# Patient Record
Sex: FEMALE | Race: WHITE | Employment: OTHER | URBAN - METROPOLITAN AREA
[De-identification: names, ages, dates, MRNs, and addresses within clinical notes are randomized per-mention and may not be internally consistent; named-entity substitution may affect disease eponyms.]

---

## 2017-02-24 ENCOUNTER — TRANSCRIBE ORDERS (OUTPATIENT)
Dept: ADMINISTRATIVE | Facility: HOSPITAL | Age: 72
End: 2017-02-24

## 2017-02-24 DIAGNOSIS — M25.562 LEFT KNEE PAIN, UNSPECIFIED CHRONICITY: Primary | ICD-10-CM

## 2017-03-02 ENCOUNTER — HOSPITAL ENCOUNTER (OUTPATIENT)
Dept: RADIOLOGY | Facility: HOSPITAL | Age: 72
Discharge: HOME | End: 2017-03-02
Attending: ORTHOPAEDIC SURGERY
Payer: MEDICARE

## 2017-03-02 DIAGNOSIS — M25.562 LEFT KNEE PAIN, UNSPECIFIED CHRONICITY: ICD-10-CM

## 2017-03-02 PROCEDURE — 78315 BONE IMAGING 3 PHASE: CPT

## 2017-03-02 PROCEDURE — A9503 TC99M MEDRONATE: HCPCS

## 2017-03-24 ENCOUNTER — APPOINTMENT (OUTPATIENT)
Dept: LAB | Facility: CLINIC | Age: 72
End: 2017-03-24
Payer: MEDICARE

## 2017-03-24 ENCOUNTER — GENERIC CONVERSION - ENCOUNTER (OUTPATIENT)
Dept: OTHER | Facility: OTHER | Age: 72
End: 2017-03-24

## 2017-03-24 DIAGNOSIS — R73.01 IMPAIRED FASTING GLUCOSE: ICD-10-CM

## 2017-03-24 DIAGNOSIS — Z79.899 OTHER LONG TERM (CURRENT) DRUG THERAPY: ICD-10-CM

## 2017-03-24 LAB
ALBUMIN SERPL BCP-MCNC: 4.1 G/DL (ref 3.5–5)
ALP SERPL-CCNC: 74 U/L (ref 46–116)
ALT SERPL W P-5'-P-CCNC: 25 U/L (ref 12–78)
ANION GAP SERPL CALCULATED.3IONS-SCNC: 4 MMOL/L (ref 4–13)
AST SERPL W P-5'-P-CCNC: 16 U/L (ref 5–45)
BILIRUB SERPL-MCNC: 0.98 MG/DL (ref 0.2–1)
BUN SERPL-MCNC: 18 MG/DL (ref 5–25)
CALCIUM SERPL-MCNC: 9.3 MG/DL (ref 8.3–10.1)
CHLORIDE SERPL-SCNC: 104 MMOL/L (ref 100–108)
CHOLEST SERPL-MCNC: 231 MG/DL (ref 50–200)
CO2 SERPL-SCNC: 34 MMOL/L (ref 21–32)
CREAT SERPL-MCNC: 0.89 MG/DL (ref 0.6–1.3)
EST. AVERAGE GLUCOSE BLD GHB EST-MCNC: 105 MG/DL
GFR SERPL CREATININE-BSD FRML MDRD: >60 ML/MIN/1.73SQ M
GLUCOSE P FAST SERPL-MCNC: 76 MG/DL (ref 65–99)
HBA1C MFR BLD: 5.3 % (ref 4.2–6.3)
HDLC SERPL-MCNC: 47 MG/DL (ref 40–60)
LDLC SERPL CALC-MCNC: 152 MG/DL (ref 0–100)
POTASSIUM SERPL-SCNC: 3.7 MMOL/L (ref 3.5–5.3)
PROT SERPL-MCNC: 7.4 G/DL (ref 6.4–8.2)
SODIUM SERPL-SCNC: 142 MMOL/L (ref 136–145)
TRIGL SERPL-MCNC: 161 MG/DL
TSH SERPL DL<=0.05 MIU/L-ACNC: 3.5 UIU/ML (ref 0.36–3.74)

## 2017-03-24 PROCEDURE — 83036 HEMOGLOBIN GLYCOSYLATED A1C: CPT

## 2017-03-24 PROCEDURE — 80061 LIPID PANEL: CPT

## 2017-03-24 PROCEDURE — 84443 ASSAY THYROID STIM HORMONE: CPT

## 2017-03-24 PROCEDURE — 80053 COMPREHEN METABOLIC PANEL: CPT

## 2017-03-24 PROCEDURE — 36415 COLL VENOUS BLD VENIPUNCTURE: CPT

## 2017-03-30 ENCOUNTER — ALLSCRIPTS OFFICE VISIT (OUTPATIENT)
Dept: OTHER | Facility: OTHER | Age: 72
End: 2017-03-30

## 2017-04-10 ENCOUNTER — GENERIC CONVERSION - ENCOUNTER (OUTPATIENT)
Dept: OTHER | Facility: OTHER | Age: 72
End: 2017-04-10

## 2017-07-20 ENCOUNTER — APPOINTMENT (OUTPATIENT)
Dept: AUDIOLOGY | Facility: CLINIC | Age: 72
End: 2017-07-20
Payer: COMMERCIAL

## 2017-07-25 ENCOUNTER — ALLSCRIPTS OFFICE VISIT (OUTPATIENT)
Dept: OTHER | Facility: OTHER | Age: 72
End: 2017-07-25

## 2017-09-11 ENCOUNTER — GENERIC CONVERSION - ENCOUNTER (OUTPATIENT)
Dept: OTHER | Facility: OTHER | Age: 72
End: 2017-09-11

## 2017-09-11 ENCOUNTER — APPOINTMENT (OUTPATIENT)
Dept: LAB | Facility: CLINIC | Age: 72
End: 2017-09-11
Payer: MEDICARE

## 2017-09-11 ENCOUNTER — TRANSCRIBE ORDERS (OUTPATIENT)
Dept: ADMINISTRATIVE | Facility: HOSPITAL | Age: 72
End: 2017-09-11

## 2017-09-11 DIAGNOSIS — Z12.31 ENCOUNTER FOR SCREENING MAMMOGRAM FOR MALIGNANT NEOPLASM OF BREAST: ICD-10-CM

## 2017-09-11 DIAGNOSIS — E78.5 HYPERLIPIDEMIA: ICD-10-CM

## 2017-09-11 DIAGNOSIS — Z12.31 VISIT FOR SCREENING MAMMOGRAM: Primary | ICD-10-CM

## 2017-09-11 LAB
ALBUMIN SERPL BCP-MCNC: 4.1 G/DL (ref 3.5–5)
ALP SERPL-CCNC: 88 U/L (ref 46–116)
ALT SERPL W P-5'-P-CCNC: 26 U/L (ref 12–78)
ANION GAP SERPL CALCULATED.3IONS-SCNC: 8 MMOL/L (ref 4–13)
AST SERPL W P-5'-P-CCNC: 20 U/L (ref 5–45)
BILIRUB SERPL-MCNC: 0.85 MG/DL (ref 0.2–1)
BUN SERPL-MCNC: 19 MG/DL (ref 5–25)
CALCIUM SERPL-MCNC: 9.2 MG/DL (ref 8.3–10.1)
CHLORIDE SERPL-SCNC: 102 MMOL/L (ref 100–108)
CHOLEST SERPL-MCNC: 214 MG/DL (ref 50–200)
CO2 SERPL-SCNC: 29 MMOL/L (ref 21–32)
CREAT SERPL-MCNC: 0.85 MG/DL (ref 0.6–1.3)
GFR SERPL CREATININE-BSD FRML MDRD: 69 ML/MIN/1.73SQ M
GLUCOSE P FAST SERPL-MCNC: 87 MG/DL (ref 65–99)
HDLC SERPL-MCNC: 44 MG/DL (ref 40–60)
LDLC SERPL CALC-MCNC: 148 MG/DL (ref 0–100)
POTASSIUM SERPL-SCNC: 3.8 MMOL/L (ref 3.5–5.3)
PROT SERPL-MCNC: 7.5 G/DL (ref 6.4–8.2)
SODIUM SERPL-SCNC: 139 MMOL/L (ref 136–145)
TRIGL SERPL-MCNC: 112 MG/DL

## 2017-09-11 PROCEDURE — 36415 COLL VENOUS BLD VENIPUNCTURE: CPT

## 2017-09-11 PROCEDURE — 80053 COMPREHEN METABOLIC PANEL: CPT

## 2017-09-11 PROCEDURE — 80061 LIPID PANEL: CPT

## 2017-10-12 ENCOUNTER — HOSPITAL ENCOUNTER (OUTPATIENT)
Dept: RADIOLOGY | Facility: HOSPITAL | Age: 72
Discharge: HOME | End: 2017-10-12
Payer: MEDICARE

## 2017-10-12 DIAGNOSIS — Z12.31 VISIT FOR SCREENING MAMMOGRAM: ICD-10-CM

## 2017-10-16 ENCOUNTER — ALLSCRIPTS OFFICE VISIT (OUTPATIENT)
Dept: OTHER | Facility: OTHER | Age: 72
End: 2017-10-16

## 2017-10-16 DIAGNOSIS — N64.4 MASTODYNIA: ICD-10-CM

## 2017-10-30 ENCOUNTER — GENERIC CONVERSION - ENCOUNTER (OUTPATIENT)
Dept: OTHER | Facility: OTHER | Age: 72
End: 2017-10-30

## 2017-10-30 ENCOUNTER — HOSPITAL ENCOUNTER (OUTPATIENT)
Dept: RADIOLOGY | Facility: HOSPITAL | Age: 72
Discharge: HOME | End: 2017-10-30
Payer: MEDICARE

## 2017-10-30 DIAGNOSIS — N64.4 MASTODYNIA: ICD-10-CM

## 2017-10-30 PROCEDURE — G0204 DX MAMMO INCL CAD BI: HCPCS

## 2017-10-30 PROCEDURE — G0279 TOMOSYNTHESIS, MAMMO: HCPCS

## 2018-01-09 NOTE — RESULT NOTES
Message   Appointment 3/30/17   Please print and put in my folder  Dr Moore       Verified Results  (1) COMPREHENSIVE METABOLIC PANEL 86XRJ3530 51:25WZ "[facility] Order Number: [id number]     Test Name Result Flag Reference   SODIUM 142 mmol/L  136-145   POTASSIUM 3 7 mmol/L  3 5-5 3   CHLORIDE 104 mmol/L  100-108   CARBON DIOXIDE 34 mmol/L H 21-32   ANION GAP (CALC) 4 mmol/L  4-13   BLOOD UREA NITROGEN 18 mg/dL  5-25   CREATININE 0 89 mg/dL  0 60-1 30   Standardized to IDMS reference method   CALCIUM 9 3 mg/dL  8 3-10 1   BILI, TOTAL 0 98 mg/dL  0 20-1 00   ALK PHOSPHATAS 74 U/L     ALT (SGPT) 25 U/L  12-78   AST(SGOT) 16 U/L  5-45   ALBUMIN 4 1 g/dL  3 5-5 0   TOTAL PROTEIN 7 4 g/dL  6 4-8 2   eGFR Non-African American      >60 0 ml/min/1 73sq m   - Patient Instructions: This is a fasting blood test  Water, black tea or black coffee only after 9:00pm the night before test Drink 2 glasses of water the morning of test - Patient Instructions: This bloodwork is non-fasting  Please drink two glasses of   water morning of bloodwork  National Kidney Disease Education Program recommendations are as follows:  GFR calculation is accurate only with a steady state creatinine  Chronic Kidney disease less than 60 ml/min/1 73 sq  meters  Kidney failure less than 15 ml/min/1 73 sq  meters  GLUCOSE FASTING 76 mg/dL  65-99     (1) HEMOGLOBIN A1C 36VZH6554 09:10AM "[facility] Order Number: [id number]     Test Name Result Flag Reference   HEMOGLOBIN A1C 5 3 %  4 2-6 3   EST  AVG  GLUCOSE 105 mg/dl       (1) LIPID PANEL FASTING W DIRECT LDL REFLEX [id number] 09:10AM "[facility] Order Number: [id number]     Test Name Result Flag Reference   CHOLESTEROL 231 mg/dL H    LDL CHOLESTEROL CALCULATED 152 mg/dL H 0-100   - Patient Instructions:  This is a fasting blood test  Water, black tea or black coffee only after 9:00pm the night before test   Drink 2 glasses of water the morning of test     - Patient Instructions: This is a fasting blood test  Water, black tea or black coffee only after 9:00pm the night before test Drink 2 glasses of water the morning of test - Patient Instructions: This bloodwork is non-fasting  Please drink two glasses of   water morning of bloodwork  Triglyceride:         Normal              <150 mg/dl       Borderline High    150-199 mg/dl       High               200-499 mg/dl       Very High          >499 mg/dl  Cholesterol:         Desirable        <200 mg/dl      Borderline High  200-239 mg/dl      High             >239 mg/dl  HDL Cholesterol:        High    >59 mg/dL      Low     <41 mg/dL  LDL Cholesterol:        Optimal          <100 mg/dl        Near Optimal     100-129 mg/dl        Above Optimal          Borderline High   130-159 mg/dl          High              160-189 mg/dl          Very High        >189 mg/dl  LDL CALCULATED:    This screening LDL is a calculated result  It does not have the accuracy of the Direct Measured LDL in the monitoring of patients with hyperlipidemia and/or statin therapy  Direct Measure LDL (NAQ479) must be ordered separately in these patients  TRIGLYCERIDES 161 mg/dL H <=150   Specimen collection should occur prior to N-Acetylcysteine or Metamizole administration due to the potential for falsely depressed results  HDL,DIRECT 47 mg/dL  40-60   Specimen collection should occur prior to Metamizole administration due to the potential for falsely depressed results  (1) TSH 48TVH8815 09:10AM Manoj Johnson Order Number: [id number]     Test Name Result Flag Reference   TSH 3 500 uIU/mL  0 358-3 740   - Patient Instructions: This bloodwork is non-fasting  Please drink two glasses of water morning of bloodwork  - Patient Instructions:  This is a fasting blood test  Water, black tea or black coffee only after 9:00pm the night before test Drink 2 glasses of water the morning of test - Patient Instructions: This bloodwork is non-fasting  Please drink two glasses of   water morning of bloodwork  Patients undergoing fluorescein dye angiography may retain small amounts of fluorescein in the body for 48-72 hours post procedure  Samples containing fluorescein can produce falsely depressed TSH values  If the patient had this procedure,a specimen should be resubmitted post fluorescein clearance            The recommended reference ranges for TSH during pregnancy are as follows:  First trimester 0 1 to 2 5 uIU/mL  Second trimester  0 2 to 3 0 uIU/mL  Third trimester 0 3 to 3 0 uIU/m

## 2018-01-09 NOTE — MISCELLANEOUS
To Whom It May Concern:    Kathleen Parker has Sjogren's syndrome and shortness of breath that is not controlled  She is currently unable to travel safely by air  Please allow her to cancel her upcoming flight on 6/10/16         Thank you,              Electronically signed by:Ally Reddy DO  May 20 2016 11:38AM EST Author

## 2018-01-10 NOTE — PROGRESS NOTES
Assessment    1  Sjogren's syndrome (710 2) (M35 00)   2  Primary generalized (osteo)arthritis (715 09) (M15 0)   3  NSAID long-term use (V58 64) (Z79 1)   4  GERD without esophagitis (530 81) (K21 9)   5  Asthma, mild persistent (493 90) (J45 30)    Plan    1  Hydroxychloroquine Sulfate 200 MG Oral Tablet; take 1 tablet twice daily after meals   2  (1) CBC/PLT/DIFF; Status:Active; Requested for:98Wjn1433;    3  (1) COMPREHENSIVE METABOLIC PANEL; Status:Active; Requested for:46Blb2119;    4  (1) C-REACTIVE PROTEIN; Status:Active; Requested for:62Cdc0444;    5  (1) SED RATE; Status:Active; Requested for:17Kjc0704;    6  Follow-up visit in 2 months Evaluation and Treatment  Follow-up  Status: Hold For -   Scheduling  Requested for: 20OVE9417    7  Call [phone] if: The pain seems worse ; Status:Complete;   Done: 23UQP0891   8  Call [phone] if: The symptoms seem worse ; Status:Complete;   Done:   78PLJ6629   9  Call [phone] if: You have questions or concerns about your problem ;   Status:Complete;   Done: 73PKY7686    Discussion/Summary    Ms Partha Flynn did undergo a left total knee arthroplasty since her last evaluation, and ago she has been progressing well in physical therapy, she still continues to have significant pain in her left knee which is worse than prior to surgery  She had the surgery proximally 2 months ago  She does continue to have some difficulty walking because of her pain, as well as worsening swelling in her legs and worsening acid reflux  She does feel that inclement weather seems to worsen her pain  She has been taking aspirin as well as Tylenol for her pain with some good relief  She also complains of pain in her shoulders, feet, right hip, and lower back  She states that her pain is constant, but the intensity varies  She has noted swelling in her hands  She also reports significant shortness of breath especially with exertion   She does report morning stiffness which typically worsens throughout the day  She also reports difficulty sleeping because of the pain and fatigue, but she denies any nonrestorative sleep  On exam, there is synovitis of the MCPs and PIPs of the hands, as well as the bilateral ankles  There is significant tenderness to palpation of mildly increased warmth of the left knee which is status post TKA  She also has crepitus of the right knee  No new labs were available for review today  At this time, her Sjogren's syndrome does appear mildly active and uncontrolled off of any DMARD therapy  We did discuss several treatment options, and we have opted to restart Plaquenil 200 mg twice a day  She is aware of the need for an annual eye exam while taking this medication  Hopefully this will limit her need to take aspirin for her discomfort, as she is having significant reflux symptoms  I will reevaluate her in 2 months  She will call in the interim if there are any questions or concerns  Counseling  Rheumatology Counseling Documentation: The patient was counseled regarding instructions for management and impressions  Chief Complaint  F/U Sjogren's and OA   Patient is here today for follow up of chronic conditions described in HPI  History of Present Illness  Pt  returns for F/U for Sjogren's and OA  Had L TKA since last visit  Progressing well in PT  Still with pain in L, but worse than before surgery  Just had surgery 2 months ago  Still with some difficulty walking due to pain  c/o swelling in legs and worsening GERD  Inclement weather worsens pain  Taking ASA and Tylenol for pain with good relief  Also with pain in shoulders, feet, R hip, and lower back  Pain is constant but intensity varies  + swelling in hands as well  Also with SOB especially with exertion  + AM stiffness which worsens throughout day  + difficulty sleeping due to pain  No non-restorative sleep  + fatigue       RAPID3: 7 0/30      Review of Systems    Constitutional: fatigue and recent 13 lb weight loss, but no fever, no recent weight gain, no chills and no anorexia  HEENT: dryness mouth, but no blurred vision, no double vision, no amaurosis fugax, no erythema eye(s), no mouth sores, not feeling congested and no sore throat    The patient presents with complaints of dryness of the eyes  Symptoms are improving  The patient presents with complaints of bilateral eye pain, described as burning  Cardiovascular: dyspnea on exertion and swelling in the arms or legs    The patient presents with complaints of chest pain or pressure (a/w GERD)  Respiratory: shortness of breath, but no pleurisy    The patient presents with complaints of unusual or persistent cough, described as dry  Gastrointestinal: heartburn and diarrhea, but no vomiting, no constipation, no melena and no BRBPR    The patient presents with complaints of mild epigastric abdominal pain  The patient presents with complaints of occasional episodes of nausea  Genitourinary: no foamy urine, but no dysuria and no hematuria  Musculoskeletal: as noted in HPI  Integumentary alopecia and nail changes, but no rash, no Raynaud's and no photosensitivity  Endocrine no polyuria and no polydipsia  Hematologic/Lymphatic: no unusual bleeding and no tendency for easy bruising  Neurological: tingling and weakness, but no headache    The patient presents with complaints of occasional episodes of vertigo or dizziness, described as loss of balance  Symptoms are made worse by turning head  Psychiatric: insomnia, but no non-restorative sleep  Active Problems    1  Asthma, mild persistent (493 90) (J45 30)   2  Body mass index (BMI) of 33 0-33 9 in adult (V85 33) (N81 17)   3  Edema (782 3) (R60 9)   4  Exertional shortness of breath (786 05) (R06 02)   5  Fatigue (780 79) (R53 83)   6  GERD without esophagitis (530 81) (K21 9)   7  Impaired fasting glucose (790 21) (R73 01)   8  Long term use of drug (V58 69) (Z79 899)   9  NSAID long-term use (V58 64) (Z79 1)   10  Palpitations (785 1) (R00 2)   11  Plantar fasciitis, right (728 71) (M72 2)   12  Pre-operative cardiovascular examination (V72 81) (Z01 810)   13  Primary generalized (osteo)arthritis (715 09) (M15 0)   14  Sjogren's syndrome (710 2) (M35 00)    Past Medical History    1  History of Acute UTI (599 0) (N39 0)   2  History of Encounter for screening mammogram for malignant neoplasm of breast   (V76 12) (Z12 31)   3  History of Generalized osteoarthritis (715 00) (M15 9)   4  History of Hearing problem, unspecified laterality (V41 2) (H91 90)   5  History of cataract (V12 49) (Z86 69)   6  History of gastroesophageal reflux (GERD) (V12 79) (Z87 19)   7  History of high cholesterol (V12 29) (Z86 39)   8  History of polyarthritis (V13 4) (Z87 39)   9  History of Long-term use of hydroxychloroquine (V58 69) (Z79 899)   10  Need for pneumococcal vaccination (V03 82) (Z23)   11  Preop examination (V72 84) (Z01 818)   12  History of Screening for thyroid disorder (V77 0) (Z13 29)   13  Sjogren's syndrome (710 2) (M35 00)   14  History of SS-A antibody positive (795 79) (R76 8)    The active problems and past medical history were reviewed and updated today  Surgical History    1  History of Anterior Colporrhaphy, Repair Of Cystocele   2  History of Appendectomy   3  History of Bladder Surgery   4  History of Breast Surgery Enlargement Procedure Elective Bilateral   5  History of Colonoscopy (Fiberoptic) Screening   6  History of Hysterectomy   7  History of Knee Arthroplasty   8  History of Knee Surgery Left   9  History of Oophorectomy - Bilateral (Removal Of Both Ovaries)   10  History of Tonsillectomy With Adenoidectomy   11  History of Urethra Surgery    The surgical history was reviewed and updated today  Family History  Mother    1  Family history of CAD (coronary artery disease)   2  Family history of chronic obstructive pulmonary disease (V17 6) (Z82 5)   3  Family history of hypertension (V17 49) (Z82 49)   4  Family history of High cholesterol  Father    5  Family history of CAD (coronary artery disease)   6  Family history of arthritis (V17 7) (Z82 61)   7  Family history of chronic obstructive pulmonary disease (V17 6) (Z82 5)   8  Family history of hypertension (V17 49) (Z82 49)   9  Family history of High cholesterol  Brother    10  Family history of CAD (coronary artery disease)   6  Family history of hypertension (V17 49) (Z82 49)   12  Family history of lung cancer (V16 1) (Z80 1)   13  Family history of High cholesterol  Maternal Grandmother    14  Family history of diabetes mellitus (V18 0) (Z83 3)   15  Family history of malignant neoplasm of breast (V16 3) (Z80 3)  Paternal Grandmother    12  Family history of malignant neoplasm of breast (V16 3) (Z80 3)  Family History    17  Family history of arthritis (V17 7) (Z82 61)   18  Family history of asthma (V17 5) (Z82 5)   19  Denied: Family history of Crohn's disease   21  Denied: Family history of psoriasis   21  Denied: Family history of rheumatoid arthritis   22  Denied: Family history of systemic lupus erythematosus   23  Denied: Family history of ulcerative colitis    The family history was reviewed and updated today  Social History    · Lack of adequate sleep (V69 4) (Z72 820)   · Lack of exercise (V69 0) (Z72 3)   ·    · Never smoker   · No drug use   · Occasional alcohol use   · Retired  The social history was reviewed and updated today  The social history was reviewed and is unchanged  Current Meds   1  Aspirin  MG Oral Tablet Delayed Release; take 2 tablet twice daily; Therapy: (Recorded:33Xic8012) to Recorded   2  B Complete TABS; TAKE 1 TABLET DAILY; Therapy: ([phone]) to Recorded   3  Colace 100 MG Oral Capsule; TAKE 1 CAPSULE DAILY; Therapy: ([phone]) to Recorded   4  Cranberry CAPS; take 1 capsule daily;    Therapy: ([phone]) to Recorded   5  Flovent  MCG/ACT Inhalation Aerosol; INHALE 2 PUFFS TWICE DAILY  RINSE   MOUTH AFTER USE  Requested for: 48QDT5867; Last Rx:94Mls0120 Ordered   6  Furosemide 40 MG Oral Tablet; TAKE 1 TABLET DAILY; Therapy: 03Tmq7878 to (Evaluate:17Jan2016); Last Rx:19Oct2015 Ordered   7  Hydrocodone-Acetaminophen 5-325 MG Oral Tablet; TAKE 1 TABLET 3 times daily PRN   pain; Therapy: (Recorded:17May2016) to Recorded   8  Klor-Con 10 10 MEQ Oral Tablet Extended Release; One a day  Requested for:   28RNE5403; Last Rx:19Oct2015 Ordered   9  Magnesium CAPS; take 1 capsule daily; Therapy: (Cary Seed) to Recorded   10  Multivitamins TABS; TAKE 1 TABLET DAILY; Therapy: (Cary Seed) to Recorded   11  ProAir  (90 Base) MCG/ACT Inhalation Aerosol Solution; 2 puffs Q4 hours prn    SOB/wheeze; Therapy: 77NNA8576 to (Last Rx:29May2015)  Requested for: 05TNW1801 Ordered   12  Probiotic Oral Capsule; take 1 capsule daily; Therapy: (Cary Seed) to Recorded   13  Restasis 0 05 % Ophthalmic Emulsion; INSTILL 1 DROP IN EACH EYE TWICE DAILY    Recorded   14  Tylenol Extra Strength 500 MG TABS; TAKE 2 TABLET Twice daily PRN pain; Therapy: (Recorded:17May2016) to Recorded   15  Vitamin D3 CAPS; take 1 capsule daily; Therapy: (Cary Seed) to Recorded    The medication list was reviewed and updated today  Immunizations  Influenza --- Kisha Shirley: 77ROH4942   Pneumococcal --- Kisha Shirley: 47QVP0232     Allergies    1  CeleBREX CAPS    Vitals  Signs [Data Includes: Current Encounter]   Recorded: 77URY5714 01:24PM   Heart Rate: 60  Systolic: 359  Diastolic: 82  Weight: 658 lb   BMI Calculated: 31 8  BSA Calculated: 1 99    Physical Exam    Constitutional   General appearance: Abnormal   obese  Eyes   Conjunctiva and lids: No swelling, erythema or discharge  Pupils and irises: Equal, round and reactive to light      Ears, Nose, Mouth, and Throat   External inspection of ears and nose: Normal     Oropharynx: Abnormal   Oral mucosa was dry  Pulmonary   Respiratory effort: No increased work of breathing or signs of respiratory distress  Auscultation of lungs: Clear to auscultation  Cardiovascular   Auscultation of heart: Normal rate and rhythm, normal S1 and S2, without murmurs  Examination of extremities for edema and/or varicosities: Normal     Lymphatic   Palpation of lymph nodes in neck: No lymphadenopathy  Psychiatric   Orientation to person, place, and time: Normal     Mood and affect: Normal         Right glenohumeral joint tenderness  Left glenohumeral joint tenderness  Right great toe MTP tenderness  Left knee tenderness and restricted ROM  Right Upper Extremity: Right Hand: Right Hand Appearance: + OA changes 1st MTP  Right Wrist: Right Elbow: Right Shoulder:   Left Upper Extremity: Left Hand: Appearance: + OA changes 1st MTP  Left Wrist: Left Elbow: Left Shoulder:   Musculoskeletal - Joints, bones, and muscles: Abnormal  Palpation - right knee crepitus and L TKA  Skin - Skin and subcutaneous tissue: Normal    Neurologic - Sensation: Normal      The patient has tenderness in all PIP and DIP joints of the right hand  The patient has tenderness in all PIP and DIP joints of the left hand        Future Appointments    Date/Time Provider Specialty Site   05/19/2016 09:30 AM Tracee Julien [address] [place] [address]   06/27/2016 09:15 AM Tracee Julien [address] [place] [address]   07/18/2016 02:40 PM Renu Barrios DO Rheumatology [facility] [address] [facility]     Signatures   Electronically signed by : Kvng Escobar DO; May 17 2016  6:57PM EST                       (Author)

## 2018-01-10 NOTE — RESULT NOTES
Verified Results  * MAMMO SCREENING BILATERAL W CAD 40UFM0677 10:38AM [facility] Order Number: [id number]    - Patient Instructions: To schedule this appointment, please contact Central Scheduling at [phone]  Do not wear any perfume, powder, lotion or deodorant on breast or underarm area  Please bring your doctors order, referral (if needed) and insurance information with you on the day of the test  Failure to bring this information may result in this test being rescheduled  Arrive 15 minutes prior to your appointment time to register  On the day of your test, please bring any prior mammogram or breast studies with you that were not performed at a [facility]  Failure to bring prior exams may result in your test needing to be rescheduled  TW Order Number: [id number]    - Patient Instructions: To schedule this appointment, please contact Central Scheduling at [phone]  Do not wear any perfume, powder, lotion or deodorant on breast or underarm area  Please bring your doctors order, referral (if needed) and insurance information with you on the day of the test  Failure to bring this information may result in this test being rescheduled  Arrive 15 minutes prior to your appointment time to register  On the day of your test, please bring any prior mammogram or breast studies with you that were not performed at a [facility]  Failure to bring prior exams may result in your test needing to be rescheduled  TW Order Number: [id number]    - Patient Instructions: To schedule this appointment, please contact Central Scheduling at [phone]  Do not wear any perfume, powder, lotion or deodorant on breast or underarm area  Please bring your doctors order, referral (if needed) and insurance information with you on the day of the test  Failure to bring this information may result in this test being rescheduled   Arrive 15 minutes prior to your appointment time to register  On the day of your test, please bring any prior mammogram or breast studies with you that were not performed at a [facility]  Failure to bring prior exams may result in your test needing to be rescheduled  Test Name Result Flag Reference   MAMMO SCREENING BILATERAL W CAD (Report)     Patient History:   Patient is postmenopausal    Family history of unknown cancer in paternal grandmother at age    48 or over and breast cancer in maternal grandmother at age 48 or   over  Reductions of both breasts, May 5, 1998  Taking unspecified hormones for 18 years  Patient has never smoked  Patient's BMI is 30 3  Reason for exam: screening (asymptomatic)  Mammo Screening Bilateral W CAD: September 9, 2016 - Check In #:    [de-identified]   Bilateral CC and MLO view(s) were taken  Technologist: ILIANA Bergman   Prior study comparison: June 24, 2015, bilateral screening    mammogram  June 16, 2014, bilateral screening mammogram      The breast tissue is almost entirely fat  No new dominant soft    tissue mass, architectural distortion or suspicious    calcifications are noted  The skin and nipple structures are    within normal limits  Benign appearing calcifications are noted  No mammographic evidence of malignancy  No    significant changes when compared with prior studies  ASSESSMENT: BiRad:2 - Benign     Recommendation:   Routine screening mammogram of both breasts in 1 year  Analyzed by CAD     8-10% of cancers will be missed on mammography  Management of a    palpable abnormality must be based on clinical grounds  Patients   will be notified of their results via letter from our facility  Accredited by [employer] of [employer] and FDA       Transcription Location: L[facility] Doll [facility]: [id number]     Risk Value(s):   Tyrer-Cuzick 10 Year: 5 832%, Tyrer-Cuzick Lifetime: 8 465%,    Myriad Table: 1 5%, KATHLEEN 5 Year: 1 4%, NCI Lifetime: 4 0%   Signed by: Kaela Reyes MD   9/9/16       Discussion/Summary   Key Vides,   Your mammogram is normal    Dr Tay Sigala

## 2018-01-12 VITALS
HEART RATE: 72 BPM | WEIGHT: 199 LBS | DIASTOLIC BLOOD PRESSURE: 78 MMHG | BODY MASS INDEX: 31.98 KG/M2 | RESPIRATION RATE: 16 BRPM | SYSTOLIC BLOOD PRESSURE: 120 MMHG | TEMPERATURE: 97.4 F | HEIGHT: 66 IN

## 2018-01-12 VITALS
SYSTOLIC BLOOD PRESSURE: 110 MMHG | DIASTOLIC BLOOD PRESSURE: 80 MMHG | BODY MASS INDEX: 31.85 KG/M2 | HEART RATE: 70 BPM | OXYGEN SATURATION: 97 % | WEIGHT: 198.19 LBS | HEIGHT: 66 IN

## 2018-01-12 NOTE — PROGRESS NOTES
Assessment    1  Sjogren's syndrome (710 2) (M35 00)   2  Primary generalized (osteo)arthritis (715 09) (M15 0)   3  NSAID long-term use (V58 64) (Z79 1)   4  GERD without esophagitis (530 81) (K21 9)   5  Asthma, mild persistent (493 90) (J45 30)    Plan    1  Call [phone] if: The pain seems worse ; Status:Complete;   Done: 16CBK6949   2  Call [phone] if: The symptoms seem worse ; Status:Complete;   Done:   54WUS5538   3  Call [phone] if: You have questions or concerns about your problem ;   Status:Complete;   Done: 92PHB7339   4  Follow-up visit in 3 months Evaluation and Treatment  Follow-up  Status: Complete    Done: 87DXM0504    Discussion/Summary    Ms Shin Henriquez has been feeling generally about the same since her last evaluation  She has continue to have significant amounts of bilateral knee pain, left more so than right  She was seen by Orthopedics, and she is scheduled to undergo a left total knee arthroplasty in March  She also continues to have some discomfort in her bilateral hands and feet  She has been taking aspirin 650 mg twice a day with some good relief  She does report some occasional swelling in her hands and her feet, as well as her knees  On exam, there is no active synovitis  She does have crepitus of the bilateral knees, as well as osteoarthritic changes in the hands  No new labs were available for review today  At this time, her Sjogren's syndrome does appear mildly active but relatively stable  I believe the majority of her symptoms are due to her primary generalized osteoarthritis at this time  She did express some interest in possibly retrying the Plaquenil at some point when she is healed from her surgery  We will continue the aspirin as needed for now, but this will need to be stopped perioperatively  I will plan to reevaluate her in 2 months  She will call in the interim if there are any questions or concerns        Counseling  Rheumatology Counseling Documentation: The patient was counseled regarding instructions for management, impressions and risks and benefits of treatment options  Chief Complaint  F/U Sjogren's and OA   Patient is here today for follow up of chronic conditions described in HPI  History of Present Illness  Pt  returns for F/U for Sjogren's and OA  Feeling better since last visit as long as she takes ASA BID  Still with pain in hands, knees, and feet  Also with mild R hip pain  Had worsening L knee pain over the holidays, which caused increased R knee pain  To have L TKA in March  + swelling in knees and hands  + AM stiffness x 30-60 minutes  + difficulty sleeping due to pain  + non-restorative sleep  + occasional fatigue  Had flu vaccine  Had pneumonia and shingles vaccines  RAPID3: not completed      Review of Systems    Constitutional: recent 3-5 lb weight gain and chills, but no fever, no recent weight loss and no anorexia    The patient presents with complaints of occasional episodes of fatigue  HEENT: dryness mouth, but no blurred vision, no double vision, no amaurosis fugax, no eye pain, no erythema eye(s), no mouth sores, not feeling congested and no sore throat    The patient presents with complaints of dryness of the eyes  Symptoms are improved by prescription eye drops  Cardiovascular: swelling in the arms or legs, but no dyspnea on exertion    The patient presents with complaints of epigastric chest pain or pressure (a/w too much ASA)  Respiratory: no shortness of breath and no pleurisy    The patient presents with complaints of constant episodes of unusual or persistent cough, described as dry  Gastrointestinal: heartburn and BRBPR, but no abdominal pain, no vomiting, no diarrhea, no constipation and no melena    The patient presents with complaints of occasional episodes of nausea (2/2 meds)  Genitourinary: no foamy urine, but no dysuria and no hematuria  Musculoskeletal: as noted in HPI     Integumentary no rash, no Raynaud's, no alopecia, no nail changes and no photosensitivity  Endocrine no polyuria and no polydipsia  Hematologic/Lymphatic: no unusual bleeding and no tendency for easy bruising  Neurological: headache and weakness, but no vertigo or dizziness and no tingling  Psychiatric: insomnia and non-restorative sleep  Active Problems    1  Asthma, mild persistent (493 90) (J45 30)   2  Edema (782 3) (R60 9)   3  Exertional shortness of breath (786 05) (R06 02)   4  Fatigue (780 79) (R53 83)   5  GERD without esophagitis (530 81) (K21 9)   6  Impaired fasting glucose (790 21) (R73 01)   7  Long term use of drug (V58 69) (Z79 899)   8  NSAID long-term use (V58 64) (Z79 1)   9  Palpitations (785 1) (R00 2)   10  Plantar fasciitis, right (728 71) (M72 2)   11  Pre-operative cardiovascular examination (V72 81) (Z01 810)   12  Primary generalized (osteo)arthritis (715 09) (M15 0)   13  Sjogren's syndrome (710 2) (M35 00)    Past Medical History    1  History of Acute UTI (599 0) (N39 0)   2  History of Encounter for screening mammogram for malignant neoplasm of breast   (V76 12) (Z12 31)   3  History of Generalized osteoarthritis (715 00) (M15 9)   4  History of Hearing problem, unspecified laterality (V41 2) (H91 90)   5  History of cataract (V12 49) (Z86 69)   6  History of gastroesophageal reflux (GERD) (V12 79) (Z87 19)   7  History of high cholesterol (V12 29) (Z86 39)   8  History of polyarthritis (V13 4) (Z87 39)   9  History of Long-term use of hydroxychloroquine (V58 69) (Z79 899)   10  Need for pneumococcal vaccination (V03 82) (Z23)   11  History of Screening for thyroid disorder (V77 0) (Z13 29)   12  Sjogren's syndrome (710 2) (M35 00)   13  History of SS-A antibody positive (795 79) (R76 0)    The active problems and past medical history were reviewed and updated today  Surgical History    1  History of Anterior Colporrhaphy, Repair Of Cystocele   2  History of Appendectomy   3   History of Bladder Surgery   4  History of Breast Surgery Enlargement Procedure Elective Bilateral   5  History of Colonoscopy (Fiberoptic) Screening   6  History of Hysterectomy   7  History of Knee Surgery Left   8  History of Oophorectomy - Bilateral (Removal Of Both Ovaries)   9  History of Tonsillectomy With Adenoidectomy   10  History of Urethra Surgery    The surgical history was reviewed and updated today  Family History    1  Family history of CAD (coronary artery disease)   2  Family history of chronic obstructive pulmonary disease (V17 6) (Z82 5)   3  Family history of hypertension (V17 49) (Z82 49)   4  Family history of High cholesterol    5  Family history of CAD (coronary artery disease)   6  Family history of arthritis (V17 7) (Z82 61)   7  Family history of chronic obstructive pulmonary disease (V17 6) (Z82 5)   8  Family history of hypertension (V17 49) (Z82 49)   9  Family history of High cholesterol    10  Family history of CAD (coronary artery disease)   6  Family history of hypertension (V17 49) (Z82 49)   12  Family history of lung cancer (V16 1) (Z80 1)   13  Family history of High cholesterol    14  Family history of diabetes mellitus (V18 0) (Z83 3)   15  Family history of malignant neoplasm of breast (V16 3) (Z80 3)    16  Family history of malignant neoplasm of breast (V16 3) (Z80 3)    17  Family history of arthritis (V17 7) (Z82 61)   18  Family history of asthma (V17 5) (Z82 5)   19  Denied: Family history of Crohn's disease   21  Denied: Family history of psoriasis   21  Denied: Family history of rheumatoid arthritis   22  Denied: Family history of systemic lupus erythematosus   23  Denied: Family history of ulcerative colitis    The family history was reviewed and updated today         Social History    · Lack of adequate sleep (V69 4) (Z72 820)   · Lack of exercise (V69 0) (Z72 3)   ·    · Never smoker   · No drug use   · Occasional alcohol use   · Retired  The social history was reviewed and updated today  The social history was reviewed and is unchanged  Current Meds   1  Acetaminophen-Codeine #3 300-30 MG Oral Tablet; as directed; Last Rx:69San3832   Ordered   2  Aspirin  MG Oral Tablet Delayed Release; take 2 tablet twice daily; Therapy: (Recorded:72Rdt7244) to Recorded   3  B Complete TABS; TAKE 1 TABLET DAILY; Therapy: (Adrienne Morehouse) to Recorded   4  Colace 100 MG Oral Capsule; TAKE 1 CAPSULE DAILY; Therapy: (Adrienne Morehouse) to Recorded   5  Cranberry CAPS; take 1 capsule daily; Therapy: (Adrienne Morehouse) to Recorded   6  Flovent  MCG/ACT Inhalation Aerosol; INHALE 2 PUFFS TWICE DAILY  RINSE   MOUTH AFTER USE Recorded   7  Furosemide 40 MG Oral Tablet; TAKE 1 TABLET DAILY; Therapy: 38Arj4917 to (Evaluate:17Jan2016); Last Rx:19Oct2015 Ordered   8  Klor-Con 10 10 MEQ Oral Tablet Extended Release; One a day  Requested for:   31NGS4409; Last Rx:19Oct2015 Ordered   9  Magnesium CAPS; take 1 capsule daily; Therapy: (Adrienne Morehouse) to Recorded   10  Multivitamins TABS; TAKE 1 TABLET DAILY; Therapy: (Adrienne Morehouse) to Recorded   11  Omeprazole 20 MG Oral Capsule Delayed Release; take 1 capsule daily; Therapy: 14EUZ7328 to (Evaluate:18May2016)  Requested for: 92HPC3423; Last    Rx:20Nov2015 Ordered   12  ProAir  (90 Base) MCG/ACT Inhalation Aerosol Solution; 2 puffs Q4 hours prn    SOB/wheeze; Therapy: 51XIR9767 to (Last Rx:53Djs8919)  Requested for: 68SVE0887 Ordered   13  Probiotic Oral Capsule; take 1 capsule daily; Therapy: (Adrienne Morehouse) to Recorded   14  Restasis 0 05 % Ophthalmic Emulsion; INSTILL 1 DROP IN EACH EYE TWICE DAILY    Recorded   15  Vitamin D3 CAPS; take 1 capsule daily; Therapy: (Adrienne Morehouse) to Recorded    The medication list was reviewed and updated today  Immunizations  Influenza --- Power Burrell: 39OYH7390   Pneumococcal --- [place] Burrell: 39JPN1994     Allergies    1   CeleBREX CAPS    Vitals  Signs [Data Includes: Current Encounter]   Recorded: 47YWF9414 04:29PM   Heart Rate: 80  Systolic: 672  Diastolic: 74  Weight: 943 lb   BMI Calculated: 33 9  BSA Calculated: 2 04    Physical Exam    Constitutional   General appearance: Abnormal   obese  Eyes   Conjunctiva and lids: No swelling, erythema or discharge  Pupils and irises: Equal, round and reactive to light  Ears, Nose, Mouth, and Throat   External inspection of ears and nose: Normal     Oropharynx: Abnormal   Oral mucosa was dry  Pulmonary   Respiratory effort: No increased work of breathing or signs of respiratory distress  Auscultation of lungs: Clear to auscultation  Cardiovascular   Auscultation of heart: Normal rate and rhythm, normal S1 and S2, without murmurs  Examination of extremities for edema and/or varicosities: Normal     Lymphatic   Palpation of lymph nodes in neck: No lymphadenopathy  Psychiatric   Orientation to person, place, and time: Normal     Mood and affect: Normal         Right glenohumeral joint tenderness  Left glenohumeral joint tenderness  Right great toe MTP tenderness  Right Upper Extremity: Right Hand: Right Hand Appearance: + OA changes 1st MTP  Right Wrist: Right Elbow: Right Shoulder:   Left Upper Extremity: Left Hand: Appearance: + OA changes 1st MTP  Left Wrist: Left Elbow: Left Shoulder:   Musculoskeletal - Joints, bones, and muscles: Abnormal  Palpation - bilateral knee crepitus  Skin - Skin and subcutaneous tissue: Normal    Neurologic - Sensation: Normal      The patient has tenderness in all PIP and DIP joints of the right hand  The patient has tenderness in all PIP and DIP joints of the left hand        Future Appointments    Date/Time Provider Specialty Site   03/02/2016 10:00 AM Gray Dangelo DO Cardiology [facility]   03/01/2016 11:00 AM Janee Ludwig [address] [place] [address]   06/27/2016 09:15 AM Janee Ludwig DO Family [address]   05/17/2016 01:20 PM Nereyda Azar DO Rheumatology [address] [facility]     Signatures   Electronically signed by : Tonya Key DO; Feb 8 2016  6:34PM EST                       (Author)

## 2018-01-13 VITALS
SYSTOLIC BLOOD PRESSURE: 110 MMHG | WEIGHT: 197 LBS | TEMPERATURE: 97.3 F | HEART RATE: 76 BPM | DIASTOLIC BLOOD PRESSURE: 70 MMHG | BODY MASS INDEX: 31.66 KG/M2 | RESPIRATION RATE: 16 BRPM | HEIGHT: 66 IN

## 2018-01-13 NOTE — RESULT NOTES
Message   Appointment 6/27/16   Please print and put in my folder   Dr Primo Allen     Verified Results  (1) HEMOGLOBIN A1C 42NVA7170 10:25AM [employer]     Test Name Result Flag Reference   HEMOGLOBIN A1C 5 1 %  4 2-6 3   EST  AVG  GLUCOSE 100 mg/dl       (1) COMPREHENSIVE METABOLIC PANEL 64TOC1291 19:66[employer]G [employer]     Test Name Result Flag Reference   GLUCOSE,RANDM 88 mg/dL     If the patient is fasting, the ADA then defines impaired fasting glucose as > 100 mg/dL and diabetes as > or equal to 123 mg/dL  SODIUM 139 mmol/L  136-145   POTASSIUM 3 9 mmol/L  3 5-5 3   CHLORIDE 103 mmol/L  100-108   CARBON DIOXIDE 32 mmol/L  21-32   ANION GAP (CALC) 4 mmol/L  4-13   BLOOD UREA NITROGEN 17 mg/dL  5-25   CREATININE 0 84 mg/dL  0 60-1 30   Standardized to IDMS reference method   CALCIUM 9 5 mg/dL  8 3-10 1   BILI, TOTAL 0 79 mg/dL  0 20-1 00   ALK PHOSPHATAS 71 U/L     ALT (SGPT) 24 U/L  12-78   AST(SGOT) 21 U/L  5-45   ALBUMIN 4 3 g/dL  3 5-5 0   TOTAL PROTEIN 7 5 g/dL  6 4-8 2   eGFR Non-African American      >60 0 ml/min/1 73sq m   [facility] Disease Education Program recommendations are as follows:  GFR calculation is accurate only with a steady state creatinine  Chronic Kidney disease less than 60 ml/min/1 73 sq  meters  Kidney failure less than 15 ml/min/1 73 sq  meters  (1) LIPID PANEL, FASTING 67OFW9976 10:25AM [employer]     Test Name Result Flag Reference   CHOLESTEROL 186 mg/dL     HDL,DIRECT 42 mg/dL  40-60   Specimen collection should occur prior to Metamizole administration due to the potential for falsely depressed results     LDL CHOLESTEROL CALCULATED 117 mg/dL H 0-100   Triglyceride:         Normal              <150 mg/dl       Borderline High    150-199 mg/dl       High               200-499 mg/dl       Very High          >499 mg/dl  Cholesterol:         Desirable        <200 mg/dl      Borderline High  200-239 mg/dl      High             >239 mg/dl  HDL Cholesterol: High    >59 mg/dL      Low     <41 mg/dL  LDL CALCULATED:    This screening LDL is a calculated result  It does not have the accuracy of the Direct Measured LDL in the monitoring of patients with hyperlipidemia and/or statin therapy  Direct Measure LDL (SDP830) must be ordered separately in these patients  TRIGLYCERIDES 133 mg/dL  <=150   Specimen collection should occur prior to N-Acetylcysteine or Metamizole administration due to the potential for falsely depressed results

## 2018-01-15 NOTE — RESULT NOTES
Verified Results  MAMMO DIAGNOSTIC BILATERAL W 3D & CAD 75WCM0372 09:25AM [place] Order Number: [id number]    - Patient Instructions: To schedule this appointment, please contact Central Scheduling at [phone]  Do not wear any perfume, powder, lotion or deodorant on breast or underarm area  Please bring your doctors order, referral (if needed) and insurance information with you on the day of the test  Failure to bring this information may result in this test being rescheduled  Arrive 15 minutes prior to your appointment time to register  On the day of your test, please bring any prior mammogram or breast studies with you that were not performed at a [facility]  Failure to bring prior exams may result in your test needing to be rescheduled  Test Name Result Flag Reference   MAMMO DIAGNOSTIC BILATERAL W 3D & CAD (Report)     Patient History:   Patient is postmenopausal    Family history of breast cancer at age 48 or over in maternal    grandmother, unknown cancer at age 48 or over in paternal    grandmother  Reductions of both breasts, May 5, 1998  Taking unspecified hormones for 18 years  Patient has never smoked  Patient's BMI is 31 6  Reason for exam: clinical finding  Mammo Diagnostic Bilateral W DBT and CAD: October 30, 2017 -    Check In #: [de-identified]   2D/3D Procedure   3D views: Bilateral MLO and CC view(s) were taken  2D views: Bilateral MLO and CC view(s) were taken  Technologist: Lauren Cruz   Prior study comparison: September 9, 2016, mammo screening    bilateral W CAD performed at [address]  June 24, 2015, bilateral screening mammogram  June 16, 2014,    bilateral screening mammogram  June 12, 2013, bilateral    screening mammogram  March 5, 2012, bilateral screening    mammogram      The breast tissue is almost entirely fat   No new dominant soft    tissue mass, architectural distortion or suspicious calcifications are noted  The skin and nipple contours are    within normal limits  Scattered benign calcifications are seen  No evidence of malignancy  No significant changes   when compared with prior studies  ACR BI-RADSï¾® Assessments: BiRad:1 - Negative     Recommendation:   Routine screening mammogram of both breasts in 1 year  The patient is scheduled in a reminder system for screening    mammography  8-10% of cancers will be missed on mammography  Management of a    palpable abnormality must be based on clinical grounds  Patients   will be notified of their results via letter from our facility  Accredited by [employer] of Radiology and FDA       Transcription Location: [facility] 98: [id number]     Risk Value(s):   Tyrer-Cuzick 10 Year: 6 000%, Tyrer-Cuzick Lifetime: 8 000%,    Myriad Table: 1 5%, KATHLEEN 5 Year: 1 4%, NCI Lifetime: 3 8%   Signed by:   Asuncion Rosas MD   10/30/17

## 2018-01-15 NOTE — PROGRESS NOTES
Assessment    1  Sjogren's syndrome (710 2) (M35 00)   2  Primary generalized (osteo)arthritis (715 09) (M15 0)   3  NSAID long-term use (V58 64) (Z79 1)   4  Long-term use of hydroxychloroquine (V58 69) (Z79 899)   5  GERD without esophagitis (530 81) (K21 9)   6  Asthma, mild persistent (493 90) (J45 30)    Plan    1  Follow-up visit in 3 months Evaluation and Treatment  Follow-up  Status: Complete    Done: 66AOT8006    2  Call [phone] if: The pain seems worse ; Status:Complete;   Done: 59UBF2163   3  Call [phone] if: The symptoms seem worse ; Status:Complete;   Done:   62HOM8856   4  Call [phone] if: You have questions or concerns about your problem ;   Status:Complete;   Done: 09XYX5832    5  Acetaminophen 500 MG Oral Capsule; TAKE 2 CAPSULE Daily PRN pain    Discussion/Summary    This is a 70-year-old female presenting today for follow-up for her Sjogren's syndrome  The patient states that she was doing well and feeling great with after restarting hydroxychloroquine however she states that she developed changes in her hearing  She states that she had trialed this medication in the past and also noticed hearing changes  She did decide after seeing her audiologist to discontinue hydroxychloroquine 3 weeks ago  The patient states that she's been utilizing aspirin and Tylenol for pain with relief  She also has noticed less discomfort over the last few weeks  She does notice pain in both feet, hands, elbows, shoulders, and hips  She also has been having lower extremity swelling which she is seeing her primary care physician for and has been utilizing an extra Lasix throughout the week  She does have a planned appointment with a cardiologist for further evaluation  The patient states that she has not noticed any obvious joint swelling however continues to have morning stiffness lasting about an hour   She does have difficulty with sleep due to left knee pain and often describes fatigue throughout the day  She has however been riding the incumbent bike once a day and walking up to 2 miles several times per week  On physical exam, patient does have mild synovitis of both ankles as well as the right elbow  She does have tenderness of bilateral ankles  Patient did not have any lab work since last being seen  At this time patient's history and physical is most consistent with Sjogren's syndrome which appears to be stable off hydroxychloroquine  Patient has opted to continue with aspirin and Tylenol for her discomfort and would not like to try any further medication for her Sjogren's at this time  We will plan to see patient back in the office in 3 months time however she will call me interim if she has any further questions or concerns or decides she would like to try further medication  The patient was counseled regarding instructions for management, prognosis, patient and family education, risks and benefits of treatment options, importance of compliance with treatment  Chief Complaint  F/U Sjogren's   Patient is here today for follow up of chronic conditions described in HPI  History of Present Illness  Patient's in the office today for follow up for Sjogren's  Feeling great at this time however having hearing issues again  Did see her audiologist  Stopped HCQ 3 weeks ago  Using ASA and APAP for pain with some relief  Bilateral feet and hands, elbows, shoulders, and hips  BLE swelling and saw Dr Cassandra Bird, taking extra Lasix a couple times a week  Saw cardiologist prior to knee surgery and scheduled another appt with them  No obvious joint swelling  +Am stiffness x30 min to 1 hours  +Difficulty sleeping due to left knee pain  +Occasional non restorative sleep  +Fatigue  Using recumbent bike everyday and walking up to two miles a few times a week      RAPID3:5 5 /30      Review of Systems    Constitutional: fatigue, but no fever, no recent weight gain, no chills and no anorexia    The patient presents with complaints of recent 4 lb weight loss (intentional)  HEENT: blurred vision, dryness of the eyes and dryness mouth, but no double vision, no amaurosis fugax, no eye pain, no erythema eye(s), no mouth sores, not feeling congested and no sore throat  Cardiovascular: dyspnea on exertion and swelling in the arms or legs, but no chest pain or pressure  Respiratory: no shortness of breath and no pleurisy    The patient presents with complaints of unusual or persistent cough, described as dry  Gastrointestinal: heartburn, but no abdominal pain, no nausea, no vomiting, no diarrhea, no constipation, no melena and no BRBPR  Genitourinary: No foamy urine, but no dysuria and no hematuria  Musculoskeletal: as noted in HPI  Integumentary alopecia, nail changes and photosensitivity, but no rash and no Raynaud's  Endocrine no polyuria and no polydipsia  Hematologic/Lymphatic: no unusual bleeding and no tendency for easy bruising  Neurological: tingling and weakness, but no headache and no vertigo or dizziness  Psychiatric: insomnia and non-restorative sleep  Active Problems    1  Asthma, mild persistent (493 90) (J45 30)   2  Body mass index (BMI) of 33 0-33 9 in adult (V85 33) (N47 34)   3  Edema (782 3) (R60 9)   4  Exertional shortness of breath (786 05) (R06 02)   5  Fatigue (780 79) (R53 83)   6  GERD without esophagitis (530 81) (K21 9)   7  Impaired fasting glucose (790 21) (R73 01)   8  Long term use of drug (V58 69) (Z79 899)   9  NSAID long-term use (V58 64) (Z79 1)   10  Palpitations (785 1) (R00 2)   11  Plantar fasciitis, right (728 71) (M72 2)   12  Primary generalized (osteo)arthritis (715 09) (M15 0)   13  Sjogren's syndrome (710 2) (M35 00)    Past Medical History    1  History of Acute UTI (599 0) (N39 0)   2  History of Encounter for screening mammogram for malignant neoplasm of breast   (V76 12) (Z12 31)   3  History of Generalized osteoarthritis (715 00) (M15 9)   4  History of Hearing problem, unspecified laterality (V41 2) (H91 90)   5  History of cataract (V12 49) (Z86 69)   6  History of gastroesophageal reflux (GERD) (V12 79) (Z87 19)   7  History of high cholesterol (V12 29) (Z86 39)   8  History of polyarthritis (V13 4) (Z87 39)   9  Need for pneumococcal vaccination (V03 82) (Z23)   10  Preop examination (V72 84) (Z01 818)   11  History of Pre-operative cardiovascular examination (V72 81) (Z01 810)   12  History of Screening for osteoporosis (V82 81) (Z13 820)   13  History of Screening for thyroid disorder (V77 0) (Z13 29)   14  Sjogren's syndrome (710 2) (M35 00)   15  History of SS-A antibody positive (795 79) (R76 8)    The active problems and past medical history were reviewed and updated today  Surgical History    1  History of Anterior Colporrhaphy, Repair Of Cystocele   2  History of Appendectomy   3  History of Bladder Surgery   4  History of Breast Surgery Enlargement Procedure Elective Bilateral   5  History of Colonoscopy (Fiberoptic) Screening   6  History of Hysterectomy   7  History of Knee Arthroplasty   8  History of Knee Surgery Left   9  History of Oophorectomy - Bilateral (Removal Of Both Ovaries)   10  History of Tonsillectomy With Adenoidectomy   11  History of Urethra Surgery    The surgical history was reviewed and updated today  Family History  Mother    1  Family history of CAD (coronary artery disease)   2  Family history of chronic obstructive pulmonary disease (V17 6) (Z82 5)   3  Family history of hypertension (V17 49) (Z82 49)   4  Family history of High cholesterol  Father    5  Family history of CAD (coronary artery disease)   6  Family history of arthritis (V17 7) (Z82 61)   7  Family history of chronic obstructive pulmonary disease (V17 6) (Z82 5)   8  Family history of hypertension (V17 49) (Z82 49)   9  Family history of High cholesterol  Sister    8  Family history of pericarditis (V17 49) (Z82 49)  Brother    6   Family history of CAD (coronary artery disease)   12  Family history of hypertension (V17 49) (Z82 49)   13  Family history of lung cancer (V16 1) (Z80 1)   14  Family history of High cholesterol  Maternal Grandmother    15  Family history of diabetes mellitus (V18 0) (Z83 3)   16  Family history of malignant neoplasm of breast (V16 3) (Z80 3)  Paternal Grandmother    16  Family history of malignant neoplasm of breast (V16 3) (Z80 3)  Family History    18  Family history of arthritis (V17 7) (Z82 61)   19  Family history of asthma (V17 5) (Z82 5)   20  Denied: Family history of Crohn's disease   21  Denied: Family history of psoriasis   22  Denied: Family history of rheumatoid arthritis   23  Denied: Family history of systemic lupus erythematosus   24  Denied: Family history of ulcerative colitis    The family history was reviewed and updated today  Social History    · Lack of adequate sleep (V69 4) (Z72 820)   · Lack of exercise (V69 0) (Z72 3)   ·    · Never smoker   · No drug use   · Occasional alcohol use   · Retired  The social history was reviewed and updated today  The social history was reviewed and is unchanged  Current Meds   1  Acetaminophen 500 MG Oral Capsule; TAKE 2 CAPSULE Daily PRN pain; Therapy: (Recorded:99Qbk4256) to Recorded   2  Aspirin  MG Oral Tablet Delayed Release; TAKE 2 TABLET Daily PRN pain; Therapy: (Recorded:34Phz5808) to Recorded   3  B Complete TABS; TAKE 1 TABLET DAILY; Therapy: (Magdalena Art) to Recorded   4  Colace 100 MG Oral Capsule; TAKE 1 CAPSULE DAILY; Therapy: (Magdalena Art) to Recorded   5  Cranberry CAPS; take 1 capsule daily; Therapy: (Magdalena Art) to Recorded   6  Flovent  MCG/ACT Inhalation Aerosol; INHALE 2 PUFFS TWICE DAILY  RINSE   MOUTH AFTER USE  Requested for: 19ZPY3782; Last Rx:40Ynj1340 Ordered   7  Furosemide 40 MG Oral Tablet; TAKE 1 TABLET DAILY;    Therapy: 56Fay0112 to (Evaluate:69Rmw9911)  Requested for: 28UJD9402; Last   MQ:51ADY2219 Ordered   8  Klor-Con 10 10 MEQ Oral Tablet Extended Release; One a day  Requested for:   27Jun2016; Last Rx:20Eav4347 Ordered   9  Magnesium CAPS; take 1 capsule daily; Therapy: (Svitlana Bidding) to Recorded   10  ProAir  (90 Base) MCG/ACT Inhalation Aerosol Solution; 2 puffs Q4 hours prn    SOB/wheeze; Therapy: 98RYM1937 to (Last Rx:56Jwz2555)  Requested for: 98KTF6354 Ordered   11  Probiotic Oral Capsule; take 1 capsule daily; Therapy: (Svitlana Bidding) to Recorded   12  Restasis 0 05 % Ophthalmic Emulsion; INSTILL 1 DROP IN EACH EYE TWICE DAILY    Recorded   13  Vitamin D3 CAPS; take 1 capsule daily; Therapy: (Svitlana Bidding) to Recorded    The medication list was reviewed and updated today  Immunizations  Influenza --- Belvia Duarte: 01-Nov-2015   PCV --- Series1: 29-May-2015     Allergies    1  CeleBREX CAPS    Vitals  Signs   Recorded: 74FIP1253 75:84ZK   Systolic: 637  Diastolic: 76  Heart Rate: 80  Weight: 194 lb   BMI Calculated: 31 31  BSA Calculated: 1 97    Physical Exam    Constitutional   General appearance: Abnormal   overweight  Eyes   Conjunctiva and lids: No swelling, erythema or discharge  Pupils and irises: Equal, round and reactive to light  Ears, Nose, Mouth, and Throat   External inspection of ears and nose: Normal     Oropharynx: Normal with no erythema, edema, exudate lesions, or ulcers  Pulmonary   Respiratory effort: No increased work of breathing or signs of respiratory distress  Auscultation of lungs: Clear to auscultation  Cardiovascular   Auscultation of heart: Normal rate and rhythm, normal S1 and S2, without murmurs  Examination of extremities for edema and/or varicosities: Normal     Carotid pulses: Normal     Lymphatic   Palpation of lymph nodes in neck: No lymphadenopathy      Psychiatric   Orientation to person, place, and time: Normal     Mood and affect: Normal         Right elbow tenderness and swelling  Right ankle tenderness and swelling  Left ankle tenderness and swelling  Right Upper Extremity: Normal ROM  Left Upper Extremity: Normal ROM  Right Lower Extremity: Normal ROM  Left Lower Extremity: Normal ROM  Musculoskeletal - Joints, bones, and muscles: Abnormal  Palpation - bilateral knee crepitus  Right hand: All MCP, PIP and DIP joints are normal  Left Hand: All MCP, PIP and DIP joints are normal    The patient has tenderness in all MTP joints of the right foot  The patient has tenderness in all MTP joints of the left foot  Attending Note  Collaborating Physician: I did not interview and examine the patient, I discussed the case with the Advanced Practitioner and reviewed the note and I agree with the Advanced Practitioner note        Future Appointments    Date/Time Provider Specialty Site   08/15/2016 10:00 AM Stacey Ramos DO Cardiology [facility] Daina Ruffin   09/27/2016 09:30 AM Nicolasa Barnard DO Family Medicine [facility]   10/11/2016 10:20 AM DMITRIY Skelton Rheumatology [facility]     Signatures   Electronically signed by : Milton Sanchez; Jul 18 2016  3:06PM EST                       (Author)    Electronically signed by : Charlie Harmon DO; Jul 18 2016  3:09PM EST                       (Author)

## 2018-01-16 NOTE — RESULT NOTES
Verified Results  * XR HIP/PELV 2-3 VWS RIGHT W PELVIS IF PERFORMED [id number] 03:21PM Deshler Raw Order Number: [id number]     Test Name Result Flag Reference   * XR HIP/PELV 2-3 VWS RIGHT (Report)     RIGHT HIP     INDICATION: Right hip pain  COMPARISON: None     VIEWS: AP pelvis and 2 coned down views of the hip; 3 images     FINDINGS:     There is no acute fracture or dislocation  There is no evidence of right hip joint space narrowing  Mild lateral acetabular spurring present     Degenerative changes of the lower visualized lumbar spine noted     Soft tissues are unremarkable  IMPRESSION:       1  No acute osseous abnormality or significant right hip joint space narrowing  Mild lateral acetabular spurring suggesting minimal osteoarthritis  2  Degenerative changes of the visualized lumbar spine       Workstation performed: [id number]     Signed by:   Grant Menendez MD   11/4/16       Discussion/Summary   Diane,   Your hip x-ray show mild arthritis  If you are not improving please follow up in the office     Dr Eldon Siddiqi

## 2018-01-16 NOTE — RESULT NOTES
Discussion/Summary   Diane,   Your cholesterol has improved  Your sugar, kidney function and liver enzymes are normal    Dr Beto Whitehead      Verified Results  (1) COMPREHENSIVE METABOLIC PANEL [id number] 18:31SG Rachel Fisher Order Number: [id number]     Test Name Result Flag Reference   SODIUM 139 mmol/L  136-145   POTASSIUM 3 8 mmol/L  3 5-5 3   CHLORIDE 102 mmol/L  100-108   CARBON DIOXIDE 29 mmol/L  21-32   ANION GAP (CALC) 8 mmol/L  4-13   BLOOD UREA NITROGEN 19 mg/dL  5-25   CREATININE 0 85 mg/dL  0 60-1 30   Standardized to IDMS reference method   CALCIUM 9 2 mg/dL  8 3-10 1   BILI, TOTAL 0 85 mg/dL  0 20-1 00   ALK PHOSPHATAS 88 U/L     ALT (SGPT) 26 U/L  12-78   Specimen collection should occur prior to Sulfasalazine and/or Sulfapyridine administration due to the potential for falsely depressed results  AST(SGOT) 20 U/L  5-45   Specimen collection should occur prior to Sulfasalazine administration due to the potential for falsely depressed results  ALBUMIN 4 1 g/dL  3 5-5 0   TOTAL PROTEIN 7 5 g/dL  6 4-8 2   eGFR 69 ml/min/1 73sq m     National Kidney Disease Education Program recommendations are as follows:  GFR calculation is accurate only with a steady state creatinine  Chronic Kidney disease less than 60 ml/min/1 73 sq  meters  Kidney failure less than 15 ml/min/1 73 sq  meters  GLUCOSE FASTING 87 mg/dL  65-99   Specimen collection should occur prior to Sulfasalazine administration due to the potential for falsely depressed results  Specimen collection should occur prior to Sulfapyridine administration due to the potential for falsely elevated results  (1) LIPID PANEL FASTING W DIRECT LDL REFLEX 64Ahq8506 10:48AM Rachel Fisher Order Number: [id number]     Test Name Result Flag Reference   CHOLESTEROL 214 mg/dL H    LDL CHOLESTEROL CALCULATED 148 mg/dL H 0-100   - Patient Instructions:  This is a fasting blood test  Water, black tea or black coffee only after 9:00pm the night before test   Drink 2 glasses of water the morning of test       Triglyceride:        Normal ??? ??? ??? ??? ??? ??? ??? <150 mg/dl   ??? ??? ???Borderline High ??? ??? 150-199 mg/dl   ??? ??? ? ?? High ??? ??? ??? ??? ??? ??? ??? 200-499 mg/dl   ??? ??? ? ??Very High ??? ??? ??? ??? ??? >499 mg/dl      Cholesterol:       Desirable ??? ??? ??? ??? <200 mg/dl   ??? ??? Borderline High ??? 200-239 mg/dl   ??? ??? High ??? ??? ??? ??? ??? ??? >239 mg/dl      HDL Cholesterol:       High ??? ???>59 mg/dL   ??? ??? Low ??? ??? <41 mg/dL      HDL Cholesterol:       High ??? ???>59 mg/dL   ??? ??? Low ??? ??? <41 mg/dL      This screening LDL is a calculated result  It does not have the accuracy of the Direct Measured LDL in the monitoring of patients with hyperlipidemia and/or statin therapy  Direct Measure LDL (FCZ290) must be ordered separately in these patients  TRIGLYCERIDES 112 mg/dL  <=150   Specimen collection should occur prior to N-Acetylcysteine or Metamizole administration due to the potential for falsely depressed results  HDL,DIRECT 44 mg/dL  40-60   Specimen collection should occur prior to Metamizole administration due to the potential for falsley depressed results

## 2018-01-18 ENCOUNTER — ALLSCRIPTS OFFICE VISIT (OUTPATIENT)
Dept: OTHER | Facility: OTHER | Age: 73
End: 2018-01-18

## 2018-01-18 DIAGNOSIS — R10.31 RIGHT LOWER QUADRANT PAIN: ICD-10-CM

## 2018-01-18 DIAGNOSIS — Z78.0 ASYMPTOMATIC MENOPAUSAL STATE: ICD-10-CM

## 2018-01-18 DIAGNOSIS — E78.5 HYPERLIPIDEMIA: ICD-10-CM

## 2018-01-18 DIAGNOSIS — R73.01 IMPAIRED FASTING GLUCOSE: ICD-10-CM

## 2018-01-19 NOTE — PROGRESS NOTES
Assessment   1  Right hip pain (719 45) (M25 551)   2  Postmenopausal (V49 81) (Z78 0)   3  Right lower quadrant abdominal pain (789 03) (R10 31)   4  Medicare annual wellness visit, subsequent (V70 0) (Z00 00)    Plan   Hyperlipidemia    · (1) COMPREHENSIVE METABOLIC PANEL; Status:Active; Requested CHATO:95ZBV5709;    · (1) LIPID PANEL FASTING W DIRECT LDL REFLEX; Status:Active; Requested    GKW:57GQY2480; Impaired fasting glucose    · (1) HEMOGLOBIN A1C; Status:Active; Requested NEE:66KWT9479; Postmenopausal    · * DXA BONE DENSITY SPINE HIP AND PELVIS; Status:Active; Requested QPJ:23TIZ7174;   Primary generalized (osteo)arthritis    · Acetaminophen-Codeine #3 300-30 MG Oral Tablet; TAKE 1 TABLET Every 4    hours PRN pain  Right hip pain    · Follow-up visit in 6 months Evaluation and Treatment  Follow-up  Status: Complete -    Scheduling  Done: 43MDV2763 08:41AM  Right lower quadrant abdominal pain    · (1) CBC/PLT/DIFF; Status:Active; Requested FQM:66FAL6850;    · * CT ABDOMEN PELVIS W CONTRAST; Status:Need Information - Financial Authorization; Requested GYC:[id number];   Screening for genitourinary condition    · *VB - Urinary Incontinence Screen (Dx Z13 89 Screen for UI); Status:Complete;   Done:    36HKQ1043 08:59AM    Discussion/Summary      Right hip pain - pain flares, will continue as needed Tylenol with codeine lower quadrant pain - new, asked for copy of last colonoscopy report, CT scan ordered  of opiate medications discussed  They are controlled dangerous substance  Opioids are highly addictive, even when taken as prescribed and there is a significant risk of developing a physical or psychological dependence  If you mix opiate medications with sedatives, benzodiazepines or alcohol fatal respiratory depression can occur        Chief Complaint   f/u medication refill chronic pain refill rmklpn      History of Present Illness   She has an appointment with Dr Malik Frias next month regarding right lower quadrant pain  also has chronic right hip pain  She uses the Tylenol with codeine to help control the pain and allow her to sleep  She uses aspirin, but then it bothers her stomach  She also gets arthritis pain in her hands  Active Problems   1  Adult BMI 32 0-32 9 kg/sq m (V85 32) (Z68 32)   2  Asthma, mild persistent (493 90) (J45 30)   3  Edema (782 3) (R60 9)   4  Encounter for screening mammogram for breast cancer (V76 12) (Z12 31)   5  Encounter for special screening examination for genitourinary disorder (V81 6) (Z13 89)   6  Exertional shortness of breath (786 05) (R06 02)   7  Fatigue (780 79) (R53 83)   8  GERD without esophagitis (530 81) (K21 9)   9  Hyperlipidemia (272 4) (E78 5)   10  Impaired fasting glucose (790 21) (R73 01)   11  Left knee pain (719 46) (M25 562)   12  Long term use of drug (V58 69) (Z79 899)   13  Long-term use of hydroxychloroquine (V58 69) (Z79 899)   14  NSAID long-term use (V58 64) (Z79 1)   15  Pain of left breast (611 71) (N64 4)   16  Palpitations (785 1) (R00 2)   17  Plantar fasciitis, right (728 71) (M72 2)   18  Primary generalized (osteo)arthritis (715 09) (M15 0)   19  Right hip pain (719 45) (M25 551)   20  Sjogren's syndrome (710 2) (M35 00)    Past Medical History   1  History of Acute UTI (599 0) (N39 0)   2  History of Encounter for screening mammogram for malignant neoplasm of breast     (V76 12) (Z12 31)   3  History of Generalized osteoarthritis (715 00) (M15 9)   4  History of Hearing problem, unspecified laterality (V41 2) (H91 90)   5  History of cataract (V12 49) (Z86 69)   6  History of gastroesophageal reflux (GERD) (V12 79) (Z87 19)   7  History of high cholesterol (V12 29) (Z86 39)   8  History of polyarthritis (V13 4) (Z87 39)   9  Influenza vaccine needed (V04 81) (Z23)   10  Initial Medicare annual wellness visit (V70 0) (Z00 00)   11  Need for pneumococcal vaccination (V03 82) (Z23)   12  Preop examination (V72 84) (Z01 818)   13  History of Pre-operative cardiovascular examination (V72 81) (Z01 810)   14  History of Screening for osteoporosis (V82 81) (Z13 820)   15  History of Screening for thyroid disorder (V77 0) (Z13 29)   16  Sjogren's syndrome (710 2) (M35 00)   17  History of SS-A antibody positive (795 79) (R76 8)    Surgical History   1  History of Anterior Colporrhaphy, Repair Of Cystocele   2  History of Appendectomy   3  History of Bladder Surgery   4  History of Breast Surgery Enlargement Procedure Elective Bilateral   5  History of Colonoscopy (Fiberoptic) Screening   6  History of Hysterectomy   7  History of Knee Arthroplasty   8  History of Knee Surgery Left   9  History of Oophorectomy - Bilateral (Removal Of Both Ovaries)   10  History of Tonsillectomy With Adenoidectomy   11  History of Urethra Surgery    Family History   Mother    1  Family history of CAD (coronary artery disease)   2  Family history of chronic obstructive pulmonary disease (V17 6) (Z82 5)   3  Family history of hypertension (V17 49) (Z82 49)   4  Family history of High cholesterol  Father    5  Family history of CAD (coronary artery disease)   6  Family history of arthritis (V17 7) (Z82 61)   7  Family history of chronic obstructive pulmonary disease (V17 6) (Z82 5)   8  Family history of hypertension (V17 49) (Z82 49)   9  Family history of High cholesterol  Sister    8  Family history of pericarditis (V17 49) (Z82 49)  Brother    6  Family history of CAD (coronary artery disease)   15  Family history of hypertension (V17 49) (Z82 49)   13  Family history of lung cancer (V16 1) (Z80 1)   14  Family history of High cholesterol  Maternal Grandmother    15  Family history of diabetes mellitus (V18 0) (Z83 3)   16  Family history of malignant neoplasm of breast (V16 3) (Z80 3)  Paternal Grandmother    16  Family history of malignant neoplasm of breast (V16 3) (Z80 3)  Family History    18  Family history of arthritis (V17 7) (Z82 61)   19   Family history of asthma (V17 5) (Z82 5)   20  Denied: Family history of Crohn's disease   21  Denied: Family history of psoriasis   22  Denied: Family history of rheumatoid arthritis   23  Denied: Family history of systemic lupus erythematosus   24  Denied: Family history of ulcerative colitis    Social History    · Lack of adequate sleep (V69 4) (Z72 820)   · Lack of exercise (V69 0) (Z72 3)   ·    · Never smoker   · No drug use   · Occasional alcohol use   · Retired    Current Meds    1  Acetaminophen 500 MG CAPS; TAKE 2 CAPSULE Daily PRN pain; Therapy: (Recorded:54Qwt6968) to Recorded   2  Acetaminophen-Codeine #3 300-30 MG Oral Tablet; TAKE 1 TABLET Every 4 hours PRN     pain; Last Rx:75Aww7425; Status: ACTIVE - Renewal Voided Ordered   3  Aspirin  MG Oral Tablet Delayed Release; TAKE 3 TABLET Twice daily; Therapy: (Recorded:09Amw5780) to Recorded   4  B Complete TABS; TAKE 1 TABLET DAILY; Therapy: (Perla Herman) to Recorded   5  Baclofen 10 MG Oral Tablet; TAKE 1 TABLET Twice daily prn muscle pain; Therapy: 68DZO3286 to (Evaluate:62Hzx4762)  Requested for: 95PAE9616; Last     Rx:48Phm7474 Ordered   6  Colace 100 MG Oral Capsule; TAKE 1 CAPSULE DAILY; Therapy: (Perla Herman) to Recorded   7  Flovent  MCG/ACT Inhalation Aerosol; INHALE 2 PUFFS TWICE DAILY  RINSE     MOUTH AFTER USE  Requested for: 06WEN4891; Last Rx:21Xhl4838 Ordered   8  Furosemide 40 MG Oral Tablet; Take 1 tablet daily; Therapy: 01Eyc0630 to (Last Rx:16Dyq4599)  Requested for: 16Oct2017 Ordered   9  Klor-Con 10 10 MEQ Oral Tablet Extended Release; One a day  Requested for:     10EIZ6250; Last Rx:27Qpa4154 Ordered   10  Magnesium CAPS; take 1 capsule daily; Therapy: (Perla Herman) to Recorded   11  ProAir  (90 Base) MCG/ACT Inhalation Aerosol Solution; 2 puffs Q4 hours prn      SOB/wheeze; Therapy: 78SQS3954 to (Last Rx:58Tdi8808)  Requested for: 16Oct2017 Ordered   12   Probiotic Oral Capsule; take 1 capsule daily; Therapy: (Katrin Severino) to Recorded   13  Restasis 0 05 % Ophthalmic Emulsion; INSTILL 1 DROP IN EACH EYE TWICE DAILY      Recorded   14  Vitamin D3 CAPS; take 1 capsule daily; Therapy: (Recorded:93Jmf2787) to Recorded    Allergies   1  CeleBREX CAPS    Vitals   Vital Signs    Recorded: 35XVD1016 08:18AM   Temperature 97 2 F   Heart Rate 74   Respiration 18   Systolic 090   Diastolic 76   Height 5 ft 6 in   Weight 202 lb    BMI Calculated 32 6   BSA Calculated 2 01     Physical Exam        Constitutional      General appearance: No acute distress, well appearing and well nourished  Ears, Nose, Mouth, and Throat      External inspection of ears and nose: Normal        Otoscopic examination: Tympanic membranes translucent with normal light reflex  Canals patent without erythema  Oropharynx: Normal with no erythema, edema, exudate or lesions  Pulmonary      Auscultation of lungs: Clear to auscultation  Cardiovascular      Auscultation of heart: Normal rate and rhythm, normal S1 and S2, without murmurs  Abdomen      Abdomen: Abnormal  -- vague right lower quadrant tenderness, no rebound or guarding  Musculoskeletal      Gait and station: Normal           Results/Data   *VB - Urinary Incontinence Screen (Dx Z13 89 Screen for UI) 36JNY4091 08:59AM Yoandy Ripple      Test Name Result Flag Reference   Urinary Incontinence Assessment 27UNO6642        Falls Risk Assessment (Dx Z13 89 Screen for Neurologic Disorder) 59MRN8830 08:58AM User, Ahs      Test Name Result Flag Reference   Falls Risk      No falls in the past year      PHQ-2 Adult Depression Screening 26RID6306 08:58AM User, Ahs      Test Name Result Flag Reference   PHQ-2 Adult Depression Score 0     Over the last two weeks, how often have you been bothered by any of the following problems?      Little interest or pleasure in doing things: Not at all - 0     Feeling down, depressed, or hopeless: Not at all - 0   PHQ-2 Adult Depression Screening Negative          Provider Comments      [place] Prescription Monitoring Program report reviewed and is appropriate, [place] and [place] included in search criteria      Health Management   Encounter for screening mammogram for breast cancer   MAMMO DIAGNOSTIC BILATERAL W 3D & CAD; every 1 year; Last 63FQS2982; Next Due: 82WNX7223;     Active    Future Appointments      Date/Time Provider Specialty Site   02/09/2018 08:00 AM Damion Tim DO [facility] [address]     Signatures    Electronically signed by : Donavan Erwin DO; Jan 18 2018  9:28AM EST                       (Author)

## 2018-01-22 ENCOUNTER — APPOINTMENT (OUTPATIENT)
Dept: LAB | Facility: CLINIC | Age: 73
End: 2018-01-22
Payer: MEDICARE

## 2018-01-22 ENCOUNTER — GENERIC CONVERSION - ENCOUNTER (OUTPATIENT)
Dept: OTHER | Facility: OTHER | Age: 73
End: 2018-01-22

## 2018-01-22 DIAGNOSIS — E78.5 HYPERLIPIDEMIA: ICD-10-CM

## 2018-01-22 DIAGNOSIS — R73.01 IMPAIRED FASTING GLUCOSE: ICD-10-CM

## 2018-01-22 DIAGNOSIS — R10.31 RIGHT LOWER QUADRANT PAIN: ICD-10-CM

## 2018-01-22 LAB
ALBUMIN SERPL BCP-MCNC: 4.1 G/DL (ref 3.5–5)
ALP SERPL-CCNC: 76 U/L (ref 46–116)
ALT SERPL W P-5'-P-CCNC: 24 U/L (ref 12–78)
ANION GAP SERPL CALCULATED.3IONS-SCNC: 6 MMOL/L (ref 4–13)
AST SERPL W P-5'-P-CCNC: 18 U/L (ref 5–45)
BASOPHILS # BLD AUTO: 0.01 THOUSANDS/ΜL (ref 0–0.1)
BASOPHILS NFR BLD AUTO: 0 % (ref 0–1)
BILIRUB SERPL-MCNC: 0.93 MG/DL (ref 0.2–1)
BUN SERPL-MCNC: 20 MG/DL (ref 5–25)
CALCIUM SERPL-MCNC: 8.9 MG/DL (ref 8.3–10.1)
CHLORIDE SERPL-SCNC: 107 MMOL/L (ref 100–108)
CHOLEST SERPL-MCNC: 214 MG/DL (ref 50–200)
CO2 SERPL-SCNC: 28 MMOL/L (ref 21–32)
CREAT SERPL-MCNC: 0.84 MG/DL (ref 0.6–1.3)
EOSINOPHIL # BLD AUTO: 0.09 THOUSAND/ΜL (ref 0–0.61)
EOSINOPHIL NFR BLD AUTO: 2 % (ref 0–6)
ERYTHROCYTE [DISTWIDTH] IN BLOOD BY AUTOMATED COUNT: 14.2 % (ref 11.6–15.1)
EST. AVERAGE GLUCOSE BLD GHB EST-MCNC: 103 MG/DL
GFR SERPL CREATININE-BSD FRML MDRD: 70 ML/MIN/1.73SQ M
GLUCOSE P FAST SERPL-MCNC: 90 MG/DL (ref 65–99)
HBA1C MFR BLD: 5.2 % (ref 4.2–6.3)
HCT VFR BLD AUTO: 42.1 % (ref 34.8–46.1)
HDLC SERPL-MCNC: 50 MG/DL (ref 40–60)
HGB BLD-MCNC: 14 G/DL (ref 11.5–15.4)
LDLC SERPL CALC-MCNC: 137 MG/DL (ref 0–100)
LYMPHOCYTES # BLD AUTO: 1.75 THOUSANDS/ΜL (ref 0.6–4.47)
LYMPHOCYTES NFR BLD AUTO: 42 % (ref 14–44)
MCH RBC QN AUTO: 29.2 PG (ref 26.8–34.3)
MCHC RBC AUTO-ENTMCNC: 33.3 G/DL (ref 31.4–37.4)
MCV RBC AUTO: 88 FL (ref 82–98)
MONOCYTES # BLD AUTO: 0.45 THOUSAND/ΜL (ref 0.17–1.22)
MONOCYTES NFR BLD AUTO: 11 % (ref 4–12)
NEUTROPHILS # BLD AUTO: 1.9 THOUSANDS/ΜL (ref 1.85–7.62)
NEUTS SEG NFR BLD AUTO: 45 % (ref 43–75)
NRBC BLD AUTO-RTO: 0 /100 WBCS
PLATELET # BLD AUTO: 214 THOUSANDS/UL (ref 149–390)
PMV BLD AUTO: 10.9 FL (ref 8.9–12.7)
POTASSIUM SERPL-SCNC: 3.9 MMOL/L (ref 3.5–5.3)
PROT SERPL-MCNC: 7.6 G/DL (ref 6.4–8.2)
RBC # BLD AUTO: 4.8 MILLION/UL (ref 3.81–5.12)
SODIUM SERPL-SCNC: 141 MMOL/L (ref 136–145)
TRIGL SERPL-MCNC: 136 MG/DL
WBC # BLD AUTO: 4.21 THOUSAND/UL (ref 4.31–10.16)

## 2018-01-22 PROCEDURE — 83036 HEMOGLOBIN GLYCOSYLATED A1C: CPT

## 2018-01-22 PROCEDURE — 80061 LIPID PANEL: CPT

## 2018-01-22 PROCEDURE — 36415 COLL VENOUS BLD VENIPUNCTURE: CPT

## 2018-01-22 PROCEDURE — 80053 COMPREHEN METABOLIC PANEL: CPT

## 2018-01-22 PROCEDURE — 85025 COMPLETE CBC W/AUTO DIFF WBC: CPT

## 2018-01-23 VITALS
RESPIRATION RATE: 18 BRPM | DIASTOLIC BLOOD PRESSURE: 76 MMHG | SYSTOLIC BLOOD PRESSURE: 130 MMHG | WEIGHT: 202 LBS | TEMPERATURE: 97.2 F | HEART RATE: 74 BPM | HEIGHT: 66 IN | BODY MASS INDEX: 32.47 KG/M2

## 2018-01-23 NOTE — PROGRESS NOTES
Assessment    1  Right hip pain (719 45) (M25 551)   2  Postmenopausal (V49 81) (Z78 0)   3  Right lower quadrant abdominal pain (789 03) (R10 31)   4  Medicare annual wellness visit, subsequent (V70 0) (Z00 00)    Plan  Hyperlipidemia    · (1) COMPREHENSIVE METABOLIC PANEL; Status:Active; Requested WDZ:41CEM1673;    · (1) LIPID PANEL FASTING W DIRECT LDL REFLEX; Status:Active; Requested  HTF:73QAR6266; Impaired fasting glucose    · (1) HEMOGLOBIN A1C; Status:Active; Requested BJH:78OKN1380; Postmenopausal    · * DXA BONE DENSITY SPINE HIP AND PELVIS; Status:Active; Requested DJQ:12EJM5734;   Primary generalized (osteo)arthritis    · Acetaminophen-Codeine #3 300-30 MG Oral Tablet; TAKE 1 TABLET Every 4  hours PRN pain  Right hip pain    · Follow-up visit in 6 months Evaluation and Treatment  Follow-up  Status: Complete -  Scheduling  Done: 67KVE2378 08:41AM  Right lower quadrant abdominal pain    · (1) CBC/PLT/DIFF; Status:Active; Requested IIJ:19JHF0143;    · * CT ABDOMEN PELVIS W CONTRAST; Status:Need Information - Financial Authorization; Requested [facility]:[id number];   Screening for genitourinary condition    · *VB - Urinary Incontinence Screen (Dx Z13 89 Screen for UI); Status:Complete;   Done:  21VUN7503 08:59AM    Discussion/Summary    Care plan given  Impression: Subsequent Annual Wellness Visit, with preventive exam as well as age and risk appropriate counseling completed  Cardiovascular screening and counseling: screening is current  Diabetes screening and counseling: screening is current  Colorectal cancer screening and counseling: she reports having a colonoscopy in 2016, she has a copy of the result at home and will bring it in  Breast cancer screening and counseling: screening is current  Cervical cancer screening and counseling: screening not indicated  Osteoporosis screening and counseling: the risks and benefits of screening were discussed and due for DXA appendicular skeleton  Abdominal aortic aneurysm screening and counseling: screening not indicated  Glaucoma screening and counseling: screening is current  HIV screening and counseling: screening not indicated  Immunizations: influenza vaccine is up to date this year, the lifetime pneumococcal vaccine has been completed, hepatitis B vaccination series is not indicated at this time due to the patient's low risk of margarita the disease and Had Zostavax, shingrix recommended  Advance Directive Planning: complete and up to date, she was encouraged to follow-up with me to discuss her questions and/or decisions  Patient Discussion: plan discussed with the patient, follow-up visit needed in 6 months  Chief Complaint  AWV rmklpn      History of Present Illness  The patient is being seen for the subsequent annual wellness visit  Medicare Screening and Risk Factors   Hospitalizations: no previous hospitalizations  Once per lifetime medicare screening tests: ECG (in chart)  Medicare Screening Tests Risk Questions   Abdominal aortic aneurysm risk assessment: none indicated  Osteoporosis risk assessment: , female gender and over 48years of age  HIV risk assessment: none indicated  Drug and Alcohol Use: The patient has never smoked cigarettes  The patient reports never drinking alcohol  She has never used illicit drugs  Diet and Physical Activity: Current diet includes low salt food choices, limited junk food, 1-2 servings of fruit per day, 1 servings of vegetables per day, 1-2 servings of meat per day, 2 servings of whole grains per day, 1-2 servings of simple carbohydrates per day, 2 servings of dairy products per day, 2 cups of coffee per day, 1 cups of tea per day and multiple glasses of water  She exercises 3 times per week  Exercise: walking, stretching 3 hours per week  Mood Disorder and Cognitive Impairment Screening: Anxiety screening was done using ENID and score was 5   She denies feeling down, depressed, or hopeless over the past two weeks  She denies feeling little interest or pleasure in doing things over the past two weeks  Cognitive impairment screening: denies difficulty learning/retaining new information, denies difficulty handling complex tasks, denies difficulty with reasoning, denies difficulty with spatial ability and orientation, denies difficulty with language and denies difficulty with behavior  Functional Ability/Level of Safety: Hearing is significantly decreased in the right ear and slightly decreased in the left ear  She uses a hearing aid  The patient is currently able to do activities of daily living without limitations, able to do instrumental activities of daily living without limitations, able to participate in social activities without limitations and able to drive without limitations  Activities of daily living details: transportation help needed, but does not need help using the phone, does not need help shopping, no meal preparation help needed, does not need help doing housework, does not need help doing laundry, does not need help managing medications and does not need help managing money  Fall risk factors: The patient fell 0 times in the past 12 months  Home safety risk factors:  loose rugs and No stairs, but no unfamiliar surroundings, no poor household lighting, no uneven floors, no household clutter and grab bars in the bathroom  Advance Directives: Advance directives: living will, durable power of  for health care directives and advance directives  end of life decisions were reviewed with the patient and I agree with the patient's decisions  Co-Managers and Medical Equipment/Suppliers: See Patient Care Team     Last Medicare Wellness Visit Information was reviewed, patient interviewed and updates made to the record today  Falls Risk: The patient fell 0 times in the past 12 months   Urinary Incontinence Symptoms includes: incomplete bladder emptying, nocturia, weak stream, intermittent stream and vaginal dryness, but no urinary incontinence    Date of last glaucoma screen was 12/2016      Patient Care Team    Care Team Member Role Specialty Office Number   [facility] DO Specialist Rheumatology [phone]   Malka Shankar MD Specialist Orthopedic Surgery [phone]   Alexia Ramos MD Specialist Orthopedic Surgery [phone]   Kathi Levi DPM Specialist Podiatry [phone]   [facility] [phone], 1 Hoganmaria del carmen Yeung DO Specialist Cardiology [phone]     Active Problems    1  Adult BMI 32 0-32 9 kg/sq m (V85 32) (Z68 32)   2  Asthma, mild persistent (493 90) (J45 30)   3  Edema (782 3) (R60 9)   4  Encounter for screening mammogram for breast cancer (V76 12) (Z12 31)   5  Encounter for special screening examination for genitourinary disorder (V81 6) (Z13 89)   6  Exertional shortness of breath (786 05) (R06 02)   7  Fatigue (780 79) (R53 83)   8  GERD without esophagitis (530 81) (K21 9)   9  Hyperlipidemia (272 4) (E78 5)   10  Impaired fasting glucose (790 21) (R73 01)   11  Left knee pain (719 46) (M25 562)   12  Long term use of drug (V58 69) (Z79 899)   13  Long-term use of hydroxychloroquine (V58 69) (Z79 899)   14  NSAID long-term use (V58 64) (Z79 1)   15  Pain of left breast (611 71) (N64 4)   16  Palpitations (785 1) (R00 2)   17  Plantar fasciitis, right (728 71) (M72 2)   18  Postmenopausal (V49 81) (Z78 0)   19  Primary generalized (osteo)arthritis (715 09) (M15 0)   20  Right hip pain (719 45) (M25 551)   21  Right lower quadrant abdominal pain (789 03) (R10 31)   22  Sjogren's syndrome (710 2) (M35 00)    Past Medical History    1  History of Acute UTI (599 0) (N39 0)   2  History of Encounter for screening mammogram for malignant neoplasm of breast   (V76 12) (Z12 31)   3  History of Generalized osteoarthritis (715 00) (M15 9)   4   History of Hearing problem, unspecified laterality (V41 2) (H91 90)   5  History of cataract (V12 49) (Z86 69)   6  History of gastroesophageal reflux (GERD) (V12 79) (Z87 19)   7  History of high cholesterol (V12 29) (Z86 39)   8  History of polyarthritis (V13 4) (Z87 39)   9  Influenza vaccine needed (V04 81) (Z23)   10  Initial Medicare annual wellness visit (V70 0) (Z00 00)   11  Need for pneumococcal vaccination (V03 82) (Z23)   12  Preop examination (V72 84) (Z01 818)   13  History of Pre-operative cardiovascular examination (V72 81) (Z01 810)   14  History of Screening for osteoporosis (V82 81) (Z13 820)   15  History of Screening for thyroid disorder (V77 0) (Z13 29)   16  Sjogren's syndrome (710 2) (M35 00)   17  History of SS-A antibody positive (795 79) (R76 8)    Surgical History    1  History of Anterior Colporrhaphy, Repair Of Cystocele   2  History of Appendectomy   3  History of Bladder Surgery   4  History of Breast Surgery Enlargement Procedure Elective Bilateral   5  History of Colonoscopy (Fiberoptic) Screening   6  History of Hysterectomy   7  History of Knee Arthroplasty   8  History of Knee Surgery Left   9  History of Oophorectomy - Bilateral (Removal Of Both Ovaries)   10  History of Tonsillectomy With Adenoidectomy   11  History of Urethra Surgery    Family History  Mother    1  Family history of CAD (coronary artery disease)   2  Family history of chronic obstructive pulmonary disease (V17 6) (Z82 5)   3  Family history of hypertension (V17 49) (Z82 49)   4  Family history of High cholesterol  Father    5  Family history of CAD (coronary artery disease)   6  Family history of arthritis (V17 7) (Z82 61)   7  Family history of chronic obstructive pulmonary disease (V17 6) (Z82 5)   8  Family history of hypertension (V17 49) (Z82 49)   9  Family history of High cholesterol  Sister    8  Family history of pericarditis (V17 49) (Z82 49)  Brother    6  Family history of CAD (coronary artery disease)   15   Family history of hypertension (V17 49) (Z82 49)   13  Family history of lung cancer (V16 1) (Z80 1)   14  Family history of High cholesterol  Maternal Grandmother    15  Family history of diabetes mellitus (V18 0) (Z83 3)   16  Family history of malignant neoplasm of breast (V16 3) (Z80 3)  Paternal Grandmother    16  Family history of malignant neoplasm of breast (V16 3) (Z80 3)  Family History    18  Family history of arthritis (V17 7) (Z82 61)   19  Family history of asthma (V17 5) (Z82 5)   20  Denied: Family history of Crohn's disease   21  Denied: Family history of psoriasis   22  Denied: Family history of rheumatoid arthritis   23  Denied: Family history of systemic lupus erythematosus   24  Denied: Family history of ulcerative colitis    Social History    · Lack of adequate sleep (V69 4) (Z72 820)   · Lack of exercise (V69 0) (Z72 3)   ·    · Never smoker   · No drug use   · Occasional alcohol use   · Retired    Current Meds   1  Acetaminophen 500 MG CAPS; TAKE 2 CAPSULE Daily PRN pain; Therapy: (Recorded:48Liv4770) to Recorded   2  Acetaminophen-Codeine #3 300-30 MG Oral Tablet; TAKE 1 TABLET Every 4 hours PRN   pain; Last Rx:18Tgq5752; Status: ACTIVE - Renewal Voided Ordered   3  Aspirin  MG Oral Tablet Delayed Release; TAKE 3 TABLET Twice daily; Therapy: (Recorded:16Oct2017) to Recorded   4  B Complete TABS; TAKE 1 TABLET DAILY; Therapy: (Lyndal Monster) to Recorded   5  Baclofen 10 MG Oral Tablet; TAKE 1 TABLET Twice daily prn muscle pain; Therapy: 65PVO1880 to (Evaluate:03Ktx8012)  Requested for: 22RCS5537; Last   Rx:16Oct2017 Ordered   6  Colace 100 MG Oral Capsule; TAKE 1 CAPSULE DAILY; Therapy: (Lyndal Monster) to Recorded   7  Flovent  MCG/ACT Inhalation Aerosol; INHALE 2 PUFFS TWICE DAILY  RINSE   MOUTH AFTER USE  Requested for: 16MNW6502; Last Rx:16Oct2017 Ordered   8  Furosemide 40 MG Oral Tablet; Take 1 tablet daily;    Therapy: 77Cwu0081 to (Last Rx:04Thw9398)  Requested for: 16Oct2017 Ordered 9  Klor-Con 10 10 MEQ Oral Tablet Extended Release; One a day  Requested for:   93LYE5141; Last Rx:56Ona7246 Ordered   10  Magnesium CAPS; take 1 capsule daily; Therapy: (Lalo Ashleigh) to Recorded   11  ProAir  (90 Base) MCG/ACT Inhalation Aerosol Solution; 2 puffs Q4 hours prn    SOB/wheeze; Therapy: 19VQF7531 to (Last Rx:63Vhx3158)  Requested for: 16Oct2017 Ordered   12  Probiotic Oral Capsule; take 1 capsule daily; Therapy: (Lalo Ashleigh) to Recorded   13  Restasis 0 05 % Ophthalmic Emulsion; INSTILL 1 DROP IN EACH EYE TWICE DAILY    Recorded   14  Vitamin D3 CAPS; take 1 capsule daily; Therapy: (Recorded:25Jun2015) to Recorded    Allergies    1  CeleBREX CAPS    Immunizations  Influenza --- Nelly Ana: 01-Nov-2015; Pari Leroyter: 27-Sep-2016; Series3: Sep 2017   PCV --- Series1: 29-May-2015   PPSV --- Nelly Ana: 01-Nov-2010   Td/DT --- Series1: 01-Apr-2013   Zoster --- Series1: 01-Apr-2013     Vitals  Signs   Recorded: 09HPG0851 08:18AM   Temperature: 97 2 F  Heart Rate: 74  Respiration: 18  Systolic: 819  Diastolic: 76  Height: 5 ft 6 in  Weight: 202 lb   BMI Calculated: 32 6  BSA Calculated: 2 01    Results/Data  *VB - Urinary Incontinence Screen (Dx Z13 89 Screen for UI) 97DLT2336 08:59AM [employer]     Test Name Result Flag Reference   Urinary Incontinence Assessment 27YXW9833       Falls Risk Assessment (Dx Z13 89 Screen for Neurologic Disorder) 56LBD1522 08:58AM User, Ahs     Test Name Result Flag Reference   Falls Risk      No falls in the past year     PHQ-2 Adult Depression Screening 38LCP0466 08:58AM User, Ahs     Test Name Result Flag Reference   PHQ-2 Adult Depression Score 0     Over the last two weeks, how often have you been bothered by any of the following problems?   Little interest or pleasure in doing things: Not at all - 0  Feeling down, depressed, or hopeless: Not at all - 0   PHQ-2 Adult Depression Screening Negative         Health Management  Encounter for screening mammogram for breast cancer   MAMMO DIAGNOSTIC BILATERAL W 3D & CAD; every 1 year; Last 49HMJ9026; Next Due: 90WED1790;   Active    Future Appointments    Date/Time Provider Specialty Site   02/09/2018 08:00 AM Mariely Tim DO Gastroenterology Adult [facility] [address]     Signatures   Electronically signed by : Clary Orozco DO; Jan 18 2018 12:27PM EST                       (Author)

## 2018-01-24 ENCOUNTER — HOSPITAL ENCOUNTER (OUTPATIENT)
Dept: RADIOLOGY | Facility: HOSPITAL | Age: 73
Discharge: HOME | End: 2018-01-24
Payer: MEDICARE

## 2018-01-24 ENCOUNTER — TELEPHONE (OUTPATIENT)
Dept: FAMILY MEDICINE CLINIC | Facility: CLINIC | Age: 73
End: 2018-01-24

## 2018-01-24 DIAGNOSIS — R10.31 RIGHT LOWER QUADRANT PAIN: ICD-10-CM

## 2018-01-24 PROCEDURE — 74177 CT ABD & PELVIS W/CONTRAST: CPT

## 2018-01-24 RX ADMIN — IOHEXOL 100 ML: 350 INJECTION, SOLUTION INTRAVENOUS at 08:19

## 2018-01-24 NOTE — RESULT NOTES
Discussion/Summary   Diane,   Your blood count, blood sugar, kidney function, and liver enzymes are all normal    Your cholesterol is stable  Dr Lorin Schwab     Verified Results  (1) COMPREHENSIVE METABOLIC PANEL 86SGQ7845 36:54NS [place] Order Number: [id number]     Test Name Result Flag Reference   SODIUM 141 mmol/L  136-145   POTASSIUM 3 9 mmol/L  3 5-5 3   CHLORIDE 107 mmol/L  100-108   CARBON DIOXIDE 28 mmol/L  21-32   ANION GAP (CALC) 6 mmol/L  4-13   BLOOD UREA NITROGEN 20 mg/dL  5-25   CREATININE 0 84 mg/dL  0 60-1 30   Standardized to IDMS reference method   CALCIUM 8 9 mg/dL  8 3-10 1   BILI, TOTAL 0 93 mg/dL  0 20-1 00   ALK PHOSPHATAS 76 U/L     ALT (SGPT) 24 U/L  12-78   Specimen collection should occur prior to Sulfasalazine and/or Sulfapyridine administration due to the potential for falsely depressed results  AST(SGOT) 18 U/L  5-45   Specimen collection should occur prior to Sulfasalazine administration due to the potential for falsely depressed results  ALBUMIN 4 1 g/dL  3 5-5 0   TOTAL PROTEIN 7 6 g/dL  6 4-8 2   eGFR 70 ml/min/1 73sq m     [facility] Disease Education Program recommendations are as follows:  GFR calculation is accurate only with a steady state creatinine  Chronic Kidney disease less than 60 ml/min/1 73 sq  meters  Kidney failure less than 15 ml/min/1 73 sq  meters  GLUCOSE FASTING 90 mg/dL  65-99   Specimen collection should occur prior to Sulfasalazine administration due to the potential for falsely depressed results  Specimen collection should occur prior to Sulfapyridine administration due to the potential for falsely elevated results       (1) LIPID PANEL FASTING W DIRECT LDL REFLEX 22Jan2018 09:16AM [place] Order Number: [id number]     Test Name Result Flag Reference   CHOLESTEROL 214 mg/dL H    Cholesterol:    Desirable <200 mg/dl    Borderline 200-239 mg/dl    High>239   LDL CHOLESTEROL CALCULATED 137 mg/dL H 0-100 This screening LDL is a calculated result  It does not have the accuracy of the Direct Measured LDL in the monitoring of patients with hyperlipidemia and/or statin therapy  Direct Measure LDL (DUX424) must be ordered separately in these patients  Triglyceride:        Normal <150 mg/dl   Borderline High 150-199 mg/dl   High 200-499 mg/dl   Very High >499 mg/dl   TRIGLYCERIDES 136 mg/dL  <=150   Specimen collection should occur prior to N-Acetylcysteine or Metamizole administration due to the potential for falsely depressed results  HDL,DIRECT 50 mg/dL  40-60   HDL Cholesterol:    High>59 mg/dL    Low <41 mg/dL  HDL Cholesterol:    High>59 mg/dL    Low <41 mg/dL     (1) CBC/PLT/DIFF 22Jan2018 09:16AM Marc Grief Order Number: [id number]     Test Name Result Flag Reference   WBC COUNT 4 21 Thousand/uL L 4 31-10 16   RBC COUNT 4 80 Million/uL  3 81-5 12   HEMOGLOBIN 14 0 g/dL  11 5-15 4   HEMATOCRIT 42 1 %  34 8-46  1   MCV 88 fL  82-98   MCH 29 2 pg  26 8-34 3   MCHC 33 3 g/dL  31 4-37 4   RDW 14 2 %  11 6-15 1   MPV 10 9 fL  8 9-12 7   PLATELET COUNT 904 Thousands/uL  149-390   nRBC AUTOMATED 0 /100 WBCs     NEUTROPHILS RELATIVE PERCENT 45 %  43-75   LYMPHOCYTES RELATIVE PERCENT 42 %  14-44   MONOCYTES RELATIVE PERCENT 11 %  4-12   EOSINOPHILS RELATIVE PERCENT 2 %  0-6   BASOPHILS RELATIVE PERCENT 0 %  0-1   NEUTROPHILS ABSOLUTE COUNT 1 90 Thousands/? ??L  1 85-7 62   LYMPHOCYTES ABSOLUTE COUNT 1 75 Thousands/? ??L  0 60-4 47   MONOCYTES ABSOLUTE COUNT 0 45 Thousand/? ??L  0 17-1 22   EOSINOPHILS ABSOLUTE COUNT 0 09 Thousand/? ??L  0 00-0 61   BASOPHILS ABSOLUTE COUNT 0 01 Thousands/? ??L  0 00-0 10     (1) HEMOGLOBIN A1C 22Jan2018 09:16AM Marc Grief Order Number: [id number]     Test Name Result Flag Reference   HEMOGLOBIN A1C 5 2 %  4 2-6 3   EST  AVG   GLUCOSE 103 mg/dl

## 2018-01-26 ENCOUNTER — TELEPHONE (OUTPATIENT)
Dept: FAMILY MEDICINE CLINIC | Facility: CLINIC | Age: 73
End: 2018-01-26

## 2018-01-26 NOTE — TELEPHONE ENCOUNTER
Spoke with patient regarding her CT scan results  She is feeling much better and thinks the pain is from her spastic colon    Alysha Yap, DO

## 2018-02-09 ENCOUNTER — OFFICE VISIT (OUTPATIENT)
Dept: GASTROENTEROLOGY | Facility: CLINIC | Age: 73
End: 2018-02-09
Payer: MEDICARE

## 2018-02-09 VITALS
SYSTOLIC BLOOD PRESSURE: 128 MMHG | HEIGHT: 66 IN | DIASTOLIC BLOOD PRESSURE: 84 MMHG | BODY MASS INDEX: 32.53 KG/M2 | TEMPERATURE: 97.6 F | HEART RATE: 74 BPM | WEIGHT: 202.4 LBS

## 2018-02-09 DIAGNOSIS — K58.0 IRRITABLE BOWEL SYNDROME WITH DIARRHEA: ICD-10-CM

## 2018-02-09 DIAGNOSIS — K52.9 CHRONIC DIARRHEA: Primary | ICD-10-CM

## 2018-02-09 PROCEDURE — 99204 OFFICE O/P NEW MOD 45 MIN: CPT | Performed by: INTERNAL MEDICINE

## 2018-02-09 RX ORDER — ACETAMINOPHEN 160 MG
1 TABLET,DISINTEGRATING ORAL EVERY MORNING
COMMUNITY
End: 2022-06-30

## 2018-02-09 RX ORDER — ALBUTEROL SULFATE 90 UG/1
2 AEROSOL, METERED RESPIRATORY (INHALATION) EVERY 4 HOURS PRN
COMMUNITY
Start: 2015-05-29 | End: 2018-04-15 | Stop reason: ALTCHOICE

## 2018-02-09 RX ORDER — FLUTICASONE PROPIONATE 220 UG/1
2 AEROSOL, METERED RESPIRATORY (INHALATION) EVERY MORNING
COMMUNITY
End: 2018-12-22 | Stop reason: SDUPTHER

## 2018-02-09 RX ORDER — DOCUSATE SODIUM 100 MG/1
1 CAPSULE, LIQUID FILLED ORAL DAILY
COMMUNITY
End: 2018-04-15 | Stop reason: ALTCHOICE

## 2018-02-09 RX ORDER — ASPIRIN 325 MG
3 TABLET ORAL 2 TIMES DAILY
COMMUNITY
End: 2018-04-15 | Stop reason: ALTCHOICE

## 2018-02-09 RX ORDER — AMOXICILLIN 500 MG/1
CAPSULE ORAL
COMMUNITY
Start: 2018-01-13 | End: 2018-04-15 | Stop reason: ALTCHOICE

## 2018-02-09 RX ORDER — DICYCLOMINE HCL 20 MG
20 TABLET ORAL EVERY 6 HOURS
Qty: 120 TABLET | Refills: 5 | Status: SHIPPED | OUTPATIENT
Start: 2018-02-09 | End: 2018-04-15 | Stop reason: ALTCHOICE

## 2018-02-09 RX ORDER — CYCLOSPORINE 0.5 MG/ML
1 EMULSION OPHTHALMIC EVERY MORNING
COMMUNITY
End: 2018-11-26 | Stop reason: ALTCHOICE

## 2018-02-09 RX ORDER — PYRIDOXINE HCL (VITAMIN B6) 100 MG
1 TABLET ORAL EVERY MORNING
COMMUNITY
End: 2019-07-09 | Stop reason: ALTCHOICE

## 2018-02-09 RX ORDER — FUROSEMIDE 40 MG/1
TABLET ORAL
COMMUNITY
Start: 2017-12-16 | End: 2018-03-23 | Stop reason: SDUPTHER

## 2018-02-09 RX ORDER — POTASSIUM CHLORIDE 750 MG/1
TABLET, FILM COATED, EXTENDED RELEASE ORAL DAILY
COMMUNITY
End: 2018-03-23 | Stop reason: SDUPTHER

## 2018-02-09 RX ORDER — BACLOFEN 10 MG/1
TABLET ORAL 2 TIMES DAILY
COMMUNITY
Start: 2016-11-04 | End: 2018-04-15 | Stop reason: ALTCHOICE

## 2018-02-09 NOTE — PROGRESS NOTES
[facility] - Outpatient Consultation  Kyle Aguilar 67 y o  female MRN: [id number]  Encounter: [id number]      ASSESSMENT AND PLAN:    67year old female with     1  Chronic diarrhea  - will check for C diff and giardia- if these are negative will try a trial of imodium as symptoms seem related to IBS given longstanding symptoms  -other differential is microcytic colitis     2  IBS- D with strong pain component  - no alarm symptoms and she had recent colonoscopy in 2016 by another gastroenterologist   - trial of bentyl      Follow up in 3 months time for consideration of repeat colonoscopy if not improved       ______________________________________________________________________    HPI:  67year old female referred by PCD Dr Clements Minus per pt preference as she wanted to get a [facility] gastroenterologist due to a few months of RLQ pain that is intermittent in nautre  Was associated with fevers in November which went away  Last summer she reported some loose bowel movements after taking colace and magnesium  After stopping these her loose stools resolved but then she would develop constipation  She thinks she has IBS  She reports a history of H pylori in 2011  Now back to loose stools  She reports 3 or 4 bowel movements daily which are loose  She has known hemorrhoids and states she occasionally sees a small amount of blood on the toilet paper  Her weight is stable  Good appetite  She denies any recent antibiotic use  She brought her last colonoscopy was in 2016 which showed non bleeding internal hemorrhoids  5 mm polyp in the sigmoid  4 mm polyp in the rectum  Multiple diverticula in the colon  REVIEW OF SYSTEMS:    CONSTITUTIONAL: Denies any fever, chills, rigors, and weight loss  HEENT: No earache or tinnitus  Denies hearing loss or visual disturbances  CARDIOVASCULAR: No chest pain or palpitations     RESPIRATORY: Denies any cough, hemoptysis, shortness of breath or dyspnea on exertion  GASTROINTESTINAL: As noted in the History of Present Illness  GENITOURINARY: No problems with urination  Denies any hematuria or dysuria  NEUROLOGIC: No dizziness or vertigo, denies headaches  MUSCULOSKELETAL: Denies any muscle or joint pain  SKIN: Denies skin rashes or itching  ENDOCRINE: Denies excessive thirst  Denies intolerance to heat or cold  PSYCHOSOCIAL: Denies depression or anxiety  Denies any recent memory loss  Historical Information   Past Medical History:   Diagnosis Date    Arthritis     Colon polyps 2016     Past Surgical History:   Procedure Laterality Date    APPENDECTOMY      HYSTERECTOMY       Social History   History   Alcohol Use No     History   Drug Use No     History   Smoking Status    Former Smoker   Smokeless Tobacco    Never Used     Family History   Problem Relation Age of Onset    No Known Problems Mother     No Known Problems Father     Lung cancer Brother        Meds/Allergies       Current Outpatient Prescriptions:     albuterol (PROAIR HFA) 90 mcg/act inhaler    amoxicillin (AMOXIL) 500 mg capsule    aspirin 325 mg tablet    B Complex-Biotin-FA (B COMPLETE) TABS    baclofen 10 mg tablet    Cholecalciferol (VITAMIN D3) 2000 units capsule    Cranberry 1000 MG CAPS    cycloSPORINE (RESTASIS) 0 05 % ophthalmic emulsion    docusate sodium (COLACE) 100 mg capsule    fluticasone (FLOVENT HFA) 220 mcg/act inhaler    furosemide (LASIX) 40 mg tablet    Magnesium Oxide -Mg Supplement 400 MG CAPS    potassium chloride (KLOR-CON 10) 10 mEq tablet    Probiotic Product (PROBIOTIC-10 PO)    Allergies   Allergen Reactions    Celecoxib      Other reaction(s): Palpitations  Category: Adverse Reaction;        Objective     Blood pressure 128/84, pulse 74, temperature 97 6 °F (36 4 °C), temperature source Tympanic, height 5' 6" (1 676 m), weight 91 8 kg (202 lb 6 4 oz)        PHYSICAL EXAM:      General Appearance:   Alert, cooperative, no distress   HEENT:   Normocephalic, atraumatic, anicteric      Neck:  Supple, symmetrical, trachea midline   Lungs:   Clear to auscultation bilaterally; no rales, rhonchi or wheezing; respirations unlabored    Heart[de-identified]   Regular rate and rhythm; no murmur, rub, or gallop  Abdomen:   Soft, non-tender, non-distended; normal bowel sounds; no masses, no organomegaly, TTP in the RLQ   Genitalia:   Deferred    Rectal:   Deferred    Extremities:  No cyanosis, clubbing or edema    Pulses:  2+ and symmetric    Skin:  No jaundice, rashes, or lesions    Lymph nodes:  No palpable cervical lymphadenopathy        Lab Results:   No visits with results within 1 Day(s) from this visit     Latest known visit with results is:   Appointment on 01/22/2018   Component Date Value    Sodium 01/22/2018 141     Potassium 01/22/2018 3 9     Chloride 01/22/2018 107     CO2 01/22/2018 28     Anion Gap 01/22/2018 6     BUN 01/22/2018 20     Creatinine 01/22/2018 0 84     Glucose, Fasting 01/22/2018 90     Calcium 01/22/2018 8 9     AST 01/22/2018 18     ALT 01/22/2018 24     Alkaline Phosphatase 01/22/2018 76     Total Protein 01/22/2018 7 6     Albumin 01/22/2018 4 1     Total Bilirubin 01/22/2018 0 93     eGFR 01/22/2018 70     Cholesterol 01/22/2018 214*    Triglycerides 01/22/2018 136     HDL, Direct 01/22/2018 50     LDL Calculated 01/22/2018 137*    WBC 01/22/2018 4 21*    RBC 01/22/2018 4 80     Hemoglobin 01/22/2018 14 0     Hematocrit 01/22/2018 42 1     MCV 01/22/2018 88     MCH 01/22/2018 29 2     MCHC 01/22/2018 33 3     RDW 01/22/2018 14 2     MPV 01/22/2018 10 9     Platelets 91/11/2116 214     nRBC 01/22/2018 0     Neutrophils Relative 01/22/2018 45     Lymphocytes Relative 01/22/2018 42     Monocytes Relative 01/22/2018 11     Eosinophils Relative 01/22/2018 2     Basophils Relative 01/22/2018 0     Neutrophils Absolute 01/22/2018 1 90     Lymphocytes Absolute 01/22/2018 1 75     Monocytes Absolute 01/22/2018 0 45     Eosinophils Absolute 01/22/2018 0 09     Basophils Absolute 01/22/2018 0 01     Hemoglobin A1C 01/22/2018 5 2     EAG 01/22/2018 103        CT:  Diverticulosis without evidence of acute diverticulitis

## 2018-02-09 NOTE — LETTER
February 9, 2018     Goldie Torres DO  [address] [facility] [place] [address]    Patient: Carmelita Wasserman   YOB: 1945   Date of Visit: 2/9/2018       Dear Dr Wiliam Xiao: Thank you for referring Carmelita Wasserman to me for evaluation  Below are my notes for this consultation  If you have questions, please do not hesitate to call me  I look forward to following your patient along with you           Sincerely,        Jacque Parry DO        CC: No Recipients

## 2018-03-23 DIAGNOSIS — R60.9 EDEMA, UNSPECIFIED TYPE: Primary | ICD-10-CM

## 2018-03-23 RX ORDER — POTASSIUM CHLORIDE 750 MG/1
10 TABLET, FILM COATED, EXTENDED RELEASE ORAL DAILY
Qty: 90 TABLET | Refills: 3 | Status: SHIPPED | OUTPATIENT
Start: 2018-03-23 | End: 2019-08-06 | Stop reason: SDUPTHER

## 2018-03-23 RX ORDER — FUROSEMIDE 40 MG/1
40 TABLET ORAL DAILY
Qty: 90 TABLET | Refills: 3 | Status: SHIPPED | OUTPATIENT
Start: 2018-03-23 | End: 2018-11-26 | Stop reason: ALTCHOICE

## 2018-04-15 ENCOUNTER — APPOINTMENT (EMERGENCY)
Dept: RADIOLOGY | Facility: HOSPITAL | Age: 73
End: 2018-04-15
Payer: MEDICARE

## 2018-04-15 ENCOUNTER — HOSPITAL ENCOUNTER (EMERGENCY)
Facility: HOSPITAL | Age: 73
Discharge: HOME | End: 2018-04-15
Attending: EMERGENCY MEDICINE
Payer: MEDICARE

## 2018-04-15 VITALS
BODY MASS INDEX: 32.62 KG/M2 | DIASTOLIC BLOOD PRESSURE: 84 MMHG | RESPIRATION RATE: 18 BRPM | TEMPERATURE: 100.7 F | HEIGHT: 66 IN | OXYGEN SATURATION: 95 % | SYSTOLIC BLOOD PRESSURE: 149 MMHG | HEART RATE: 82 BPM | WEIGHT: 203 LBS

## 2018-04-15 DIAGNOSIS — K57.92 DIVERTICULITIS: Primary | ICD-10-CM

## 2018-04-15 LAB
ALBUMIN SERPL BCP-MCNC: 3.8 G/DL (ref 3.5–5)
ALP SERPL-CCNC: 96 U/L (ref 46–116)
ALT SERPL W P-5'-P-CCNC: 27 U/L (ref 12–78)
ANION GAP SERPL CALCULATED.3IONS-SCNC: 6 MMOL/L (ref 4–13)
APTT PPP: 29 SECONDS (ref 24–33)
AST SERPL W P-5'-P-CCNC: 20 U/L (ref 5–45)
BASOPHILS # BLD AUTO: 0 THOUSANDS/ΜL (ref 0–0.1)
BASOPHILS NFR BLD AUTO: 0 % (ref 0–1)
BILIRUB SERPL-MCNC: 1.3 MG/DL (ref 0.2–1)
BILIRUB UR QL STRIP: NEGATIVE
BUN SERPL-MCNC: 16 MG/DL (ref 5–25)
CALCIUM SERPL-MCNC: 9 MG/DL
CHLORIDE SERPL-SCNC: 103 MMOL/L (ref 100–108)
CLARITY UR: CLEAR
CO2 SERPL-SCNC: 31 MMOL/L (ref 21–32)
COLOR UR: YELLOW
CREAT SERPL-MCNC: 0.93 MG/DL (ref 0.6–1.3)
EOSINOPHIL # BLD AUTO: 0 THOUSAND/ΜL (ref 0–0.61)
EOSINOPHIL NFR BLD AUTO: 1 % (ref 0–6)
ERYTHROCYTE [DISTWIDTH] IN BLOOD BY AUTOMATED COUNT: 14.1 % (ref 11.6–15.1)
GFR SERPL CREATININE-BSD FRML MDRD: 62 ML/MIN/1.73SQ M
GLUCOSE SERPL-MCNC: 96 MG/DL (ref 65–140)
GLUCOSE UR STRIP-MCNC: NEGATIVE MG/DL
HCT VFR BLD AUTO: 41.2 % (ref 37–47)
HGB BLD-MCNC: 13.8 G/DL (ref 12–16)
HGB UR QL STRIP.AUTO: NEGATIVE
INR PPP: 1.02 (ref 0.86–1.16)
KETONES UR STRIP-MCNC: NEGATIVE MG/DL
LACTATE SERPL-SCNC: 0.8 MMOL/L (ref 0.5–2)
LEUKOCYTE ESTERASE UR QL STRIP: NEGATIVE
LIPASE SERPL-CCNC: 63 U/L (ref 73–393)
LYMPHOCYTES # BLD AUTO: 1.2 THOUSANDS/ΜL (ref 0.6–4.47)
LYMPHOCYTES NFR BLD AUTO: 14 % (ref 14–44)
MAGNESIUM SERPL-MCNC: 2.2 MG/DL (ref 1.6–2.6)
MCH RBC QN AUTO: 29.8 PG (ref 27–31)
MCHC RBC AUTO-ENTMCNC: 33.6 G/DL (ref 31.4–37.4)
MCV RBC AUTO: 89 FL (ref 82–98)
MONOCYTES # BLD AUTO: 0.9 THOUSAND/ΜL (ref 0.17–1.22)
MONOCYTES NFR BLD AUTO: 10 % (ref 4–12)
NEUTROPHILS # BLD AUTO: 6.7 THOUSANDS/ΜL (ref 1.85–7.62)
NEUTS SEG NFR BLD AUTO: 76 % (ref 43–75)
NITRITE UR QL STRIP: NEGATIVE
NRBC BLD AUTO-RTO: 0 /100 WBCS
PH UR STRIP.AUTO: 6.5 [PH] (ref 5–9)
PHOSPHATE SERPL-MCNC: 2.7 MG/DL (ref 2.3–4.1)
PLATELET # BLD AUTO: 219 THOUSANDS/UL (ref 130–400)
PMV BLD AUTO: 8.3 FL (ref 8.9–12.7)
POTASSIUM SERPL-SCNC: 3.5 MMOL/L (ref 3.5–5.3)
PROT SERPL-MCNC: 7.4 G/DL (ref 6.4–8.2)
PROT UR STRIP-MCNC: NEGATIVE MG/DL
PROTHROMBIN TIME: 10.7 SECONDS (ref 9.4–11.7)
RBC # BLD AUTO: 4.65 MILLION/UL (ref 4.2–5.4)
SODIUM SERPL-SCNC: 140 MMOL/L (ref 136–145)
SP GR UR STRIP.AUTO: 1.01 (ref 1–1.03)
TROPONIN I SERPL-MCNC: <0.02 NG/ML
UROBILINOGEN UR QL STRIP.AUTO: 0.2 E.U./DL
WBC # BLD AUTO: 8.9 THOUSAND/UL (ref 4.8–10.8)

## 2018-04-15 PROCEDURE — 85025 COMPLETE CBC W/AUTO DIFF WBC: CPT | Performed by: EMERGENCY MEDICINE

## 2018-04-15 PROCEDURE — 71046 X-RAY EXAM CHEST 2 VIEWS: CPT

## 2018-04-15 PROCEDURE — 85730 THROMBOPLASTIN TIME PARTIAL: CPT | Performed by: EMERGENCY MEDICINE

## 2018-04-15 PROCEDURE — 83605 ASSAY OF LACTIC ACID: CPT | Performed by: EMERGENCY MEDICINE

## 2018-04-15 PROCEDURE — 80053 COMPREHEN METABOLIC PANEL: CPT | Performed by: EMERGENCY MEDICINE

## 2018-04-15 PROCEDURE — 85610 PROTHROMBIN TIME: CPT | Performed by: EMERGENCY MEDICINE

## 2018-04-15 PROCEDURE — 36415 COLL VENOUS BLD VENIPUNCTURE: CPT | Performed by: EMERGENCY MEDICINE

## 2018-04-15 PROCEDURE — 93005 ELECTROCARDIOGRAM TRACING: CPT | Performed by: EMERGENCY MEDICINE

## 2018-04-15 PROCEDURE — 84484 ASSAY OF TROPONIN QUANT: CPT | Performed by: EMERGENCY MEDICINE

## 2018-04-15 PROCEDURE — 81003 URINALYSIS AUTO W/O SCOPE: CPT | Performed by: EMERGENCY MEDICINE

## 2018-04-15 PROCEDURE — 96375 TX/PRO/DX INJ NEW DRUG ADDON: CPT

## 2018-04-15 PROCEDURE — 96374 THER/PROPH/DIAG INJ IV PUSH: CPT

## 2018-04-15 PROCEDURE — 96361 HYDRATE IV INFUSION ADD-ON: CPT

## 2018-04-15 PROCEDURE — 83735 ASSAY OF MAGNESIUM: CPT | Performed by: EMERGENCY MEDICINE

## 2018-04-15 PROCEDURE — 99285 EMERGENCY DEPT VISIT HI MDM: CPT

## 2018-04-15 PROCEDURE — 84100 ASSAY OF PHOSPHORUS: CPT | Performed by: EMERGENCY MEDICINE

## 2018-04-15 PROCEDURE — 83690 ASSAY OF LIPASE: CPT | Performed by: EMERGENCY MEDICINE

## 2018-04-15 PROCEDURE — 74177 CT ABD & PELVIS W/CONTRAST: CPT

## 2018-04-15 RX ORDER — CIPROFLOXACIN 500 MG/1
500 TABLET, FILM COATED ORAL ONCE
Status: COMPLETED | OUTPATIENT
Start: 2018-04-15 | End: 2018-04-15

## 2018-04-15 RX ORDER — METRONIDAZOLE 500 MG/1
500 TABLET ORAL EVERY 8 HOURS SCHEDULED
Qty: 30 TABLET | Refills: 0 | Status: SHIPPED | OUTPATIENT
Start: 2018-04-15 | End: 2018-04-25

## 2018-04-15 RX ORDER — GINSENG 100 MG
1 CAPSULE ORAL ONCE
Status: DISCONTINUED | OUTPATIENT
Start: 2018-04-15 | End: 2018-04-15

## 2018-04-15 RX ORDER — ONDANSETRON 2 MG/ML
4 INJECTION INTRAMUSCULAR; INTRAVENOUS ONCE
Status: COMPLETED | OUTPATIENT
Start: 2018-04-15 | End: 2018-04-15

## 2018-04-15 RX ORDER — ONDANSETRON 4 MG/1
4 TABLET, FILM COATED ORAL EVERY 6 HOURS
Qty: 10 TABLET | Refills: 0 | Status: SHIPPED | OUTPATIENT
Start: 2018-04-15 | End: 2018-05-30

## 2018-04-15 RX ORDER — SENNA PLUS 8.6 MG/1
1 TABLET ORAL AS NEEDED
COMMUNITY

## 2018-04-15 RX ORDER — DOCUSATE SODIUM 100 MG/1
100 CAPSULE, LIQUID FILLED ORAL 2 TIMES DAILY
COMMUNITY

## 2018-04-15 RX ORDER — METRONIDAZOLE 500 MG/1
500 TABLET ORAL ONCE
Status: COMPLETED | OUTPATIENT
Start: 2018-04-15 | End: 2018-04-15

## 2018-04-15 RX ORDER — CIPROFLOXACIN 500 MG/1
500 TABLET, FILM COATED ORAL 2 TIMES DAILY
Qty: 20 TABLET | Refills: 0 | Status: SHIPPED | OUTPATIENT
Start: 2018-04-15 | End: 2018-04-25

## 2018-04-15 RX ORDER — MORPHINE SULFATE 4 MG/ML
4 INJECTION, SOLUTION INTRAMUSCULAR; INTRAVENOUS ONCE
Status: COMPLETED | OUTPATIENT
Start: 2018-04-15 | End: 2018-04-15

## 2018-04-15 RX ADMIN — METRONIDAZOLE 500 MG: 500 TABLET ORAL at 12:12

## 2018-04-15 RX ADMIN — IOHEXOL 100 ML: 350 INJECTION, SOLUTION INTRAVENOUS at 11:29

## 2018-04-15 RX ADMIN — ONDANSETRON 4 MG: 2 INJECTION INTRAMUSCULAR; INTRAVENOUS at 10:48

## 2018-04-15 RX ADMIN — MORPHINE SULFATE 4 MG: 4 INJECTION INTRAVENOUS at 10:51

## 2018-04-15 RX ADMIN — SODIUM CHLORIDE 1000 ML: 0.9 INJECTION, SOLUTION INTRAVENOUS at 10:30

## 2018-04-15 RX ADMIN — CIPROFLOXACIN 500 MG: 500 TABLET, FILM COATED ORAL at 12:12

## 2018-04-15 NOTE — ED PROVIDER NOTES
History  Chief Complaint   Patient presents with    Abdominal Pain     Pt sts started with left lower abd pain 5 days ago  Initially had diarrhea but now unable to move bowels  Has had vomiting off and on since  80-year-old female with past medical history of arthritis, asthma, peripheral edema, colon polyps, presents to the ER with complaint of left-sided abdominal pain over the past 5 days  Patient states that initially she had multiple episodes of nausea, vomiting, diarrhea  The symptoms resolved in 24 hours however abdominal pain persisted and has become more severe in nature in the past 24 hours  Patient came to the ER for further evaluation  Patient denies any fevers or chills at home  Patient does have some associated nausea currently  Patient describes her abdominal pain to be sharp and moderate in intensity  Patient notes no stool output over the past 4 days        History provided by:  Patient  Abdominal Pain   Associated symptoms: nausea    Associated symptoms: no chest pain, no chills, no constipation, no cough, no diarrhea, no dysuria, no fever and no shortness of breath        Prior to Admission Medications   Prescriptions Last Dose Informant Patient Reported? Taking?    B Complex-Biotin-FA (B COMPLETE) TABS  Self Yes Yes   Sig: Take 1 tablet by mouth daily   Cholecalciferol (VITAMIN D3) 2000 units capsule  Self Yes Yes   Sig: Take 1 capsule by mouth daily   Magnesium Oxide -Mg Supplement 400 MG CAPS  Self Yes Yes   Sig: Take 1 capsule by mouth daily   cycloSPORINE (RESTASIS) 0 05 % ophthalmic emulsion  Self Yes Yes   Sig: Apply 1 drop to eye 2 (two) times a day   docusate sodium (COLACE) 100 mg capsule   Yes Yes   Sig: Take 100 mg by mouth 2 (two) times a day   fluticasone (FLOVENT HFA) 220 mcg/act inhaler  Self Yes Yes   Sig: Inhale 2 puffs 2 (two) times a day   furosemide (LASIX) 40 mg tablet   No Yes   Sig: Take 1 tablet (40 mg total) by mouth daily   potassium chloride (KLOR-CON 10) 10 mEq tablet   No Yes   Sig: Take 1 tablet (10 mEq total) by mouth daily   senna (SENOKOT) 8 6 MG tablet   Yes Yes   Sig: Take 1 tablet by mouth as needed for constipation      Facility-Administered Medications: None       Past Medical History:   Diagnosis Date    Arthritis     Asthma     Colon polyps 2016       Past Surgical History:   Procedure Laterality Date    APPENDECTOMY      BLADDER SURGERY      HYSTERECTOMY      REDUCTION MAMMAPLASTY         Family History   Problem Relation Age of Onset    No Known Problems Mother     No Known Problems Father     Lung cancer Brother      I have reviewed and agree with the history as documented  Social History   Substance Use Topics    Smoking status: Former Smoker    Smokeless tobacco: Never Used    Alcohol use No        Review of Systems   Constitutional: Negative for activity change, appetite change, chills and fever  HENT: Negative for congestion and ear pain  Eyes: Negative for pain and discharge  Respiratory: Negative for cough, chest tightness, shortness of breath, wheezing and stridor  Cardiovascular: Negative for chest pain and palpitations  Gastrointestinal: Positive for abdominal pain and nausea  Negative for abdominal distention, constipation and diarrhea  Endocrine: Negative for cold intolerance  Genitourinary: Negative for dysuria, frequency and urgency  Musculoskeletal: Negative for arthralgias and back pain  Skin: Negative for color change and rash  Allergic/Immunologic: Negative for environmental allergies and food allergies  Neurological: Negative for dizziness, weakness, numbness and headaches  Hematological: Negative for adenopathy  Psychiatric/Behavioral: Negative for agitation, behavioral problems and confusion  The patient is not nervous/anxious  All other systems reviewed and are negative        Physical Exam  ED Triage Vitals [04/15/18 0954]   Temperature Pulse Respirations Blood Pressure SpO2   (!) 100 7 °F (38 2 °C) 105 18 165/89 96 %      Temp src Heart Rate Source Patient Position - Orthostatic VS BP Location FiO2 (%)   -- -- -- -- --      Pain Score       6           Orthostatic Vital Signs  Vitals:    04/15/18 1115 04/15/18 1200 04/15/18 1215 04/15/18 1230   BP: 163/91  153/75 149/84   Pulse: 84 86 88 82       Physical Exam   Constitutional: She is oriented to person, place, and time  She appears well-developed and well-nourished  HENT:   Head: Normocephalic and atraumatic  Mouth/Throat: Oropharynx is clear and moist    Eyes: Conjunctivae and EOM are normal    Neck: Normal range of motion  Neck supple  Cardiovascular: Normal rate, regular rhythm, normal heart sounds and intact distal pulses  Pulmonary/Chest: Effort normal and breath sounds normal    Abdominal: Soft  Bowel sounds are normal  She exhibits no distension  There is tenderness  Abdomen is soft  Bowel sounds present in all 4 quadrant  Significant tenderness to palpation noted to the left lower quadrant  Musculoskeletal: Normal range of motion  Neurological: She is alert and oriented to person, place, and time  Skin: Skin is warm and dry  Psychiatric: She has a normal mood and affect  Her behavior is normal  Judgment and thought content normal    Nursing note and vitals reviewed        ED Medications  Medications   sodium chloride 0 9 % bolus 1,000 mL (0 mL Intravenous Stopped 4/15/18 1143)   ondansetron (ZOFRAN) injection 4 mg (4 mg Intravenous Given 4/15/18 1048)   ondansetron (ZOFRAN) injection 4 mg (4 mg Intravenous Given 4/15/18 1048)   morphine (PF) 4 mg/mL injection 4 mg (4 mg Intravenous Given 4/15/18 1051)   iohexol (OMNIPAQUE) 350 MG/ML injection (MULTI-DOSE) 100 mL (100 mL Intravenous Given 4/15/18 1129)   ciprofloxacin (CIPRO) tablet 500 mg (500 mg Oral Given 4/15/18 1212)   metroNIDAZOLE (FLAGYL) tablet 500 mg (500 mg Oral Given 4/15/18 1212)       Diagnostic Studies  Results Reviewed     Procedure Component Value Units Date/Time    Protime-INR [[id number]]  (Normal) Collected:  04/15/18 1030    Lab Status:  Final result Specimen:  Blood from Arm, Right Updated:  04/15/18 1124     Protime 10 7 seconds      INR 1 02    APTT [[id number]]  (Normal) Collected:  04/15/18 1030    Lab Status:  Final result Specimen:  Blood from Arm, Right Updated:  04/15/18 1124     PTT 29 seconds     Troponin I [[id number]]  (Normal) Collected:  04/15/18 1030    Lab Status:  Final result Specimen:  Blood from Arm, Right Updated:  04/15/18 1118     Troponin I <0 02 ng/mL     Narrative:         [employer] Chemistry analyzer 99% cutoff is > 0 04 ng/mL in network labs    o cTnI 99% cutoff is useful only when applied to patients in the clinical setting of myocardial ischemia  o cTnI 99% cutoff should be interpreted in the context of clinical history, ECG findings and possibly cardiac imaging to establish correct diagnosis  o cTnI 99% cutoff may be suggestive but clearly not indicative of a coronary event without the clinical setting of myocardial ischemia  Lactic acid, plasma [[id number]]  (Normal) Collected:  04/15/18 1030    Lab Status:  Final result Specimen:  Blood from Arm, Right Updated:  04/15/18 1118     LACTIC ACID 0 8 mmol/L     Narrative:         Result may be elevated if tourniquet was used during collection      Comprehensive metabolic panel [[id number]]  (Abnormal) Collected:  04/15/18 1030    Lab Status:  Final result Specimen:  Blood from Arm, Right Updated:  04/15/18 1115     Sodium 140 mmol/L      Potassium 3 5 mmol/L      Chloride 103 mmol/L      CO2 31 mmol/L      Anion Gap 6 mmol/L      BUN 16 mg/dL      Creatinine 0 93 mg/dL      Glucose 96 mg/dL      Calcium 9 0 mg/dL      AST 20 U/L      ALT 27 U/L      Alkaline Phosphatase 96 U/L      Total Protein 7 4 g/dL      Albumin 3 8 g/dL      Total Bilirubin 1 30 (H) mg/dL      eGFR 62 ml/min/1 73sq m     Narrative:         National Kidney Disease Education Program recommendations are as follows:  GFR calculation is accurate only with a steady state creatinine  Chronic Kidney disease less than 60 ml/min/1 73 sq  meters  Kidney failure less than 15 ml/min/1 73 sq  meters  Magnesium [[id number]]  (Normal) Collected:  04/15/18 1030    Lab Status:  Final result Specimen:  Blood from Arm, Right Updated:  04/15/18 1115     Magnesium 2 2 mg/dL     Phosphorus [[id number]]  (Normal) Collected:  04/15/18 1030    Lab Status:  Final result Specimen:  Blood from Arm, Right Updated:  04/15/18 1115     Phosphorus 2 7 mg/dL     Lipase [[id number]]  (Abnormal) Collected:  04/15/18 1030    Lab Status:  Final result Specimen:  Blood from Arm, Right Updated:  04/15/18 1115     Lipase 63 (L) u/L     CBC and differential [[id number]]  (Abnormal) Collected:  04/15/18 1030    Lab Status:  Final result Specimen:  Blood from Arm, Right Updated:  04/15/18 1057     WBC 8 90 Thousand/uL      RBC 4 65 Million/uL      Hemoglobin 13 8 g/dL      Hematocrit 41 2 %      MCV 89 fL      MCH 29 8 pg      MCHC 33 6 g/dL      RDW 14 1 %      MPV 8 3 (L) fL      Platelets 437 Thousands/uL      nRBC 0 /100 WBCs      Neutrophils Relative 76 (H) %      Lymphocytes Relative 14 %      Monocytes Relative 10 %      Eosinophils Relative 1 %      Basophils Relative 0 %      Neutrophils Absolute 6 70 Thousands/µL      Lymphocytes Absolute 1 20 Thousands/µL      Monocytes Absolute 0 90 Thousand/µL      Eosinophils Absolute 0 00 Thousand/µL      Basophils Absolute 0 00 Thousands/µL     UA w Reflex to Microscopic w Reflex to Culture [[id number]] Collected:  04/15/18 1048    Lab Status:  No result Specimen:  Urine from Urine, Clean Catch                  XR chest 2 views   Final Result by Derek Quinn MD (04/15 1200)      No acute cardiopulmonary disease              Workstation performed: [id number]         CT abdomen pelvis with contrast   Final Result by Sydney Saini MD (04/15 1159)      Moderate thickening of a short segment of sigmoid colon with surrounding stranding  This likely represents acute diverticulitis, particularly given a rounded hyperdense focus along the wall suggesting an inflamed diverticula  Followup colonoscopy after    the acute illness has resolved is recommended to rule out colon cancer mimicking diverticulitis  Workstation performed: [id number]                    Procedures  ECG 12 Lead Documentation  Date/Time: 4/15/2018 10:45 AM  Performed by: Christa Adler  Authorized by: Christa Adler     Indications / Diagnosis:  Pain  ECG reviewed by me, the ED Provider: yes    Patient location:  ED  Previous ECG:     Previous ECG:  Unavailable  Interpretation:     Interpretation: normal    Comments:      Sinus rhythm, rate 87, normal axis, normal intervals, T-wave inversions noted to lead 3, no acute ST elevations noted, nonspecific T-wave abnormality, otherwise unremarkable EKG, no previous EKG available for comparison  Phone Contacts  ED Phone Contact    ED Course  ED Course as of Apr 15 1250   Sun Apr 15, 2018   1220   Patient re-examined at bedside  Patient's pain is decreased to 2/10  MDM  Number of Diagnoses or Management Options  Diverticulitis: new and requires workup  Diagnosis management comments: Obtain blood work, EKG, CT abdomen pelvis to rule out any acute abnormalities  Give IV fluids, antiemetics, pain medication and reassess symptoms  Amount and/or Complexity of Data Reviewed  Clinical lab tests: ordered and reviewed  Tests in the radiology section of CPT®: ordered and reviewed  Tests in the medicine section of CPT®: ordered and reviewed  Review and summarize past medical records: yes  Independent visualization of images, tracings, or specimens: yes    Risk of Complications, Morbidity, and/or Mortality  General comments: No significant abnormalities noted in patient's blood work  CT abdomen/pelvis shows concern for diverticulitis    Patient's symptoms have improved in the ER with IV fluids, pain medication, antiemetics  At this point patient is started on empiric antibiotics and discharged home on antibiotics and antiemetics p r n  Renard Romero Patient to follow up with PCP and GI  Close return instructions given to return to the ER for any worsening symptoms  Patient agrees with discharge plan  Patient well appearing at time of discharge  Patient Progress  Patient progress: stable    CritCare Time    Disposition  Final diagnoses:   Diverticulitis     Time reflects when diagnosis was documented in both MDM as applicable and the Disposition within this note     Time User Action Codes Description Comment    4/15/2018 11:23 AM Jan Mail Add Ha Ankush  XXXA] Insect bite, initial encounter     4/15/2018 11:57 AM Megan Bethea Remove [D83  XXXA] Insect bite, initial encounter     4/15/2018 12:31 PM Jan Mail Add [K57 [id number]] Diverticulitis       ED Disposition     ED Disposition Condition Comment    Discharge          Follow-up Information     Follow up With Specialties Details Why Contact Info    Cesar Molina DO Family Medicine In 1 week  [address] [place] [address]  [phone]      Ana Chu MD Gastroenterology In 2 weeks Please follow up with her own GI doctor or call the number below if you do not have a GI doctor   [facility]  [phone]          Discharge Medication List as of 4/15/2018 12:43 PM      START taking these medications    Details   ciprofloxacin (CIPRO) 500 mg tablet Take 1 tablet (500 mg total) by mouth 2 (two) times a day for 10 days, Starting Sun 4/15/2018, Until Wed 4/25/2018, Normal      metroNIDAZOLE (FLAGYL) 500 mg tablet Take 1 tablet (500 mg total) by mouth every 8 (eight) hours for 10 days, Starting Sun 4/15/2018, Until Wed 4/25/2018, Normal      ondansetron (ZOFRAN) 4 mg tablet Take 1 tablet (4 mg total) by mouth every 6 (six) hours, Starting Sun 4/15/2018, Normal         CONTINUE these medications which have NOT CHANGED    Details   B Complex-Biotin-FA (B COMPLETE) TABS Take 1 tablet by mouth daily, Historical Med      Cholecalciferol (VITAMIN D3) 2000 units capsule Take 1 capsule by mouth daily, Historical Med      cycloSPORINE (RESTASIS) 0 05 % ophthalmic emulsion Apply 1 drop to eye 2 (two) times a day, Historical Med      docusate sodium (COLACE) 100 mg capsule Take 100 mg by mouth 2 (two) times a day, Historical Med      fluticasone (FLOVENT HFA) 220 mcg/act inhaler Inhale 2 puffs 2 (two) times a day, Historical Med      furosemide (LASIX) 40 mg tablet Take 1 tablet (40 mg total) by mouth daily, Starting Fri 3/23/2018, Normal      Magnesium Oxide -Mg Supplement 400 MG CAPS Take 1 capsule by mouth daily, Historical Med      potassium chloride (KLOR-CON 10) 10 mEq tablet Take 1 tablet (10 mEq total) by mouth daily, Starting Fri 3/23/2018, Normal      senna (SENOKOT) 8 6 MG tablet Take 1 tablet by mouth as needed for constipation, Historical Med           No discharge procedures on file      ED Provider  Electronically Signed by           Charisma Phan DO  04/15/18 0873

## 2018-04-15 NOTE — DISCHARGE INSTRUCTIONS
Please take a list of all of your medications and discharge paperwork with you to all of your follow-up medical visits  Please take all of your medications as directed  Please call your family doctor or return to the ER if you have increased shortness of breath, chest pain, fevers, chills, nausea, vomiting, diarrhea, or any other worsening symptoms  Diverticulitis   WHAT YOU NEED TO KNOW:   What is diverticulitis? Diverticulitis is a condition that causes small pockets along your intestine called diverticula to become inflamed or infected  This is caused by hard bowel movement, food, or bacteria that get stuck in the pockets  What are the signs and symptoms of diverticulitis? · Pain in the lower left side of your abdomen    · Fever and chills    · Nausea or vomiting     · Constipation or diarrhea     · An urge to urinate or have a bowel movement more often than usual     · Bloody bowel movements    · Bloating and gas  How is diverticulitis diagnosed? Your healthcare provider will examine you  He or she will ask questions about your symptoms and health history  You may need any of the following:  · Blood and urine tests  may show signs of infection or inflammation  · CT scan or ultrasound  pictures may be used to find problems in your intestines  You may be given contrast liquid to help your intestines show up better in the pictures  Tell the healthcare provider if you have ever had an allergic reaction to contrast liquid  · A colonoscopy  is used to look at your intestines with a scope  A scope (long bendable tube with a light on the end) is used to take pictures  This test may show swollen diverticula or bleeding  Samples may be taken from your digestive tract and sent to a lab for tests  Bleeding may be controlled with tools that are inserted through the scope  How is diverticulitis treated? Mild diverticulitis can be treated at home   You may need to rest and follow a clear liquid diet until your diverticulitis gets better  You will be admitted to the hospital if you have severe diverticulitis  You may need any of the following:  · Antibiotics  help treat or prevent a bacterial infection  · Prescription pain medicine  may be given  Ask your healthcare provider how to take this medicine safely  Some prescription pain medicines contain acetaminophen  Do not take other medicines that contain acetaminophen without talking to your healthcare provider  Too much acetaminophen may cause liver damage  Prescription pain medicine may cause constipation  Ask your healthcare provider how to prevent or treat constipation  · An IV  may be used to give you liquids and nutrition  You may not be able to eat or drink anything until your healthcare provider says it is okay  · Drainage  may be done to reduce inflammation or treat infection  Your healthcare provider may insert a small tube through an incision in your abdomen to drain pus from infected diverticula  · Surgery  may be needed if there is a hole in your bowel or a large amount of swelling  A healthcare provider will remove the infected or inflamed areas of your colon  How can I manage my symptoms? A clear liquid diet will allow your intestines to heal  A clear liquid diet includes any liquids that you can see through  Examples include water, ginger-oumar, cranberry or apple juice, frozen fruit ice, or broth  Ask your healthcare provider for more information on a clear liquid diet  When should I seek immediate care? · You have bowel movement or foul-smelling discharge leaking from your vagina or in your urine  · You have severe diarrhea  · You urinate less than usual or not at all  · You are not able to have a bowel movement  · You cannot stop vomiting  · You have severe abdominal pain, a fever, and your abdomen is larger than usual      · You have new or increased blood in your bowel movements    When should I contact my healthcare provider? · You have pain when you urinate  · Your symptoms get worse or do not go away  · You have questions or concerns about your condition or care  CARE AGREEMENT:   You have the right to help plan your care  Learn about your health condition and how it may be treated  Discuss treatment options with your caregivers to decide what care you want to receive  You always have the right to refuse treatment  The above information is an  only  It is not intended as medical advice for individual conditions or treatments  Talk to your doctor, nurse or pharmacist before following any medical regimen to see if it is safe and effective for you  © 2017 [employer]  Information is for End User's use only and may not be sold, redistributed or otherwise used for commercial purposes  All illustrations and images included in CareNotes® are the copyrighted property of A D [employer] M [employer]  or [employer]

## 2018-04-16 ENCOUNTER — OFFICE VISIT (OUTPATIENT)
Dept: FAMILY MEDICINE CLINIC | Facility: CLINIC | Age: 73
End: 2018-04-16
Payer: MEDICARE

## 2018-04-16 ENCOUNTER — VBI (OUTPATIENT)
Dept: ADMINISTRATIVE | Facility: OTHER | Age: 73
End: 2018-04-16

## 2018-04-16 VITALS
TEMPERATURE: 99.3 F | RESPIRATION RATE: 20 BRPM | SYSTOLIC BLOOD PRESSURE: 136 MMHG | DIASTOLIC BLOOD PRESSURE: 80 MMHG | HEIGHT: 66 IN | BODY MASS INDEX: 33.91 KG/M2 | HEART RATE: 100 BPM | WEIGHT: 211 LBS

## 2018-04-16 DIAGNOSIS — K57.92 ACUTE DIVERTICULITIS: Primary | ICD-10-CM

## 2018-04-16 DIAGNOSIS — K21.9 GERD WITHOUT ESOPHAGITIS: ICD-10-CM

## 2018-04-16 PROBLEM — N64.4 PAIN OF LEFT BREAST: Status: ACTIVE | Noted: 2017-10-16

## 2018-04-16 PROBLEM — E78.5 HYPERLIPIDEMIA: Status: ACTIVE | Noted: 2017-03-30

## 2018-04-16 LAB
ATRIAL RATE: 87 BPM
P AXIS: 43 DEGREES
PR INTERVAL: 164 MS
QRS AXIS: 1 DEGREES
QRSD INTERVAL: 78 MS
QT INTERVAL: 366 MS
QTC INTERVAL: 440 MS
T WAVE AXIS: 18 DEGREES
VENTRICULAR RATE: 87 BPM

## 2018-04-16 PROCEDURE — 99214 OFFICE O/P EST MOD 30 MIN: CPT | Performed by: NURSE PRACTITIONER

## 2018-04-16 PROCEDURE — 93010 ELECTROCARDIOGRAM REPORT: CPT | Performed by: INTERNAL MEDICINE

## 2018-04-16 RX ORDER — OXYCODONE HYDROCHLORIDE AND ACETAMINOPHEN 5; 325 MG/1; MG/1
1 TABLET ORAL EVERY 4 HOURS PRN
Qty: 10 TABLET | Refills: 0 | Status: SHIPPED | OUTPATIENT
Start: 2018-04-16 | End: 2018-05-30

## 2018-04-16 NOTE — PROGRESS NOTES
Assessment/Plan:    Discussed clear liquid diet and using Zofran as needed for nausea  A prescription for Percocet was given for severe pain  Monitor for fever, worsening abdominal pain, recurrent diarrhea, or decrease urine output  She will schedule an appt with GI for f/u  Will need repeat colonoscopy  Problem List Items Addressed This Visit     GERD without esophagitis     stable         Acute diverticulitis - Primary     Continue antibiotics until finished  Advised on supportive care including clear liquid diet until pain resolves  Schedule f/u with GI         Relevant Medications    oxyCODONE-acetaminophen (PERCOCET) 5-325 mg per tablet    Other Relevant Orders    Ambulatory referral to Gastroenterology          Patient Instructions     Diverticulitis   AMBULATORY CARE:   Diverticulitis  is a condition that causes small pockets along your intestine called diverticula to become inflamed or infected  This is caused by hard bowel movement, food, or bacteria that get stuck in the pockets  Signs and symptoms include the following:   · Pain in the lower left side of your abdomen    · Fever and chills    · Nausea or vomiting     · Constipation or diarrhea     · An urge to urinate or have a bowel movement more often than usual     · Bloody bowel movements    · Bloating and gas  Seek care immediately if:   · You have bowel movement or foul-smelling discharge leaking from your vagina or in your urine  · You have severe diarrhea  · You urinate less than usual or not at all  · You are not able to have a bowel movement  · You cannot stop vomiting  · You have cramps or severe abdominal pain and a fever  · You have new or increased blood in your bowel movements  Contact your healthcare provider if:   · You have pain when you urinate  · Your symptoms get worse or do not go away  · You have questions or concerns about your condition or care    Treatment:  Mild diverticulitis can be treated at home  You may need to rest and follow a clear liquid diet until your diverticulitis gets better  You will be admitted to the hospital if you have severe diverticulitis  You may need any of the following:  · Antibiotics  help treat or prevent a bacterial infection  · Prescription pain medicine  may be given  Ask your healthcare provider how to take this medicine safely  Some prescription pain medicines contain acetaminophen  Do not take other medicines that contain acetaminophen without talking to your healthcare provider  Too much acetaminophen may cause liver damage  Prescription pain medicine may cause constipation  Ask your healthcare provider how to prevent or treat constipation  · An IV  may be used to give you liquids and nutrition  You may not be able to eat or drink anything until your healthcare provider says it is okay  · Drainage  may be done to reduce inflammation or treat infection  Your healthcare provider may insert a small tube through an incision in your abdomen to drain pus from infected diverticula  · Surgery  may be needed if there is a hole in your bowel or a large amount of swelling  A healthcare provider will remove the infected or inflamed areas of your colon  Clear liquid diet:  A clear liquid diet includes any liquids that you can see through  Examples include water, ginger-oumar, cranberry or apple juice, frozen fruit ice, or broth  Stay on a clear liquid diet until your symptoms are gone, or as directed  Follow up with your healthcare provider as directed: You may need to return for a colonoscopy  When your symptoms are gone, you may need a low-fat, high-fiber diet to prevent diverticulitis from developing again  Your healthcare provider or dietitian can help you create meal plans  Write down your questions so you remember to ask them during your visits     © 2017 [employer] Information is for End User's use only and may not be sold, redistributed or otherwise used for commercial purposes  All illustrations and images included in CareNotes® are the copyrighted property of [employer] M [employer] Inc  or [employer]  The above information is an  only  It is not intended as medical advice for individual conditions or treatments  Talk to your doctor, nurse or pharmacist before following any medical regimen to see if it is safe and effective for you  Return if symptoms worsen or fail to improve  Subjective:      Patient ID: Judy Leonardo is a 67 y o  female  Chief Complaint   Patient presents with    Follow-up     ER for stomach pain       Here today for ER f/u  Was evaluated yesterday for acute diverticulitis  Symptoms started about 5 days ago with abdominal pain, n/v/d  She has had a low grade temp since symptom onset  Diarrhea resolved after the first 24-48 hours  She passed a small formed stool this morning and is passing more gas  She is trying to hydrate and sticking to clear liquids  Abdominal pain somewhat improved  States pain is no longer stabbing but still moderate  Pain is LLQ and radiates to right side of abdomen  Colonoscopy 3 years ago  Most recently saw GI in [place], looking for someone closer  The following portions of the patient's history were reviewed and updated as appropriate: allergies, current medications, past family history, past medical history, past social history, past surgical history and problem list     Review of Systems   Constitutional: Positive for fever  Gastrointestinal: Positive for abdominal pain  See HPI   Genitourinary: Negative for decreased urine volume and difficulty urinating  Neurological: Negative for dizziness and weakness  All other systems reviewed and are negative          Current Outpatient Prescriptions   Medication Sig Dispense Refill    B Complex-Biotin-FA (B COMPLETE) TABS Take 1 tablet by mouth daily      Cholecalciferol (VITAMIN D3) 2000 units capsule Take 1 capsule by mouth daily      ciprofloxacin (CIPRO) 500 mg tablet Take 1 tablet (500 mg total) by mouth 2 (two) times a day for 10 days 20 tablet 0    cycloSPORINE (RESTASIS) 0 05 % ophthalmic emulsion Apply 1 drop to eye 2 (two) times a day      docusate sodium (COLACE) 100 mg capsule Take 100 mg by mouth 2 (two) times a day      fluticasone (FLOVENT HFA) 220 mcg/act inhaler Inhale 2 puffs 2 (two) times a day      furosemide (LASIX) 40 mg tablet Take 1 tablet (40 mg total) by mouth daily 90 tablet 3    Magnesium Oxide -Mg Supplement 400 MG CAPS Take 1 capsule by mouth daily      metroNIDAZOLE (FLAGYL) 500 mg tablet Take 1 tablet (500 mg total) by mouth every 8 (eight) hours for 10 days 30 tablet 0    ondansetron (ZOFRAN) 4 mg tablet Take 1 tablet (4 mg total) by mouth every 6 (six) hours 10 tablet 0    potassium chloride (KLOR-CON 10) 10 mEq tablet Take 1 tablet (10 mEq total) by mouth daily 90 tablet 3    senna (SENOKOT) 8 6 MG tablet Take 1 tablet by mouth as needed for constipation      oxyCODONE-acetaminophen (PERCOCET) 5-325 mg per tablet Take 1 tablet by mouth every 4 (four) hours as needed for moderate pain Max Daily Amount: 6 tablets 10 tablet 0     No current facility-administered medications for this visit  Objective:    /80   Pulse 100   Temp 99 3 °F (37 4 °C)   Resp 20   Ht 5' 6" (1 676 m)   Wt 95 7 kg (211 lb)   BMI 34 06 kg/m²        Physical Exam   Constitutional: She appears well-developed and well-nourished  HENT:   Right Ear: Tympanic membrane, external ear and ear canal normal    Left Ear: Tympanic membrane, external ear and ear canal normal    Nose: No mucosal edema  Mouth/Throat: Oropharynx is clear and moist and mucous membranes are normal    Eyes: Conjunctivae are normal    Cardiovascular: Normal rate, regular rhythm and normal heart sounds      Pulmonary/Chest: Effort normal and breath sounds normal    Abdominal: Bowel sounds are normal  She exhibits no distension  There is no splenomegaly or hepatomegaly  There is tenderness in the left lower quadrant  Lymphadenopathy:        Right cervical: No superficial cervical adenopathy present  Left cervical: No superficial cervical adenopathy present  Skin: No rash noted  Psychiatric: She has a normal mood and affect  Nursing note and vitals reviewed               Jacek Brown

## 2018-04-16 NOTE — ASSESSMENT & PLAN NOTE
Continue antibiotics until finished  Advised on supportive care including clear liquid diet until pain resolves    Schedule f/u with GI

## 2018-04-16 NOTE — PATIENT INSTRUCTIONS
Diverticulitis   AMBULATORY CARE:   Diverticulitis  is a condition that causes small pockets along your intestine called diverticula to become inflamed or infected  This is caused by hard bowel movement, food, or bacteria that get stuck in the pockets  Signs and symptoms include the following:   · Pain in the lower left side of your abdomen    · Fever and chills    · Nausea or vomiting     · Constipation or diarrhea     · An urge to urinate or have a bowel movement more often than usual     · Bloody bowel movements    · Bloating and gas  Seek care immediately if:   · You have bowel movement or foul-smelling discharge leaking from your vagina or in your urine  · You have severe diarrhea  · You urinate less than usual or not at all  · You are not able to have a bowel movement  · You cannot stop vomiting  · You have cramps or severe abdominal pain and a fever  · You have new or increased blood in your bowel movements  Contact your healthcare provider if:   · You have pain when you urinate  · Your symptoms get worse or do not go away  · You have questions or concerns about your condition or care  Treatment:  Mild diverticulitis can be treated at home  You may need to rest and follow a clear liquid diet until your diverticulitis gets better  You will be admitted to the hospital if you have severe diverticulitis  You may need any of the following:  · Antibiotics  help treat or prevent a bacterial infection  · Prescription pain medicine  may be given  Ask your healthcare provider how to take this medicine safely  Some prescription pain medicines contain acetaminophen  Do not take other medicines that contain acetaminophen without talking to your healthcare provider  Too much acetaminophen may cause liver damage  Prescription pain medicine may cause constipation  Ask your healthcare provider how to prevent or treat constipation       · An IV  may be used to give you liquids and nutrition  You may not be able to eat or drink anything until your healthcare provider says it is okay  · Drainage  may be done to reduce inflammation or treat infection  Your healthcare provider may insert a small tube through an incision in your abdomen to drain pus from infected diverticula  · Surgery  may be needed if there is a hole in your bowel or a large amount of swelling  A healthcare provider will remove the infected or inflamed areas of your colon  Clear liquid diet:  A clear liquid diet includes any liquids that you can see through  Examples include water, ginger-oumar, cranberry or apple juice, frozen fruit ice, or broth  Stay on a clear liquid diet until your symptoms are gone, or as directed  Follow up with your healthcare provider as directed: You may need to return for a colonoscopy  When your symptoms are gone, you may need a low-fat, high-fiber diet to prevent diverticulitis from developing again  Your healthcare provider or dietitian can help you create meal plans  Write down your questions so you remember to ask them during your visits  © 2017 [facility] Information is for End User's use only and may not be sold, redistributed or otherwise used for commercial purposes  All illustrations and images included in CareNotes® are the copyrighted property of [employer]  or [employer]  The above information is an  only  It is not intended as medical advice for individual conditions or treatments  Talk to your doctor, nurse or pharmacist before following any medical regimen to see if it is safe and effective for you

## 2018-04-17 ENCOUNTER — TELEPHONE (OUTPATIENT)
Dept: GASTROENTEROLOGY | Facility: CLINIC | Age: 73
End: 2018-04-17

## 2018-04-17 ENCOUNTER — TELEPHONE (OUTPATIENT)
Dept: FAMILY MEDICINE CLINIC | Facility: CLINIC | Age: 73
End: 2018-04-17

## 2018-04-17 NOTE — TELEPHONE ENCOUNTER
Spoke with pt regarding her ER follow up for diverticulitis  Pt was seen in our office for an ER follow up  Pt states her pain is better, still a little bit of discomfort but better  She called today to let us know she has not had a bowel movement in 6 days  States she has tried multiple suppositories, senokot, dulcolax stimulant, she also tried an enema  She states she didn't completely finish the enema because she felt some resistance, but no stool  Pt states she has bowel sounds in all 4 quadrants  She also stated that she has no taken any pain medication because she is worried to become more constipated  I spoke with Cora Ramires she stated pt should try mirilax today and no more enemas and she will need to call our office tomorrow to updated us on if she went or not  If not shraon stated she will need an x-ray to r/o any obstruction  Pt was agreeable and will call our office tomorrow with an updated   Abrahan Furl, [place]

## 2018-04-17 NOTE — TELEPHONE ENCOUNTER
Dr Asael Montalvo pt    Pt has been seeing dr Papo Watkins, she is requesting to switch her care to dr ferris due to the [place] location being too far, she is closer to the [place] office   [facility]

## 2018-05-01 ENCOUNTER — OFFICE VISIT (OUTPATIENT)
Dept: GASTROENTEROLOGY | Facility: CLINIC | Age: 73
End: 2018-05-01
Payer: MEDICARE

## 2018-05-01 VITALS
WEIGHT: 201 LBS | DIASTOLIC BLOOD PRESSURE: 70 MMHG | SYSTOLIC BLOOD PRESSURE: 116 MMHG | HEIGHT: 66 IN | HEART RATE: 72 BPM | BODY MASS INDEX: 32.3 KG/M2

## 2018-05-01 DIAGNOSIS — R10.31 RIGHT LOWER QUADRANT PAIN: ICD-10-CM

## 2018-05-01 DIAGNOSIS — R17 SERUM TOTAL BILIRUBIN ELEVATED: ICD-10-CM

## 2018-05-01 DIAGNOSIS — K57.33 DIVERTICULITIS OF LARGE INTESTINE WITHOUT PERFORATION OR ABSCESS WITH BLEEDING: Primary | ICD-10-CM

## 2018-05-01 PROCEDURE — 99214 OFFICE O/P EST MOD 30 MIN: CPT | Performed by: INTERNAL MEDICINE

## 2018-05-01 NOTE — LETTER
May 1, 2018     Paulina Deal DO  [address] [place] [address]    Patient: Thomas Fields   YOB: 1945   Date of Visit: 5/1/2018       Dear Dr Guy Benjamin: Thank you for referring Thomas Fields to me for evaluation  Below are my notes for this consultation  If you have questions, please do not hesitate to call me  I look forward to following your patient along with you           Sincerely,        Marco A Funes MD        CC: No Recipients

## 2018-05-01 NOTE — PROGRESS NOTES
[facility]  Progress Note - Maryuri Clark 67 y o  female MRN: [id number]    Unit/Bed#:  Encounter: [id number]    Assessment/Plan:  1  Recent history of diverticulitis status post treatment with Cipro and Flagyl for a days-currently with minimal discomfort, nontender exam   CT of abdomen and pelvis showed moderate thickening of the sigmoid colon     -discussed to continue low residue diet for 2 more weeks followed by high-fiber diet  Should she develop any constipation while on low residue diet, start taking Metamucil on a daily basis along with as needed stool softener   - Patient was explained about  the risks and benefits of the procedure  Risks including but not limited to bleeding, infection, perforation were explained in detail  Also explained about less than 100% sensitivity with the exam and other alternatives  -increase water intake   -last colonoscopy was in 2016 by another gastroenterologist which revealed nonbleeding internal hemorrhoids, 5 mm polyp in the sigmoid and a 4 mm polyp in the rectum  There was also evidence of multiple diverticuli   -will schedule repeat colonoscopy in a 4-5 weeks to assess for any polyps and inflammatory bowel disease given initial episode of diverticulitis  2   Isolated elevation of total bilirubin on the most recent labs  No right upper quadrant tenderness, no evidence of gallstones on the CT scan  Will plan for repeat hepatic function  3   Intermittent episodes of right lower quadrant pain and bloating suspect could be IBS, start on probiotics daily  Patient was previously given prescription for Bentyl, can be taken on as-needed basis  Subjective: This is a 57-year-old female who comes for follow-up  Patient was previously seen by Dr  tap it for right lower quadrant pain that has been ongoing for past 6 months the and is intermittent in nature    She underwent CT scan recently in the ER which showed diverticulitis in the sigmoid and was appropriately treated with Cipro and Flagyl  She states that the right lower quadrant pain had proceeded the episode of diverticulitis  She denies any unintentional weight loss, nausea, vomiting, hematemesis or melena  No unintentional weight loss  Her last colonoscopy was in 2016  Patient attributes current symptoms to IB S     Objective:     Vitals: Blood pressure 116/70, pulse 72, height 5' 6" (1 676 m), weight 91 2 kg (201 lb)  ,Body mass index is 32 44 kg/m²  [unfilled]    Physical Exam:    GEN: wn/wd, NAD  HEENT: MMM, no cervical or supraclavicular LAD, anciteric  CV: RRR, no m/r/g  CHEST: CTA b/l, no w/r/r  ABD: +BS, soft, NT/ND, no hepatosplenomegaly  EXT: no c/c/e  SKIN: no rashes  NEURO: aaox3      Invasive Devices     Peripheral Intravenous Line            Peripheral IV 04/15/18 Right Antecubital 16 days                        Lab, Imaging and other studies:     No visits with results within 1 Day(s) from this visit     Latest known visit with results is:   Admission on 04/15/2018, Discharged on 04/15/2018   Component Date Value    WBC 04/15/2018 8 90     RBC 04/15/2018 4 65     Hemoglobin 04/15/2018 13 8     Hematocrit 04/15/2018 41 2     MCV 04/15/2018 89     MCH 04/15/2018 29 8     MCHC 04/15/2018 33 6     RDW 04/15/2018 14 1     MPV 04/15/2018 8 3*    Platelets 70/23/1469 219     nRBC 04/15/2018 0     Neutrophils Relative 04/15/2018 76*    Lymphocytes Relative 04/15/2018 14     Monocytes Relative 04/15/2018 10     Eosinophils Relative 04/15/2018 1     Basophils Relative 04/15/2018 0     Neutrophils Absolute 04/15/2018 6 70     Lymphocytes Absolute 04/15/2018 1 20     Monocytes Absolute 04/15/2018 0 90     Eosinophils Absolute 04/15/2018 0 00     Basophils Absolute 04/15/2018 0 00     Protime 04/15/2018 10 7     INR 04/15/2018 1 02     PTT 04/15/2018 29     Sodium 04/15/2018 140     Potassium 04/15/2018 3 5     Chloride 04/15/2018 103     CO2 04/15/2018 31     Anion Gap 04/15/2018 6     BUN 04/15/2018 16     Creatinine 04/15/2018 0 93     Glucose 04/15/2018 96     Calcium 04/15/2018 9 0     AST 04/15/2018 20     ALT 04/15/2018 27     Alkaline Phosphatase 04/15/2018 96     Total Protein 04/15/2018 7 4     Albumin 04/15/2018 3 8     Total Bilirubin 04/15/2018 1 30*    eGFR 04/15/2018 62     Magnesium 04/15/2018 2 2     Phosphorus 04/15/2018 2 7     Lipase 04/15/2018 63*    Color, UA 04/15/2018 Yellow     Clarity, UA 04/15/2018 Clear     Specific Gravity, UA 04/15/2018 1 010     pH, UA 04/15/2018 6 5     Leukocytes, UA 04/15/2018 Negative     Nitrite, UA 04/15/2018 Negative     Protein, UA 04/15/2018 Negative     Glucose, UA 04/15/2018 Negative     Ketones, UA 04/15/2018 Negative     Urobilinogen, UA 04/15/2018 0 2     Bilirubin, UA 04/15/2018 Negative     Blood, UA 04/15/2018 Negative     Troponin I 04/15/2018 <0 02     LACTIC ACID 04/15/2018 0 8     Ventricular Rate 04/15/2018 87     Atrial Rate 04/15/2018 87     CA Interval 04/15/2018 164     QRSD Interval 04/15/2018 78     QT Interval 04/15/2018 366     QTC Interval 04/15/2018 440     P Axis 04/15/2018 43     QRS Axis 04/15/2018 1     T Wave Axis 04/15/2018 18          I have personally reviewed pertinent films in PACS    No current facility-administered medications for this visit

## 2018-05-04 ENCOUNTER — TRANSCRIBE ORDERS (OUTPATIENT)
Dept: LAB | Facility: CLINIC | Age: 73
End: 2018-05-04

## 2018-05-04 ENCOUNTER — APPOINTMENT (OUTPATIENT)
Dept: LAB | Facility: CLINIC | Age: 73
End: 2018-05-04
Payer: MEDICARE

## 2018-05-04 DIAGNOSIS — K57.33 DIVERTICULITIS OF LARGE INTESTINE WITHOUT PERFORATION OR ABSCESS WITH BLEEDING: ICD-10-CM

## 2018-05-04 LAB
ALBUMIN SERPL BCP-MCNC: 4.1 G/DL (ref 3.5–5)
ALP SERPL-CCNC: 75 U/L (ref 46–116)
ALT SERPL W P-5'-P-CCNC: 25 U/L (ref 12–78)
AST SERPL W P-5'-P-CCNC: 17 U/L (ref 5–45)
BILIRUB DIRECT SERPL-MCNC: 0.17 MG/DL (ref 0–0.2)
BILIRUB SERPL-MCNC: 0.88 MG/DL (ref 0.2–1)
PROT SERPL-MCNC: 7.7 G/DL (ref 6.4–8.2)

## 2018-05-04 PROCEDURE — 80076 HEPATIC FUNCTION PANEL: CPT

## 2018-05-04 PROCEDURE — 36415 COLL VENOUS BLD VENIPUNCTURE: CPT

## 2018-05-09 ENCOUNTER — TELEPHONE (OUTPATIENT)
Dept: GASTROENTEROLOGY | Facility: AMBULARY SURGERY CENTER | Age: 73
End: 2018-05-09

## 2018-05-09 NOTE — TELEPHONE ENCOUNTER
----- Message from Geraldine Koch MD sent at 5/8/2018  1:13 PM EDT -----  Please inform the patient that the liver functions are normal

## 2018-05-30 ENCOUNTER — ANESTHESIA EVENT (OUTPATIENT)
Dept: GASTROENTEROLOGY | Facility: AMBULARY SURGERY CENTER | Age: 73
End: 2018-05-30
Payer: MEDICARE

## 2018-05-30 RX ORDER — ASPIRIN 325 MG
325 TABLET ORAL
COMMUNITY
End: 2022-01-13

## 2018-05-30 NOTE — PRE-PROCEDURE INSTRUCTIONS
Pre-Surgery Instructions:   Medication Instructions    Acetaminophen-Codeine (TYLENOL WITH CODEINE #3 PO) Patient was instructed by Physician and understands   aspirin 325 mg tablet Patient was instructed by Physician and understands   B Complex-Biotin-FA (B COMPLETE) TABS Patient was instructed by Physician and understands   Cholecalciferol (VITAMIN D3) 2000 units capsule Patient was instructed by Physician and understands   cycloSPORINE (RESTASIS) 0 05 % ophthalmic emulsion Patient was instructed by Physician and understands   docusate sodium (COLACE) 100 mg capsule Patient was instructed by Physician and understands   fluticasone (FLOVENT HFA) 220 mcg/act inhaler Patient was instructed by Physician and understands   furosemide (LASIX) 40 mg tablet Patient was instructed by Physician and understands   Magnesium Oxide -Mg Supplement 400 MG CAPS Patient was instructed by Physician and understands   Na Sulfate-K Sulfate-Mg Sulf (SUPREP BOWEL PREP KIT PO) Patient was instructed by Physician and understands   potassium chloride (KLOR-CON 10) 10 mEq tablet Patient was instructed by Physician and understands   senna (SENOKOT) 8 6 MG tablet Patient was instructed by Physician and understands  Pt to follow Dr Jordan Barth instructions    Sam

## 2018-05-30 NOTE — ANESTHESIA PREPROCEDURE EVALUATION
Review of Systems/Medical History  Patient summary reviewed  Chart reviewed  History of anesthetic complications (has gotten shivers after anesthesia)     Cardiovascular   Pulmonary  Asthma , well controlled/ stable ,        GI/Hepatic    GERD ,             Endo/Other    Comment: sjogren's syndrome    GYN    Hysterectomy,        Hematology   Musculoskeletal    Arthritis     Neurology      Comment: Blind left eye Psychology           Physical Exam    Airway    Mallampati score: II  TM Distance: >3 FB  Neck ROM: full     Dental       Cardiovascular  Rhythm: regular, Rate: normal,     Pulmonary  Breath sounds clear to auscultation,     Other Findings        Anesthesia Plan  ASA Score- 2     Anesthesia Type- IV sedation with anesthesia with ASA Monitors  Additional Monitors:   Airway Plan:         Plan Factors-    Induction- intravenous  Postoperative Plan-     Informed Consent- Anesthetic plan and risks discussed with patient

## 2018-05-31 ENCOUNTER — HOSPITAL ENCOUNTER (OUTPATIENT)
Facility: AMBULARY SURGERY CENTER | Age: 73
Setting detail: OUTPATIENT SURGERY
Discharge: HOME | End: 2018-05-31
Attending: INTERNAL MEDICINE | Admitting: INTERNAL MEDICINE
Payer: MEDICARE

## 2018-05-31 ENCOUNTER — ANESTHESIA (OUTPATIENT)
Dept: GASTROENTEROLOGY | Facility: AMBULARY SURGERY CENTER | Age: 73
End: 2018-05-31
Payer: MEDICARE

## 2018-05-31 VITALS
SYSTOLIC BLOOD PRESSURE: 133 MMHG | DIASTOLIC BLOOD PRESSURE: 78 MMHG | HEART RATE: 59 BPM | WEIGHT: 201 LBS | TEMPERATURE: 97.5 F | OXYGEN SATURATION: 98 % | RESPIRATION RATE: 18 BRPM | BODY MASS INDEX: 32.44 KG/M2

## 2018-05-31 DIAGNOSIS — K57.33 DIVERTICULITIS OF LARGE INTESTINE WITHOUT PERFORATION OR ABSCESS WITH BLEEDING: ICD-10-CM

## 2018-05-31 PROCEDURE — 88305 TISSUE EXAM BY PATHOLOGIST: CPT | Performed by: PATHOLOGY

## 2018-05-31 PROCEDURE — 45380 COLONOSCOPY AND BIOPSY: CPT | Performed by: INTERNAL MEDICINE

## 2018-05-31 PROCEDURE — 45385 COLONOSCOPY W/LESION REMOVAL: CPT | Performed by: INTERNAL MEDICINE

## 2018-05-31 RX ORDER — PROPOFOL 10 MG/ML
INJECTION, EMULSION INTRAVENOUS AS NEEDED
Status: DISCONTINUED | OUTPATIENT
Start: 2018-05-31 | End: 2018-05-31 | Stop reason: SURG

## 2018-05-31 RX ORDER — SODIUM CHLORIDE 9 MG/ML
75 INJECTION, SOLUTION INTRAVENOUS CONTINUOUS
Status: DISCONTINUED | OUTPATIENT
Start: 2018-05-31 | End: 2018-05-31 | Stop reason: HOSPADM

## 2018-05-31 RX ADMIN — PROPOFOL 30 MG: 10 INJECTION, EMULSION INTRAVENOUS at 12:09

## 2018-05-31 RX ADMIN — SODIUM CHLORIDE: 0.9 INJECTION, SOLUTION INTRAVENOUS at 12:02

## 2018-05-31 RX ADMIN — PROPOFOL 60 MG: 10 INJECTION, EMULSION INTRAVENOUS at 12:33

## 2018-05-31 RX ADMIN — PROPOFOL 40 MG: 10 INJECTION, EMULSION INTRAVENOUS at 12:27

## 2018-05-31 RX ADMIN — PROPOFOL 60 MG: 10 INJECTION, EMULSION INTRAVENOUS at 12:23

## 2018-05-31 RX ADMIN — PROPOFOL 60 MG: 10 INJECTION, EMULSION INTRAVENOUS at 12:19

## 2018-05-31 RX ADMIN — PROPOFOL 40 MG: 10 INJECTION, EMULSION INTRAVENOUS at 12:30

## 2018-05-31 RX ADMIN — PROPOFOL 110 MG: 10 INJECTION, EMULSION INTRAVENOUS at 12:06

## 2018-05-31 RX ADMIN — PROPOFOL 60 MG: 10 INJECTION, EMULSION INTRAVENOUS at 12:14

## 2018-05-31 RX ADMIN — PROPOFOL 40 MG: 10 INJECTION, EMULSION INTRAVENOUS at 12:15

## 2018-05-31 NOTE — H&P
History and Physical -  [facility]  Jaja Arshad 67 y o  female MRN: [id number]    HPI: Jaja Arshad is a 67y o  year old female who presents for evaluation of recent episode of diverticulitis  Last colonoscopy was in 2016, had several polyps        Review of Systems    Historical Information   Past Medical History:   Diagnosis Date    Anesthesia complication     "shivers"    Arthritis     Asthma     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 see colors, with over-use goes darker- from untreated amblyopia    Breathing difficulty     with extubation pt developed laryngeal spasm    Cataract     both eyes- right eye needs surgery    Colon polyps 2016    Diverticulitis     had episode in April 2018    Edema     lower legs    Generalized osteoarthritis     GERD (gastroesophageal reflux disease)     Hearing problem     unspecified laterality-hearing aid right ear    High cholesterol     Polyarthritis     last assessed: 6/26/2015    Sjogren's syndrome ([address] )     last assessed: 7/18/2016    SS-A antibody positive     last assessed: 8/20/2015     Past Surgical History:   Procedure Laterality Date    APPENDECTOMY      AUGMENTATION BREAST Bilateral     breast surgery enlargement procedure elective bilateral    BILATERAL OOPHORECTOMY Bilateral 2003    BLADDER SURGERY      bladder lift,cystocele    COLONOSCOPY  04/18/2011    COLPORRHAPHY  2014    anterior, repair of cystocele    HYSTERECTOMY  1673    with cystocele     JOINT REPLACEMENT Left 2016    knee    KNEE ARTHROPLASTY Left     KNEE ARTHROSCOPY  2009    KNEE SURGERY Left 2009    torn meniscus    REDUCTION MAMMAPLASTY      TONSILLECTOMY AND ADENOIDECTOMY      URETHRA SURGERY       Social History   History   Alcohol Use    Yes     Comment: rarely     History   Drug Use No     History   Smoking Status    Never Smoker   Smokeless Tobacco    Never Used     Family History   Problem Relation Age of Onset    Coronary artery disease Mother     COPD Mother     Hypertension Mother     Hyperlipidemia Mother      high cholesterol    Atrial fibrillation Mother     Coronary artery disease Father     Arthritis Father     COPD Father      Black lung    Hypertension Father     Hyperlipidemia Father      high cholesterol    Lung cancer Brother     Coronary artery disease Brother     Hypertension Brother     Hyperlipidemia Brother      high cholesterol    Atrial fibrillation Brother      fib/flutter    Other Sister      pericarditis    Arthritis Sister     Diabetes Maternal Grandmother      mellitus    Breast cancer Maternal Grandmother     Breast cancer Paternal Grandmother     Arthritis Family     Asthma Family     Cancer Brother      lung    Heart disease Brother      CAD-stents-smoker       Meds/Allergies     Prescriptions Prior to Admission   Medication    Acetaminophen-Codeine (TYLENOL WITH CODEINE #3 PO)    aspirin 325 mg tablet    B Complex-Biotin-FA (B COMPLETE) TABS    Cholecalciferol (VITAMIN D3) 2000 units capsule    cycloSPORINE (RESTASIS) 0 05 % ophthalmic emulsion    docusate sodium (COLACE) 100 mg capsule    fluticasone (FLOVENT HFA) 220 mcg/act inhaler    furosemide (LASIX) 40 mg tablet    Magnesium Oxide -Mg Supplement 400 MG CAPS    Na Sulfate-K Sulfate-Mg Sulf (SUPREP BOWEL PREP KIT PO)    potassium chloride (KLOR-CON 10) 10 mEq tablet    senna (SENOKOT) 8 6 MG tablet       Allergies   Allergen Reactions    Celecoxib      Other reaction(s): Palpitations  Category: Adverse Reaction;        Objective     Pulse 60, temperature 97 5 °F (36 4 °C), temperature source Tympanic, resp  rate 18, weight 91 2 kg (201 lb), SpO2 99 %  PHYSICAL EXAM    Gen: NAD  CV: RRR  CHEST: Clear  ABD: soft, NT/ND  EXT: no edema  Neuro: AAO      ASSESSMENT/PLAN:  This is a 67y o  year old female here for recent episode of diverticulitis, had colonoscopy in 2016 which showed polyps  PLAN:   Procedure:  Colonoscopy

## 2018-05-31 NOTE — OP NOTE
Colonoscopy Procedure Note    Procedure: Colonoscopy    Sedation: Monitored anesthesia care, check anesthesia records      ASA Class: 2    INDICATIONS: History of Colon Polyps, History of Diverticulitis    POST-OP DIAGNOSIS: See the impression below    Procedure Details     Prior colonoscopy: 3 years ago  Informed consent was obtained for the procedure, including sedation  Risks of perforation, hemorrhage, adverse drug reaction and aspiration were discussed  The patient was placed in the left lateral decubitus position  Based on the pre-procedure assessment, including review of the patient's medical history, medications, allergies, and review of systems, she had been deemed to be an appropriate candidate for conscious sedation; she was therefore sedated with the medications listed below  The patient was monitored continuously with telemetry, pulse oximetry, blood pressure monitoring, and direct observations  A rectal examination was performed  The variable-stiffness pediatric colonoscope was inserted into the rectum and advanced under direct vision to the cecum, which was identified by the ileocecal valve and appendiceal orifice  The quality of the colonic preparation was good  A careful inspection was made as the colonoscope was withdrawn, including a retroflexed view of the rectum; findings and interventions are described below  Findings:  1   3 mm sessile polyp noted in the descending colon which was removed using cold biopsy forceps  2   2 mm sessile polyp noted in the sigmoid colon, removed using biopsy forceps  3   7-8 mm pedunculated polyp noted in the rectosigmoid area, removed using hot snare polypectomy in its entirety  4   Severe diverticulosis noted in the rectosigmoid and descending colon  There was 1 area of slight erythema around diverticuli in the rectosigmoid area    5   Retroflexed view revealed small internal hemorrhoids           Complications:  None; patient tolerated the procedure well  Impression:    1  Three colon polyps  2   Severe left-sided diverticulosis  3   Small internal hemorrhoids  4   Slight erythema around 1 of the diverticuli in the sigmoid colon suggestive of healing diverticulitis  Recommendations:  Repeat colonoscopy in 3 years if polyp is an adenoma  High-fiber diet    Follow up biopsy results in 2-3 weeks

## 2018-06-12 ENCOUNTER — TELEPHONE (OUTPATIENT)
Dept: GASTROENTEROLOGY | Facility: AMBULARY SURGERY CENTER | Age: 73
End: 2018-06-12

## 2018-06-25 RX ORDER — BACLOFEN 10 MG/1
TABLET ORAL
Qty: 60 TABLET | Refills: 0 | OUTPATIENT
Start: 2018-06-25

## 2018-07-31 ENCOUNTER — OFFICE VISIT (OUTPATIENT)
Dept: FAMILY MEDICINE CLINIC | Facility: CLINIC | Age: 73
End: 2018-07-31
Payer: MEDICARE

## 2018-07-31 VITALS
HEIGHT: 66 IN | RESPIRATION RATE: 18 BRPM | BODY MASS INDEX: 30.7 KG/M2 | DIASTOLIC BLOOD PRESSURE: 74 MMHG | SYSTOLIC BLOOD PRESSURE: 120 MMHG | TEMPERATURE: 97.6 F | HEART RATE: 72 BPM | WEIGHT: 191 LBS

## 2018-07-31 DIAGNOSIS — G89.29 CHRONIC BILATERAL THORACIC BACK PAIN: ICD-10-CM

## 2018-07-31 DIAGNOSIS — R73.01 IMPAIRED FASTING GLUCOSE: Primary | ICD-10-CM

## 2018-07-31 DIAGNOSIS — M15.0 PRIMARY GENERALIZED (OSTEO)ARTHRITIS: ICD-10-CM

## 2018-07-31 DIAGNOSIS — J45.30 MILD PERSISTENT ASTHMA WITHOUT COMPLICATION: ICD-10-CM

## 2018-07-31 DIAGNOSIS — M54.6 CHRONIC BILATERAL THORACIC BACK PAIN: ICD-10-CM

## 2018-07-31 PROBLEM — K57.92 ACUTE DIVERTICULITIS: Status: RESOLVED | Noted: 2018-04-16 | Resolved: 2018-07-31

## 2018-07-31 PROCEDURE — 99214 OFFICE O/P EST MOD 30 MIN: CPT | Performed by: FAMILY MEDICINE

## 2018-07-31 RX ORDER — ACETAMINOPHEN AND CODEINE PHOSPHATE 300; 30 MG/1; MG/1
1 TABLET ORAL EVERY 6 HOURS PRN
Qty: 30 TABLET | Refills: 1 | Status: SHIPPED | OUTPATIENT
Start: 2018-07-31 | End: 2019-01-21 | Stop reason: SDUPTHER

## 2018-07-31 RX ORDER — BACLOFEN 10 MG/1
10 TABLET ORAL 3 TIMES DAILY PRN
Qty: 90 TABLET | Refills: 2 | Status: SHIPPED | OUTPATIENT
Start: 2018-07-31 | End: 2019-12-10

## 2018-07-31 NOTE — PATIENT INSTRUCTIONS
Opiates are a controlled dangerous substance   Opioids are highly addictive, even when taken as prescribed and there is a significant risk of developing a physical or psychological dependence   If you mix opiate medications with sedatives, benzodiazepines or alcohol fatal respiratory depression can occur  Core Strengthening Exercises   AMBULATORY CARE:   What you need to know about core strengthening exercises: Your core includes the muscles of your lower back, hip, pelvis, and abdomen  Core strengthening exercises help heal and strengthen these muscles  This helps prevent another injury, and keeps your pelvis, spine, and hips in the correct position  Contact your healthcare provider if:   · You have sharp or worsening pain during exercise or at rest     · You have questions or concerns about your shoulder exercises  Safety tips:  Talk to your healthcare provider before you start an exercise program  A physical therapist can teach you how to do core strengthening exercises safely  · Do the exercises on a mat or firm surface  A firm surface will support your spine and avoid low back pain  Do not do these exercises on a bed  · Move slowly and smoothly  Avoid fast or jerky motions  · Stop if you feel pain  Core exercises should not feel painful  Stop if you feel pain  · Breathe normally during core exercises  Do not hold your breath  This may cause an increase in blood pressure and prevent muscle strengthening  Your healthcare provider will tell you when to inhale and exhale during the exercise  · Begin all of your exercises with abdominal bracing  Abdominal bracing helps warm up your core muscles  You can also practice abdominal bracing throughout the day while you are sitting or standing  Lie on your back with your knees bent and feet flat on the floor  Place your arms in a relaxed position beside your body  Tighten your abdominal muscles   Pull your belly button in and up toward your spine  Hold for 5 seconds  Relax your muscles  Repeat 10 times  Core strengthening exercises: Your healthcare provider will tell you how often to do these exercises  The provider will also tell you how many repetitions of each exercise you should do  Hold each exercise for 5 seconds or as directed  As you get stronger, increase your hold to 10 to 15 seconds  You can do some of these exercises on a stability ball, or with a weight  Ask your healthcare provider how to use a stability ball or weight for these exercises:  · Bent leg side bridge:  Lie on one side with your legs, hips, and shoulders in a straight line  Prop yourself up onto your forearm so your elbow is directly below your shoulder  Bend your knees to 90°  Lift your hips off the floor  Balance yourself on your forearm and the side of your knee  Hold this position  Repeat on the other side  · Straight leg side bridge:  Lie on one side with your legs, hips, and shoulders in a straight line  Prop yourself up onto your forearm so your elbow is directly below your shoulder  Your top leg can rest in front of your bottom leg for support  Lift your hips off the floor  Balance yourself on your forearm and the outside of your flexed foot  Do not let your ankle bend sideways  Hold this position  Repeat on the other side  · Superman:  Lie on your stomach  Extend your arms forward on the floor  Tighten your abdominal muscles and lift your right hand and left leg off the floor  Hold this position  Slowly return to the starting position  Tighten your abdominal muscles and lift your left hand and right leg off the floor  Hold this position  Slowly return to the starting position  · Curl up:  Lie on your back with your knees bent and feet flat on the floor  Place your hands, palms down, underneath your lower back  Next, with your elbows on the floor, lift your shoulders and chest 2 to 3 inches off the floor   Keep your head in line with your shoulders  Hold this position  Slowly return to the starting position  · Straight leg raises:  Lie on your back with one leg straight  Bend the other knee and place your foot flat on the floor  Tighten your abdominal muscles  Keep your leg straight and slowly lift it straight up 6 to 12 inches off the floor  Hold this position  Lower your leg slowly  Do as many repetitions as directed on this side  Repeat with the other leg  · Plank:  Lie on your stomach  Bend your elbows and place your forearms flat on the floor  Lift your chest, stomach, and knees off the floor  Make sure your elbows are below your shoulders  Your body should be in a straight line  Do not let your hips or lower back sink to the ground  Squeeze your abdominal muscles together and hold for 15 seconds  To make this exercise harder, hold for 30 seconds or lift 1 leg at a time  · Bicycles:  Lie on your back  Bend both knees and bring them toward your chest  Your calves should be parallel to the floor  Place the palms of your hands on the back of your head  Straighten your right leg and keep it lifted 2 inches off the floor  Raise your head and shoulders off the floor and twist towards your left  Keep your head and shoulders lifted  Bend your right knee while you straighten your left leg  Keep your left leg 2 inches off the floor  Twist your head and chest towards the left leg  Continue to straighten 1 leg at a time and twist        Follow up with your healthcare provider as directed:  Write down your questions so you remember to ask them during your visits  © 2017 [employer] Information is for End User's use only and may not be sold, redistributed or otherwise used for commercial purposes  All illustrations and images included in CareNotes® are the copyrighted property of [employer]  or [employer]  The above information is an  only   It is not intended as medical advice for individual conditions or treatments  Talk to your doctor, nurse or pharmacist before following any medical regimen to see if it is safe and effective for you

## 2018-07-31 NOTE — PROGRESS NOTES
Assessment/Plan:    Problem List Items Addressed This Visit     Asthma, mild persistent     Well controlled         Impaired fasting glucose - Primary     Last fasting sugar was normal at 96  Primary generalized (osteo)arthritis    Relevant Medications    acetaminophen-codeine (TYLENOL/CODEINE #3) 300-30 MG per tablet    Chronic bilateral thoracic back pain     From caring for her disabled   Risks and benefits of medication discussed  Relevant Medications    baclofen 10 mg tablet        [place] prescription monitoring program report reviewed and is appropriate  [place] and [place] included in search criteria  Patient Instructions     Opiates are a controlled dangerous substance   Opioids are highly addictive, even when taken as prescribed and there is a significant risk of developing a physical or psychological dependence   If you mix opiate medications with sedatives, benzodiazepines or alcohol fatal respiratory depression can occur  Core Strengthening Exercises   AMBULATORY CARE:   What you need to know about core strengthening exercises: Your core includes the muscles of your lower back, hip, pelvis, and abdomen  Core strengthening exercises help heal and strengthen these muscles  This helps prevent another injury, and keeps your pelvis, spine, and hips in the correct position  Contact your healthcare provider if:   · You have sharp or worsening pain during exercise or at rest     · You have questions or concerns about your shoulder exercises  Safety tips:  Talk to your healthcare provider before you start an exercise program  A physical therapist can teach you how to do core strengthening exercises safely  · Do the exercises on a mat or firm surface  A firm surface will support your spine and avoid low back pain  Do not do these exercises on a bed  · Move slowly and smoothly  Avoid fast or jerky motions  · Stop if you feel pain  Core exercises should not feel painful  Stop if you feel pain  · Breathe normally during core exercises  Do not hold your breath  This may cause an increase in blood pressure and prevent muscle strengthening  Your healthcare provider will tell you when to inhale and exhale during the exercise  · Begin all of your exercises with abdominal bracing  Abdominal bracing helps warm up your core muscles  You can also practice abdominal bracing throughout the day while you are sitting or standing  Lie on your back with your knees bent and feet flat on the floor  Place your arms in a relaxed position beside your body  Tighten your abdominal muscles  Pull your belly button in and up toward your spine  Hold for 5 seconds  Relax your muscles  Repeat 10 times  Core strengthening exercises: Your healthcare provider will tell you how often to do these exercises  The provider will also tell you how many repetitions of each exercise you should do  Hold each exercise for 5 seconds or as directed  As you get stronger, increase your hold to 10 to 15 seconds  You can do some of these exercises on a stability ball, or with a weight  Ask your healthcare provider how to use a stability ball or weight for these exercises:  · Bent leg side bridge:  Lie on one side with your legs, hips, and shoulders in a straight line  Prop yourself up onto your forearm so your elbow is directly below your shoulder  Bend your knees to 90°  Lift your hips off the floor  Balance yourself on your forearm and the side of your knee  Hold this position  Repeat on the other side  · Straight leg side bridge:  Lie on one side with your legs, hips, and shoulders in a straight line  Prop yourself up onto your forearm so your elbow is directly below your shoulder  Your top leg can rest in front of your bottom leg for support  Lift your hips off the floor  Balance yourself on your forearm and the outside of your flexed foot  Do not let your ankle bend sideways  Hold this position  Repeat on the other side  · Superman:  Lie on your stomach  Extend your arms forward on the floor  Tighten your abdominal muscles and lift your right hand and left leg off the floor  Hold this position  Slowly return to the starting position  Tighten your abdominal muscles and lift your left hand and right leg off the floor  Hold this position  Slowly return to the starting position  · Curl up:  Lie on your back with your knees bent and feet flat on the floor  Place your hands, palms down, underneath your lower back  Next, with your elbows on the floor, lift your shoulders and chest 2 to 3 inches off the floor  Keep your head in line with your shoulders  Hold this position  Slowly return to the starting position  · Straight leg raises:  Lie on your back with one leg straight  Bend the other knee and place your foot flat on the floor  Tighten your abdominal muscles  Keep your leg straight and slowly lift it straight up 6 to 12 inches off the floor  Hold this position  Lower your leg slowly  Do as many repetitions as directed on this side  Repeat with the other leg  · Plank:  Lie on your stomach  Bend your elbows and place your forearms flat on the floor  Lift your chest, stomach, and knees off the floor  Make sure your elbows are below your shoulders  Your body should be in a straight line  Do not let your hips or lower back sink to the ground  Squeeze your abdominal muscles together and hold for 15 seconds  To make this exercise harder, hold for 30 seconds or lift 1 leg at a time  · Bicycles:  Lie on your back  Bend both knees and bring them toward your chest  Your calves should be parallel to the floor  Place the palms of your hands on the back of your head  Straighten your right leg and keep it lifted 2 inches off the floor  Raise your head and shoulders off the floor and twist towards your left  Keep your head and shoulders lifted   Bend your right knee while you straighten your left leg  Keep your left leg 2 inches off the floor  Twist your head and chest towards the left leg  Continue to straighten 1 leg at a time and twist        Follow up with your healthcare provider as directed:  Write down your questions so you remember to ask them during your visits  © 2017 [employer] Information is for End User's use only and may not be sold, redistributed or otherwise used for commercial purposes  All illustrations and images included in CareNotes® are the copyrighted property of [employer]  or [employer]  The above information is an  only  It is not intended as medical advice for individual conditions or treatments  Talk to your doctor, nurse or pharmacist before following any medical regimen to see if it is safe and effective for you  Return in about 6 months (around 1/31/2019) for Annual Wellness Visit  Subjective:      Patient ID: Thomas Fields is a 68 y o  female  Chief Complaint   Patient presents with    Follow-up     blood pressure check and medication review [id number]       She has been taking care of her  who had a stroke this summer  She has to lift him and help him move around the house  The extra lifting is causing her increased back pain and arthritis pain  The hot weather is bothering her  She needs the inhaler regularly  The following portions of the patient's history were reviewed and updated as appropriate:  past social history    Review of Systems   Respiratory: Negative  Musculoskeletal: Positive for arthralgias and back pain           Current Outpatient Prescriptions   Medication Sig Dispense Refill    acetaminophen-codeine (TYLENOL/CODEINE #3) 300-30 MG per tablet Take 1 tablet by mouth every 6 (six) hours as needed for moderate pain 30 tablet 1    aspirin 325 mg tablet Take 975 mg by mouth as needed for mild pain May take every other day prn      B Complex-Biotin-FA (B COMPLETE) TABS Take 1 tablet by mouth every morning        Cholecalciferol (VITAMIN D3) 2000 units capsule Take 1 capsule by mouth every morning        cycloSPORINE (RESTASIS) 0 05 % ophthalmic emulsion Administer 1 drop to both eyes every morning        docusate sodium (COLACE) 100 mg capsule Take 100 mg by mouth 2 (two) times a day      fluticasone (FLOVENT HFA) 220 mcg/act inhaler Inhale 2 puffs every morning        furosemide (LASIX) 40 mg tablet Take 1 tablet (40 mg total) by mouth daily (Patient taking differently: Take 40 mg by mouth every morning  ) 90 tablet 3    Magnesium Oxide -Mg Supplement 400 MG CAPS Take 1 capsule by mouth every morning        potassium chloride (KLOR-CON 10) 10 mEq tablet Take 1 tablet (10 mEq total) by mouth daily (Patient taking differently: Take 10 mEq by mouth every morning  ) 90 tablet 3    senna (SENOKOT) 8 6 MG tablet Take 1 tablet by mouth as needed for constipation      baclofen 10 mg tablet Take 1 tablet (10 mg total) by mouth 3 (three) times a day as needed for muscle spasms 90 tablet 2     No current facility-administered medications for this visit  Objective:    /74   Pulse 72   Temp 97 6 °F (36 4 °C)   Resp 18   Ht 5' 6" (1 676 m)   Wt 86 6 kg (191 lb)   BMI 30 83 kg/m²        Physical Exam   Constitutional: She appears well-developed and well-nourished  HENT:   Head: Normocephalic and atraumatic  Right Ear: External ear normal    Left Ear: External ear normal    Mouth/Throat: Oropharynx is clear and moist    Cardiovascular: Normal rate, regular rhythm and normal heart sounds  Exam reveals no friction rub  No murmur heard  Pulmonary/Chest: Effort normal and breath sounds normal  No respiratory distress  She has no wheezes  She has no rales  Musculoskeletal: She exhibits no edema or deformity  Nursing note and vitals reviewed               Samara Mcgraw DO

## 2018-08-06 ENCOUNTER — OFFICE VISIT (OUTPATIENT)
Dept: CARDIOLOGY CLINIC | Facility: CLINIC | Age: 73
End: 2018-08-06
Payer: MEDICARE

## 2018-08-06 VITALS
OXYGEN SATURATION: 97 % | SYSTOLIC BLOOD PRESSURE: 119 MMHG | DIASTOLIC BLOOD PRESSURE: 74 MMHG | HEART RATE: 71 BPM | WEIGHT: 189 LBS | BODY MASS INDEX: 30.37 KG/M2 | HEIGHT: 66 IN

## 2018-08-06 DIAGNOSIS — R73.01 IMPAIRED FASTING GLUCOSE: Primary | ICD-10-CM

## 2018-08-06 DIAGNOSIS — E78.2 MIXED HYPERLIPIDEMIA: ICD-10-CM

## 2018-08-06 DIAGNOSIS — R60.9 EDEMA, UNSPECIFIED TYPE: ICD-10-CM

## 2018-08-06 PROCEDURE — 93000 ELECTROCARDIOGRAM COMPLETE: CPT | Performed by: INTERNAL MEDICINE

## 2018-08-06 PROCEDURE — 99214 OFFICE O/P EST MOD 30 MIN: CPT | Performed by: INTERNAL MEDICINE

## 2018-08-30 ENCOUNTER — OFFICE VISIT (OUTPATIENT)
Dept: FAMILY MEDICINE CLINIC | Facility: CLINIC | Age: 73
End: 2018-08-30
Payer: MEDICARE

## 2018-08-30 VITALS
DIASTOLIC BLOOD PRESSURE: 70 MMHG | TEMPERATURE: 97.9 F | RESPIRATION RATE: 16 BRPM | HEART RATE: 76 BPM | WEIGHT: 189 LBS | HEIGHT: 66 IN | BODY MASS INDEX: 30.37 KG/M2 | SYSTOLIC BLOOD PRESSURE: 112 MMHG

## 2018-08-30 DIAGNOSIS — J45.30 MILD PERSISTENT ASTHMA WITHOUT COMPLICATION: ICD-10-CM

## 2018-08-30 DIAGNOSIS — E78.2 MIXED HYPERLIPIDEMIA: ICD-10-CM

## 2018-08-30 DIAGNOSIS — Z01.818 PRE-OP EXAM: Primary | ICD-10-CM

## 2018-08-30 DIAGNOSIS — R60.9 EDEMA, UNSPECIFIED TYPE: ICD-10-CM

## 2018-08-30 DIAGNOSIS — H26.9 CATARACT OF RIGHT EYE, UNSPECIFIED CATARACT TYPE: ICD-10-CM

## 2018-08-30 PROCEDURE — 99214 OFFICE O/P EST MOD 30 MIN: CPT | Performed by: NURSE PRACTITIONER

## 2018-08-30 NOTE — PROGRESS NOTES
FAMILY PRACTICE PRE-OPERATIVE EVALUATION  [facility]    NAME: Keiko Gibbs  AGE: 68 y o  SEX: female  : 1945     DATE: 2018    Family Practice Pre-Operative Evaluation      Chief Complaint: Pre-operative Evaluation     Surgery: Right cataract surgery   Anticipated Date of Surgery: 2018  Surgeon: Dr Spike Hampton        History of Present Illness:     Keiko Gibbs is a 68 y o  female who presents to the office today for a preoperative consultation at the request of surgeon, Dr Spike Hampton, who plans on performing right cataract  on 2018  Planned anesthesia is IV sedation  Patient has a bleeding risk of: no recent abnormal bleeding, no remote history of abnormal bleeding and no use of Ca-channel blockers  Patient does not have objections to receiving blood products if needed  Current anti-platelet/anti-coagulation medications that the patient is prescribed includes: Aspirin  Assessment of Chronic Conditions:   - Asthma: Stable with flovent BID   Had cardiac follow up done on 2018 with Dr Angel Good for edema and continue with lasix and KCL  Not on any other cardiac medications      Assessment of Cardiac Risk:  · Denies unstable or severe angina or MI in the last 6 weeks or history of stent placement in the last year   · Denies decompensated heart failure (e g  New onset heart failure, NYHA functional class IV heart failure, or worsening existing heart failure)  · Denies significant arrhythmias such as high grade AV block, symptomatic ventricular arrhythmia, newly recognized ventricular tachycardia, supraventricular tachycardia with resting heart rate >100, or symptomatic bradycardia  · Denies severe heart valve disease including aortic stenosis or symptomatic mitral stenosis     Exercise Capacity:  · Able to walk 4 blocks without symptoms?: Yes  · Able to walk 2 flights without symptoms?: Yes    Prior Anesthesia Reactions: stated that had the general anaesthesia in the past and on extubation had the laryngeal spasm  Personal history of venous thromboembolic disease? No    History of steroid use for >2 weeks within last year? No         Review of Systems:     Review of Systems   Constitutional: Negative  HENT: Negative  Eyes: Negative  Respiratory: Negative  Cardiovascular: Negative  Gastrointestinal: Negative  Endocrine: Negative  Genitourinary: Negative  Musculoskeletal: Negative  Skin: Negative  Allergic/Immunologic: Negative  Neurological: Negative  Hematological: Negative  Psychiatric/Behavioral: Negative  Current Problem List:     Patient Active Problem List   Diagnosis    Edema    Asthma, mild persistent    Exertional shortness of breath    Fatigue    GERD without esophagitis    Hyperlipidemia    Impaired fasting glucose    Left knee pain    Pain of left breast    Plantar fasciitis, right    Palpitations    Primary generalized (osteo)arthritis    Right hip pain    Sjogren's syndrome ([address] )    Diverticulitis of large intestine without perforation or abscess with bleeding    Chronic bilateral thoracic back pain       Allergies: Allergies   Allergen Reactions    Celecoxib      Other reaction(s): Palpitations  Category:  Adverse Reaction;        Current Medications:       Current Outpatient Prescriptions:     acetaminophen-codeine (TYLENOL/CODEINE #3) 300-30 MG per tablet, Take 1 tablet by mouth every 6 (six) hours as needed for moderate pain, Disp: 30 tablet, Rfl: 1    aspirin 325 mg tablet, Take 975 mg by mouth as needed for mild pain May take every other day prn, Disp: , Rfl:     B Complex-Biotin-FA (B COMPLETE) TABS, Take 1 tablet by mouth every morning  , Disp: , Rfl:     baclofen 10 mg tablet, Take 1 tablet (10 mg total) by mouth 3 (three) times a day as needed for muscle spasms, Disp: 90 tablet, Rfl: 2    Cholecalciferol (VITAMIN D3) 2000 units capsule, Take 1 capsule by mouth every morning  , Disp: , Rfl:     cycloSPORINE (RESTASIS) 0 05 % ophthalmic emulsion, Administer 1 drop to both eyes every morning  , Disp: , Rfl:     docusate sodium (COLACE) 100 mg capsule, Take 100 mg by mouth 2 (two) times a day, Disp: , Rfl:     fluticasone (FLOVENT HFA) 220 mcg/act inhaler, Inhale 2 puffs every morning  , Disp: , Rfl:     furosemide (LASIX) 40 mg tablet, Take 1 tablet (40 mg total) by mouth daily (Patient taking differently: Take 40 mg by mouth every morning  ), Disp: 90 tablet, Rfl: 3    Magnesium Oxide -Mg Supplement 400 MG CAPS, Take 1 capsule by mouth every morning  , Disp: , Rfl:     potassium chloride (KLOR-CON 10) 10 mEq tablet, Take 1 tablet (10 mEq total) by mouth daily (Patient taking differently: Take 10 mEq by mouth every morning  ), Disp: 90 tablet, Rfl: 3    senna (SENOKOT) 8 6 MG tablet, Take 1 tablet by mouth as needed for constipation, Disp: , Rfl:     Past Medical History:       Past Medical History:   Diagnosis Date    Anesthesia complication     "shivers"    Arthritis     Asthma     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 see colors, with over-use goes darker- from untreated amblyopia    Breathing difficulty     with extubation pt developed laryngeal spasm    Cataract     both eyes- right eye needs surgery    Colon polyps 2016    Diverticulitis     had episode in April 2018    Edema     lower legs    Generalized osteoarthritis     GERD (gastroesophageal reflux disease)     Hearing problem     unspecified laterality-hearing aid right ear    High cholesterol     Polyarthritis     last assessed: 6/26/2015    Sjogren's syndrome [address] [address]     last assessed: 7/18/2016    SS-A antibody positive     last assessed: 8/20/2015        Past Surgical History:   Procedure Laterality Date    APPENDECTOMY      AUGMENTATION BREAST Bilateral     breast surgery enlargement procedure elective bilateral    BILATERAL OOPHORECTOMY Bilateral 2003    BLADDER SURGERY      bladder lift,cystocele    COLONOSCOPY 04/18/2011    COLPORRHAPHY  2014    anterior, repair of cystocele    HYSTERECTOMY  1673    with cystocele     JOINT REPLACEMENT Left 2016    knee    KNEE ARTHROPLASTY Left     KNEE ARTHROSCOPY  2009    KNEE SURGERY Left 2009    torn meniscus    AR COLONOSCOPY FLX DX W/COLLJ SPEC WHEN PFRMD N/A 5/31/2018    Procedure: COLONOSCOPY;  Surgeon: Vishal Chavez MD;  Location: [facility];   Service: Gastroenterology    REDUCTION MAMMAPLASTY      TONSILLECTOMY AND ADENOIDECTOMY      URETHRA SURGERY          Family History   Problem Relation Age of Onset    Coronary artery disease Mother     COPD Mother    Learta January Hypertension Mother     Hyperlipidemia Mother         high cholesterol    Atrial fibrillation Mother     Coronary artery disease Father     Arthritis Father     COPD Father         Black lung    Hypertension Father     Hyperlipidemia Father         high cholesterol    Lung cancer Brother     Coronary artery disease Brother     Hypertension Brother     Hyperlipidemia Brother         high cholesterol    Atrial fibrillation Brother         fib/flutter    Other Sister         pericarditis    Arthritis Sister     Diabetes Maternal Grandmother         mellitus    Breast cancer Maternal Grandmother     Breast cancer Paternal Grandmother     Arthritis Family     Asthma Family     Cancer Brother         lung    Heart disease Brother         CAD-stents-smoker        Social History     Social History    Marital status: /Civil Union     Spouse name: N/A    Number of children: N/A    Years of education: N/A     Occupational History    retired      Social History Main Topics    Smoking status: Never Smoker    Smokeless tobacco: Never Used    Alcohol use Yes      Comment: rarely    Drug use: No    Sexual activity: Not on file     Other Topics Concern    Not on file     Social History Narrative    Lack of adequate sleep-4-6 hrs/night    Lack of exercise            Physical Exam: /70   Pulse 76   Temp 97 9 °F (36 6 °C)   Resp 16   Ht 5' 6" (1 676 m)   Wt 85 7 kg (189 lb)   BMI 30 51 kg/m²     Physical Exam   Constitutional: She is oriented to person, place, and time  She appears well-developed and well-nourished  HENT:   Head: Normocephalic  Right Ear: Tympanic membrane, external ear and ear canal normal    Left Ear: Tympanic membrane, external ear and ear canal normal    Nose: Nose normal  Right sinus exhibits no maxillary sinus tenderness and no frontal sinus tenderness  Left sinus exhibits no maxillary sinus tenderness and no frontal sinus tenderness  Mouth/Throat: Oropharynx is clear and moist and mucous membranes are normal    Eyes: Conjunctivae and lids are normal  Pupils are equal, round, and reactive to light  Neck: Normal range of motion  Neck supple  Cardiovascular: Normal rate, regular rhythm and normal heart sounds  Pulmonary/Chest: Effort normal and breath sounds normal    Abdominal: Soft  Normal appearance and bowel sounds are normal  There is no hepatosplenomegaly  There is no tenderness  There is no rebound and no CVA tenderness  Hernia confirmed negative in the right inguinal area and confirmed negative in the left inguinal area  Musculoskeletal: Normal range of motion  She exhibits no edema, tenderness or deformity  Lymphadenopathy:        Right cervical: No superficial cervical and no posterior cervical adenopathy present  Left cervical: No superficial cervical and no posterior cervical adenopathy present  Right: No inguinal and no supraclavicular adenopathy present  Left: No inguinal and no supraclavicular adenopathy present  Neurological: She is alert and oriented to person, place, and time  She has normal strength and normal reflexes  No cranial nerve deficit or sensory deficit  She displays a negative Romberg sign  Coordination normal  GCS eye subscore is 4  GCS verbal subscore is 5  GCS motor subscore is 6  Skin: Skin is warm, dry and intact  Psychiatric: She has a normal mood and affect  Her speech is normal and behavior is normal  Judgment and thought content normal  Cognition and memory are normal    Vitals reviewed  Data:     Pre-operative work-up    Laboratory Results: I have personally reviewed the pertinent laboratory results/reports  from April and amy 2018  EKG: I have personally reviewed pertinent reports  Done on 8/6 by cardiologist and evaluated with NSR without any ST abnormalities        Assessment & Recommendations:     1  Pre-op exam     2  Cataract of right eye, unspecified cataract type     3  Edema, unspecified type      Stable with lasix and KCL   4  Mild persistent asthma without complication      Stable with flovent BID   5  Mixed hyperlipidemia      Patient refused statin as advised by cardilogist and aware of possible complications of hyperlipidemia and wants to manage with diet and excercise  6  BMI 30 0-30 9,adult         Pre-Op Evaluation Assessment  68 y o  female with planned surgery: right cataract surgery on 9/7/2018    Known risk factors for perioperative complications: None  Current medications which may produce withdrawal symptoms if withheld perioperatively: None    Pre-Op Evaluation Plan  1  Further preoperative workup as follows:   - None; no further preoperative work-up is required    2  Medication Management/Recommendations:   - Patient has been instructed to avoid herbs or non-directed vitamins the week prior to surgery to ensure no drug interactions with perioperative surgical and anesthetic medications  - Patient should hold diuretic medication the day of surgery  - Patient has been instructed to avoid aspirin containing medications or non-steroidal anti-inflammatory drugs for the week preceding surgery  3  Prophylaxis for cardiac events with perioperative beta-blockers: should be considered, specific regimen per anesthesia      4  Patient requires further consultation with: None    Clearance  Patient is CLEARED for surgery without any additional cardiac testing  Patient at low to moderate risk from cardiovascular standpoint at this time without any additional cardiac testing  Reevaluation needed, if patient should present with symptoms prior to surgery/procedure  Has h/o asthma and with last surgery on extubation had larygospasm and was cleared with nebulizer as per patient and please keep strong observation of airway      Wynelle Gosselin, [facility]  [address] [address]  Phone#  [phone]  Fax#  [phone]

## 2018-08-30 NOTE — PATIENT INSTRUCTIONS
Patient has been instructed to avoid herbs or non-directed vitamins the week prior to surgery to ensure no drug interactions with perioperative surgical and anesthetic medications  - Patient should hold diuretic medication the day of surgery  - Patient has been instructed to avoid aspirin containing medications or non-steroidal anti-inflammatory drugs for the week preceding surgery  Supportive care discussed and advised  Follow up for no improvement and worsening of conditions  Patient advised and educated when to see immediate medical care

## 2018-10-01 ENCOUNTER — OFFICE VISIT (OUTPATIENT)
Dept: FAMILY MEDICINE CLINIC | Facility: CLINIC | Age: 73
End: 2018-10-01
Payer: MEDICARE

## 2018-10-01 VITALS
BODY MASS INDEX: 29.96 KG/M2 | WEIGHT: 186.4 LBS | RESPIRATION RATE: 16 BRPM | TEMPERATURE: 97.4 F | DIASTOLIC BLOOD PRESSURE: 78 MMHG | SYSTOLIC BLOOD PRESSURE: 110 MMHG | HEIGHT: 66 IN | HEART RATE: 72 BPM

## 2018-10-01 DIAGNOSIS — R60.9 EDEMA, UNSPECIFIED TYPE: ICD-10-CM

## 2018-10-01 DIAGNOSIS — H26.9 CATARACT OF LEFT EYE, UNSPECIFIED CATARACT TYPE: ICD-10-CM

## 2018-10-01 DIAGNOSIS — J45.30 MILD PERSISTENT ASTHMA WITHOUT COMPLICATION: ICD-10-CM

## 2018-10-01 DIAGNOSIS — Z01.818 PREOP EXAMINATION: Primary | ICD-10-CM

## 2018-10-01 DIAGNOSIS — E78.2 MIXED HYPERLIPIDEMIA: ICD-10-CM

## 2018-10-01 PROCEDURE — 93000 ELECTROCARDIOGRAM COMPLETE: CPT | Performed by: NURSE PRACTITIONER

## 2018-10-01 PROCEDURE — 99214 OFFICE O/P EST MOD 30 MIN: CPT | Performed by: NURSE PRACTITIONER

## 2018-10-01 NOTE — PROGRESS NOTES
FAMILY PRACTICE PRE-OPERATIVE EVALUATION  [facility]    NAME: Darshan Alvares  AGE: 68 y o  SEX: female  : 1945     DATE: 10/1/2018    Family Practice Pre-Operative Evaluation      Chief Complaint: Pre-operative Evaluation     Surgery: left cataract surgery  Anticipated Date of Surgery: 10/5/2018  Surgeon: Dr Margret Burkitt     History of Present Illness:     Darshan Alvares is a 68 y o  female who presents to the office today for a preoperative consultation at the request of surgeon, Dr Margret Burkitt, who plans on performing left cataract repair on 10/5/2018  Planned anesthesia is IV sedation  Patient has a bleeding risk of: no recent abnormal bleeding, no remote history of abnormal bleeding and no use of Ca-channel blockers  Patient does not have objections to receiving blood products if needed  Current anti-platelet/anti-coagulation medications that the patient is prescribed includes: Aspirin  Assessment of Chronic Conditions:   - Asthma: On flovent and stable     Assessment of Cardiac Risk:  · Denies unstable or severe angina or MI in the last 6 weeks or history of stent placement in the last year   · Denies decompensated heart failure (e g  New onset heart failure, NYHA functional class IV heart failure, or worsening existing heart failure)  · Denies significant arrhythmias such as high grade AV block, symptomatic ventricular arrhythmia, newly recognized ventricular tachycardia, supraventricular tachycardia with resting heart rate >100, or symptomatic bradycardia  · Denies severe heart valve disease including aortic stenosis or symptomatic mitral stenosis     Exercise Capacity:  · Able to walk 4 blocks without symptoms?: Yes  · Able to walk 2 flights without symptoms?: Yes    Prior Anesthesia Reactions: Stated that had the laryngeal spasm in the past on the post extubation after general anaesthesia  Personal history of venous thromboembolic disease?  No    History of steroid use for >2 weeks within last year? No         Review of Systems:     Review of Systems   Constitutional: Negative  HENT: Negative  Eyes: Negative  Left cataract   Respiratory: Negative  Cardiovascular: Negative  Gastrointestinal: Negative  Endocrine: Negative  Genitourinary: Negative  Musculoskeletal: Negative  Skin: Negative  Allergic/Immunologic: Negative  Neurological: Negative  Hematological: Negative  Psychiatric/Behavioral: Negative  Current Problem List:     Patient Active Problem List   Diagnosis    Edema    Asthma, mild persistent    Exertional shortness of breath    Fatigue    GERD without esophagitis    Hyperlipidemia    Impaired fasting glucose    Left knee pain    Pain of left breast    Plantar fasciitis, right    Palpitations    Primary generalized (osteo)arthritis    Right hip pain    Sjogren's syndrome ([facility] [address] )    Diverticulitis of large intestine without perforation or abscess with bleeding    Chronic bilateral thoracic back pain       Allergies: Allergies   Allergen Reactions    Celecoxib      Other reaction(s): Palpitations  Category:  Adverse Reaction;        Current Medications:       Current Outpatient Prescriptions:     acetaminophen-codeine (TYLENOL/CODEINE #3) 300-30 MG per tablet, Take 1 tablet by mouth every 6 (six) hours as needed for moderate pain, Disp: 30 tablet, Rfl: 1    aspirin 325 mg tablet, Take 975 mg by mouth as needed for mild pain May take every other day prn, Disp: , Rfl:     B Complex-Biotin-FA (B COMPLETE) TABS, Take 1 tablet by mouth every morning  , Disp: , Rfl:     baclofen 10 mg tablet, Take 1 tablet (10 mg total) by mouth 3 (three) times a day as needed for muscle spasms, Disp: 90 tablet, Rfl: 2    Besifloxacin HCl (BESIVANCE OP), Apply to eye, Disp: , Rfl:     Cholecalciferol (VITAMIN D3) 2000 units capsule, Take 1 capsule by mouth every morning  , Disp: , Rfl:     cycloSPORINE (RESTASIS) 0 05 % ophthalmic emulsion, Administer 1 drop to both eyes every morning  , Disp: , Rfl:     Difluprednate (DUREZOL OP), Apply to eye, Disp: , Rfl:     docusate sodium (COLACE) 100 mg capsule, Take 100 mg by mouth 2 (two) times a day, Disp: , Rfl:     fluticasone (FLOVENT HFA) 220 mcg/act inhaler, Inhale 2 puffs every morning  , Disp: , Rfl:     furosemide (LASIX) 40 mg tablet, Take 1 tablet (40 mg total) by mouth daily (Patient taking differently: Take 40 mg by mouth every morning  ), Disp: 90 tablet, Rfl: 3    Magnesium Oxide -Mg Supplement 400 MG CAPS, Take 1 capsule by mouth every morning  , Disp: , Rfl:     Nepafenac (ILEVRO OP), Apply to eye, Disp: , Rfl:     potassium chloride (KLOR-CON 10) 10 mEq tablet, Take 1 tablet (10 mEq total) by mouth daily (Patient taking differently: Take 10 mEq by mouth every morning  ), Disp: 90 tablet, Rfl: 3    senna (SENOKOT) 8 6 MG tablet, Take 1 tablet by mouth as needed for constipation, Disp: , Rfl:     Past Medical History:       Past Medical History:   Diagnosis Date    Anesthesia complication     "shivers"    Arthritis     Asthma     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 see colors, with over-use goes darker- from untreated amblyopia    Breathing difficulty     with extubation pt developed laryngeal spasm    Cataract     both eyes- right eye needs surgery    Colon polyps 2016    Diverticulitis     had episode in April 2018    Edema     lower legs    Generalized osteoarthritis     GERD (gastroesophageal reflux disease)     Hearing problem     unspecified laterality-hearing aid right ear    High cholesterol     Polyarthritis     last assessed: 6/26/2015    Sjogren's syndrome ([facility] [address] )     last assessed: 7/18/2016    SS-A antibody positive     last assessed: 8/20/2015        Past Surgical History:   Procedure Laterality Date    APPENDECTOMY      AUGMENTATION BREAST Bilateral     breast surgery enlargement procedure elective bilateral    BILATERAL OOPHORECTOMY Bilateral 2003    BLADDER SURGERY      bladder lift,cystocele    COLONOSCOPY  04/18/2011    COLPORRHAPHY  2014    anterior, repair of cystocele    HYSTERECTOMY  1673    with cystocele     JOINT REPLACEMENT Left 2016    knee    KNEE ARTHROPLASTY Left     KNEE ARTHROSCOPY  2009    KNEE SURGERY Left 2009    torn meniscus    SC COLONOSCOPY FLX DX W/COLLJ SPEC WHEN PFRMD N/A 5/31/2018    Procedure: COLONOSCOPY;  Surgeon: Satish Esteban MD;  Location: [facility];   Service: Gastroenterology    REDUCTION MAMMAPLASTY      TONSILLECTOMY AND ADENOIDECTOMY      URETHRA SURGERY          Family History   Problem Relation Age of Onset    Coronary artery disease Mother     COPD Mother    [employer] Hypertension Mother     Hyperlipidemia Mother         high cholesterol    Atrial fibrillation Mother     Coronary artery disease Father     Arthritis Father     COPD Father         Black lung    Hypertension Father     Hyperlipidemia Father         high cholesterol    Lung cancer Brother     Coronary artery disease Brother     Hypertension Brother     Hyperlipidemia Brother         high cholesterol    Atrial fibrillation Brother         fib/flutter    Other Sister         pericarditis    Arthritis Sister     Diabetes Maternal Grandmother         mellitus    Breast cancer Maternal Grandmother     Breast cancer Paternal Grandmother     Arthritis Family     Asthma Family     Cancer Brother         lung    Heart disease Brother         CAD-stents-smoker        Social History     Social History    Marital status: /Civil Union     Spouse name: N/A    Number of children: N/A    Years of education: N/A     Occupational History    retired      Social History Main Topics    Smoking status: Never Smoker    Smokeless tobacco: Never Used    Alcohol use Yes      Comment: rarely    Drug use: No    Sexual activity: Not on file     Other Topics Concern    Not on file     Social History Narrative    Lack of adequate sleep-4-6 hrs/night    Lack of exercise            Physical Exam:     /78   Pulse 72   Temp (!) 97 4 °F (36 3 °C)   Resp 16   Ht 5' 6" (1 676 m)   Wt 84 6 kg (186 lb 6 4 oz)   BMI 30 09 kg/m²     Physical Exam   Constitutional: She is oriented to person, place, and time  She appears well-developed and well-nourished  HENT:   Head: Normocephalic  Right Ear: Tympanic membrane, external ear and ear canal normal    Left Ear: Tympanic membrane, external ear and ear canal normal    Nose: Nose normal  Right sinus exhibits no maxillary sinus tenderness and no frontal sinus tenderness  Left sinus exhibits no maxillary sinus tenderness and no frontal sinus tenderness  Mouth/Throat: Oropharynx is clear and moist and mucous membranes are normal    Eyes: Conjunctivae and lids are normal    Neck: Normal range of motion  Neck supple  Cardiovascular: Normal rate, regular rhythm and normal heart sounds  Pulmonary/Chest: Effort normal and breath sounds normal    Abdominal: Soft  Bowel sounds are normal    Musculoskeletal: Normal range of motion  She exhibits no edema, tenderness or deformity  Lymphadenopathy:        Right cervical: No superficial cervical and no posterior cervical adenopathy present  Left cervical: No superficial cervical and no posterior cervical adenopathy present  Right: No inguinal and no supraclavicular adenopathy present  Left: No inguinal and no supraclavicular adenopathy present  Neurological: She is alert and oriented to person, place, and time  She has normal strength and normal reflexes  No cranial nerve deficit or sensory deficit  She displays a negative Romberg sign  Coordination and gait normal  GCS eye subscore is 4  GCS verbal subscore is 5  GCS motor subscore is 6  Skin: Skin is warm, dry and intact  Psychiatric: She has a normal mood and affect   Her speech is normal and behavior is normal  Judgment and thought content normal  Cognition and memory are normal    Vitals reviewed  Data:     Pre-operative work-up    Laboratory Results: I have personally reviewed the pertinent laboratory results/reports  from April and may, 2018     EKG: I have personally reviewed pertinent reports  NSR without any ischemic changes and confirmed by Dr Ruma Chang & Recommendations:     1  Preop examination  POCT ECG   2  Cataract of left eye, unspecified cataract type     3  Mild persistent asthma without complication      Stable with flovent   4  Edema, unspecified type      On lasix and KCL   5  Mixed hyperlipidemia      Diet controlled and aware about possible complications  Refused statin   6  BMI 30 0-30 9,adult         Pre-Op Evaluation Assessment  68 y o  female with planned surgery: Left cataract surgery on 10/5/2018    Known risk factors for perioperative complications: None  Current medications which may produce withdrawal symptoms if withheld perioperatively: None  Pre-Op Evaluation Plan  1  Further preoperative workup as follows:   - None; no further preoperative work-up is required    2  Medication Management/Recommendations:   - Patient has been instructed to avoid herbs or non-directed vitamins the week prior to surgery to ensure no drug interactions with perioperative surgical and anesthetic medications  - Patient should hold diuretic medication the day of surgery  - Patient has been instructed to avoid aspirin containing medications or non-steroidal anti-inflammatory drugs for the week preceding surgery  3  Prophylaxis for cardiac events with perioperative beta-blockers: should be considered, specific regimen per anesthesia  4  Patient requires further consultation with: None    Clearance  Patient is CLEARED for surgery without any additional cardiac testing       Mis Schultz, [address] [place] [address]  [id number]  Phone#  [phone]  Fax#  [phone]

## 2018-10-01 NOTE — PATIENT INSTRUCTIONS
- Patient has been instructed to avoid herbs or non-directed vitamins the week prior to surgery to ensure no drug interactions with perioperative surgical and anesthetic medications  - Patient should hold diuretic medication the day of surgery  - Patient has been instructed to avoid aspirin containing medications or non-steroidal anti-inflammatory drugs for the week preceding surgery

## 2018-11-07 ENCOUNTER — OFFICE VISIT (OUTPATIENT)
Dept: AUDIOLOGY | Facility: CLINIC | Age: 73
End: 2018-11-07

## 2018-11-07 DIAGNOSIS — H90.3 SENSORINEURAL HEARING LOSS, BILATERAL: Primary | ICD-10-CM

## 2018-11-19 RX ORDER — BESIFLOXACIN 6 MG/ML
SUSPENSION OPHTHALMIC
COMMUNITY
Start: 2018-10-01 | End: 2018-11-26 | Stop reason: ALTCHOICE

## 2018-11-19 RX ORDER — NEPAFENAC 0.3 %
SUSPENSION, DROPS(FINAL DOSAGE FORM)(ML) OPHTHALMIC (EYE)
COMMUNITY
Start: 2018-10-27 | End: 2018-11-26 | Stop reason: ALTCHOICE

## 2018-11-26 ENCOUNTER — OFFICE VISIT (OUTPATIENT)
Dept: FAMILY MEDICINE CLINIC | Facility: CLINIC | Age: 73
End: 2018-11-26
Payer: MEDICARE

## 2018-11-26 VITALS
RESPIRATION RATE: 18 BRPM | HEART RATE: 84 BPM | DIASTOLIC BLOOD PRESSURE: 80 MMHG | BODY MASS INDEX: 30.37 KG/M2 | WEIGHT: 189 LBS | TEMPERATURE: 98.6 F | HEIGHT: 66 IN | SYSTOLIC BLOOD PRESSURE: 128 MMHG

## 2018-11-26 DIAGNOSIS — Z11.59 NEED FOR HEPATITIS C SCREENING TEST: ICD-10-CM

## 2018-11-26 DIAGNOSIS — Z13.6 SCREENING FOR CARDIOVASCULAR CONDITION: ICD-10-CM

## 2018-11-26 DIAGNOSIS — L65.9 HAIR LOSS: ICD-10-CM

## 2018-11-26 DIAGNOSIS — M35.00 SJOGREN'S SYNDROME, WITH UNSPECIFIED ORGAN INVOLVEMENT (HCC): Primary | ICD-10-CM

## 2018-11-26 DIAGNOSIS — R73.01 IMPAIRED FASTING GLUCOSE: ICD-10-CM

## 2018-11-26 DIAGNOSIS — R53.83 FATIGUE, UNSPECIFIED TYPE: ICD-10-CM

## 2018-11-26 DIAGNOSIS — Z12.31 SCREENING MAMMOGRAM, ENCOUNTER FOR: ICD-10-CM

## 2018-11-26 DIAGNOSIS — Z79.899 ENCOUNTER FOR LONG-TERM CURRENT USE OF MEDICATION: ICD-10-CM

## 2018-11-26 PROCEDURE — 99214 OFFICE O/P EST MOD 30 MIN: CPT | Performed by: FAMILY MEDICINE

## 2018-11-26 RX ORDER — FUROSEMIDE 40 MG/1
40 TABLET ORAL DAILY
COMMUNITY
End: 2019-03-19 | Stop reason: SDUPTHER

## 2018-11-26 NOTE — PROGRESS NOTES
Assessment/Plan:    Problem List Items Addressed This Visit     Fatigue     new         Relevant Orders    CBC    Iron    TSH, 3rd generation    Impaired fasting glucose     Last sugar was normal at 96         Relevant Orders    Comprehensive metabolic panel    Hemoglobin A1C    Sjogren's syndrome ([facility] [address] ) - Primary     Worsening symptoms  Will consider short term course of steroids pending inflammation level  Relevant Orders    C-reactive protein      Other Visit Diagnoses     Encounter for long-term current use of medication        Relevant Orders    CBC    Iron    TSH, 3rd generation    Need for hepatitis C screening test        Relevant Orders    Hepatitis C antibody    Screening for cardiovascular condition        Relevant Orders    Lipid Panel with Direct LDL reflex    Hair loss         Relevant Orders    Iron    Screening mammogram, encounter for        Relevant Orders    Mammo screening bilateral w cad          There are no Patient Instructions on file for this visit  Return for January as scheduled  Subjective:      Patient ID: Yan Howard is a 68 y o  female  Chief Complaint   Patient presents with    Fatigue     For 3 weeks prcma       She has been aching all over and is worried it could be her Sjogren's  She is also helping her  a lot since he had CABG  She has diffuse joint pain especially in her hands  Her eyes are glassy and tired  She is falling asleep  She is also losing more hair  She stopped going to the rheumatologist because she is not comfortable with the risk of the side of effects of the medication  Fatigue   This is a new problem  The current episode started 1 to 4 weeks ago  The problem occurs daily  Associated symptoms include fatigue  The following portions of the patient's history were reviewed and updated as appropriate:  past social history    Review of Systems   Constitutional: Positive for fatigue  Cardiovascular: Negative  Current Outpatient Prescriptions   Medication Sig Dispense Refill    acetaminophen-codeine (TYLENOL/CODEINE #3) 300-30 MG per tablet Take 1 tablet by mouth every 6 (six) hours as needed for moderate pain 30 tablet 1    aspirin 325 mg tablet Take 975 mg by mouth as needed for mild pain May take every other day prn      B Complex-Biotin-FA (B COMPLETE) TABS Take 1 tablet by mouth every morning        baclofen 10 mg tablet Take 1 tablet (10 mg total) by mouth 3 (three) times a day as needed for muscle spasms 90 tablet 2    Cholecalciferol (VITAMIN D3) 2000 units capsule Take 1 capsule by mouth every morning        docusate sodium (COLACE) 100 mg capsule Take 100 mg by mouth 2 (two) times a day      fluticasone (FLOVENT HFA) 220 mcg/act inhaler Inhale 2 puffs every morning        furosemide (LASIX) 40 mg tablet Take 40 mg by mouth daily Every morning 40mg      Magnesium Oxide -Mg Supplement 400 MG CAPS Take 1 capsule by mouth every morning        potassium chloride (KLOR-CON 10) 10 mEq tablet Take 1 tablet (10 mEq total) by mouth daily (Patient taking differently: Take 10 mEq by mouth every morning  ) 90 tablet 3    senna (SENOKOT) 8 6 MG tablet Take 1 tablet by mouth as needed for constipation       No current facility-administered medications for this visit  Objective:    /80   Pulse 84   Temp 98 6 °F (37 °C)   Resp 18   Ht 5' 6" (1 676 m)   Wt 85 7 kg (189 lb)   BMI 30 51 kg/m²        Physical Exam   Constitutional: She appears well-developed and well-nourished  HENT:   Head: Normocephalic and atraumatic  Right Ear: External ear normal    Left Ear: External ear normal    Mouth/Throat: Oropharynx is clear and moist    Cardiovascular: Normal rate, regular rhythm and normal heart sounds  Exam reveals no friction rub  No murmur heard  Pulmonary/Chest: Effort normal and breath sounds normal  No respiratory distress  She has no wheezes  She has no rales     Musculoskeletal: She exhibits tenderness (diffusely in hands bilaterally and left hip)  She exhibits no edema or deformity  Psychiatric: She has a normal mood and affect  Her behavior is normal  Judgment and thought content normal    Nursing note and vitals reviewed               Jacquelyn Shen DO

## 2018-11-27 ENCOUNTER — APPOINTMENT (OUTPATIENT)
Dept: LAB | Facility: CLINIC | Age: 73
End: 2018-11-27
Payer: MEDICARE

## 2018-11-27 ENCOUNTER — TELEPHONE (OUTPATIENT)
Dept: FAMILY MEDICINE CLINIC | Facility: CLINIC | Age: 73
End: 2018-11-27

## 2018-11-27 DIAGNOSIS — Z13.6 SCREENING FOR CARDIOVASCULAR CONDITION: ICD-10-CM

## 2018-11-27 DIAGNOSIS — L65.9 HAIR LOSS: ICD-10-CM

## 2018-11-27 DIAGNOSIS — M35.00 SJOGREN'S SYNDROME, WITH UNSPECIFIED ORGAN INVOLVEMENT (HCC): Primary | ICD-10-CM

## 2018-11-27 DIAGNOSIS — Z79.899 ENCOUNTER FOR LONG-TERM CURRENT USE OF MEDICATION: ICD-10-CM

## 2018-11-27 DIAGNOSIS — R73.01 IMPAIRED FASTING GLUCOSE: ICD-10-CM

## 2018-11-27 DIAGNOSIS — R53.83 FATIGUE, UNSPECIFIED TYPE: ICD-10-CM

## 2018-11-27 DIAGNOSIS — Z11.59 NEED FOR HEPATITIS C SCREENING TEST: ICD-10-CM

## 2018-11-27 DIAGNOSIS — M35.00 SJOGREN'S SYNDROME, WITH UNSPECIFIED ORGAN INVOLVEMENT (HCC): ICD-10-CM

## 2018-11-27 LAB
ALBUMIN SERPL BCP-MCNC: 3.9 G/DL (ref 3.5–5)
ALP SERPL-CCNC: 81 U/L (ref 46–116)
ALT SERPL W P-5'-P-CCNC: 31 U/L (ref 12–78)
ANION GAP SERPL CALCULATED.3IONS-SCNC: 3 MMOL/L (ref 4–13)
AST SERPL W P-5'-P-CCNC: 20 U/L (ref 5–45)
BILIRUB SERPL-MCNC: 0.88 MG/DL (ref 0.2–1)
BUN SERPL-MCNC: 19 MG/DL (ref 5–25)
CALCIUM SERPL-MCNC: 9.6 MG/DL (ref 8.3–10.1)
CHLORIDE SERPL-SCNC: 104 MMOL/L (ref 100–108)
CHOLEST SERPL-MCNC: 211 MG/DL (ref 50–200)
CO2 SERPL-SCNC: 30 MMOL/L (ref 21–32)
CREAT SERPL-MCNC: 0.86 MG/DL (ref 0.6–1.3)
CRP SERPL QL: <3 MG/L
ERYTHROCYTE [DISTWIDTH] IN BLOOD BY AUTOMATED COUNT: 13.7 % (ref 11.6–15.1)
EST. AVERAGE GLUCOSE BLD GHB EST-MCNC: 97 MG/DL
GFR SERPL CREATININE-BSD FRML MDRD: 67 ML/MIN/1.73SQ M
GLUCOSE P FAST SERPL-MCNC: 94 MG/DL (ref 65–99)
HBA1C MFR BLD: 5 % (ref 4.2–6.3)
HCT VFR BLD AUTO: 42.9 % (ref 34.8–46.1)
HCV AB SER QL: NORMAL
HDLC SERPL-MCNC: 43 MG/DL (ref 40–60)
HGB BLD-MCNC: 14.3 G/DL (ref 11.5–15.4)
IRON SERPL-MCNC: 58 UG/DL (ref 50–170)
LDLC SERPL CALC-MCNC: 136 MG/DL (ref 0–100)
MCH RBC QN AUTO: 29.9 PG (ref 26.8–34.3)
MCHC RBC AUTO-ENTMCNC: 33.3 G/DL (ref 31.4–37.4)
MCV RBC AUTO: 90 FL (ref 82–98)
PLATELET # BLD AUTO: 213 THOUSANDS/UL (ref 149–390)
PMV BLD AUTO: 10.1 FL (ref 8.9–12.7)
POTASSIUM SERPL-SCNC: 4 MMOL/L (ref 3.5–5.3)
PROT SERPL-MCNC: 7.5 G/DL (ref 6.4–8.2)
RBC # BLD AUTO: 4.79 MILLION/UL (ref 3.81–5.12)
SODIUM SERPL-SCNC: 137 MMOL/L (ref 136–145)
TRIGL SERPL-MCNC: 160 MG/DL
TSH SERPL DL<=0.05 MIU/L-ACNC: 2.26 UIU/ML (ref 0.36–3.74)
WBC # BLD AUTO: 5.44 THOUSAND/UL (ref 4.31–10.16)

## 2018-11-27 PROCEDURE — 80061 LIPID PANEL: CPT

## 2018-11-27 PROCEDURE — 84443 ASSAY THYROID STIM HORMONE: CPT

## 2018-11-27 PROCEDURE — 36415 COLL VENOUS BLD VENIPUNCTURE: CPT

## 2018-11-27 PROCEDURE — 80053 COMPREHEN METABOLIC PANEL: CPT

## 2018-11-27 PROCEDURE — 86140 C-REACTIVE PROTEIN: CPT

## 2018-11-27 PROCEDURE — 86803 HEPATITIS C AB TEST: CPT

## 2018-11-27 PROCEDURE — 83540 ASSAY OF IRON: CPT

## 2018-11-27 PROCEDURE — 83036 HEMOGLOBIN GLYCOSYLATED A1C: CPT

## 2018-11-27 PROCEDURE — 85027 COMPLETE CBC AUTOMATED: CPT

## 2018-11-27 RX ORDER — METHYLPREDNISOLONE 4 MG/1
TABLET ORAL
Qty: 21 TABLET | Refills: 0 | Status: SHIPPED | OUTPATIENT
Start: 2018-11-27 | End: 2019-01-21 | Stop reason: ALTCHOICE

## 2018-11-27 NOTE — TELEPHONE ENCOUNTER
11/27/2018 12:27 PM Called patient regarding her lab results  Her inflammation level is normal    Hepatitis C screening is negative  Triglycerides have gone up  Discussed results with patient  We discussed all lab results that she just had  Since she is still having pain will do a short course of steroids to see if that helps her feel better    Risks and benefits of medication discussed    Message complete  Rhett Kitchen, DO

## 2018-12-18 ENCOUNTER — HOSPITAL ENCOUNTER (OUTPATIENT)
Dept: RADIOLOGY | Facility: HOSPITAL | Age: 73
Discharge: HOME | End: 2018-12-18
Payer: MEDICARE

## 2018-12-18 ENCOUNTER — TRANSCRIBE ORDERS (OUTPATIENT)
Dept: ADMINISTRATIVE | Facility: HOSPITAL | Age: 73
End: 2018-12-18

## 2018-12-18 VITALS — WEIGHT: 186 LBS | HEIGHT: 66 IN | BODY MASS INDEX: 29.89 KG/M2

## 2018-12-18 DIAGNOSIS — Z12.31 SCREENING MAMMOGRAM, ENCOUNTER FOR: ICD-10-CM

## 2018-12-18 PROCEDURE — 77067 SCR MAMMO BI INCL CAD: CPT

## 2018-12-18 PROCEDURE — 77063 BREAST TOMOSYNTHESIS BI: CPT

## 2018-12-22 DIAGNOSIS — J45.30 MILD PERSISTENT ASTHMA WITHOUT COMPLICATION: Primary | ICD-10-CM

## 2018-12-24 RX ORDER — FLUTICASONE PROPIONATE 220 UG/1
AEROSOL, METERED RESPIRATORY (INHALATION)
Qty: 36 G | Refills: 3 | Status: SHIPPED | OUTPATIENT
Start: 2018-12-24 | End: 2021-08-03 | Stop reason: SDUPTHER

## 2019-01-14 ENCOUNTER — TELEPHONE (OUTPATIENT)
Dept: FAMILY MEDICINE CLINIC | Facility: CLINIC | Age: 74
End: 2019-01-14

## 2019-01-14 DIAGNOSIS — H91.90 HEARING LOSS, UNSPECIFIED HEARING LOSS TYPE, UNSPECIFIED LATERALITY: Primary | ICD-10-CM

## 2019-01-16 RX ORDER — BESIFLOXACIN 6 MG/ML
SUSPENSION OPHTHALMIC
COMMUNITY
Start: 2018-12-11 | End: 2019-06-10

## 2019-01-16 NOTE — PROGRESS NOTES
Hearing Aid Visit:    Name:  Paulo Richter  :  1945  Age:  68 y o  Date of Evaluation: 19     Paulo Richter is being seen for a hearing aid visit  Paulo Richter   is fit in the right ear only with [employer] [id number] JONATAN hearing aid(s) serial number [id number]  The hearing aid was donated by her brother and warranty  2018  Patient reports she is using VC to turn the hearing aid up, but it seems as though the volume will then decrease on its own  The patient also reports that she lost the [email or web address] hearing aid she used periodically  The warranty on this device  18, and one-time replacement had already been used  Action:  Adjusted gain settings to reflect patient's manual VC adjustments  Cleaned and checked hearing aid -- possible that VC button is malfunctioning and causing the random decreases in volume  The patient is considering purchasing new hearing aids, and so chose to hold off on having the aid sent in for service  Patient's last hearing evaluation was in 2015  Patient has a 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

## 2019-01-21 ENCOUNTER — OFFICE VISIT (OUTPATIENT)
Dept: FAMILY MEDICINE CLINIC | Facility: CLINIC | Age: 74
End: 2019-01-21
Payer: MEDICARE

## 2019-01-21 VITALS
HEART RATE: 88 BPM | RESPIRATION RATE: 16 BRPM | SYSTOLIC BLOOD PRESSURE: 130 MMHG | HEIGHT: 66 IN | WEIGHT: 188 LBS | DIASTOLIC BLOOD PRESSURE: 80 MMHG | TEMPERATURE: 95.9 F | BODY MASS INDEX: 30.22 KG/M2

## 2019-01-21 DIAGNOSIS — M15.0 PRIMARY GENERALIZED (OSTEO)ARTHRITIS: ICD-10-CM

## 2019-01-21 DIAGNOSIS — E78.2 MIXED HYPERLIPIDEMIA: ICD-10-CM

## 2019-01-21 DIAGNOSIS — Z00.00 MEDICARE ANNUAL WELLNESS VISIT, SUBSEQUENT: Primary | ICD-10-CM

## 2019-01-21 DIAGNOSIS — M35.00 SJOGREN'S SYNDROME, WITH UNSPECIFIED ORGAN INVOLVEMENT (HCC): ICD-10-CM

## 2019-01-21 DIAGNOSIS — Z79.899 ENCOUNTER FOR LONG-TERM CURRENT USE OF MEDICATION: ICD-10-CM

## 2019-01-21 DIAGNOSIS — R73.01 IMPAIRED FASTING GLUCOSE: ICD-10-CM

## 2019-01-21 PROCEDURE — G0439 PPPS, SUBSEQ VISIT: HCPCS | Performed by: FAMILY MEDICINE

## 2019-01-21 RX ORDER — CYCLOSPORINE 0.5 MG/ML
EMULSION OPHTHALMIC
COMMUNITY
Start: 2019-01-16 | End: 2019-12-10

## 2019-01-21 RX ORDER — ACETAMINOPHEN AND CODEINE PHOSPHATE 300; 30 MG/1; MG/1
1 TABLET ORAL EVERY 6 HOURS PRN
Qty: 30 TABLET | Refills: 1 | Status: SHIPPED | OUTPATIENT
Start: 2019-01-21 | End: 2019-07-09 | Stop reason: SDUPTHER

## 2019-01-21 NOTE — ASSESSMENT & PLAN NOTE
Has chronic pain in multiple joints  Using occasional tylenol #3  Pain agreement reviewed and signed  Risks of medication discussed:  Opiates are a controlled dangerous substance   Opioids are highly addictive, even when taken as prescribed and there is a significant risk of developing a physical or psychological dependence   If you mix opiate medications with sedatives, benzodiazepines or alcohol fatal respiratory depression can occur

## 2019-01-21 NOTE — PROGRESS NOTES
Assessment and Plan:    Problem List Items Addressed This Visit     Hyperlipidemia    Relevant Orders    Comprehensive metabolic panel    Lipid Panel with Direct LDL reflex    Impaired fasting glucose    Relevant Orders    Hemoglobin A1C    Primary generalized (osteo)arthritis     Has chronic pain in multiple joints  Using occasional tylenol #3  Pain agreement reviewed and signed  Risks of medication discussed:  Opiates are a controlled dangerous substance   Opioids are highly addictive, even when taken as prescribed and there is a significant risk of developing a physical or psychological dependence   If you mix opiate medications with sedatives, benzodiazepines or alcohol fatal respiratory depression can occur  Relevant Medications    acetaminophen-codeine (TYLENOL/CODEINE #3) 300-30 MG per tablet    Sjogren's syndrome (HCC)    Relevant Medications    BESIVANCE 0 6 % SUSP    RESTASIS 0 05 % ophthalmic emulsion    Other Relevant Orders    Ambulatory referral to Rheumatology      Other Visit Diagnoses     Medicare annual wellness visit, subsequent    -  Primary    Encounter for long-term current use of medication        Relevant Orders    CBC    TSH, 3rd generation        Health Maintenance Due   Topic Date Due    Medicare Annual Wellness Visit (AWV)  1945    Urinary Incontinence Screening  07/25/2010    DXA SCAN  03/19/2011    DTaP,Tdap,and Td Vaccines (1 - Tdap) 04/02/2013     BMI Counseling: Body mass index is 30 34 kg/m²  Discussed with patient's BMI with her  The BMI is above average  BMI counseling and education was provided to the patient  Exercise recommendations include exercising 3-5 times per week  HPI:  Ricco Garnica is a 68 y o  female here for her Subsequent Wellness Visit  She has been achy        Patient Active Problem List   Diagnosis    Edema    Asthma, mild persistent    Exertional shortness of breath    Fatigue    GERD without esophagitis    Hyperlipidemia    Impaired fasting glucose    Left knee pain    Pain of left breast    Plantar fasciitis, right    Palpitations    Primary generalized (osteo)arthritis    Right hip pain    Sjogren's syndrome ([address] )    Diverticulitis of large intestine without perforation or abscess with bleeding    Chronic bilateral thoracic back pain     Past Medical History:   Diagnosis Date    Anesthesia complication     "shivers"    Arthritis     Asthma     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 see colors, with over-use goes darker- from untreated amblyopia    Breathing difficulty     with extubation pt developed laryngeal spasm    Cataract     both eyes- right eye needs surgery    Colon polyps 2016    Diverticulitis     had episode in April 2018    Edema     lower legs    Generalized osteoarthritis     GERD (gastroesophageal reflux disease)     Hearing problem     unspecified laterality-hearing aid right ear    High cholesterol     Polyarthritis     last assessed: 6/26/2015    Sjogren's syndrome ([address] )     last assessed: 7/18/2016    SS-A antibody positive     last assessed: 8/20/2015     Past Surgical History:   Procedure Laterality Date    APPENDECTOMY      AUGMENTATION BREAST Bilateral     breast surgery enlargement procedure elective bilateral    BILATERAL OOPHORECTOMY Bilateral 2003    BLADDER SURGERY      bladder lift,cystocele    COLONOSCOPY  04/18/2011    COLPORRHAPHY  2014    anterior, repair of cystocele    HYSTERECTOMY  1673    with cystocele     JOINT REPLACEMENT Left 2016    knee    KNEE ARTHROPLASTY Left     KNEE ARTHROSCOPY  2009    KNEE SURGERY Left 2009    torn meniscus    OOPHORECTOMY      NE COLONOSCOPY FLX DX W/COLLJ SPEC WHEN PFRMD N/A 5/31/2018    Procedure: COLONOSCOPY;  Surgeon: Diane Dominguez MD;  Location: [facility];   Service: Gastroenterology    REDUCTION MAMMAPLASTY      TONSILLECTOMY AND ADENOIDECTOMY      URETHRA SURGERY       Family History   Problem Relation Age of Onset    Coronary artery disease Mother     COPD Mother     Hypertension Mother     Hyperlipidemia Mother         high cholesterol    Atrial fibrillation Mother     Coronary artery disease Father     Arthritis Father     COPD Father         Black lung    Hypertension Father     Hyperlipidemia Father         high cholesterol    Lung cancer Brother     Coronary artery disease Brother     Hypertension Brother     Hyperlipidemia Brother         high cholesterol    Atrial fibrillation Brother         fib/flutter    Other Sister         pericarditis    Arthritis Sister     Diabetes Maternal Grandmother         mellitus    Breast cancer Maternal Grandmother     Breast cancer Paternal Grandmother     Arthritis Family     Asthma Family     Cancer Brother         lung    Heart disease Brother         CAD-stents-smoker     History   Smoking Status    Never Smoker   Smokeless Tobacco    Never Used     History   Alcohol Use    Yes     Comment: rarely      History   Drug Use No       Current Outpatient Prescriptions   Medication Sig Dispense Refill    acetaminophen-codeine (TYLENOL/CODEINE #3) 300-30 MG per tablet Take 1 tablet by mouth every 6 (six) hours as needed for moderate pain 30 tablet 1    aspirin 325 mg tablet Take 975 mg by mouth as needed for mild pain May take every other day prn      B Complex-Biotin-FA (B COMPLETE) TABS Take 1 tablet by mouth every morning        baclofen 10 mg tablet Take 1 tablet (10 mg total) by mouth 3 (three) times a day as needed for muscle spasms 90 tablet 2    Cholecalciferol (VITAMIN D3) 2000 units capsule Take 1 capsule by mouth every morning        docusate sodium (COLACE) 100 mg capsule Take 100 mg by mouth 2 (two) times a day      FLOVENT  MCG/ACT inhaler USE 2 INHALATIONS TWICE A DAY (RINSE MOUTH AFTER USE) 36 g 3    furosemide (LASIX) 40 mg tablet Take 40 mg by mouth daily Every morning 40mg      Magnesium Oxide -Mg Supplement 400 MG CAPS Take 1 capsule by mouth every morning        potassium chloride (KLOR-CON 10) 10 mEq tablet Take 1 tablet (10 mEq total) by mouth daily (Patient taking differently: Take 10 mEq by mouth every morning  ) 90 tablet 3    RESTASIS 0 05 % ophthalmic emulsion       senna (SENOKOT) 8 6 MG tablet Take 1 tablet by mouth as needed for constipation      BESIVANCE 0 6 % SUSP        No current facility-administered medications for this visit  Allergies   Allergen Reactions    Celecoxib      Other reaction(s): Palpitations  Category: Adverse Reaction;      Immunization History   Administered Date(s) Administered    DT (pediatric) 04/01/2013    Influenza 10/19/2018    Influenza Split High Dose Preservative Free IM 09/27/2016, 09/01/2017    Influenza TIV (IM) 11/01/2015    Pneumococcal Conjugate 13-Valent 05/29/2015    Pneumococcal Polysaccharide PPV23 11/01/2010    Zoster 04/01/2013       Patient Care Team:  Jacquelyn Shen DO as PCP - General  Cynthia Sharp, DO Jacquelyn Shen, DO Lito Delaney,     Medicare Screening Tests and Risk Assessments:  Abundio Narayan is here for her Subsequent Wellness visit  Health Risk Assessment:  Patient rates overall health as very good  Patient feels that their physical health rating is Same  Eyesight was rated as Slightly worse  Hearing was rated as Same  Patient feels that their emotional and mental health rating is Same  Pain experienced by patient in the last 7 days has been A lot  Patient's pain rating has been 5/10  Patient states that she has experienced no weight loss or gain in last 6 months  Emotional/Mental Health:  Patient has been feeling nervous/anxious  PHQ-9 Depression Screening:    Frequency of the following problems over the past two weeks:      1  Little interest or pleasure in doing things: 0 - not at all      2  Feeling down, depressed, or hopeless: 0 - not at all  PHQ-2 Score: 0          Broken Bones/Falls:     Fall Risk Assessment:    In the past year, patient has experienced: No history of falling in past year          Bladder/Bowel:  Patient has not leaked urine accidently in the last six months  Patient reports no loss of bowel control  Immunizations:  Patient has had a flu vaccination within the last year  Patient has received a pneumonia shot  Patient has received a shingles shot  Patient has received tetanus/diphtheria shot  Date of tetanus/diphtheria shot: 1/21/2009    Home Safety:  Patient does not have trouble with stairs inside or outside of their home  Patient currently reports that there are no safety hazards present in home, working smoke alarms, working carbon monoxide detectors  Preventative Screenings:   Breast cancer screening performed, 12/21/2018  colon cancer screen completed, 5/21/2018  cholesterol screen completed, 9/21/2018  glaucoma eye exam completed, 12/21/2018      Nutrition:  Current diet: Regular and Limited junk food with servings of the following:    Medications:  Patient is currently taking over-the-counter supplements  List of OTC medications includes: B vitamin, [place]am red fish oil  Patient is able to manage medications  Lifestyle Choices:  Patient reports no tobacco use  Patient has smoked or used tobacco in the past   Patient has not stopped tobacco use  Patient reports alcohol use  Alcohol use per week: socially  Patient drives a vehicle  Patient wears seat belt  Current level of exercise of physical activity described by patient as: Walking, Light excercise  Activities of Daily Living:  Can get out of bed by his or her self, able to dress self, able to make own meals, able to do own shopping, able to bathe self, can do own laundry/housekeeping, can manage own money, pay bills and track expenses    Previous Hospitalizations:  No hospitalization or ED visit in past 12 months        Advanced Directives:  Patient has decided on a power of   Patient has spoken to designated power of   Patient has completed advanced directive  Preventative Screening/Counseling:      Cardiovascular:      General: Screening Current      Counseling: Healthy Weight          Diabetes:      General: Risks and Benefits Discussed          Colorectal Cancer:      General: Screening Current          Breast Cancer:      General: Screening Current          Cervical Cancer:      General: Screening Not Indicated          Osteoporosis:      General: Risks and Benefits Discussed and Patient Declines          AAA:      General: Screening Not Indicated          Glaucoma:      General: Screening Current          HIV:      General: Screening Not Indicated          Hepatitis C:      General: Screening Current        Advanced Directives:   Patient has living will for healthcare, Information on ACP and/or AD provided  End of life assessment reviewed with patient  Provider agrees with end of life decisions   Immunizations:      Influenza: Influenza UTD This Year      Pneumococcal: Lifetime Vaccine Completed      Shingrix: Risks & Benefits Discussed          Physical Exam   Constitutional: She is oriented to person, place, and time  She appears well-developed and well-nourished  HENT:   Head: Normocephalic and atraumatic  Right Ear: External ear normal    Left Ear: External ear normal    Nose: Nose normal    Mouth/Throat: Oropharynx is clear and moist    Cardiovascular: Normal rate, regular rhythm and normal heart sounds  Exam reveals no friction rub  No murmur heard  Pulmonary/Chest: Breath sounds normal  No respiratory distress  She has no wheezes  She has no rales  Musculoskeletal: She exhibits no edema  Neurological: She is oriented to person, place, and time  No cranial nerve deficit  Nursing note and vitals reviewed

## 2019-03-19 ENCOUNTER — TRANSCRIBE ORDERS (OUTPATIENT)
Dept: LAB | Facility: CLINIC | Age: 74
End: 2019-03-19

## 2019-03-19 ENCOUNTER — LAB (OUTPATIENT)
Dept: LAB | Facility: CLINIC | Age: 74
End: 2019-03-19
Payer: MEDICARE

## 2019-03-19 DIAGNOSIS — M35.00 SJOGREN'S SYNDROME, WITH UNSPECIFIED ORGAN INVOLVEMENT (HCC): ICD-10-CM

## 2019-03-19 DIAGNOSIS — M25.50 PAIN IN JOINT, MULTIPLE SITES: Primary | ICD-10-CM

## 2019-03-19 DIAGNOSIS — R60.9 EDEMA, UNSPECIFIED TYPE: Primary | ICD-10-CM

## 2019-03-19 DIAGNOSIS — R53.83 FATIGUE, UNSPECIFIED TYPE: ICD-10-CM

## 2019-03-19 LAB
25(OH)D3 SERPL-MCNC: 47.6 NG/ML (ref 30–100)
VIT B12 SERPL-MCNC: 2420 PG/ML (ref 100–900)

## 2019-03-19 PROCEDURE — 84165 PROTEIN E-PHORESIS SERUM: CPT | Performed by: PATHOLOGY

## 2019-03-19 PROCEDURE — 84165 PROTEIN E-PHORESIS SERUM: CPT

## 2019-03-19 PROCEDURE — 86235 NUCLEAR ANTIGEN ANTIBODY: CPT

## 2019-03-19 PROCEDURE — 86430 RHEUMATOID FACTOR TEST QUAL: CPT

## 2019-03-19 PROCEDURE — 82306 VITAMIN D 25 HYDROXY: CPT

## 2019-03-19 PROCEDURE — 86200 CCP ANTIBODY: CPT

## 2019-03-19 PROCEDURE — 86225 DNA ANTIBODY NATIVE: CPT

## 2019-03-19 PROCEDURE — 36415 COLL VENOUS BLD VENIPUNCTURE: CPT

## 2019-03-19 PROCEDURE — 87340 HEPATITIS B SURFACE AG IA: CPT

## 2019-03-19 PROCEDURE — 86038 ANTINUCLEAR ANTIBODIES: CPT

## 2019-03-19 PROCEDURE — 82607 VITAMIN B-12: CPT

## 2019-03-19 RX ORDER — FUROSEMIDE 40 MG/1
40 TABLET ORAL DAILY
Qty: 90 TABLET | Refills: 1 | Status: SHIPPED | OUTPATIENT
Start: 2019-03-19 | End: 2019-07-09 | Stop reason: SDUPTHER

## 2019-03-20 LAB
HBV SURFACE AG SER QL: NORMAL
RHEUMATOID FACT SER QL LA: NEGATIVE

## 2019-03-21 LAB
ALBUMIN SERPL ELPH-MCNC: 4.39 G/DL (ref 3.5–5)
ALBUMIN SERPL ELPH-MCNC: 64.6 % (ref 52–65)
ALPHA1 GLOB SERPL ELPH-MCNC: 0.27 G/DL (ref 0.1–0.4)
ALPHA1 GLOB SERPL ELPH-MCNC: 4 % (ref 2.5–5)
ALPHA2 GLOB SERPL ELPH-MCNC: 0.53 G/DL (ref 0.4–1.2)
ALPHA2 GLOB SERPL ELPH-MCNC: 7.8 % (ref 7–13)
BETA GLOB ABNORMAL SERPL ELPH-MCNC: 0.41 G/DL (ref 0.4–0.8)
BETA1 GLOB SERPL ELPH-MCNC: 6 % (ref 5–13)
BETA2 GLOB SERPL ELPH-MCNC: 4.3 % (ref 2–8)
BETA2+GAMMA GLOB SERPL ELPH-MCNC: 0.29 G/DL (ref 0.2–0.5)
CENTROMERE B AB SER-ACNC: <0.2 AI (ref 0–0.9)
DSDNA AB SER-ACNC: 3 IU/ML (ref 0–9)
ENA RNP AB SER-ACNC: <0.2 AI (ref 0–0.9)
ENA SCL70 AB SER-ACNC: <0.2 AI (ref 0–0.9)
ENA SM AB SER-ACNC: <0.2 AI (ref 0–0.9)
ENA SS-A AB SER-ACNC: 0.8 AI (ref 0–0.9)
ENA SS-B AB SER-ACNC: <0.2 AI (ref 0–0.9)
GAMMA GLOB ABNORMAL SERPL ELPH-MCNC: 0.9 G/DL (ref 0.5–1.6)
GAMMA GLOB SERPL ELPH-MCNC: 13.3 % (ref 12–22)
IGG/ALB SER: 1.82 {RATIO} (ref 1.1–1.8)
PROT PATTERN SERPL ELPH-IMP: ABNORMAL
PROT SERPL-MCNC: 6.8 G/DL (ref 6.4–8.2)

## 2019-03-22 LAB
CCP IGA+IGG SERPL IA-ACNC: 3 UNITS (ref 0–19)
RYE IGE QN: NEGATIVE

## 2019-05-16 ENCOUNTER — TELEPHONE (OUTPATIENT)
Dept: FAMILY MEDICINE CLINIC | Facility: CLINIC | Age: 74
End: 2019-05-16

## 2019-05-16 DIAGNOSIS — H91.90 HEARING LOSS, UNSPECIFIED HEARING LOSS TYPE, UNSPECIFIED LATERALITY: Primary | ICD-10-CM

## 2019-05-20 ENCOUNTER — APPOINTMENT (OUTPATIENT)
Dept: AUDIOLOGY | Facility: CLINIC | Age: 74
End: 2019-05-20
Payer: MEDICARE

## 2019-05-20 PROCEDURE — 92557 COMPREHENSIVE HEARING TEST: CPT | Performed by: AUDIOLOGIST

## 2019-05-20 PROCEDURE — 92567 TYMPANOMETRY: CPT | Performed by: AUDIOLOGIST

## 2019-05-24 ENCOUNTER — OFFICE VISIT (OUTPATIENT)
Dept: FAMILY MEDICINE CLINIC | Facility: CLINIC | Age: 74
End: 2019-05-24
Payer: MEDICARE

## 2019-05-24 ENCOUNTER — APPOINTMENT (OUTPATIENT)
Dept: RADIOLOGY | Facility: CLINIC | Age: 74
End: 2019-05-24
Payer: MEDICARE

## 2019-05-24 VITALS
RESPIRATION RATE: 16 BRPM | WEIGHT: 196 LBS | HEART RATE: 80 BPM | TEMPERATURE: 97.7 F | SYSTOLIC BLOOD PRESSURE: 120 MMHG | BODY MASS INDEX: 31.5 KG/M2 | HEIGHT: 66 IN | DIASTOLIC BLOOD PRESSURE: 78 MMHG

## 2019-05-24 DIAGNOSIS — R05.9 COUGH: ICD-10-CM

## 2019-05-24 DIAGNOSIS — R06.01 ORTHOPNEA: Primary | ICD-10-CM

## 2019-05-24 DIAGNOSIS — R06.01 ORTHOPNEA: ICD-10-CM

## 2019-05-24 PROCEDURE — 99213 OFFICE O/P EST LOW 20 MIN: CPT | Performed by: FAMILY MEDICINE

## 2019-05-24 PROCEDURE — 71046 X-RAY EXAM CHEST 2 VIEWS: CPT

## 2019-05-24 RX ORDER — HYDROXYCHLOROQUINE SULFATE 200 MG/1
200 TABLET, FILM COATED ORAL 2 TIMES DAILY
Refills: 3 | COMMUNITY
Start: 2019-05-02 | End: 2020-01-22 | Stop reason: ALTCHOICE

## 2019-05-28 DIAGNOSIS — J45.30 MILD PERSISTENT ASTHMA WITHOUT COMPLICATION: ICD-10-CM

## 2019-05-28 RX ORDER — ALBUTEROL SULFATE 90 UG/1
2 AEROSOL, METERED RESPIRATORY (INHALATION) EVERY 6 HOURS PRN
Qty: 1 INHALER | Refills: 5 | Status: SHIPPED | OUTPATIENT
Start: 2019-05-28 | End: 2019-07-09 | Stop reason: SDUPTHER

## 2019-05-30 ENCOUNTER — TELEPHONE (OUTPATIENT)
Dept: FAMILY MEDICINE CLINIC | Facility: CLINIC | Age: 74
End: 2019-05-30

## 2019-06-10 ENCOUNTER — OFFICE VISIT (OUTPATIENT)
Dept: CARDIOLOGY CLINIC | Facility: CLINIC | Age: 74
End: 2019-06-10
Payer: MEDICARE

## 2019-06-10 VITALS
OXYGEN SATURATION: 95 % | WEIGHT: 194 LBS | HEART RATE: 72 BPM | BODY MASS INDEX: 31.18 KG/M2 | DIASTOLIC BLOOD PRESSURE: 70 MMHG | SYSTOLIC BLOOD PRESSURE: 114 MMHG | HEIGHT: 66 IN

## 2019-06-10 DIAGNOSIS — E78.2 MIXED HYPERLIPIDEMIA: ICD-10-CM

## 2019-06-10 DIAGNOSIS — R06.02 EXERTIONAL SHORTNESS OF BREATH: Primary | ICD-10-CM

## 2019-06-10 DIAGNOSIS — R60.9 EDEMA, UNSPECIFIED TYPE: ICD-10-CM

## 2019-06-10 DIAGNOSIS — R05.9 COUGH: ICD-10-CM

## 2019-06-10 PROCEDURE — 93000 ELECTROCARDIOGRAM COMPLETE: CPT | Performed by: INTERNAL MEDICINE

## 2019-06-10 PROCEDURE — 99214 OFFICE O/P EST MOD 30 MIN: CPT | Performed by: INTERNAL MEDICINE

## 2019-06-10 RX ORDER — FAMOTIDINE 40 MG/1
40 TABLET, FILM COATED ORAL DAILY
Qty: 30 TABLET | Refills: 0 | Status: SHIPPED | OUTPATIENT
Start: 2019-06-10 | End: 2019-06-11 | Stop reason: CLARIF

## 2019-06-11 ENCOUNTER — TELEPHONE (OUTPATIENT)
Dept: CARDIOLOGY CLINIC | Facility: CLINIC | Age: 74
End: 2019-06-11

## 2019-06-11 DIAGNOSIS — K21.9 GERD WITHOUT ESOPHAGITIS: Primary | ICD-10-CM

## 2019-06-11 RX ORDER — RANITIDINE 150 MG/1
150 TABLET ORAL 2 TIMES DAILY PRN
Qty: 180 TABLET | Refills: 1 | Status: SHIPPED | OUTPATIENT
Start: 2019-06-11 | End: 2019-12-10

## 2019-07-05 ENCOUNTER — LAB (OUTPATIENT)
Dept: LAB | Facility: CLINIC | Age: 74
End: 2019-07-05
Payer: MEDICARE

## 2019-07-05 DIAGNOSIS — Z51.81 ENCOUNTER FOR THERAPEUTIC DRUG MONITORING: ICD-10-CM

## 2019-07-05 DIAGNOSIS — M25.551 RIGHT HIP PAIN: ICD-10-CM

## 2019-07-05 DIAGNOSIS — R53.83 FATIGUE, UNSPECIFIED TYPE: Primary | ICD-10-CM

## 2019-07-05 DIAGNOSIS — Z79.899 ENCOUNTER FOR LONG-TERM CURRENT USE OF MEDICATION: ICD-10-CM

## 2019-07-05 DIAGNOSIS — E78.2 MIXED HYPERLIPIDEMIA: ICD-10-CM

## 2019-07-05 DIAGNOSIS — M25.552 LEFT HIP PAIN: ICD-10-CM

## 2019-07-05 DIAGNOSIS — R73.01 IMPAIRED FASTING GLUCOSE: ICD-10-CM

## 2019-07-05 DIAGNOSIS — M25.50 PAIN IN JOINT, MULTIPLE SITES: ICD-10-CM

## 2019-07-05 DIAGNOSIS — M35.00 SICCA SYNDROME (HCC): ICD-10-CM

## 2019-07-05 LAB
ALBUMIN SERPL BCP-MCNC: 4 G/DL (ref 3.5–5)
ALP SERPL-CCNC: 73 U/L (ref 46–116)
ALT SERPL W P-5'-P-CCNC: 31 U/L (ref 12–78)
ANION GAP SERPL CALCULATED.3IONS-SCNC: 4 MMOL/L (ref 4–13)
AST SERPL W P-5'-P-CCNC: 20 U/L (ref 5–45)
BILIRUB SERPL-MCNC: 0.99 MG/DL (ref 0.2–1)
BUN SERPL-MCNC: 17 MG/DL (ref 5–25)
CALCIUM SERPL-MCNC: 9.1 MG/DL (ref 8.3–10.1)
CHLORIDE SERPL-SCNC: 106 MMOL/L (ref 100–108)
CHOLEST SERPL-MCNC: 210 MG/DL (ref 50–200)
CO2 SERPL-SCNC: 30 MMOL/L (ref 21–32)
CREAT SERPL-MCNC: 0.88 MG/DL (ref 0.6–1.3)
ERYTHROCYTE [DISTWIDTH] IN BLOOD BY AUTOMATED COUNT: 13.5 % (ref 11.6–15.1)
EST. AVERAGE GLUCOSE BLD GHB EST-MCNC: 105 MG/DL
GFR SERPL CREATININE-BSD FRML MDRD: 65 ML/MIN/1.73SQ M
GLUCOSE P FAST SERPL-MCNC: 88 MG/DL (ref 65–99)
HBA1C MFR BLD: 5.3 % (ref 4.2–6.3)
HCT VFR BLD AUTO: 42.1 % (ref 34.8–46.1)
HDLC SERPL-MCNC: 53 MG/DL (ref 40–60)
HGB BLD-MCNC: 13.5 G/DL (ref 11.5–15.4)
LDLC SERPL CALC-MCNC: 141 MG/DL (ref 0–100)
MCH RBC QN AUTO: 29 PG (ref 26.8–34.3)
MCHC RBC AUTO-ENTMCNC: 32.1 G/DL (ref 31.4–37.4)
MCV RBC AUTO: 91 FL (ref 82–98)
PLATELET # BLD AUTO: 204 THOUSANDS/UL (ref 149–390)
PMV BLD AUTO: 10.5 FL (ref 8.9–12.7)
POTASSIUM SERPL-SCNC: 3.9 MMOL/L (ref 3.5–5.3)
PROT SERPL-MCNC: 7.2 G/DL (ref 6.4–8.2)
RBC # BLD AUTO: 4.65 MILLION/UL (ref 3.81–5.12)
SODIUM SERPL-SCNC: 140 MMOL/L (ref 136–145)
TRIGL SERPL-MCNC: 78 MG/DL
TSH SERPL DL<=0.05 MIU/L-ACNC: 2.45 UIU/ML (ref 0.36–3.74)
VIT B12 SERPL-MCNC: 501 PG/ML (ref 100–900)
WBC # BLD AUTO: 3.75 THOUSAND/UL (ref 4.31–10.16)

## 2019-07-05 PROCEDURE — 85027 COMPLETE CBC AUTOMATED: CPT

## 2019-07-05 PROCEDURE — 83036 HEMOGLOBIN GLYCOSYLATED A1C: CPT

## 2019-07-05 PROCEDURE — 80053 COMPREHEN METABOLIC PANEL: CPT

## 2019-07-05 PROCEDURE — 36415 COLL VENOUS BLD VENIPUNCTURE: CPT

## 2019-07-05 PROCEDURE — 80061 LIPID PANEL: CPT

## 2019-07-05 PROCEDURE — 82607 VITAMIN B-12: CPT

## 2019-07-05 PROCEDURE — 84443 ASSAY THYROID STIM HORMONE: CPT

## 2019-07-06 NOTE — PROGRESS NOTES
Assessment/Plan:    Problem List Items Addressed This Visit     Asthma, mild persistent     Has been stable          Relevant Medications    albuterol (PROVENTIL HFA,VENTOLIN HFA) 90 mcg/act inhaler    Edema     Unchanged          Relevant Medications    furosemide (LASIX) 40 mg tablet    Primary generalized (osteo)arthritis - Primary     Pain has improving  Will continue sparing use of Tylenol #3         Relevant Medications    acetaminophen-codeine (TYLENOL/CODEINE #3) 300-30 MG per tablet    Other Relevant Orders    POCT urine drug screen (Completed)    Rosacea     Using makeup to control it  Will try metrogel          Relevant Medications    metroNIDAZOLE (METROGEL) 0 75 % gel      Other Visit Diagnoses     Arthralgia, unspecified joint         Relevant Orders    POCT urine drug screen (Completed)          BMI Counseling: Body mass index is 31 64 kg/m²  Discussed the patient's BMI with her  The BMI is above average  BMI counseling and education was provided to the patient  Exercise recommendations include exercising 3-5 times per week  [place] prescription monitoring program report reviewed and is appropriate  [place] and [place] included in search criteria  There are no Patient Instructions on file for this visit  Return in about 6 months (around 1/22/2020) for Annual Wellness Visit  Subjective:      Patient ID: Arben Osorio is a 68 y o  female  Chief Complaint   Patient presents with    Follow-up     cough-fatigue--lj       Her pain level is has been better  She has been using less Tylenol with codeine  She needed it after pulling her shoulder with leaf blowing  She thinks the Plaquenil may be helping  She would like something for her rosacea  Her cheeks get red  The following portions of the patient's history were reviewed and updated as appropriate:  past social history    Review of Systems   Constitutional: Positive for fatigue (still a little tired, but improving)  Musculoskeletal: Positive for arthralgias  Current Outpatient Medications   Medication Sig Dispense Refill    acetaminophen-codeine (TYLENOL/CODEINE #3) 300-30 MG per tablet Take 1 tablet by mouth every 6 (six) hours as needed for moderate pain 30 tablet 0    aspirin 325 mg tablet Take 975 mg by mouth as needed for mild pain May take every other day prn      docusate sodium (COLACE) 100 mg capsule Take 100 mg by mouth 2 (two) times a day      FLOVENT  MCG/ACT inhaler USE 2 INHALATIONS TWICE A DAY (RINSE MOUTH AFTER USE) 36 g 3    furosemide (LASIX) 40 mg tablet Take 1 tablet (40 mg total) by mouth daily Every morning 40mg 90 tablet 1    hydroxychloroquine (PLAQUENIL) 200 mg tablet Take 200 mg by mouth 2 (two) times a day  3    Magnesium Oxide -Mg Supplement 400 MG CAPS Take 1 capsule by mouth every morning        potassium chloride (KLOR-CON 10) 10 mEq tablet Take 1 tablet (10 mEq total) by mouth daily (Patient taking differently: Take 10 mEq by mouth every morning  ) 90 tablet 3    ranitidine (ZANTAC) 150 mg tablet Take 1 tablet (150 mg total) by mouth 2 (two) times a day as needed for heartburn 180 tablet 1    RESTASIS 0 05 % ophthalmic emulsion       senna (SENOKOT) 8 6 MG tablet Take 1 tablet by mouth as needed for constipation      albuterol (PROVENTIL HFA,VENTOLIN HFA) 90 mcg/act inhaler Inhale 2 puffs every 6 (six) hours as needed for wheezing or shortness of breath 1 Inhaler 5    baclofen 10 mg tablet Take 1 tablet (10 mg total) by mouth 3 (three) times a day as needed for muscle spasms (Patient not taking: Reported on 6/10/2019) 90 tablet 2    Cholecalciferol (VITAMIN D3) 2000 units capsule Take 1 capsule by mouth every morning        metroNIDAZOLE (METROGEL) 0 75 % gel Apply topically 2 (two) times a day 90 g 1     No current facility-administered medications for this visit          Objective:    /80   Pulse 84   Temp (!) 97 4 °F (36 3 °C)   Resp 16   Ht 5' 6" (1 676 m)   Wt 88 9 kg (196 lb)   BMI 31 64 kg/m²        Physical Exam   Constitutional: She appears well-developed and well-nourished  HENT:   Head: Normocephalic and atraumatic  Right Ear: External ear normal    Left Ear: External ear normal    Mouth/Throat: Oropharynx is clear and moist    Cardiovascular: Normal rate, regular rhythm and normal heart sounds  Exam reveals no friction rub  No murmur heard  Pulmonary/Chest: Effort normal and breath sounds normal  No respiratory distress  She has no wheezes  She has no rales  Musculoskeletal: She exhibits no edema or deformity  Psychiatric: She has a normal mood and affect  Her behavior is normal  Thought content normal    Nursing note and vitals reviewed               Eligio Akhtar DO

## 2019-07-09 ENCOUNTER — OFFICE VISIT (OUTPATIENT)
Dept: FAMILY MEDICINE CLINIC | Facility: CLINIC | Age: 74
End: 2019-07-09
Payer: MEDICARE

## 2019-07-09 VITALS
DIASTOLIC BLOOD PRESSURE: 80 MMHG | HEART RATE: 84 BPM | BODY MASS INDEX: 31.5 KG/M2 | SYSTOLIC BLOOD PRESSURE: 126 MMHG | TEMPERATURE: 97.4 F | RESPIRATION RATE: 16 BRPM | WEIGHT: 196 LBS | HEIGHT: 66 IN

## 2019-07-09 DIAGNOSIS — M25.50 ARTHRALGIA, UNSPECIFIED JOINT: ICD-10-CM

## 2019-07-09 DIAGNOSIS — L71.9 ROSACEA: ICD-10-CM

## 2019-07-09 DIAGNOSIS — M15.0 PRIMARY GENERALIZED (OSTEO)ARTHRITIS: Primary | ICD-10-CM

## 2019-07-09 DIAGNOSIS — R60.9 EDEMA, UNSPECIFIED TYPE: ICD-10-CM

## 2019-07-09 DIAGNOSIS — J45.30 MILD PERSISTENT ASTHMA WITHOUT COMPLICATION: ICD-10-CM

## 2019-07-09 LAB
SL AMB POCT AMPHETAMINES URINE: NORMAL
SL AMB POCT COCAINE URINE: NORMAL
SL AMB POCT METHAMPETAMINES URINE: NORMAL
SL AMB POCT OPIATES URINE: NORMAL
SL AMB POCT PHENCYCLIDINE URINE: NORMAL
SL AMB THC URINE: NORMAL

## 2019-07-09 PROCEDURE — 99214 OFFICE O/P EST MOD 30 MIN: CPT | Performed by: FAMILY MEDICINE

## 2019-07-09 PROCEDURE — 80305 DRUG TEST PRSMV DIR OPT OBS: CPT | Performed by: FAMILY MEDICINE

## 2019-07-09 RX ORDER — METRONIDAZOLE 7.5 MG/G
GEL TOPICAL 2 TIMES DAILY
Qty: 90 G | Refills: 1 | Status: SHIPPED | OUTPATIENT
Start: 2019-07-09 | End: 2020-08-04

## 2019-07-09 RX ORDER — ALBUTEROL SULFATE 90 UG/1
2 AEROSOL, METERED RESPIRATORY (INHALATION) EVERY 6 HOURS PRN
Qty: 1 INHALER | Refills: 5 | Status: SHIPPED | OUTPATIENT
Start: 2019-07-09

## 2019-07-09 RX ORDER — FUROSEMIDE 40 MG/1
40 TABLET ORAL DAILY
Qty: 90 TABLET | Refills: 1 | Status: SHIPPED | OUTPATIENT
Start: 2019-07-09 | End: 2020-01-22 | Stop reason: SDUPTHER

## 2019-07-09 RX ORDER — ACETAMINOPHEN AND CODEINE PHOSPHATE 300; 30 MG/1; MG/1
1 TABLET ORAL EVERY 6 HOURS PRN
Qty: 30 TABLET | Refills: 0 | Status: SHIPPED | OUTPATIENT
Start: 2019-07-09 | End: 2019-10-11 | Stop reason: SDUPTHER

## 2019-07-16 ENCOUNTER — HOSPITAL ENCOUNTER (OUTPATIENT)
Dept: NON INVASIVE DIAGNOSTICS | Facility: HOSPITAL | Age: 74
Discharge: HOME | End: 2019-07-16
Attending: INTERNAL MEDICINE
Payer: MEDICARE

## 2019-07-16 ENCOUNTER — HOSPITAL ENCOUNTER (OUTPATIENT)
Dept: PULMONOLOGY | Facility: HOSPITAL | Age: 74
Discharge: HOME | End: 2019-07-16
Attending: INTERNAL MEDICINE
Payer: MEDICARE

## 2019-07-16 DIAGNOSIS — R06.02 EXERTIONAL SHORTNESS OF BREATH: ICD-10-CM

## 2019-07-16 PROCEDURE — 94060 EVALUATION OF WHEEZING: CPT

## 2019-07-16 PROCEDURE — 94726 PLETHYSMOGRAPHY LUNG VOLUMES: CPT | Performed by: INTERNAL MEDICINE

## 2019-07-16 PROCEDURE — 94729 DIFFUSING CAPACITY: CPT | Performed by: INTERNAL MEDICINE

## 2019-07-16 PROCEDURE — 94726 PLETHYSMOGRAPHY LUNG VOLUMES: CPT

## 2019-07-16 PROCEDURE — 93306 TTE W/DOPPLER COMPLETE: CPT

## 2019-07-16 PROCEDURE — 94760 N-INVAS EAR/PLS OXIMETRY 1: CPT

## 2019-07-16 PROCEDURE — 94729 DIFFUSING CAPACITY: CPT

## 2019-07-16 PROCEDURE — 93306 TTE W/DOPPLER COMPLETE: CPT | Performed by: INTERNAL MEDICINE

## 2019-07-16 PROCEDURE — 94060 EVALUATION OF WHEEZING: CPT | Performed by: INTERNAL MEDICINE

## 2019-07-31 ENCOUNTER — OFFICE VISIT (OUTPATIENT)
Dept: CARDIOLOGY CLINIC | Facility: CLINIC | Age: 74
End: 2019-07-31
Payer: MEDICARE

## 2019-07-31 VITALS
OXYGEN SATURATION: 96 % | WEIGHT: 196 LBS | HEART RATE: 74 BPM | SYSTOLIC BLOOD PRESSURE: 120 MMHG | BODY MASS INDEX: 31.5 KG/M2 | DIASTOLIC BLOOD PRESSURE: 74 MMHG | HEIGHT: 66 IN

## 2019-07-31 DIAGNOSIS — R06.02 EXERTIONAL SHORTNESS OF BREATH: Primary | ICD-10-CM

## 2019-07-31 DIAGNOSIS — R05.9 COUGH: ICD-10-CM

## 2019-07-31 PROBLEM — R06.01 ORTHOPNEA: Status: RESOLVED | Noted: 2019-05-24 | Resolved: 2019-07-31

## 2019-07-31 PROCEDURE — 99213 OFFICE O/P EST LOW 20 MIN: CPT | Performed by: INTERNAL MEDICINE

## 2019-07-31 NOTE — PROGRESS NOTES
Cardiology Follow Up    Tally Leventhal  1945  [id number]      Interval History: Ms Evelyn Armstrong is here for followup of dyspnea and cough  She has a cough for the past few months  2 months ago, she had a pain in the center part of her chest lasting 10-15 minutes and resolved gradually  Has not had it since then  During her last visit, echocardiogram and PFT were ordered  Echocardiogram showed normal ejection fraction with grade 1 diastolic dysfunction and normal valve function  Pulmonary function test was normal   A trial of Tagamet and Allegra resulted in an improvement in cough  She now has a dry cough intermittently  She has stopped using both medications  She has a history of dyslipidemia with last LDL cholesterol of 137 mg/d L  She does not wish to take statin therapy  She eats a diet high in fruits and vegetables  Does not eat a large amount of sugar  Does not exercise regularly  Walks 1 mile 2-3 times a week which is more of an effort recently       The following portions of the patient's history were reviewed and updated as appropriate: allergies, current medications, past family history, past medical history, past social history, past surgical history and problem list   Current Outpatient Medications on File Prior to Visit   Medication Sig Dispense Refill    acetaminophen-codeine (TYLENOL/CODEINE #3) 300-30 MG per tablet Take 1 tablet by mouth every 6 (six) hours as needed for moderate pain 30 tablet 0    albuterol (PROVENTIL HFA,VENTOLIN HFA) 90 mcg/act inhaler Inhale 2 puffs every 6 (six) hours as needed for wheezing or shortness of breath 1 Inhaler 5    aspirin 325 mg tablet Take 975 mg by mouth as needed for mild pain May take every other day prn      docusate sodium (COLACE) 100 mg capsule Take 100 mg by mouth 2 (two) times a day      FLOVENT  MCG/ACT inhaler USE 2 INHALATIONS TWICE A DAY (RINSE MOUTH AFTER USE) 36 g 3    furosemide (LASIX) 40 mg tablet Take 1 tablet (40 mg total) by mouth daily Every morning 40mg 90 tablet 1    hydroxychloroquine (PLAQUENIL) 200 mg tablet Take 200 mg by mouth 2 (two) times a day  3    Magnesium Oxide -Mg Supplement 400 MG CAPS Take 1 capsule by mouth every morning        metroNIDAZOLE (METROGEL) 0 75 % gel Apply topically 2 (two) times a day 90 g 1    potassium chloride (KLOR-CON 10) 10 mEq tablet Take 1 tablet (10 mEq total) by mouth daily (Patient taking differently: Take 10 mEq by mouth every morning  ) 90 tablet 3    RESTASIS 0 05 % ophthalmic emulsion       senna (SENOKOT) 8 6 MG tablet Take 1 tablet by mouth as needed for constipation      baclofen 10 mg tablet Take 1 tablet (10 mg total) by mouth 3 (three) times a day as needed for muscle spasms (Patient not taking: Reported on 6/10/2019) 90 tablet 2    Cholecalciferol (VITAMIN D3) 2000 units capsule Take 1 capsule by mouth every morning        ranitidine (ZANTAC) 150 mg tablet Take 1 tablet (150 mg total) by mouth 2 (two) times a day as needed for heartburn (Patient not taking: Reported on 7/31/2019) 180 tablet 1     No current facility-administered medications on file prior to visit  Review of Systems:  Review of Systems   Respiratory: Positive for cough  Musculoskeletal: Positive for arthralgias  All other systems reviewed and are negative  Physical Exam:  /74 (BP Location: Left arm, Patient Position: Sitting, Cuff Size: Standard)   Pulse 74   Ht 5' 6" (1 676 m)   Wt 88 9 kg (196 lb)   SpO2 96%   BMI 31 64 kg/m²     Physical Exam   Constitutional: She is oriented to person, place, and time  She appears well-developed  No distress  HENT:   Head: Normocephalic and atraumatic  Eyes: Pupils are equal, round, and reactive to light  Conjunctivae and EOM are normal  No scleral icterus  Neurological: She is alert and oriented to person, place, and time  Skin: She is not diaphoretic     Psychiatric: She has a normal mood and affect  Her behavior is normal  Judgment and thought content normal        Cardiographics  ECG: normal sinus rhythm, no blocks or conduction defects, no ischemic changes    Labs:  Lab Results   Component Value Date     12/11/2015    K 3 9 07/05/2019    K 3 8 12/11/2015     07/05/2019     12/11/2015    CO2 30 07/05/2019    CO2 31 (H) 12/11/2015    BUN 17 07/05/2019    BUN 22 (H) 12/11/2015    CREATININE 0 88 07/05/2019    CREATININE 1 0 12/11/2015    GLUCOSE 101 12/11/2015    CALCIUM 9 1 07/05/2019    CALCIUM 9 0 12/11/2015     Lab Results   Component Value Date    WBC 3 75 (L) 07/05/2019    WBC 5 79 04/10/2014    HGB 13 5 07/05/2019    HGB 14 4 04/10/2014    HCT 42 1 07/05/2019    HCT 42 3 04/10/2014    MCV 91 07/05/2019    MCV 87 04/10/2014     07/05/2019     04/10/2014     Lab Results   Component Value Date    TRIG 78 07/05/2019    HDL 53 07/05/2019     Imaging: No results found  Discussion/Summary:  1  Exertional shortness of breath    2  Cough      - She has chronic dyspnea and cough  Workup with pulmonary function test and echocardiogram showed no cardiac or pulmonary cause for cough  - encouraged to use Flonase and over-the-counter Sudafed to see if cough resolves  - consider GI follow-up if no improvement    - Follow up in 1 year

## 2019-08-06 DIAGNOSIS — R60.9 EDEMA, UNSPECIFIED TYPE: ICD-10-CM

## 2019-08-06 RX ORDER — POTASSIUM CHLORIDE 750 MG/1
10 TABLET, FILM COATED, EXTENDED RELEASE ORAL EVERY MORNING
Qty: 90 TABLET | Refills: 1 | Status: SHIPPED | OUTPATIENT
Start: 2019-08-06 | End: 2020-01-22 | Stop reason: ALTCHOICE

## 2019-10-11 DIAGNOSIS — M15.0 PRIMARY GENERALIZED (OSTEO)ARTHRITIS: ICD-10-CM

## 2019-10-11 RX ORDER — ACETAMINOPHEN AND CODEINE PHOSPHATE 300; 30 MG/1; MG/1
1 TABLET ORAL EVERY 6 HOURS PRN
Qty: 30 TABLET | Refills: 0 | Status: SHIPPED | OUTPATIENT
Start: 2019-10-11 | End: 2020-01-22 | Stop reason: SDUPTHER

## 2019-11-25 ENCOUNTER — TELEPHONE (OUTPATIENT)
Dept: FAMILY MEDICINE CLINIC | Facility: CLINIC | Age: 74
End: 2019-11-25

## 2019-11-25 DIAGNOSIS — Z12.39 SCREENING FOR BREAST CANCER: Primary | ICD-10-CM

## 2019-11-25 DIAGNOSIS — Z12.39 SCREENING BREAST EXAMINATION: Primary | ICD-10-CM

## 2019-11-25 NOTE — TELEPHONE ENCOUNTER
Patient left message on general mailbox stating that she needs her yearly mammo order  Please call patient when this is ready    [phone]

## 2019-11-25 NOTE — TELEPHONE ENCOUNTER
Spoke with pt, she is aware new order for 3D mammo was put into chart  No further action needed   Gigi Lipscomb

## 2019-12-10 ENCOUNTER — OFFICE VISIT (OUTPATIENT)
Dept: URGENT CARE | Facility: CLINIC | Age: 74
End: 2019-12-10
Payer: MEDICARE

## 2019-12-10 VITALS
RESPIRATION RATE: 18 BRPM | TEMPERATURE: 98.2 F | DIASTOLIC BLOOD PRESSURE: 82 MMHG | OXYGEN SATURATION: 100 % | HEART RATE: 64 BPM | WEIGHT: 200.8 LBS | HEIGHT: 66 IN | SYSTOLIC BLOOD PRESSURE: 150 MMHG | BODY MASS INDEX: 32.27 KG/M2

## 2019-12-10 DIAGNOSIS — N30.90 CYSTITIS: Primary | ICD-10-CM

## 2019-12-10 LAB
SL AMB  POCT GLUCOSE, UA: NEGATIVE
SL AMB LEUKOCYTE ESTERASE,UA: ABNORMAL
SL AMB POCT BILIRUBIN,UA: NEGATIVE
SL AMB POCT BLOOD,UA: NEGATIVE
SL AMB POCT CLARITY,UA: ABNORMAL
SL AMB POCT COLOR,UA: ABNORMAL
SL AMB POCT KETONES,UA: NEGATIVE
SL AMB POCT NITRITE,UA: POSITIVE
SL AMB POCT PH,UA: 7
SL AMB POCT SPECIFIC GRAVITY,UA: 1
SL AMB POCT URINE PROTEIN: NEGATIVE
SL AMB POCT UROBILINOGEN: 0.2

## 2019-12-10 PROCEDURE — 99214 OFFICE O/P EST MOD 30 MIN: CPT | Performed by: PHYSICIAN ASSISTANT

## 2019-12-10 PROCEDURE — 81002 URINALYSIS NONAUTO W/O SCOPE: CPT | Performed by: PHYSICIAN ASSISTANT

## 2019-12-10 PROCEDURE — 87086 URINE CULTURE/COLONY COUNT: CPT | Performed by: PHYSICIAN ASSISTANT

## 2019-12-10 RX ORDER — NITROFURANTOIN 25; 75 MG/1; MG/1
100 CAPSULE ORAL 2 TIMES DAILY
Qty: 10 CAPSULE | Refills: 0 | Status: SHIPPED | OUTPATIENT
Start: 2019-12-10 | End: 2019-12-15

## 2019-12-10 RX ORDER — PREDNISONE 10 MG/1
10 TABLET ORAL DAILY PRN
Refills: 1 | COMMUNITY
Start: 2019-11-13 | End: 2020-01-22 | Stop reason: ALTCHOICE

## 2019-12-10 NOTE — PROGRESS NOTES
[facility]        NAME: Feliciano Mera is a 76 y o  female  : 1945    MRN: [id number]  DATE: December 10, 2019  TIME: 1:40 PM    Assessment and Plan   Cystitis [N30 90]  1  Cystitis  POCT urine dip    Urine culture    nitrofurantoin (MACROBID) 100 mg capsule     Patient Instructions     Take the antibiotic as directed with food and water  Take a probiotic while taking this medication  Drink plenty of water  You may take a Azo, as directed for discomfort relief  Call in 48-72 hours for the result of your urine culture  Changes to your treatment plan will be made at this time if necessary  Follow up with your family doctor in 3-5 days  Proceed to the ER if symptoms worsen  Chief Complaint     Chief Complaint   Patient presents with    Possible UTI     Started last night with dysuria, urgency and frequency, suprapubic and b/l flank pain  History of Present Illness     49-year-old female presenting with complaint of dysuria x 1 day  Reports onset of urgency, frequency, and suprapubic pressure last night  Notes some low back aching but no flank pain or radiation of discomfort  Does note some hesitancy with starting her void and nausea today  Some chills but believes it may be related to the weather  Treated with azo last night with some relief  Has increased fluid intake  Reports a history of UTIs but notes that these decreased in frequency after surgery to straighten the urethra a few years ago  Since then notes rare occurrence  History of kidney infection prior to the procedure but otherwise denies any history of antibiotic resistance, kidney stones, or other complications related to UTIs  Review of Systems   Review of Systems   Constitutional: Positive for chills and fatigue  Negative for diaphoresis and fever  Gastrointestinal: Positive for abdominal pain and nausea  Negative for vomiting     Genitourinary: Positive for difficulty urinating, dysuria, frequency and urgency  Negative for flank pain, hematuria, vaginal bleeding, vaginal discharge and vaginal pain  Musculoskeletal: Positive for back pain  Skin: Negative for rash  Neurological: Negative for dizziness and headaches       Current Medications       Current Outpatient Medications:     acetaminophen-codeine (TYLENOL/CODEINE #3) 300-30 MG per tablet, Take 1 tablet by mouth every 6 (six) hours as needed for moderate pain, Disp: 30 tablet, Rfl: 0    albuterol (PROVENTIL HFA,VENTOLIN HFA) 90 mcg/act inhaler, Inhale 2 puffs every 6 (six) hours as needed for wheezing or shortness of breath, Disp: 1 Inhaler, Rfl: 5    aspirin 325 mg tablet, Take 975 mg by mouth as needed for mild pain May take every other day prn, Disp: , Rfl:     Cholecalciferol (VITAMIN D3) 2000 units capsule, Take 1 capsule by mouth every morning  , Disp: , Rfl:     docusate sodium (COLACE) 100 mg capsule, Take 100 mg by mouth 2 (two) times a day, Disp: , Rfl:     FLOVENT  MCG/ACT inhaler, USE 2 INHALATIONS TWICE A DAY (RINSE MOUTH AFTER USE) (Patient taking differently: 2 (two) times a day as needed ), Disp: 36 g, Rfl: 3    furosemide (LASIX) 40 mg tablet, Take 1 tablet (40 mg total) by mouth daily Every morning 40mg, Disp: 90 tablet, Rfl: 1    Magnesium Oxide -Mg Supplement 400 MG CAPS, Take 1 capsule by mouth every morning  , Disp: , Rfl:     metroNIDAZOLE (METROGEL) 0 75 % gel, Apply topically 2 (two) times a day (Patient taking differently: Apply topically 2 (two) times a day as needed ), Disp: 90 g, Rfl: 1    potassium chloride (K-DUR) 10 mEq tablet, Take 1 tablet (10 mEq total) by mouth every morning, Disp: 90 tablet, Rfl: 1    predniSONE 10 mg tablet, Take 10 mg by mouth daily as needed, Disp: , Rfl: 1    senna (SENOKOT) 8 6 MG tablet, Take 1 tablet by mouth as needed for constipation, Disp: , Rfl:     hydroxychloroquine (PLAQUENIL) 200 mg tablet, Take 200 mg by mouth 2 (two) times a day, Disp: , Rfl: 3    nitrofurantoin (MACROBID) 100 mg capsule, Take 1 capsule (100 mg total) by mouth 2 (two) times a day for 5 days, Disp: 10 capsule, Rfl: 0    Current Allergies     Allergies as of 12/10/2019 - Reviewed 12/10/2019   Allergen Reaction Noted    Celecoxib Other (See Comments) 06/25/2015            The following portions of the patient's history were reviewed and updated as appropriate: allergies, current medications, past family history, past medical history, past social history, past surgical history and problem list      Past Medical History:   Diagnosis Date    Anesthesia complication     "shivers"    Arthritis     Asthma     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 see colors, with over-use goes darker- from untreated amblyopia    Breathing difficulty     with extubation pt developed laryngeal spasm    Cataract     both eyes- right eye needs surgery    Colon polyps 2016    Diverticulitis     had episode in April 2018    Edema     lower legs    Generalized osteoarthritis     GERD (gastroesophageal reflux disease)     Hearing problem     unspecified laterality-hearing aid right ear    High cholesterol     Polyarthritis     last assessed: 6/26/2015    Sjogren's syndrome ([facility] [address] )     last assessed: 7/18/2016    SS-A antibody positive     last assessed: 8/20/2015     Past Surgical History:   Procedure Laterality Date    APPENDECTOMY      AUGMENTATION BREAST Bilateral     breast surgery enlargement procedure elective bilateral    BILATERAL OOPHORECTOMY Bilateral 2003    BLADDER SURGERY      bladder lift,cystocele    CATARACT EXTRACTION      Right 9/2018; Left 10/2018    COLONOSCOPY  04/18/2011    COLPORRHAPHY  2014    anterior, repair of cystocele    HYSTERECTOMY  1673    with cystocele     JOINT REPLACEMENT Left 2016    knee    KNEE ARTHROPLASTY Left     KNEE ARTHROSCOPY  2009    KNEE SURGERY Left 2009    torn meniscus    OOPHORECTOMY      [place] COLONOSCOPY FLX DX W/COLLJ SPEC WHEN PFRMD N/A 5/31/2018    Procedure: COLONOSCOPY; Surgeon: Anatoly Ledesma MD;  Location: [facility]; Service: Gastroenterology    REDUCTION MAMMAPLASTY      TONSILLECTOMY AND ADENOIDECTOMY      URETHRA SURGERY         Family History   Problem Relation Age of Onset    Coronary artery disease Mother     COPD Mother    Camarena Hypertension Mother     Hyperlipidemia Mother         high cholesterol    Atrial fibrillation Mother     Coronary artery disease Father     Arthritis Father     COPD Father         Black lung    Hypertension Father     Hyperlipidemia Father         high cholesterol    Lung cancer Brother     Coronary artery disease Brother     Hypertension Brother     Hyperlipidemia Brother         high cholesterol    Atrial fibrillation Brother         fib/flutter    Other Sister         pericarditis    Arthritis Sister     Diabetes Maternal Grandmother         mellitus    Breast cancer Maternal Grandmother     Breast cancer Paternal Grandmother     Arthritis Family     Asthma Family     Cancer Brother         lung    Heart disease Brother         CAD-stents-smoker     Medications have been verified  Objective   /82 (BP Location: Left arm, Patient Position: Sitting, Cuff Size: Large)   Pulse 64   Temp 98 2 °F (36 8 °C) (Tympanic)   Resp 18   Ht 5' 6" (1 676 m)   Wt 91 1 kg (200 lb 12 8 oz)   SpO2 100%   BMI 32 41 kg/m²     Urine dip:  Component 12/10/19  1:27 PM   LEUKOCYTE ESTERASE,UA moderate    NITRITE,UA positive    SL AMB POCT UROBILINOGEN 0 2    POCT URINE PROTEIN negative     PH,UA 7 0    BLOOD,UA negative    SPECIFIC GRAVITY,UA 1 005    KETONES,UA negative    BILIRUBIN,UA negative    GLUCOSE, UA negative     COLOR,UA erlinda    CLARITY,UA hazy        Physical Exam     Physical Exam   Constitutional: She is oriented to person, place, and time  Vital signs are normal  She appears well-developed and well-nourished  She is cooperative  She does not appear ill  No distress     HENT:   Head: Normocephalic and atraumatic  Eyes: Conjunctivae and lids are normal  Right eye exhibits no discharge and no exudate  Left eye exhibits no discharge and no exudate  Cardiovascular: Normal rate, regular rhythm and normal heart sounds  Exam reveals no gallop, no distant heart sounds and no friction rub  No murmur heard  Pulmonary/Chest: Effort normal and breath sounds normal  No stridor  No respiratory distress  She has no decreased breath sounds  She has no wheezes  She has no rhonchi  She has no rales  Abdominal: Soft  Normal appearance and bowel sounds are normal  She exhibits no distension and no mass  There is tenderness in the suprapubic area  There is no rigidity, no rebound, no guarding and no CVA tenderness  Neurological: She is alert and oriented to person, place, and time  She is not disoriented  No cranial nerve deficit  She exhibits normal muscle tone  Coordination normal    Skin: Skin is warm, dry and intact  No rash noted  She is not diaphoretic  No erythema  No pallor  Psychiatric: She has a normal mood and affect  Her behavior is normal  Judgment and thought content normal    Nursing note and vitals reviewed

## 2019-12-10 NOTE — PATIENT INSTRUCTIONS
Take the antibiotic as directed with food and water  Take a probiotic while taking this medication  Drink plenty of water  You may take a Azo, as directed for discomfort relief  Call in 48-72 hours for the result of your urine culture  Changes to your treatment plan will be made at this time if necessary  Follow up with your family doctor in 3-5 days  Proceed to the ER if symptoms worsen

## 2019-12-12 LAB — BACTERIA UR CULT: ABNORMAL

## 2020-01-07 ENCOUNTER — HOSPITAL ENCOUNTER (OUTPATIENT)
Dept: RADIOLOGY | Facility: HOSPITAL | Age: 75
Discharge: HOME | End: 2020-01-07
Payer: MEDICARE

## 2020-01-07 ENCOUNTER — TRANSCRIBE ORDERS (OUTPATIENT)
Dept: ADMINISTRATIVE | Facility: HOSPITAL | Age: 75
End: 2020-01-07

## 2020-01-07 VITALS — BODY MASS INDEX: 32.14 KG/M2 | HEIGHT: 66 IN | WEIGHT: 200 LBS

## 2020-01-07 DIAGNOSIS — Z12.39 SCREENING BREAST EXAMINATION: ICD-10-CM

## 2020-01-07 PROCEDURE — 77067 SCR MAMMO BI INCL CAD: CPT

## 2020-01-07 PROCEDURE — 77063 BREAST TOMOSYNTHESIS BI: CPT

## 2020-01-20 ENCOUNTER — TRANSCRIBE ORDERS (OUTPATIENT)
Dept: LAB | Facility: CLINIC | Age: 75
End: 2020-01-20

## 2020-01-20 ENCOUNTER — APPOINTMENT (OUTPATIENT)
Dept: LAB | Facility: CLINIC | Age: 75
End: 2020-01-20
Payer: MEDICARE

## 2020-01-20 DIAGNOSIS — Z51.81 THERAPEUTIC DRUG MONITORING: ICD-10-CM

## 2020-01-20 DIAGNOSIS — M19.042 PRIMARY OSTEOARTHRITIS OF LEFT HAND: Primary | ICD-10-CM

## 2020-01-20 DIAGNOSIS — M35.00 SICCA SYNDROME (HCC): ICD-10-CM

## 2020-01-20 DIAGNOSIS — M19.041 PRIMARY OSTEOARTHRITIS OF RIGHT HAND: ICD-10-CM

## 2020-01-20 DIAGNOSIS — M19.042 PRIMARY OSTEOARTHRITIS OF LEFT HAND: ICD-10-CM

## 2020-01-20 LAB
ALBUMIN SERPL BCP-MCNC: 4 G/DL (ref 3.5–5)
ALP SERPL-CCNC: 77 U/L (ref 46–116)
ALT SERPL W P-5'-P-CCNC: 27 U/L (ref 12–78)
ANION GAP SERPL CALCULATED.3IONS-SCNC: 5 MMOL/L (ref 4–13)
AST SERPL W P-5'-P-CCNC: 15 U/L (ref 5–45)
BASOPHILS # BLD AUTO: 0.03 THOUSANDS/ΜL (ref 0–0.1)
BASOPHILS NFR BLD AUTO: 1 % (ref 0–1)
BILIRUB SERPL-MCNC: 0.85 MG/DL (ref 0.2–1)
BUN SERPL-MCNC: 19 MG/DL (ref 5–25)
CALCIUM SERPL-MCNC: 9.2 MG/DL (ref 8.3–10.1)
CHLORIDE SERPL-SCNC: 107 MMOL/L (ref 100–108)
CO2 SERPL-SCNC: 30 MMOL/L (ref 21–32)
CREAT SERPL-MCNC: 0.99 MG/DL (ref 0.6–1.3)
CRP SERPL QL: 3.3 MG/L
EOSINOPHIL # BLD AUTO: 0.1 THOUSAND/ΜL (ref 0–0.61)
EOSINOPHIL NFR BLD AUTO: 2 % (ref 0–6)
ERYTHROCYTE [DISTWIDTH] IN BLOOD BY AUTOMATED COUNT: 13.4 % (ref 11.6–15.1)
ERYTHROCYTE [SEDIMENTATION RATE] IN BLOOD: 14 MM/HOUR (ref 0–20)
GFR SERPL CREATININE-BSD FRML MDRD: 56 ML/MIN/1.73SQ M
GLUCOSE SERPL-MCNC: 92 MG/DL (ref 65–140)
HCT VFR BLD AUTO: 41.8 % (ref 34.8–46.1)
HGB BLD-MCNC: 13.6 G/DL (ref 11.5–15.4)
IMM GRANULOCYTES # BLD AUTO: 0.01 THOUSAND/UL (ref 0–0.2)
IMM GRANULOCYTES NFR BLD AUTO: 0 % (ref 0–2)
LYMPHOCYTES # BLD AUTO: 1.52 THOUSANDS/ΜL (ref 0.6–4.47)
LYMPHOCYTES NFR BLD AUTO: 24 % (ref 14–44)
MCH RBC QN AUTO: 29.2 PG (ref 26.8–34.3)
MCHC RBC AUTO-ENTMCNC: 32.5 G/DL (ref 31.4–37.4)
MCV RBC AUTO: 90 FL (ref 82–98)
MONOCYTES # BLD AUTO: 0.61 THOUSAND/ΜL (ref 0.17–1.22)
MONOCYTES NFR BLD AUTO: 10 % (ref 4–12)
NEUTROPHILS # BLD AUTO: 3.98 THOUSANDS/ΜL (ref 1.85–7.62)
NEUTS SEG NFR BLD AUTO: 63 % (ref 43–75)
NRBC BLD AUTO-RTO: 0 /100 WBCS
PLATELET # BLD AUTO: 206 THOUSANDS/UL (ref 149–390)
PMV BLD AUTO: 10.6 FL (ref 8.9–12.7)
POTASSIUM SERPL-SCNC: 4 MMOL/L (ref 3.5–5.3)
PROT SERPL-MCNC: 7.3 G/DL (ref 6.4–8.2)
RBC # BLD AUTO: 4.65 MILLION/UL (ref 3.81–5.12)
SODIUM SERPL-SCNC: 142 MMOL/L (ref 136–145)
WBC # BLD AUTO: 6.25 THOUSAND/UL (ref 4.31–10.16)

## 2020-01-20 PROCEDURE — 85652 RBC SED RATE AUTOMATED: CPT

## 2020-01-20 PROCEDURE — 86140 C-REACTIVE PROTEIN: CPT

## 2020-01-20 PROCEDURE — 80053 COMPREHEN METABOLIC PANEL: CPT

## 2020-01-20 PROCEDURE — 36415 COLL VENOUS BLD VENIPUNCTURE: CPT

## 2020-01-20 PROCEDURE — 85025 COMPLETE CBC W/AUTO DIFF WBC: CPT

## 2020-01-22 ENCOUNTER — OFFICE VISIT (OUTPATIENT)
Dept: FAMILY MEDICINE CLINIC | Facility: CLINIC | Age: 75
End: 2020-01-22
Payer: MEDICARE

## 2020-01-22 VITALS
SYSTOLIC BLOOD PRESSURE: 118 MMHG | HEIGHT: 66 IN | HEART RATE: 76 BPM | WEIGHT: 200 LBS | DIASTOLIC BLOOD PRESSURE: 70 MMHG | BODY MASS INDEX: 32.14 KG/M2 | TEMPERATURE: 96.5 F | RESPIRATION RATE: 16 BRPM

## 2020-01-22 DIAGNOSIS — R73.01 IMPAIRED FASTING GLUCOSE: ICD-10-CM

## 2020-01-22 DIAGNOSIS — R60.9 EDEMA, UNSPECIFIED TYPE: ICD-10-CM

## 2020-01-22 DIAGNOSIS — M15.0 PRIMARY GENERALIZED (OSTEO)ARTHRITIS: ICD-10-CM

## 2020-01-22 DIAGNOSIS — Z00.00 MEDICARE ANNUAL WELLNESS VISIT, SUBSEQUENT: Primary | ICD-10-CM

## 2020-01-22 DIAGNOSIS — M35.00 SJOGREN'S SYNDROME, WITH UNSPECIFIED ORGAN INVOLVEMENT (HCC): ICD-10-CM

## 2020-01-22 PROCEDURE — 1123F ACP DISCUSS/DSCN MKR DOCD: CPT | Performed by: FAMILY MEDICINE

## 2020-01-22 PROCEDURE — G0439 PPPS, SUBSEQ VISIT: HCPCS | Performed by: FAMILY MEDICINE

## 2020-01-22 RX ORDER — POTASSIUM CHLORIDE 750 MG/1
10 TABLET, FILM COATED, EXTENDED RELEASE ORAL DAILY
Qty: 90 TABLET | Refills: 1 | Status: SHIPPED | OUTPATIENT
Start: 2020-01-22 | End: 2020-07-17

## 2020-01-22 RX ORDER — FUROSEMIDE 40 MG/1
40 TABLET ORAL DAILY
Qty: 90 TABLET | Refills: 1 | Status: SHIPPED | OUTPATIENT
Start: 2020-01-22 | End: 2020-07-17

## 2020-01-22 RX ORDER — ACETAMINOPHEN AND CODEINE PHOSPHATE 300; 30 MG/1; MG/1
1 TABLET ORAL EVERY 6 HOURS PRN
Qty: 30 TABLET | Refills: 1 | Status: SHIPPED | OUTPATIENT
Start: 2020-01-22 | End: 2020-08-03 | Stop reason: SDUPTHER

## 2020-01-22 NOTE — PROGRESS NOTES
Assessment and Plan:     Problem List Items Addressed This Visit     Edema    Relevant Medications    furosemide (LASIX) 40 mg tablet    potassium chloride (K-DUR) 10 mEq tablet    Impaired fasting glucose     Low sugar/carbohydrate diet discussed          Primary generalized (osteo)arthritis    Relevant Medications    acetaminophen-codeine (TYLENOL/CODEINE #3) 300-30 MG per tablet    Sjogren's syndrome ([address] )     Following with rheumatologist  She stopped her plaquenil because it wasn't helping  Other Visit Diagnoses     Medicare annual wellness visit, subsequent    -  Primary          [place] prescription monitoring program report reviewed and is appropriate  [place] and [place] included in search criteria  BMI Counseling: Body mass index is 32 28 kg/m²  The BMI is above normal  Exercise recommendations include exercising 3-5 times per week  Preventive health issues were discussed with patient, and age appropriate screening tests were ordered as noted in patient's After Visit Summary  Personalized health advice and appropriate referrals for health education or preventive services given if needed, as noted in patient's After Visit Summary  History of Present Illness:     Patient presents for Medicare Annual Wellness visit    She started with more hand and joint issues in the fall while she was on Plaquenil  She has been going to the rheumatologist   She failed Plaquenil and is now on methotrexate       Patient Care Team:  Ruba Amado DO as PCP - DO Ioana Ferro MD as Endoscopist     Problem List:     Patient Active Problem List   Diagnosis    Edema    Asthma, mild persistent    Exertional shortness of breath    Fatigue    GERD without esophagitis    Hyperlipidemia    Impaired fasting glucose    Left knee pain    Pain of left breast    Plantar fasciitis, right    Palpitations    Primary generalized (osteo)arthritis  Right hip pain    Sjogren's syndrome ([address] )    Diverticulitis of large intestine without perforation or abscess with bleeding    Chronic bilateral thoracic back pain    Rosacea    Cough      Past Medical and Surgical History:     Past Medical History:   Diagnosis Date    Anesthesia complication     "shivers"    Arthritis     Asthma     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 see colors, with over-use goes darker- from untreated amblyopia    Breathing difficulty     with extubation pt developed laryngeal spasm    Cataract     both eyes- right eye needs surgery    Colon polyps 2016    Diverticulitis     had episode in April 2018    Edema     lower legs    Generalized osteoarthritis     GERD (gastroesophageal reflux disease)     Hearing problem     unspecified laterality-hearing aid right ear    High cholesterol     Polyarthritis     last assessed: 6/26/2015    Sjogren's syndrome ([address] )     last assessed: 7/18/2016    SS-A antibody positive     last assessed: 8/20/2015     Past Surgical History:   Procedure Laterality Date    APPENDECTOMY      AUGMENTATION BREAST Bilateral     breast surgery enlargement procedure elective bilateral    BILATERAL OOPHORECTOMY Bilateral 2003    BLADDER SURGERY      bladder lift,cystocele    CATARACT EXTRACTION      Right 9/2018; Left 10/2018    COLONOSCOPY  04/18/2011    COLPORRHAPHY  2014    anterior, repair of cystocele    HYSTERECTOMY  16[address]    with cystocele     JOINT REPLACEMENT Left 2016    knee    KNEE ARTHROPLASTY Left     KNEE ARTHROSCOPY  2009    KNEE SURGERY Left 2009    torn meniscus    OOPHORECTOMY      [place] COLONOSCOPY FLX DX W/COLLJ SPEC WHEN PFRMD N/A 5/31/2018    Procedure: COLONOSCOPY;  Surgeon: Zach Machado MD;  Location: [facility];   Service: Gastroenterology    REDUCTION MAMMAPLASTY      TONSILLECTOMY AND ADENOIDECTOMY      URETHRA SURGERY        Family History:     Family History   Problem Relation Age of Onset    Coronary artery disease Mother     COPD Mother     Hypertension Mother     Hyperlipidemia Mother         high cholesterol    Atrial fibrillation Mother     Coronary artery disease Father     Arthritis Father     COPD Father         Black lung    Hypertension Father     Hyperlipidemia Father         high cholesterol    Lung cancer Brother     Coronary artery disease Brother     Hypertension Brother     Hyperlipidemia Brother         high cholesterol    Atrial fibrillation Brother         fib/flutter    Other Sister         pericarditis    Arthritis Sister     Diabetes Maternal Grandmother         mellitus    Breast cancer Maternal Grandmother     Breast cancer Paternal Grandmother     Arthritis Family     Asthma Family     Cancer Brother         lung    Heart disease Brother         CAD-stents-smoker      Social History:     Social History     Socioeconomic History    Marital status: /Civil Union     Spouse name: None    Number of children: None    Years of education: None    Highest education level: None   Occupational History    Occupation: retired   Social Needs    Financial resource strain: None    Food insecurity:     Worry: None     Inability: None    Transportation needs:     Medical: None     Non-medical: None   Tobacco Use    Smoking status: Never Smoker    Smokeless tobacco: Never Used   Substance and Sexual Activity    Alcohol use: Yes     Comment: rarely - wine 2 x month    Drug use: No    Sexual activity: None   Lifestyle    Physical activity:     Days per week: None     Minutes per session: None    Stress: None   Relationships    Social connections:     Talks on phone: None     Gets together: None     Attends Alevism service: None     Active member of club or organization: None     Attends meetings of clubs or organizations: None     Relationship status: None    Intimate partner violence:     Fear of current or ex partner: None     Emotionally abused: None     Physically abused: None     Forced sexual activity: None   Other Topics Concern    None   Social History Narrative    Lack of adequate sleep-4-6 hrs/night    Lack of exercise       Medications and Allergies:     Current Outpatient Medications   Medication Sig Dispense Refill    acetaminophen-codeine (TYLENOL/CODEINE #3) 300-30 MG per tablet Take 1 tablet by mouth every 6 (six) hours as needed for moderate pain 30 tablet 1    albuterol (PROVENTIL HFA,VENTOLIN HFA) 90 mcg/act inhaler Inhale 2 puffs every 6 (six) hours as needed for wheezing or shortness of breath 1 Inhaler 5    aspirin 325 mg tablet Take 325 mg by mouth as needed for mild pain May take every other day prn       Cholecalciferol (VITAMIN D3) 2000 units capsule Take 1 capsule by mouth every morning        docusate sodium (COLACE) 100 mg capsule Take 100 mg by mouth 2 (two) times a day      FLOVENT  MCG/ACT inhaler USE 2 INHALATIONS TWICE A DAY (RINSE MOUTH AFTER USE) 36 g 3    furosemide (LASIX) 40 mg tablet Take 1 tablet (40 mg total) by mouth daily Every morning 40mg 90 tablet 1    Magnesium Oxide -Mg Supplement 400 MG CAPS Take 1 capsule by mouth every morning        metroNIDAZOLE (METROGEL) 0 75 % gel Apply topically 2 (two) times a day (Patient taking differently: Apply topically 2 (two) times a day as needed ) 90 g 1    senna (SENOKOT) 8 6 MG tablet Take 1 tablet by mouth as needed for constipation      potassium chloride (K-DUR) 10 mEq tablet Take 1 tablet (10 mEq total) by mouth daily Needs potassium chloride ER (wax) tabs 90 tablet 1     No current facility-administered medications for this visit  Allergies   Allergen Reactions    Celecoxib Other (See Comments)     Other reaction(s): Palpitations  Category:  Adverse Reaction;       Immunizations:     Immunization History   Administered Date(s) Administered    DT (pediatric) 04/01/2013    INFLUENZA 10/19/2018    Influenza Split High Dose Preservative Free IM 09/27/2016, 09/01/2017, 10/29/2019    Influenza TIV (IM) 11/01/2015    Pneumococcal Conjugate 13-Valent 05/29/2015    Pneumococcal Polysaccharide PPV23 11/01/2010    Zoster 04/01/2013      Health Maintenance:         Topic Date Due    DXA SCAN  03/19/2011    MAMMOGRAM  01/07/2021    CRC Screening: Colonoscopy  05/31/2028    Hepatitis C Screening  Completed         Topic Date Due    DTaP,Tdap,and Td Vaccines (1 - Tdap) 07/25/1956      Medicare Health Risk Assessment:     /70   Pulse 76   Temp (!) 96 5 °F (35 8 °C)   Resp 16   Ht 5' 6" (1 676 m)   Wt 90 7 kg (200 lb)   BMI 32 28 kg/m²      Elda Ocasio is here for her Subsequent Wellness visit  Health Risk Assessment:   Patient rates overall health as good  Patient feels that their physical health rating is slightly worse  Eyesight was rated as slightly worse  Hearing was rated as same  Patient feels that their emotional and mental health rating is same  Pain experienced in the last 7 days has been some  Patient's pain rating has been 7/10  Patient states that she has experienced no weight loss or gain in last 6 months  Hip pain and leg but goes away  Harder to get up and down  Depression Screening:   PHQ-2 Score: 0      Fall Risk Screening: In the past year, patient has experienced: no history of falling in past year      Urinary Incontinence Screening:   Patient has not leaked urine accidently in the last six months  Home Safety:  Patient has trouble with stairs inside or outside of their home  Patient has working smoke alarms and has working carbon monoxide detector  Home safety hazards include: none  Nutrition:   Current diet is No Added Salt, Other (please comment) and Low Saturated Fat  No sugar     Medications:   Patient is currently taking over-the-counter supplements  OTC medications include: see medication list  Patient is able to manage medications       Activities of Daily Living (ADLs)/Instrumental Activities of Daily Living (IADLs):   Walk and transfer into and out of bed and chair?: Yes  Dress and groom yourself?: Yes    Bathe or shower yourself?: Yes    Feed yourself? Yes  Do your laundry/housekeeping?: Yes  Manage your money, pay your bills and track your expenses?: Yes  Make your own meals?: Yes    Do your own shopping?: Yes    Previous Hospitalizations:   Any hospitalizations or ED visits within the last 12 months?: No      Advance Care Planning:   Living will: Yes    Durable POA for healthcare: Yes    Advanced directive: Yes    Advanced directive counseling given: Yes    End of Life Decisions reviewed with patient: Yes    Provider agrees with end of life decisions: Yes      Cognitive Screening:   Provider or family/friend/caregiver concerned regarding cognition?: No    PREVENTIVE SCREENINGS      Cardiovascular Screening:    General: Screening Not Indicated and History Lipid Disorder      Diabetes Screening:     General: Screening Current      Colorectal Cancer Screening:     General: Screening Current      Breast Cancer Screening:     General: Screening Current      Cervical Cancer Screening:    General: Screening Not Indicated      Osteoporosis Screening:    General: Risks and Benefits Discussed and Patient Declines      Abdominal Aortic Aneurysm (AAA) Screening:        General: Screening Not Indicated      Lung Cancer Screening:     General: Screening Not Indicated      Hepatitis C Screening:    General: Screening Current      Preventive Screening Comments: Shingrix and Adacel recommended  Physical Exam   Constitutional: She is oriented to person, place, and time  She appears well-developed and well-nourished  HENT:   Head: Normocephalic and atraumatic  Right Ear: External ear normal    Left Ear: External ear normal    Nose: Nose normal    Mouth/Throat: Oropharynx is clear and moist    Cardiovascular: Normal rate, regular rhythm and normal heart sounds  Exam reveals no friction rub  No murmur heard    Pulmonary/Chest: Breath sounds normal  No respiratory distress  She has no wheezes  She has no rales  Musculoskeletal: She exhibits no edema  Neurological: She is oriented to person, place, and time  No cranial nerve deficit  Nursing note and vitals reviewed       Karina Degroot DO

## 2020-01-22 NOTE — PATIENT INSTRUCTIONS
Medicare Preventive Visit Patient Instructions  Thank you for completing your Welcome to Medicare Visit or Medicare Annual Wellness Visit today  Your next wellness visit will be due in one year (1/22/2021)  The screening/preventive services that you may require over the next 5-10 years are detailed below  Some tests may not apply to you based off risk factors and/or age  Screening tests ordered at today's visit but not completed yet may show as past due  Also, please note that scanned in results may not display below  Preventive Screenings:  Service Recommendations Previous Testing/Comments   Colorectal Cancer Screening  * Colonoscopy    * Fecal Occult Blood Test (FOBT)/Fecal Immunochemical Test (FIT)  * Fecal DNA/Cologuard Test  * Flexible Sigmoidoscopy Age: 54-65 years old   Colonoscopy: every 10 years (may be performed more frequently if at higher risk)  OR  FOBT/FIT: every 1 year  OR  Cologuard: every 3 years  OR  Sigmoidoscopy: every 5 years  Screening may be recommended earlier than age 48 if at higher risk for colorectal cancer  Also, an individualized decision between you and your healthcare provider will decide whether screening between the ages of 74-80 would be appropriate  Colonoscopy: 05/31/2018  FOBT/FIT: Not on file  Cologuard: Not on file  Sigmoidoscopy: Not on file    Screening Current     Breast Cancer Screening Age: 36 years old  Frequency: every 1-2 years  Not required if history of left and right mastectomy Mammogram: 01/07/2020    Screening Current   Cervical Cancer Screening Between the ages of 21-29, pap smear recommended once every 3 years  Between the ages of 33-67, can perform pap smear with HPV co-testing every 5 years     Recommendations may differ for women with a history of total hysterectomy, cervical cancer, or abnormal pap smears in past  Pap Smear: Not on file    Screening Not Indicated   Hepatitis C Screening Once for adults born between 1945 and 1965  More frequently in patients at high risk for Hepatitis C Hep C Antibody: 11/27/2018    Screening Current   Diabetes Screening 1-2 times per year if you're at risk for diabetes or have pre-diabetes Fasting glucose: 88 mg/dL   A1C: 5 3 %    Screening Current   Cholesterol Screening Once every 5 years if you don't have a lipid disorder  May order more often based on risk factors  Lipid panel: 07/05/2019    Screening Not Indicated  History Lipid Disorder     Other Preventive Screenings Covered by Medicare:  1  Abdominal Aortic Aneurysm (AAA) Screening: covered once if your at risk  You're considered to be at risk if you have a family history of AAA  2  Lung Cancer Screening: covers low dose CT scan once per year if you meet all of the following conditions: (1) Age 50-69; (2) No signs or symptoms of lung cancer; (3) Current smoker or have quit smoking within the last 15 years; (4) You have a tobacco smoking history of at least 30 pack years (packs per day multiplied by number of years you smoked); (5) You get a written order from a healthcare provider  3  Glaucoma Screening: covered annually if you're considered high risk: (1) You have diabetes OR (2) Family history of glaucoma OR (3)  aged 48 and older OR (3)  American aged 72 and older  3  Osteoporosis Screening: covered every 2 years if you meet one of the following conditions: (1) You're estrogen deficient and at risk for osteoporosis based off medical history and other findings; (2) Have a vertebral abnormality; (3) On glucocorticoid therapy for more than 3 months; (4) Have primary hyperparathyroidism; (5) On osteoporosis medications and need to assess response to drug therapy  · Last bone density test (DXA Scan): 03/19/2009  5  HIV Screening: covered annually if you're between the age of 12-76  Also covered annually if you are younger than 13 and older than 72 with risk factors for HIV infection   For pregnant patients, it is covered up to 3 times per pregnancy  Immunizations:  Immunization Recommendations   Influenza Vaccine Annual influenza vaccination during flu season is recommended for all persons aged >= 6 months who do not have contraindications   Pneumococcal Vaccine (Prevnar and Pneumovax)  * Prevnar = PCV13  * Pneumovax = PPSV23   Adults 25-60 years old: 1-3 doses may be recommended based on certain risk factors  Adults 72 years old: Prevnar (PCV13) vaccine recommended followed by Pneumovax (PPSV23) vaccine  If already received PPSV23 since turning 65, then PCV13 recommended at least one year after PPSV23 dose  Hepatitis B Vaccine 3 dose series if at intermediate or high risk (ex: diabetes, end stage renal disease, liver disease)   Tetanus (Td) Vaccine - COST NOT COVERED BY MEDICARE PART B Following completion of primary series, a booster dose should be given every 10 years to maintain immunity against tetanus  Td may also be given as tetanus wound prophylaxis  Tdap Vaccine - COST NOT COVERED BY MEDICARE PART B Recommended at least once for all adults  For pregnant patients, recommended with each pregnancy  Shingles Vaccine (Shingrix) - COST NOT COVERED BY MEDICARE PART B  2 shot series recommended in those aged 48 and above     Health Maintenance Due:      Topic Date Due    DXA SCAN  03/19/2011    MAMMOGRAM  01/07/2021    CRC Screening: Colonoscopy  05/31/2028    Hepatitis C Screening  Completed     Immunizations Due:      Topic Date Due    DTaP,Tdap,and Td Vaccines (1 - Tdap) 07/25/1956     Advance Directives   What are advance directives? Advance directives are legal documents that state your wishes and plans for medical care  These plans are made ahead of time in case you lose your ability to make decisions for yourself  Advance directives can apply to any medical decision, such as the treatments you want, and if you want to donate organs  What are the types of advance directives?   There are many types of advance directives, and each state has rules about how to use them  You may choose a combination of any of the following:  · Living will: This is a written record of the treatment you want  You can also choose which treatments you do not want, which to limit, and which to stop at a certain time  This includes surgery, medicine, IV fluid, and tube feedings  · Durable power of  for healthcare [facility]): This is a written record that states who you want to make healthcare choices for you when you are unable to make them for yourself  This person, called a proxy, is usually a family member or a friend  You may choose more than 1 proxy  · Do not resuscitate (DNR) order:  A DNR order is used in case your heart stops beating or you stop breathing  It is a request not to have certain forms of treatment, such as CPR  A DNR order may be included in other types of advance directives  · Medical directive: This covers the care that you want if you are in a coma, near death, or unable to make decisions for yourself  You can list the treatments you want for each condition  Treatment may include pain medicine, surgery, blood transfusions, dialysis, IV or tube feedings, and a ventilator (breathing machine)  · Values history: This document has questions about your views, beliefs, and how you feel and think about life  This information can help others choose the care that you would choose  Why are advance directives important? An advance directive helps you control your care  Although spoken wishes may be used, it is better to have your wishes written down  Spoken wishes can be misunderstood, or not followed  Treatments may be given even if you do not want them  An advance directive may make it easier for your family to make difficult choices about your care     Weight Management   Why it is important to manage your weight:  Being overweight increases your risk of health conditions such as heart disease, high blood pressure, type 2 diabetes, and certain types of cancer  It can also increase your risk for osteoarthritis, sleep apnea, and other respiratory problems  Aim for a slow, steady weight loss  Even a small amount of weight loss can lower your risk of health problems  How to lose weight safely:  A safe and healthy way to lose weight is to eat fewer calories and get regular exercise  You can lose up about 1 pound a week by decreasing the number of calories you eat by 500 calories each day  Healthy meal plan for weight management:  A healthy meal plan includes a variety of foods, contains fewer calories, and helps you stay healthy  A healthy meal plan includes the following:  · Eat whole-grain foods more often  A healthy meal plan should contain fiber  Fiber is the part of grains, fruits, and vegetables that is not broken down by your body  Whole-grain foods are healthy and provide extra fiber in your diet  Some examples of whole-grain foods are whole-wheat breads and pastas, oatmeal, brown rice, and bulgur  · Eat a variety of vegetables every day  Include dark, leafy greens such as spinach, kale, shruthi greens, and mustard greens  Eat yellow and orange vegetables such as carrots, sweet potatoes, and winter squash  · Eat a variety of fruits every day  Choose fresh or canned fruit (canned in its own juice or light syrup) instead of juice  Fruit juice has very little or no fiber  · Eat low-fat dairy foods  Drink fat-free (skim) milk or 1% milk  Eat fat-free yogurt and low-fat cottage cheese  Try low-fat cheeses such as mozzarella and other reduced-fat cheeses  · Choose meat and other protein foods that are low in fat  Choose beans or other legumes such as split peas or lentils  Choose fish, skinless poultry (chicken or turkey), or lean cuts of red meat (beef or pork)  Before you cook meat or poultry, cut off any visible fat  · Use less fat and oil  Try baking foods instead of frying them   Add less fat, such as margarine, sour cream, regular salad dressing and mayonnaise to foods  Eat fewer high-fat foods  Some examples of high-fat foods include french fries, doughnuts, ice cream, and cakes  · Eat fewer sweets  Limit foods and drinks that are high in sugar  This includes candy, cookies, regular soda, and sweetened drinks  Exercise:  Exercise at least 30 minutes per day on most days of the week  Some examples of exercise include walking, biking, dancing, and swimming  You can also fit in more physical activity by taking the stairs instead of the elevator or parking farther away from stores  Ask your healthcare provider about the best exercise plan for you  © Copyright [employer] 2018 Information is for End User's use only and may not be sold, redistributed or otherwise used for commercial purposes   All illustrations and images included in CareNotes® are the copyrighted property of A D A M , Inc  or [address] [place] [address]

## 2020-02-23 ENCOUNTER — OFFICE VISIT (OUTPATIENT)
Dept: URGENT CARE | Facility: CLINIC | Age: 75
End: 2020-02-23
Payer: MEDICARE

## 2020-02-23 VITALS
RESPIRATION RATE: 18 BRPM | WEIGHT: 203.8 LBS | BODY MASS INDEX: 32.75 KG/M2 | OXYGEN SATURATION: 96 % | DIASTOLIC BLOOD PRESSURE: 80 MMHG | HEART RATE: 88 BPM | HEIGHT: 66 IN | TEMPERATURE: 100 F | SYSTOLIC BLOOD PRESSURE: 122 MMHG

## 2020-02-23 DIAGNOSIS — J11.1 INFLUENZA: Primary | ICD-10-CM

## 2020-02-23 PROCEDURE — 99213 OFFICE O/P EST LOW 20 MIN: CPT | Performed by: PHYSICIAN ASSISTANT

## 2020-02-23 PROCEDURE — 4040F PNEUMOC VAC/ADMIN/RCVD: CPT | Performed by: PHYSICIAN ASSISTANT

## 2020-02-23 PROCEDURE — 1036F TOBACCO NON-USER: CPT | Performed by: PHYSICIAN ASSISTANT

## 2020-02-23 PROCEDURE — 3008F BODY MASS INDEX DOCD: CPT | Performed by: PHYSICIAN ASSISTANT

## 2020-02-23 PROCEDURE — 1160F RVW MEDS BY RX/DR IN RCRD: CPT | Performed by: PHYSICIAN ASSISTANT

## 2020-02-23 RX ORDER — BENZONATATE 100 MG/1
100 CAPSULE ORAL 3 TIMES DAILY PRN
Qty: 20 CAPSULE | Refills: 0 | Status: SHIPPED | OUTPATIENT
Start: 2020-02-23 | End: 2020-04-23 | Stop reason: ALTCHOICE

## 2020-02-23 RX ORDER — FOLIC ACID 1 MG/1
TABLET ORAL DAILY
COMMUNITY
Start: 2020-01-29 | End: 2021-05-07

## 2020-02-23 RX ORDER — OSELTAMIVIR PHOSPHATE 75 MG/1
75 CAPSULE ORAL 2 TIMES DAILY
Qty: 10 CAPSULE | Refills: 0 | Status: SHIPPED | OUTPATIENT
Start: 2020-02-23 | End: 2020-02-28

## 2020-02-23 NOTE — PATIENT INSTRUCTIONS
Most likely influenza, discussed send out test, patient declined  Rx tamiflu twice daily x 5 days sent via EMR  rx tessalon perls sent via EMR  Recommend tylenol/ibuprofen as needed for pain/fever  Rest, fluids, and supportive care  Follow up with PCP in 3-5 days  Proceed to  ER if symptoms worsen  Influenza   AMBULATORY CARE:   Influenza  (the flu) is an infection caused by the influenza virus  The flu is easily spread when an infected person coughs, sneezes, or has close contact with others  You may be able to spread the flu to others for 1 week or longer after signs or symptoms appear  Common signs and symptoms include the following:   · Fever and chills    · Headaches, body aches, and muscle or joint pain    · Cough, runny nose, and sore throat    · Loss of appetite, nausea, vomiting, or diarrhea    · Tiredness    · Trouble breathing  Call 911 for any of the following:   · You have trouble breathing, and your lips look purple or blue  · You have a seizure  Seek care immediately if:   · You are dizzy, or you are urinating less or not at all  · You have a headache with a stiff neck, and you feel tired or confused  · You have new pain or pressure in your chest     · Your symptoms, such as shortness of breath, vomiting, or diarrhea, get worse  · Your symptoms, such as fever and coughing, seem to get better, but then get worse  Contact your healthcare provider if:   · You have new muscle pain or weakness  · You have questions or concerns about your condition or care  Treatment for influenza  may include any of the following:  · Acetaminophen  decreases pain and fever  It is available without a doctor's order  Ask how much to take and how often to take it  Follow directions  Acetaminophen can cause liver damage if not taken correctly  · NSAIDs , such as ibuprofen, help decrease swelling, pain, and fever  This medicine is available with or without a doctor's order   NSAIDs can cause stomach bleeding or kidney problems in certain people  If you take blood thinner medicine, always ask your healthcare provider if NSAIDs are safe for you  Always read the medicine label and follow directions  · Antivirals  help fight a viral infection  Manage your symptoms:   · Rest  as much as you can to help you recover  · Drink liquids as directed  to help prevent dehydration  Ask how much liquid to drink each day and which liquids are best for you  Prevent the spread of the flu:   · Wash your hands often  Use soap and water  Wash your hands after you use the bathroom, change a child's diapers, or sneeze  Wash your hands before you prepare or eat food  Use gel hand cleanser when soap and water are not available  Do not touch your eyes, nose, or mouth unless you have washed your hands first            · Cover your mouth when you sneeze or cough  Cough into a tissue or the bend of your arm  · Clean shared items with a germ-killing   Clean table surfaces, doorknobs, and light switches  Do not share towels, silverware, and dishes with people who are sick  Wash bed sheets, towels, silverware, and dishes with soap and water  · Wear a mask  over your mouth and nose if you are sick or are near anyone who is sick  · Stay away from others  if you are sick  · Influenza vaccine  helps prevent influenza (flu)  Everyone older than 6 months should get a yearly influenza vaccine  Get the vaccine as soon as it is available, usually in September or October each year  Follow up with your healthcare provider as directed:  Write down your questions so you remember to ask them during your visits  © 2017 [address] Information is for End User's use only and may not be sold, redistributed or otherwise used for commercial purposes  All illustrations and images included in CareNotes® are the copyrighted property of [employer]  or Obie Galvez    The above information is an  only  It is not intended as medical advice for individual conditions or treatments  Talk to your doctor, nurse or pharmacist before following any medical regimen to see if it is safe and effective for you

## 2020-02-23 NOTE — PROGRESS NOTES
[facility]    NAME: Rolando Dakins is a 76 y o  female  : 1945    MRN: [id number]  DATE: 2020  TIME: 9:32 PM    Assessment and Plan   Influenza [J11 1]  1  Influenza  oseltamivir (TAMIFLU) 75 mg capsule    benzonatate (TESSALON PERLES) 100 mg capsule     Patient Instructions   Most likely influenza, discussed send out test, patient declined  Rx tamiflu twice daily x 5 days sent via EMR  rx tessalon perls sent via EMR  Recommend tylenol/ibuprofen as needed for pain/fever  Rest, fluids, and supportive care  Follow up with PCP in 3-5 days  Proceed to  ER if symptoms worsen  Chief Complaint     Chief Complaint   Patient presents with   Corin Wang Like Symptoms     Started suddenly on Friday with body aches, fatigue, congested, harsh, but non-productive cough with tightness and LESLIE, abd  pain, nausea and chills  Fever 101 yesterday and today  Sl  sore throat   Cough    Generalized Body Aches    Fever         History of Present Illness       Js Bergman is a 77-year-old female who presents to clinic complaining of cough x2 days  She describes the cough as dry and nonproductive  She is also complaining of fever of 101° F, chills, myalgias, headache, slight sore throat  She states that her symptoms came on suddenly 2 days ago  She denies any ear pain, nasal congestion, sinus pain/pressure, nausea, or vomiting  She has had her flu vaccine this season  She denies any recent sick contacts  She has been taking over-the-counter Tylenol for the fever and pain  Review of Systems   Review of Systems   Constitutional: Positive for chills, fatigue and fever  HENT: Positive for sore throat  Negative for congestion, ear pain, sinus pressure and sinus pain  Respiratory: Positive for cough  Negative for chest tightness and shortness of breath  Cardiovascular: Negative for chest pain  Gastrointestinal: Negative for diarrhea, nausea and vomiting  Musculoskeletal: Positive for myalgias  Neurological: Positive for headaches       Current Medications       Current Outpatient Medications:     acetaminophen-codeine (TYLENOL/CODEINE #3) 300-30 MG per tablet, Take 1 tablet by mouth every 6 (six) hours as needed for moderate pain, Disp: 30 tablet, Rfl: 1    albuterol (PROVENTIL HFA,VENTOLIN HFA) 90 mcg/act inhaler, Inhale 2 puffs every 6 (six) hours as needed for wheezing or shortness of breath, Disp: 1 Inhaler, Rfl: 5    aspirin 325 mg tablet, Take 325 mg by mouth as needed for mild pain May take every other day prn , Disp: , Rfl:     docusate sodium (COLACE) 100 mg capsule, Take 100 mg by mouth 2 (two) times a day, Disp: , Rfl:     FLOVENT  MCG/ACT inhaler, USE 2 INHALATIONS TWICE A DAY (RINSE MOUTH AFTER USE) (Patient taking differently: 2 (two) times a day as needed ), Disp: 36 g, Rfl: 3    folic acid (FOLVITE) 1 mg tablet, daily Except Friday, Disp: , Rfl:     furosemide (LASIX) 40 mg tablet, Take 1 tablet (40 mg total) by mouth daily Every morning 40mg, Disp: 90 tablet, Rfl: 1    Magnesium Oxide -Mg Supplement 400 MG CAPS, Take 1 capsule by mouth every morning  , Disp: , Rfl:     methotrexate 2 5 mg tablet, 3 tablets once a week, Disp: , Rfl:     metroNIDAZOLE (METROGEL) 0 75 % gel, Apply topically 2 (two) times a day (Patient taking differently: Apply topically 2 (two) times a day as needed ), Disp: 90 g, Rfl: 1    potassium chloride (K-DUR) 10 mEq tablet, Take 1 tablet (10 mEq total) by mouth daily Needs potassium chloride ER (wax) tabs, Disp: 90 tablet, Rfl: 1    senna (SENOKOT) 8 6 MG tablet, Take 1 tablet by mouth as needed for constipation, Disp: , Rfl:     benzonatate (TESSALON PERLES) 100 mg capsule, Take 1 capsule (100 mg total) by mouth 3 (three) times a day as needed for cough, Disp: 20 capsule, Rfl: 0    Cholecalciferol (VITAMIN D3) 2000 units capsule, Take 1 capsule by mouth every morning  , Disp: , Rfl:     oseltamivir (TAMIFLU) 75 mg capsule, Take 1 capsule (75 mg total) by mouth 2 (two) times a day for 5 days, Disp: 10 capsule, Rfl: 0    Current Allergies     Allergies as of 02/23/2020 - Reviewed 02/23/2020   Allergen Reaction Noted    Celecoxib Other (See Comments) 06/25/2015            The following portions of the patient's history were reviewed and updated as appropriate: allergies, current medications, past family history, past medical history, past social history, past surgical history and problem list      Past Medical History:   Diagnosis Date    Anesthesia complication     "shivers"    Arthritis     Asthma     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 see colors, with over-use goes darker- from untreated amblyopia    Breathing difficulty     with extubation pt developed laryngeal spasm    Cataract     both eyes- right eye needs surgery    Colon polyps 2016    Diverticulitis     had episode in April 2018    Edema     lower legs    Generalized osteoarthritis     GERD (gastroesophageal reflux disease)     Hearing problem     unspecified laterality-hearing aid right ear    High cholesterol     Polyarthritis     last assessed: 6/26/2015    Rheumatoid arthritis ([facility] [address] )     Sjogren's syndrome ([facility] [address] )     last assessed: 7/18/2016    SS-A antibody positive     last assessed: 8/20/2015       Past Surgical History:   Procedure Laterality Date    APPENDECTOMY      AUGMENTATION BREAST Bilateral     breast surgery enlargement procedure elective bilateral    BILATERAL OOPHORECTOMY Bilateral 2003    BLADDER SURGERY      bladder lift,cystocele    CATARACT EXTRACTION      Right 9/2018; Left 10/2018    COLONOSCOPY  04/18/2011    COLPORRHAPHY  2014    anterior, repair of cystocele    HYSTERECTOMY  1673    with cystocele     JOINT REPLACEMENT Left 2016    knee    KNEE ARTHROPLASTY Left     KNEE ARTHROSCOPY  2009    KNEE SURGERY Left 2009    torn meniscus    OOPHORECTOMY      NV COLONOSCOPY FLX DX W/COLLJ SPEC WHEN PFRMD N/A 5/31/2018    Procedure: COLONOSCOPY;  Surgeon: Susy Houston MD;  Location: [facility]; Service: Gastroenterology    REDUCTION MAMMAPLASTY      TONSILLECTOMY AND ADENOIDECTOMY      URETHRA SURGERY         Family History   Problem Relation Age of Onset    Coronary artery disease Mother     COPD Mother    Obdulia Crystal Hypertension Mother     Hyperlipidemia Mother         high cholesterol    Atrial fibrillation Mother     Coronary artery disease Father     Arthritis Father     COPD Father         Black lung    Hypertension Father     Hyperlipidemia Father         high cholesterol    Lung cancer Brother     Coronary artery disease Brother     Hypertension Brother     Hyperlipidemia Brother         high cholesterol    Atrial fibrillation Brother         fib/flutter    Other Sister         pericarditis    Arthritis Sister     Diabetes Maternal Grandmother         mellitus    Breast cancer Maternal Grandmother     Breast cancer Paternal Grandmother     Arthritis Family     Asthma Family     Cancer Brother         lung    Heart disease Brother         CAD-stents-smoker         Medications have been verified  Objective   /80   Pulse 88   Temp 100 °F (37 8 °C)   Resp 18   Ht 5' 6" (1 676 m)   Wt 92 4 kg (203 lb 12 8 oz)   SpO2 96%   BMI 32 89 kg/m²        Physical Exam     Physical Exam   Constitutional: She is oriented to person, place, and time  She appears well-developed and well-nourished  No distress  HENT:   Right Ear: Tympanic membrane, external ear and ear canal normal    Left Ear: Tympanic membrane, external ear and ear canal normal    Nose: Nose normal  Right sinus exhibits no maxillary sinus tenderness and no frontal sinus tenderness  Left sinus exhibits no maxillary sinus tenderness and no frontal sinus tenderness  Mouth/Throat: Uvula is midline and mucous membranes are normal  Posterior oropharyngeal erythema present  No oropharyngeal exudate or posterior oropharyngeal edema     Cardiovascular: Normal rate, regular rhythm and normal heart sounds  Pulmonary/Chest: Effort normal and breath sounds normal    Lymphadenopathy:     She has no cervical adenopathy  Neurological: She is alert and oriented to person, place, and time  Psychiatric: She has a normal mood and affect  Nursing note and vitals reviewed

## 2020-04-17 ENCOUNTER — APPOINTMENT (OUTPATIENT)
Dept: LAB | Facility: CLINIC | Age: 75
End: 2020-04-17
Payer: MEDICARE

## 2020-04-17 ENCOUNTER — TRANSCRIBE ORDERS (OUTPATIENT)
Dept: LAB | Facility: CLINIC | Age: 75
End: 2020-04-17

## 2020-04-17 DIAGNOSIS — M25.50 PAIN IN JOINT, MULTIPLE SITES: Primary | ICD-10-CM

## 2020-04-17 DIAGNOSIS — Z51.81 ENCOUNTER FOR THERAPEUTIC DRUG MONITORING: ICD-10-CM

## 2020-04-17 DIAGNOSIS — M35.00 SICCA SYNDROME (HCC): ICD-10-CM

## 2020-04-17 DIAGNOSIS — M25.50 PAIN IN JOINT, MULTIPLE SITES: ICD-10-CM

## 2020-04-17 DIAGNOSIS — M19.042 ARTHRITIS OF LEFT HAND: ICD-10-CM

## 2020-04-17 DIAGNOSIS — M19.041 ARTHRITIS OF RIGHT HAND: ICD-10-CM

## 2020-04-17 LAB
ALBUMIN SERPL BCP-MCNC: 4.1 G/DL (ref 3.5–5)
ALP SERPL-CCNC: 91 U/L (ref 46–116)
ALT SERPL W P-5'-P-CCNC: 32 U/L (ref 12–78)
ANION GAP SERPL CALCULATED.3IONS-SCNC: 3 MMOL/L (ref 4–13)
AST SERPL W P-5'-P-CCNC: 16 U/L (ref 5–45)
BASOPHILS # BLD AUTO: 0.05 THOUSANDS/ΜL (ref 0–0.1)
BASOPHILS NFR BLD AUTO: 1 % (ref 0–1)
BILIRUB SERPL-MCNC: 0.77 MG/DL (ref 0.2–1)
BUN SERPL-MCNC: 24 MG/DL (ref 5–25)
CALCIUM SERPL-MCNC: 9.5 MG/DL (ref 8.3–10.1)
CHLORIDE SERPL-SCNC: 108 MMOL/L (ref 100–108)
CO2 SERPL-SCNC: 32 MMOL/L (ref 21–32)
CREAT SERPL-MCNC: 1.01 MG/DL (ref 0.6–1.3)
CRP SERPL QL: <3 MG/L
EOSINOPHIL # BLD AUTO: 0.1 THOUSAND/ΜL (ref 0–0.61)
EOSINOPHIL NFR BLD AUTO: 2 % (ref 0–6)
ERYTHROCYTE [DISTWIDTH] IN BLOOD BY AUTOMATED COUNT: 15 % (ref 11.6–15.1)
ERYTHROCYTE [SEDIMENTATION RATE] IN BLOOD: 13 MM/HOUR (ref 0–20)
GFR SERPL CREATININE-BSD FRML MDRD: 55 ML/MIN/1.73SQ M
GLUCOSE SERPL-MCNC: 86 MG/DL (ref 65–140)
HCT VFR BLD AUTO: 43.9 % (ref 34.8–46.1)
HGB BLD-MCNC: 14.1 G/DL (ref 11.5–15.4)
IMM GRANULOCYTES # BLD AUTO: 0.02 THOUSAND/UL (ref 0–0.2)
IMM GRANULOCYTES NFR BLD AUTO: 0 % (ref 0–2)
LYMPHOCYTES # BLD AUTO: 1.99 THOUSANDS/ΜL (ref 0.6–4.47)
LYMPHOCYTES NFR BLD AUTO: 31 % (ref 14–44)
MCH RBC QN AUTO: 29.6 PG (ref 26.8–34.3)
MCHC RBC AUTO-ENTMCNC: 32.1 G/DL (ref 31.4–37.4)
MCV RBC AUTO: 92 FL (ref 82–98)
MONOCYTES # BLD AUTO: 0.52 THOUSAND/ΜL (ref 0.17–1.22)
MONOCYTES NFR BLD AUTO: 8 % (ref 4–12)
NEUTROPHILS # BLD AUTO: 3.65 THOUSANDS/ΜL (ref 1.85–7.62)
NEUTS SEG NFR BLD AUTO: 58 % (ref 43–75)
NRBC BLD AUTO-RTO: 0 /100 WBCS
PLATELET # BLD AUTO: 256 THOUSANDS/UL (ref 149–390)
PMV BLD AUTO: 10.4 FL (ref 8.9–12.7)
POTASSIUM SERPL-SCNC: 4.4 MMOL/L (ref 3.5–5.3)
PROT SERPL-MCNC: 7.6 G/DL (ref 6.4–8.2)
RBC # BLD AUTO: 4.76 MILLION/UL (ref 3.81–5.12)
SODIUM SERPL-SCNC: 143 MMOL/L (ref 136–145)
WBC # BLD AUTO: 6.33 THOUSAND/UL (ref 4.31–10.16)

## 2020-04-17 PROCEDURE — 86140 C-REACTIVE PROTEIN: CPT

## 2020-04-17 PROCEDURE — 80053 COMPREHEN METABOLIC PANEL: CPT

## 2020-04-17 PROCEDURE — 85652 RBC SED RATE AUTOMATED: CPT

## 2020-04-17 PROCEDURE — 85025 COMPLETE CBC W/AUTO DIFF WBC: CPT

## 2020-04-17 PROCEDURE — 36415 COLL VENOUS BLD VENIPUNCTURE: CPT

## 2020-04-23 ENCOUNTER — TELEMEDICINE (OUTPATIENT)
Dept: FAMILY MEDICINE CLINIC | Facility: CLINIC | Age: 75
End: 2020-04-23
Payer: MEDICARE

## 2020-04-23 DIAGNOSIS — M15.0 PRIMARY GENERALIZED (OSTEO)ARTHRITIS: ICD-10-CM

## 2020-04-23 DIAGNOSIS — R73.01 IMPAIRED FASTING GLUCOSE: Primary | ICD-10-CM

## 2020-04-23 DIAGNOSIS — J45.30 MILD PERSISTENT ASTHMA WITHOUT COMPLICATION: ICD-10-CM

## 2020-04-23 DIAGNOSIS — E78.2 MIXED HYPERLIPIDEMIA: ICD-10-CM

## 2020-04-23 DIAGNOSIS — Z13.0 SCREENING FOR DEFICIENCY ANEMIA: ICD-10-CM

## 2020-04-23 PROCEDURE — 99442 PR PHYS/QHP TELEPHONE EVALUATION 11-20 MIN: CPT | Performed by: FAMILY MEDICINE

## 2020-07-16 DIAGNOSIS — R60.9 EDEMA, UNSPECIFIED TYPE: ICD-10-CM

## 2020-07-17 RX ORDER — POTASSIUM CHLORIDE 750 MG/1
TABLET, FILM COATED, EXTENDED RELEASE ORAL
Qty: 90 TABLET | Refills: 3 | Status: SHIPPED | OUTPATIENT
Start: 2020-07-17 | End: 2021-07-29

## 2020-07-17 RX ORDER — FUROSEMIDE 40 MG/1
TABLET ORAL
Qty: 90 TABLET | Refills: 3 | Status: SHIPPED | OUTPATIENT
Start: 2020-07-17 | End: 2021-07-29

## 2020-07-27 ENCOUNTER — APPOINTMENT (OUTPATIENT)
Dept: LAB | Facility: CLINIC | Age: 75
End: 2020-07-27
Payer: MEDICARE

## 2020-07-27 ENCOUNTER — TRANSCRIBE ORDERS (OUTPATIENT)
Dept: LAB | Facility: CLINIC | Age: 75
End: 2020-07-27

## 2020-07-27 DIAGNOSIS — M25.50 ARTHRALGIA, UNSPECIFIED JOINT: Primary | ICD-10-CM

## 2020-07-27 DIAGNOSIS — M35.00 SJOGREN'S SYNDROME, WITH UNSPECIFIED ORGAN INVOLVEMENT (HCC): ICD-10-CM

## 2020-07-27 DIAGNOSIS — R53.83 FATIGUE, UNSPECIFIED TYPE: ICD-10-CM

## 2020-07-27 DIAGNOSIS — M25.50 ARTHRALGIA, UNSPECIFIED JOINT: ICD-10-CM

## 2020-07-27 DIAGNOSIS — M19.041: ICD-10-CM

## 2020-07-27 DIAGNOSIS — Z13.0 SCREENING FOR DEFICIENCY ANEMIA: ICD-10-CM

## 2020-07-27 DIAGNOSIS — Z51.81 ENCOUNTER FOR THERAPEUTIC DRUG MONITORING: ICD-10-CM

## 2020-07-27 DIAGNOSIS — R73.01 IMPAIRED FASTING GLUCOSE: ICD-10-CM

## 2020-07-27 DIAGNOSIS — E78.2 MIXED HYPERLIPIDEMIA: ICD-10-CM

## 2020-07-27 LAB
ALBUMIN SERPL BCP-MCNC: 3.9 G/DL (ref 3.5–5)
ALP SERPL-CCNC: 77 U/L (ref 46–116)
ALT SERPL W P-5'-P-CCNC: 25 U/L (ref 12–78)
ANION GAP SERPL CALCULATED.3IONS-SCNC: 5 MMOL/L (ref 4–13)
AST SERPL W P-5'-P-CCNC: 14 U/L (ref 5–45)
BASOPHILS # BLD AUTO: 0.03 THOUSANDS/ΜL (ref 0–0.1)
BASOPHILS NFR BLD AUTO: 1 % (ref 0–1)
BILIRUB SERPL-MCNC: 1.25 MG/DL (ref 0.2–1)
BUN SERPL-MCNC: 22 MG/DL (ref 5–25)
CALCIUM SERPL-MCNC: 10.1 MG/DL (ref 8.3–10.1)
CHLORIDE SERPL-SCNC: 106 MMOL/L (ref 100–108)
CHOLEST SERPL-MCNC: 222 MG/DL (ref 50–200)
CO2 SERPL-SCNC: 31 MMOL/L (ref 21–32)
CREAT SERPL-MCNC: 0.94 MG/DL (ref 0.6–1.3)
CRP SERPL QL: <3 MG/L
EOSINOPHIL # BLD AUTO: 0.1 THOUSAND/ΜL (ref 0–0.61)
EOSINOPHIL NFR BLD AUTO: 2 % (ref 0–6)
ERYTHROCYTE [DISTWIDTH] IN BLOOD BY AUTOMATED COUNT: 13 % (ref 11.6–15.1)
ERYTHROCYTE [SEDIMENTATION RATE] IN BLOOD: 3 MM/HOUR (ref 0–20)
EST. AVERAGE GLUCOSE BLD GHB EST-MCNC: 103 MG/DL
GFR SERPL CREATININE-BSD FRML MDRD: 60 ML/MIN/1.73SQ M
GLUCOSE P FAST SERPL-MCNC: 86 MG/DL (ref 65–99)
HBA1C MFR BLD: 5.2 %
HCT VFR BLD AUTO: 44.5 % (ref 34.8–46.1)
HDLC SERPL-MCNC: 46 MG/DL
HGB BLD-MCNC: 14.6 G/DL (ref 11.5–15.4)
IMM GRANULOCYTES # BLD AUTO: 0.01 THOUSAND/UL (ref 0–0.2)
IMM GRANULOCYTES NFR BLD AUTO: 0 % (ref 0–2)
LDLC SERPL CALC-MCNC: 143 MG/DL (ref 0–100)
LYMPHOCYTES # BLD AUTO: 1.85 THOUSANDS/ΜL (ref 0.6–4.47)
LYMPHOCYTES NFR BLD AUTO: 34 % (ref 14–44)
MCH RBC QN AUTO: 29.6 PG (ref 26.8–34.3)
MCHC RBC AUTO-ENTMCNC: 32.8 G/DL (ref 31.4–37.4)
MCV RBC AUTO: 90 FL (ref 82–98)
MONOCYTES # BLD AUTO: 0.49 THOUSAND/ΜL (ref 0.17–1.22)
MONOCYTES NFR BLD AUTO: 9 % (ref 4–12)
NEUTROPHILS # BLD AUTO: 2.92 THOUSANDS/ΜL (ref 1.85–7.62)
NEUTS SEG NFR BLD AUTO: 54 % (ref 43–75)
NRBC BLD AUTO-RTO: 0 /100 WBCS
PLATELET # BLD AUTO: 221 THOUSANDS/UL (ref 149–390)
PMV BLD AUTO: 10.4 FL (ref 8.9–12.7)
POTASSIUM SERPL-SCNC: 3.8 MMOL/L (ref 3.5–5.3)
PROT SERPL-MCNC: 7.2 G/DL (ref 6.4–8.2)
RBC # BLD AUTO: 4.93 MILLION/UL (ref 3.81–5.12)
SODIUM SERPL-SCNC: 142 MMOL/L (ref 136–145)
TRIGL SERPL-MCNC: 164 MG/DL
WBC # BLD AUTO: 5.4 THOUSAND/UL (ref 4.31–10.16)

## 2020-07-27 PROCEDURE — 85025 COMPLETE CBC W/AUTO DIFF WBC: CPT

## 2020-07-27 PROCEDURE — 36415 COLL VENOUS BLD VENIPUNCTURE: CPT

## 2020-07-27 PROCEDURE — 86140 C-REACTIVE PROTEIN: CPT

## 2020-07-27 PROCEDURE — 80061 LIPID PANEL: CPT

## 2020-07-27 PROCEDURE — 85652 RBC SED RATE AUTOMATED: CPT

## 2020-07-27 PROCEDURE — 83036 HEMOGLOBIN GLYCOSYLATED A1C: CPT

## 2020-07-27 PROCEDURE — 80053 COMPREHEN METABOLIC PANEL: CPT

## 2020-07-31 NOTE — PROGRESS NOTES
Assessment/Plan:    1  Impaired fasting glucose  Assessment & Plan:  stable  Will recheck sugar again in 6 months    Orders:  -     Comprehensive metabolic panel; Future; Expected date: 01/15/2021    2  Primary generalized (osteo)arthritis  Assessment & Plan:  Worsening with recent fall  Patient is compliant with pain agreement  New agreement done today and reviewed with patient  Continue Tylenol with codeine as needed    Orders:  -     acetaminophen-codeine (TYLENOL/CODEINE #3) 300-30 MG per tablet; Take 1 tablet by mouth every 6 (six) hours as needed for moderate pain    3  Palpitations  Assessment & Plan:  New  Will see Dr Danyel Guadarrama tomorrow  Orders:  -     TSH, 3rd generation; Future  -     Lyme Antibody Profile with reflex to WB; Future    4  Discharge from left nipple  Comments:  new  Orders:  -     Prolactin; Future  -     Mammo diagnostic left w 3d & cad; Future; Expected date: 08/03/2020  -     US breast left limited (diagnostic); Future; Expected date: 08/03/2020    5  Screening for cardiovascular condition  -     Comprehensive metabolic panel; Future; Expected date: 01/15/2021  -     Lipid Panel with Direct LDL reflex; Future; Expected date: 01/15/2021    6  Screening for deficiency anemia  -     CBC; Future; Expected date: 01/15/2021         [place] prescription monitoring program report reviewed and is appropriate  There are no Patient Instructions on file for this visit  Return in about 6 months (around 2/3/2021) for Annual Wellness Visit  Subjective:      Patient ID: Jenny Lucero is a 76 y o  female  Chief Complaint   Patient presents with    Follow-up     follow up on chronic issues [id number]        She has stopped the methotrexate and was put on prednisone  She has not needed as much tylenol #3  Though, she has needed a little more in the past 3 days because she fell  Now her left hip hurts  She had an injection in that hip 6 weeks ago       She has been having serosanguinous leaking out of her left breast   It is also itching  It has been going on intermittently since January  Three weeks ago it started happening more frequently  She has been having episodes of palpitations that can be associated with shortness of breath and lightheaded  She has tried coughing and that seems to help  The following portions of the patient's history were reviewed and updated as appropriate:  past social history    Review of Systems   Respiratory: Negative  Cardiovascular: Positive for palpitations           Current Outpatient Medications   Medication Sig Dispense Refill    acetaminophen-codeine (TYLENOL/CODEINE #3) 300-30 MG per tablet Take 1 tablet by mouth every 6 (six) hours as needed for moderate pain 30 tablet 1    albuterol (PROVENTIL HFA,VENTOLIN HFA) 90 mcg/act inhaler Inhale 2 puffs every 6 (six) hours as needed for wheezing or shortness of breath 1 Inhaler 5    aspirin 325 mg tablet Take 325 mg by mouth as needed for mild pain May take every other day prn       Cholecalciferol (VITAMIN D3) 2000 units capsule Take 1 capsule by mouth every morning        docusate sodium (COLACE) 100 mg capsule Take 100 mg by mouth 2 (two) times a day      furosemide (LASIX) 40 mg tablet TAKE 1 TABLET DAILY EVERY MORNING 90 tablet 3    Magnesium Oxide -Mg Supplement 400 MG CAPS Take 1 capsule by mouth every morning        metroNIDAZOLE (METROGEL) 0 75 % gel Apply topically 2 (two) times a day (Patient taking differently: Apply topically 2 (two) times a day as needed ) 90 g 1    potassium chloride (K-DUR) 10 mEq tablet TAKE 1 TABLET DAILY, NEEDS POTASSIUM CHLORIDE ER (WAX) TABLETS 90 tablet 3    predniSONE 10 mg tablet       senna (SENOKOT) 8 6 MG tablet Take 1 tablet by mouth as needed for constipation      FLOVENT  MCG/ACT inhaler USE 2 INHALATIONS TWICE A DAY (RINSE MOUTH AFTER USE) (Patient not taking: No sig reported) 36 g 3    folic acid (FOLVITE) 1 mg tablet daily Except Friday      methotrexate 2 5 mg tablet 3 tablets once a week       No current facility-administered medications for this visit  Objective:    /80   Pulse 82   Temp 97 9 °F (36 6 °C)   Resp 16   Ht 5' 6 5"   Wt 91 6 kg (202 lb)   SpO2 96%   BMI 32 12 kg/m²      Physical Exam   Constitutional: She appears well-developed  HENT:   Head: Normocephalic and atraumatic  Right Ear: External ear normal    Left Ear: External ear normal    Cardiovascular: Normal rate, regular rhythm and normal heart sounds  Exam reveals no friction rub  No murmur heard  Pulmonary/Chest: Effort normal and breath sounds normal  No respiratory distress  She has no wheezes  She has no rales  Musculoskeletal:         General: No deformity  Nursing note and vitals reviewed            Ayana Stock DO

## 2020-08-03 ENCOUNTER — OFFICE VISIT (OUTPATIENT)
Dept: FAMILY MEDICINE CLINIC | Facility: CLINIC | Age: 75
End: 2020-08-03
Payer: MEDICARE

## 2020-08-03 VITALS
OXYGEN SATURATION: 96 % | SYSTOLIC BLOOD PRESSURE: 120 MMHG | TEMPERATURE: 97.9 F | HEART RATE: 82 BPM | WEIGHT: 202 LBS | DIASTOLIC BLOOD PRESSURE: 80 MMHG | BODY MASS INDEX: 31.71 KG/M2 | HEIGHT: 67 IN | RESPIRATION RATE: 16 BRPM

## 2020-08-03 DIAGNOSIS — R73.01 IMPAIRED FASTING GLUCOSE: Primary | ICD-10-CM

## 2020-08-03 DIAGNOSIS — M15.0 PRIMARY GENERALIZED (OSTEO)ARTHRITIS: ICD-10-CM

## 2020-08-03 DIAGNOSIS — R00.2 PALPITATIONS: ICD-10-CM

## 2020-08-03 DIAGNOSIS — N64.52 DISCHARGE FROM LEFT NIPPLE: ICD-10-CM

## 2020-08-03 DIAGNOSIS — Z13.6 SCREENING FOR CARDIOVASCULAR CONDITION: ICD-10-CM

## 2020-08-03 DIAGNOSIS — Z13.0 SCREENING FOR DEFICIENCY ANEMIA: ICD-10-CM

## 2020-08-03 PROCEDURE — 1036F TOBACCO NON-USER: CPT | Performed by: FAMILY MEDICINE

## 2020-08-03 PROCEDURE — 3008F BODY MASS INDEX DOCD: CPT | Performed by: FAMILY MEDICINE

## 2020-08-03 PROCEDURE — 4040F PNEUMOC VAC/ADMIN/RCVD: CPT | Performed by: FAMILY MEDICINE

## 2020-08-03 PROCEDURE — 1160F RVW MEDS BY RX/DR IN RCRD: CPT | Performed by: FAMILY MEDICINE

## 2020-08-03 PROCEDURE — 99214 OFFICE O/P EST MOD 30 MIN: CPT | Performed by: FAMILY MEDICINE

## 2020-08-03 RX ORDER — ACETAMINOPHEN AND CODEINE PHOSPHATE 300; 30 MG/1; MG/1
1 TABLET ORAL EVERY 6 HOURS PRN
Qty: 30 TABLET | Refills: 1 | Status: SHIPPED | OUTPATIENT
Start: 2020-08-03 | End: 2021-03-04 | Stop reason: SDUPTHER

## 2020-08-03 RX ORDER — PREDNISONE 10 MG/1
TABLET ORAL AS NEEDED
COMMUNITY
Start: 2020-07-29 | End: 2022-01-24 | Stop reason: ALTCHOICE

## 2020-08-03 NOTE — ASSESSMENT & PLAN NOTE
Worsening with recent fall  Patient is compliant with pain agreement  New agreement done today and reviewed with patient    Continue Tylenol with codeine as needed

## 2020-08-04 ENCOUNTER — OFFICE VISIT (OUTPATIENT)
Dept: CARDIOLOGY CLINIC | Facility: CLINIC | Age: 75
End: 2020-08-04
Payer: MEDICARE

## 2020-08-04 ENCOUNTER — TELEPHONE (OUTPATIENT)
Dept: CARDIOLOGY CLINIC | Facility: CLINIC | Age: 75
End: 2020-08-04

## 2020-08-04 ENCOUNTER — APPOINTMENT (OUTPATIENT)
Dept: LAB | Facility: CLINIC | Age: 75
End: 2020-08-04
Payer: MEDICARE

## 2020-08-04 VITALS
WEIGHT: 203 LBS | OXYGEN SATURATION: 98 % | DIASTOLIC BLOOD PRESSURE: 78 MMHG | BODY MASS INDEX: 31.86 KG/M2 | HEIGHT: 67 IN | HEART RATE: 65 BPM | TEMPERATURE: 98.5 F | SYSTOLIC BLOOD PRESSURE: 120 MMHG

## 2020-08-04 DIAGNOSIS — R00.2 PALPITATIONS: ICD-10-CM

## 2020-08-04 DIAGNOSIS — N64.52 DISCHARGE FROM LEFT NIPPLE: ICD-10-CM

## 2020-08-04 DIAGNOSIS — E78.2 MIXED HYPERLIPIDEMIA: Primary | ICD-10-CM

## 2020-08-04 DIAGNOSIS — R06.02 EXERTIONAL SHORTNESS OF BREATH: ICD-10-CM

## 2020-08-04 DIAGNOSIS — R73.01 IMPAIRED FASTING GLUCOSE: ICD-10-CM

## 2020-08-04 DIAGNOSIS — Z13.0 SCREENING FOR DEFICIENCY ANEMIA: ICD-10-CM

## 2020-08-04 DIAGNOSIS — Z13.6 SCREENING FOR CARDIOVASCULAR CONDITION: ICD-10-CM

## 2020-08-04 LAB
PROLACTIN SERPL-MCNC: 49.9 NG/ML
TSH SERPL DL<=0.05 MIU/L-ACNC: 2.37 UIU/ML (ref 0.36–3.74)

## 2020-08-04 PROCEDURE — 4040F PNEUMOC VAC/ADMIN/RCVD: CPT | Performed by: INTERNAL MEDICINE

## 2020-08-04 PROCEDURE — 86618 LYME DISEASE ANTIBODY: CPT

## 2020-08-04 PROCEDURE — 93000 ELECTROCARDIOGRAM COMPLETE: CPT | Performed by: INTERNAL MEDICINE

## 2020-08-04 PROCEDURE — 1036F TOBACCO NON-USER: CPT | Performed by: INTERNAL MEDICINE

## 2020-08-04 PROCEDURE — 3008F BODY MASS INDEX DOCD: CPT | Performed by: INTERNAL MEDICINE

## 2020-08-04 PROCEDURE — 84146 ASSAY OF PROLACTIN: CPT

## 2020-08-04 PROCEDURE — 84443 ASSAY THYROID STIM HORMONE: CPT

## 2020-08-04 PROCEDURE — 99214 OFFICE O/P EST MOD 30 MIN: CPT | Performed by: INTERNAL MEDICINE

## 2020-08-04 PROCEDURE — 1160F RVW MEDS BY RX/DR IN RCRD: CPT | Performed by: INTERNAL MEDICINE

## 2020-08-04 PROCEDURE — 36415 COLL VENOUS BLD VENIPUNCTURE: CPT

## 2020-08-04 NOTE — PROGRESS NOTES
Cardiology   Jaki Arias DO, Kathya Guillen MD, Palomo Trejo MD, Triny Becerra MD, [facility] [place]  -------------------------------------------------------------------  [facility]  [address], [address], [address] [place] [address], [facility]  [phone]    Cardiology Follow Up  [facility]  1945  [id number]          Assessment/Plan:    1  Mixed hyperlipidemia    2  Exertional shortness of breath    3  Palpitations      - Discussed palpitations with her in detail  Likely benign cause of palpitations  ECG was reviewed  Rhythm was sinus with normal intervals and axis  She has no features in history which would suggest atrial fibrillation, SVT or other significant arrhythmia as the cause of her palpitations  She has no history of cardiac disease, structural heart disease or family history of SCD/early CAD  Electrolytes were normal on last blood work  No structural heart disease seen on echocardiogram   - 2 week event monitor ordered  - Discussed monitoring for any triggers of palpitations such as stress, coffee, alcohol or lack of sleep  - She should call if any change in nature of palpitations, syncope or near syncope develop  - Followup after testing complete    Discussed diet in detail - she has elevated cholesterol and a history of rheumatoid arthritis which places her at increased risk for future cardiovascular events  1  Reduce saturated fat - butter intake  2  Increase Fruits and Vegetables  3  Reduce meat intake  4  Increase bean intake - (chickpeas, lentils, split beans, kidney beans)  5  Flax Seeds - 2 tablespoons (grounded)  6  Supplements which she may add include: garlic, black cumin and marcela  May also add 1-2 tablespoons of vinegar to diet daily         I have spent 25 minutes with Patient  today in which greater than 50% of this time was spent in counseling/coordination of care regarding Risk factor reductions and Impressions  Interval History:     Darshan Alvares is 76 y o  female here for evaluation of palpitations  The patient notes palpitations which have been present for the past several weeks  She describes them as a skipping, racing heartbeat that occur at various times throughout the day  There is no inciting events  Can occur with activity and at rest   She denies any chest pain, shortness of breath, lightheadedness, syncope or near-syncope when palpitations occur  She has no history of similar palpitations  She was last seen in July 2019  At that time, she had undergone echocardiogram and pulmonary function testing to her evaluation of dyspnea and cough  Cardiac workup was normal and pulmonary function testing was also normal   She was given Tagamet and Allegra which improved her cough but she stopped the medications  She also has history of rheumatoid arthritis and has stopped her rheumatoid arthritis medications as well  She has a history of dyslipidemia with an LDL cholesterol of 143 milligrams/deciliter  This has been stable over the past few years  She denied treatment in the past   Her diet consist of some fruits and vegetables but also processed food, snacks and sugar          Past Medical History:   Diagnosis Date    Anesthesia complication     "shivers"    Arthritis     Asthma     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 see colors, with over-use goes darker- from untreated amblyopia    Breathing difficulty     with extubation pt developed laryngeal spasm    Cataract     both eyes- right eye needs surgery    Colon polyps 2016    Diverticulitis     had episode in April 2018    Edema     lower legs    Generalized osteoarthritis     GERD (gastroesophageal reflux disease)     Hearing problem     unspecified laterality-hearing aid right ear    High cholesterol     Polyarthritis     last assessed: 6/26/2015    Rheumatoid arthritis ([facility] [address] [facility]     [facility]     last assessed: 7/18/2016    SS-A antibody positive     last assessed: 8/20/2015     Social History     Socioeconomic History    Marital status: /Civil Union     Spouse name: Not on file    Number of children: Not on file    Years of education: Not on file    Highest education level: Not on file   Occupational History    Occupation: retired   Social Needs    Financial resource strain: Not on file    Food insecurity     Worry: Not on file     Inability: Not on file   [employer] needs     Medical: Not on file     Non-medical: Not on file   Tobacco Use    Smoking status: Never Smoker    Smokeless tobacco: Never Used   Substance and Sexual Activity    Alcohol use: Yes     Comment: rarely - wine 2 x month    Drug use: No    Sexual activity: Not on file   Lifestyle    Physical activity     Days per week: Not on file     Minutes per session: Not on file    Stress: Not on file   Relationships    Social connections     Talks on phone: Not on file     Gets together: Not on file     Attends Anabaptism service: Not on file     Active member of club or organization: Not on file     Attends meetings of clubs or organizations: Not on file     Relationship status: Not on file    Intimate partner violence     Fear of current or ex partner: Not on file     Emotionally abused: Not on file     Physically abused: Not on file     Forced sexual activity: Not on file   Other Topics Concern    Not on file   Social History Narrative    Lack of adequate sleep-4-6 hrs/night    Lack of exercise      Family History   Problem Relation Age of Onset    Coronary artery disease Mother     COPD Mother     Hypertension Mother     Hyperlipidemia Mother         high cholesterol    Atrial fibrillation Mother     Coronary artery disease Father     Arthritis Father     COPD Father         Black lung    Hypertension Father     Hyperlipidemia Father         high cholesterol    Lung cancer Brother     Coronary artery disease Brother     Hypertension Brother     Hyperlipidemia Brother         high cholesterol    Atrial fibrillation Brother         fib/flutter    Other Sister         pericarditis    Arthritis Sister     Diabetes Maternal Grandmother         mellitus    Breast cancer Maternal Grandmother     Breast cancer Paternal Grandmother     Arthritis Family     Asthma Family     Cancer Brother         lung    Heart disease Brother         CAD-stents-smoker     Past Surgical History:   Procedure Laterality Date    APPENDECTOMY      AUGMENTATION BREAST Bilateral     breast surgery enlargement procedure elective bilateral    BILATERAL OOPHORECTOMY Bilateral 2003    BLADDER SURGERY      bladder lift,cystocele    CATARACT EXTRACTION      Right 9/2018; Left 10/2018    COLONOSCOPY  04/18/2011    COLPORRHAPHY  2014    anterior, repair of cystocele    HYSTERECTOMY  1673    with cystocele     JOINT REPLACEMENT Left 2016    knee    KNEE ARTHROPLASTY Left     KNEE ARTHROSCOPY  2009    KNEE SURGERY Left 2009    torn meniscus    OOPHORECTOMY      OR COLONOSCOPY FLX DX W/COLLJ SPEC WHEN PFRMD N/A 5/31/2018    Procedure: COLONOSCOPY;  Surgeon: Ursula Ricardo MD;  Location: [facility];   Service: Gastroenterology    REDUCTION MAMMAPLASTY      TONSILLECTOMY AND ADENOIDECTOMY      URETHRA SURGERY         Current Outpatient Medications:     acetaminophen-codeine (TYLENOL/CODEINE #3) 300-30 MG per tablet, Take 1 tablet by mouth every 6 (six) hours as needed for moderate pain, Disp: 30 tablet, Rfl: 1    aspirin 325 mg tablet, Take 325 mg by mouth as needed for mild pain May take every other day prn , Disp: , Rfl:     Cholecalciferol (VITAMIN D3) 2000 units capsule, Take 1 capsule by mouth every morning  , Disp: , Rfl:     docusate sodium (COLACE) 100 mg capsule, Take 100 mg by mouth 2 (two) times a day, Disp: , Rfl:     FLOVENT  MCG/ACT inhaler, USE 2 INHALATIONS TWICE A DAY (RINSE MOUTH AFTER USE), Disp: 36 g, Rfl: 3    furosemide (LASIX) 40 mg tablet, TAKE 1 TABLET DAILY EVERY MORNING, Disp: 90 tablet, Rfl: 3    Magnesium Oxide -Mg Supplement 400 MG CAPS, Take 1 capsule by mouth every morning  , Disp: , Rfl:     potassium chloride (K-DUR) 10 mEq tablet, TAKE 1 TABLET DAILY, NEEDS POTASSIUM CHLORIDE ER (WAX) TABLETS, Disp: 90 tablet, Rfl: 3    predniSONE 10 mg tablet, , Disp: , Rfl:     senna (SENOKOT) 8 6 MG tablet, Take 1 tablet by mouth as needed for constipation, Disp: , Rfl:     albuterol (PROVENTIL HFA,VENTOLIN HFA) 90 mcg/act inhaler, Inhale 2 puffs every 6 (six) hours as needed for wheezing or shortness of breath (Patient not taking: Reported on 8/4/2020), Disp: 1 Inhaler, Rfl: 5    folic acid (FOLVITE) 1 mg tablet, daily Except Friday, Disp: , Rfl:     methotrexate 2 5 mg tablet, 3 tablets once a week, Disp: , Rfl:         Review of Systems:  Review of Systems   Constitutional: Negative for chills, fatigue and fever  HENT: Negative for congestion, nosebleeds and postnasal drip  Respiratory: Negative for cough, chest tightness and shortness of breath  Cardiovascular: Positive for palpitations  Negative for chest pain and leg swelling  Gastrointestinal: Negative for abdominal distention, abdominal pain, diarrhea, nausea and vomiting  Endocrine: Negative for polydipsia, polyphagia and polyuria  Musculoskeletal: Negative for gait problem and myalgias  Skin: Negative for color change, pallor and rash  Allergic/Immunologic: Negative for environmental allergies, food allergies and immunocompromised state  Neurological: Negative for dizziness, seizures, syncope and light-headedness  Hematological: Negative for adenopathy  Does not bruise/bleed easily  Psychiatric/Behavioral: Negative for dysphoric mood  The patient is not nervous/anxious            Physical Exam:  Vitals:  Vitals:    08/04/20 1000   BP: 120/78   BP Location: Right arm   Patient Position: Sitting   Cuff Size: Large   Pulse: 65   Temp: 98 5 °F (36 9 °C)   TempSrc: Temporal   SpO2: 98%   Weight: 92 1 kg (203 lb)   Height: 5' 6 5"     Physical Exam   Constitutional: She appears healthy  No distress  Eyes: Pupils are equal, round, and reactive to light  Conjunctivae are normal    Neck: Normal range of motion  Neck supple  No JVD present  Cardiovascular: Normal rate, regular rhythm and normal heart sounds  Exam reveals no gallop and no friction rub  No murmur heard  Pulmonary/Chest: Effort normal and breath sounds normal  She has no wheezes  She has no rales  Abdominal: Soft  She exhibits no distension  There is no abdominal tenderness  Musculoskeletal:         General: No edema  Neurological: She is alert and oriented to person, place, and time  Skin: Skin is warm and dry  Cardiographics:  EKG: Personally reviewed normal sinus rhythm with rate 66 beats per minute  Normal intervals and normal axis  Last known EF:  55 percent    This note was completed in part utilizing [employer] Flu[employer] Direct Software  Grammatical errors, random word insertions, spelling mistakes, and incomplete sentences can be an occasional consequence of this system secondary to software limitations, ambient noise, and hardware issues  If you have any questions or concerns about the content, text, or information contained within the body of this dictation, please contact the provider for clarification

## 2020-08-05 LAB — B BURGDOR IGG+IGM SER-ACNC: <0.91 ISR (ref 0–0.9)

## 2020-09-02 ENCOUNTER — CLINICAL SUPPORT (OUTPATIENT)
Dept: CARDIOLOGY CLINIC | Facility: CLINIC | Age: 75
End: 2020-09-02
Payer: MEDICARE

## 2020-09-02 DIAGNOSIS — R00.2 PALPITATIONS: ICD-10-CM

## 2020-09-02 PROCEDURE — 0298T PR EXT ECG > 48HR TO 21 DAY REVIEW AND INTERPRETATN: CPT | Performed by: INTERNAL MEDICINE

## 2020-09-10 ENCOUNTER — HOSPITAL ENCOUNTER (OUTPATIENT)
Dept: RADIOLOGY | Facility: HOSPITAL | Age: 75
Discharge: HOME | End: 2020-09-10
Payer: MEDICARE

## 2020-09-10 VITALS — HEIGHT: 67 IN | BODY MASS INDEX: 31.86 KG/M2 | WEIGHT: 203 LBS

## 2020-09-10 DIAGNOSIS — N64.52 DISCHARGE FROM LEFT NIPPLE: ICD-10-CM

## 2020-09-10 PROCEDURE — 77065 DX MAMMO INCL CAD UNI: CPT

## 2020-09-10 PROCEDURE — G0279 TOMOSYNTHESIS, MAMMO: HCPCS

## 2020-09-10 PROCEDURE — 76642 ULTRASOUND BREAST LIMITED: CPT

## 2020-09-21 ENCOUNTER — TELEPHONE (OUTPATIENT)
Dept: CARDIOLOGY CLINIC | Facility: CLINIC | Age: 75
End: 2020-09-21

## 2020-09-21 NOTE — TELEPHONE ENCOUNTER
----- Message from Mikel Burden DO sent at 9/20/2020  9:19 AM EDT -----  Patient had a min HR of 48 bpm, max HR of 164 bpm, and avg HR of 72  bpm  Predominant underlying rhythm was Sinus Rhythm  12  Supraventricular Tachycardia runs occurred, the run with the fastest  interval lasting 4 beats with a max rate of 164 bpm, the longest lasting 20  beats with an avg rate of 118 bpm  Isolated SVEs were rare (<1 0%), SVE  Couplets were rare (<1 0%), and SVE Triplets were rare (<1 0%)  Isolated  VEs were rare (<1 0%), and no VE Couplets or VE Triplets were present  Normal extended holter monitoring

## 2020-12-14 ENCOUNTER — TELEMEDICINE (OUTPATIENT)
Dept: FAMILY MEDICINE CLINIC | Facility: CLINIC | Age: 75
End: 2020-12-14
Payer: MEDICARE

## 2020-12-14 DIAGNOSIS — Z20.822 EXPOSURE TO COVID-19 VIRUS: Primary | ICD-10-CM

## 2020-12-14 PROCEDURE — 99213 OFFICE O/P EST LOW 20 MIN: CPT | Performed by: NURSE PRACTITIONER

## 2020-12-15 DIAGNOSIS — Z20.822 EXPOSURE TO COVID-19 VIRUS: ICD-10-CM

## 2020-12-15 PROCEDURE — U0003 INFECTIOUS AGENT DETECTION BY NUCLEIC ACID (DNA OR RNA); SEVERE ACUTE RESPIRATORY SYNDROME CORONAVIRUS 2 (SARS-COV-2) (CORONAVIRUS DISEASE [COVID-19]), AMPLIFIED PROBE TECHNIQUE, MAKING USE OF HIGH THROUGHPUT TECHNOLOGIES AS DESCRIBED BY CMS-2020-01-R: HCPCS | Performed by: NURSE PRACTITIONER

## 2020-12-18 LAB — SARS-COV-2 RNA SPEC QL NAA+PROBE: DETECTED

## 2020-12-19 ENCOUNTER — TELEPHONE (OUTPATIENT)
Dept: FAMILY MEDICINE CLINIC | Facility: CLINIC | Age: 75
End: 2020-12-19
Payer: MEDICARE

## 2020-12-19 DIAGNOSIS — U07.1 COVID-19: Primary | ICD-10-CM

## 2020-12-19 PROCEDURE — G2012 BRIEF CHECK IN BY MD/QHP: HCPCS | Performed by: INTERNAL MEDICINE

## 2020-12-19 RX ORDER — ALBUTEROL SULFATE 90 UG/1
3 AEROSOL, METERED RESPIRATORY (INHALATION) ONCE AS NEEDED
Status: CANCELLED | OUTPATIENT
Start: 2020-12-19

## 2020-12-19 RX ORDER — HYDROCODONE POLISTIREX AND CHLORPHENIRAMINE POLISTIREX 10; 8 MG/5ML; MG/5ML
5 SUSPENSION, EXTENDED RELEASE ORAL EVERY 12 HOURS PRN
Qty: 120 ML | Refills: 0 | Status: SHIPPED | OUTPATIENT
Start: 2020-12-19 | End: 2021-02-03 | Stop reason: ALTCHOICE

## 2020-12-19 RX ORDER — SODIUM CHLORIDE 9 MG/ML
20 INJECTION, SOLUTION INTRAVENOUS ONCE
Status: CANCELLED | OUTPATIENT
Start: 2020-12-19

## 2020-12-19 RX ORDER — ACETAMINOPHEN 325 MG/1
650 TABLET ORAL ONCE AS NEEDED
Status: CANCELLED | OUTPATIENT
Start: 2020-12-19

## 2020-12-22 ENCOUNTER — HOSPITAL ENCOUNTER (OUTPATIENT)
Dept: INFUSION CENTER | Facility: HOSPITAL | Age: 75
Discharge: HOME | End: 2020-12-22
Attending: INTERNAL MEDICINE
Payer: MEDICARE

## 2020-12-22 VITALS
WEIGHT: 194 LBS | HEIGHT: 66 IN | HEART RATE: 77 BPM | SYSTOLIC BLOOD PRESSURE: 148 MMHG | RESPIRATION RATE: 18 BRPM | TEMPERATURE: 98 F | DIASTOLIC BLOOD PRESSURE: 74 MMHG | BODY MASS INDEX: 31.18 KG/M2 | OXYGEN SATURATION: 95 %

## 2020-12-22 DIAGNOSIS — U07.1 COVID-19: Primary | ICD-10-CM

## 2020-12-22 PROCEDURE — M0239 BAMLANIVIMAB-XXXX INFUSION: HCPCS | Performed by: INTERNAL MEDICINE

## 2020-12-22 RX ORDER — SODIUM CHLORIDE 9 MG/ML
20 INJECTION, SOLUTION INTRAVENOUS ONCE
Status: CANCELLED | OUTPATIENT
Start: 2020-12-22

## 2020-12-22 RX ORDER — ACETAMINOPHEN 325 MG/1
650 TABLET ORAL ONCE AS NEEDED
Status: DISCONTINUED | OUTPATIENT
Start: 2020-12-22 | End: 2020-12-25 | Stop reason: HOSPADM

## 2020-12-22 RX ORDER — SODIUM CHLORIDE 9 MG/ML
20 INJECTION, SOLUTION INTRAVENOUS ONCE
Status: COMPLETED | OUTPATIENT
Start: 2020-12-22 | End: 2020-12-22

## 2020-12-22 RX ORDER — ALBUTEROL SULFATE 90 UG/1
3 AEROSOL, METERED RESPIRATORY (INHALATION) ONCE AS NEEDED
Status: CANCELLED | OUTPATIENT
Start: 2020-12-22

## 2020-12-22 RX ORDER — ALBUTEROL SULFATE 90 UG/1
3 AEROSOL, METERED RESPIRATORY (INHALATION) ONCE AS NEEDED
Status: DISCONTINUED | OUTPATIENT
Start: 2020-12-22 | End: 2020-12-25 | Stop reason: HOSPADM

## 2020-12-22 RX ORDER — ACETAMINOPHEN 325 MG/1
650 TABLET ORAL ONCE AS NEEDED
Status: CANCELLED | OUTPATIENT
Start: 2020-12-22

## 2020-12-22 RX ADMIN — SODIUM CHLORIDE 700 MG: 9 INJECTION, SOLUTION INTRAVENOUS at 09:32

## 2020-12-22 RX ADMIN — SODIUM CHLORIDE 20 ML/HR: 9 INJECTION, SOLUTION INTRAVENOUS at 08:51

## 2020-12-29 ENCOUNTER — TELEMEDICINE (OUTPATIENT)
Dept: FAMILY MEDICINE CLINIC | Facility: CLINIC | Age: 75
End: 2020-12-29
Payer: MEDICARE

## 2020-12-29 ENCOUNTER — APPOINTMENT (OUTPATIENT)
Dept: RADIOLOGY | Facility: CLINIC | Age: 75
End: 2020-12-29
Payer: MEDICARE

## 2020-12-29 VITALS — WEIGHT: 192 LBS | HEIGHT: 66 IN | BODY MASS INDEX: 30.86 KG/M2 | TEMPERATURE: 98.2 F

## 2020-12-29 DIAGNOSIS — R05.9 COUGH: ICD-10-CM

## 2020-12-29 DIAGNOSIS — U07.1 COVID-19: Primary | ICD-10-CM

## 2020-12-29 DIAGNOSIS — U07.1 COVID-19: ICD-10-CM

## 2020-12-29 PROCEDURE — 71046 X-RAY EXAM CHEST 2 VIEWS: CPT

## 2020-12-29 PROCEDURE — 99213 OFFICE O/P EST LOW 20 MIN: CPT | Performed by: FAMILY MEDICINE

## 2020-12-29 RX ORDER — AZITHROMYCIN 250 MG/1
TABLET, FILM COATED ORAL
Qty: 6 TABLET | Refills: 0 | Status: SHIPPED | OUTPATIENT
Start: 2020-12-29 | End: 2020-12-29

## 2020-12-29 RX ORDER — AZITHROMYCIN 250 MG/1
TABLET, FILM COATED ORAL
Qty: 6 TABLET | Refills: 0 | Status: SHIPPED | OUTPATIENT
Start: 2020-12-29 | End: 2021-01-03

## 2021-01-04 ENCOUNTER — TELEPHONE (OUTPATIENT)
Dept: FAMILY MEDICINE CLINIC | Facility: CLINIC | Age: 76
End: 2021-01-04

## 2021-01-04 NOTE — TELEPHONE ENCOUNTER
Violette Marr was feeling better but now has a more productive cough  She finished her antibiotics but cough is not going away  She would like to know if she can get another round of antibiotics?

## 2021-01-04 NOTE — TELEPHONE ENCOUNTER
1/4/2021 12:28 PM returned call to [facility]  The zithromax lasts in her system for 10 days  It is too soon for another antibiotic  Left message on her voicemail to call the office     Camelia Guerrero DO

## 2021-01-12 ENCOUNTER — OFFICE VISIT (OUTPATIENT)
Dept: CARDIOLOGY CLINIC | Facility: CLINIC | Age: 76
End: 2021-01-12
Payer: MEDICARE

## 2021-01-12 VITALS
HEIGHT: 66 IN | HEART RATE: 79 BPM | DIASTOLIC BLOOD PRESSURE: 78 MMHG | TEMPERATURE: 99 F | WEIGHT: 199 LBS | BODY MASS INDEX: 31.98 KG/M2 | SYSTOLIC BLOOD PRESSURE: 118 MMHG | OXYGEN SATURATION: 96 %

## 2021-01-12 DIAGNOSIS — R06.02 EXERTIONAL SHORTNESS OF BREATH: ICD-10-CM

## 2021-01-12 DIAGNOSIS — E78.2 MIXED HYPERLIPIDEMIA: ICD-10-CM

## 2021-01-12 DIAGNOSIS — R00.2 PALPITATIONS: Primary | ICD-10-CM

## 2021-01-12 PROCEDURE — 93000 ELECTROCARDIOGRAM COMPLETE: CPT | Performed by: INTERNAL MEDICINE

## 2021-01-12 PROCEDURE — 99214 OFFICE O/P EST MOD 30 MIN: CPT | Performed by: INTERNAL MEDICINE

## 2021-01-12 NOTE — PROGRESS NOTES
Cardiology   Alcira Rangel DO, Suleiman Enriquez MD, Asad Garzon MD, Yasmin Cruz MD, [facility] - [place]  -------------------------------------------------------------------  [facility]  [address], [address], [address] [place] [address], [facility]  [phone]    Cardiology Follow Up  Jamal Solorio  1945  [id number]          Assessment/Plan:    1  Palpitations    2  Exertional shortness of breath      - her palpitations have resolved  Previous workup was negative  Two week monitor had shown rare episodes of SVT which did not correspond to her palpitations  Echocardiogram showed a structurally normal heart  - She should call if any change in nature of palpitations, syncope or near syncope develop  - discussed diet and exercise  She will follow-up for repeat cholesterol testing with Dr Willem Hayward  - Follow up in 1 year  I have spent 25 minutes with Patient  today in which greater than 50% of this time was spent in counseling/coordination of care regarding Risk factor reductions and Impressions  Interval History:     Jamal Solorio is 76 y o  female here for follow up of palpitations  Recently, had COVID infection that has resolved  She has not had any episodes of palpitations recently  She had shortness of breath secondary to COVID that is improving  Previously, she was having palpitations which she described as  skipping, racing heartbeat that occur at various times throughout the day  There is no inciting events  Can occur with activity and at rest   She denies any chest pain, shortness of breath, lightheadedness, syncope or near-syncope when palpitations occur  In July 2019 she had undergone echocardiogram and pulmonary function testing to her evaluation of dyspnea and cough    Cardiac workup was normal and pulmonary function testing was also normal   She was given Tagamet and Allegra which improved her cough but she stopped the medications  She also has history of rheumatoid arthritis and has stopped her rheumatoid arthritis medications as well  She has a history of dyslipidemia with an LDL cholesterol of 143 milligrams/deciliter  This has been stable over the past few years  She denied treatment in the past   Her diet consist of some fruits and vegetables but also processed food, snacks and sugar          Past Medical History:   Diagnosis Date    Anesthesia complication     "shivers"    Arthritis     Asthma     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 see colors, with over-use goes darker- from untreated amblyopia    Breathing difficulty     with extubation pt developed laryngeal spasm    Cataract     both eyes- right eye needs surgery    Colon polyps 2016    Diverticulitis     had episode in April 2018    Edema     lower legs    Generalized osteoarthritis     GERD (gastroesophageal reflux disease)     Hearing problem     unspecified laterality-hearing aid right ear    High cholesterol     Polyarthritis     last assessed: 6/26/2015    Rheumatoid arthritis ([address] )     Sjogren's syndrome ([address] )     last assessed: 7/18/2016    SS-A antibody positive     last assessed: 8/20/2015     Social History     Socioeconomic History    Marital status: /Civil Union     Spouse name: Not on file    Number of children: Not on file    Years of education: Not on file    Highest education level: Not on file   Occupational History    Occupation: retired   Social Needs    Financial resource strain: Not on file    Food insecurity     Worry: Not on file     Inability: Not on file   [employer] needs     Medical: Not on file     Non-medical: Not on file   Tobacco Use    Smoking status: Never Smoker    Smokeless tobacco: Never Used   Substance and Sexual Activity    Alcohol use: Not Currently     Comment: rarely - wine 2 x month    Drug use: No    Sexual activity: Not on file   Lifestyle    Physical activity     Days per week: Not on file     Minutes per session: Not on file    Stress: Not on file   Relationships    Social connections     Talks on phone: Not on file     Gets together: Not on file     Attends Congregation service: Not on file     Active member of club or organization: Not on file     Attends meetings of clubs or organizations: Not on file     Relationship status: Not on file    Intimate partner violence     Fear of current or ex partner: Not on file     Emotionally abused: Not on file     Physically abused: Not on file     Forced sexual activity: Not on file   Other Topics Concern    Not on file   Social History Narrative    Lack of adequate sleep-4-6 hrs/night    Lack of exercise      Family History   Problem Relation Age of Onset    Coronary artery disease Mother     COPD Mother     Hypertension Mother     Hyperlipidemia Mother         high cholesterol    Atrial fibrillation Mother     Coronary artery disease Father     Arthritis Father     COPD Father         Black lung    Hypertension Father     Hyperlipidemia Father         high cholesterol    Lung cancer Brother     Coronary artery disease Brother     Hypertension Brother     Hyperlipidemia Brother         high cholesterol    Atrial fibrillation Brother         fib/flutter    Other Sister         pericarditis    Arthritis Sister     Diabetes Maternal Grandmother         mellitus    Breast cancer Maternal Grandmother 79    Breast cancer Paternal Grandmother 79    Arthritis Family     Asthma Family     Cancer Brother         lung    Heart disease Brother         CAD-stents-smoker     Past Surgical History:   Procedure Laterality Date    APPENDECTOMY      AUGMENTATION BREAST Bilateral     breast surgery enlargement procedure elective bilateral    BILATERAL OOPHORECTOMY Bilateral 2003    BLADDER SURGERY      bladder lift,cystocele    CATARACT EXTRACTION      Right 9/2018; Left 10/2018    COLONOSCOPY  04/18/2011    COLPORRHAPHY  2014    anterior, repair of cystocele    HYSTERECTOMY  1673    with cystocele     JOINT REPLACEMENT Left 2016    knee    KNEE ARTHROPLASTY Left     KNEE ARTHROSCOPY  2009    KNEE SURGERY Left 2009    torn meniscus    OOPHORECTOMY      OR COLONOSCOPY FLX DX W/COLLJ SPEC WHEN PFRMD N/A 5/31/2018    Procedure: COLONOSCOPY;  Surgeon: Susy Houston MD;  Location: [facility];   Service: Gastroenterology    REDUCTION MAMMAPLASTY  2000    TONSILLECTOMY AND ADENOIDECTOMY      URETHRA SURGERY         Current Outpatient Medications:     Cholecalciferol (VITAMIN D3) 2000 units capsule, Take 1 capsule by mouth every morning  , Disp: , Rfl:     docusate sodium (COLACE) 100 mg capsule, Take 100 mg by mouth 2 (two) times a day, Disp: , Rfl:     FLOVENT  MCG/ACT inhaler, USE 2 INHALATIONS TWICE A DAY (RINSE MOUTH AFTER USE), Disp: 36 g, Rfl: 3    furosemide (LASIX) 40 mg tablet, TAKE 1 TABLET DAILY EVERY MORNING, Disp: 90 tablet, Rfl: 3    Magnesium Oxide -Mg Supplement 400 MG CAPS, Take 1 capsule by mouth every morning  , Disp: , Rfl:     potassium chloride (K-DUR) 10 mEq tablet, TAKE 1 TABLET DAILY, NEEDS POTASSIUM CHLORIDE ER (WAX) TABLETS, Disp: 90 tablet, Rfl: 3    senna (SENOKOT) 8 6 MG tablet, Take 1 tablet by mouth as needed for constipation, Disp: , Rfl:     acetaminophen-codeine (TYLENOL/CODEINE #3) 300-30 MG per tablet, Take 1 tablet by mouth every 6 (six) hours as needed for moderate pain (Patient not taking: Reported on 1/12/2021), Disp: 30 tablet, Rfl: 1    albuterol (PROVENTIL HFA,VENTOLIN HFA) 90 mcg/act inhaler, Inhale 2 puffs every 6 (six) hours as needed for wheezing or shortness of breath (Patient not taking: Reported on 1/12/2021), Disp: 1 Inhaler, Rfl: 5    aspirin 325 mg tablet, Take 325 mg by mouth as needed for mild pain May take every other day prn , Disp: , Rfl:     folic acid (FOLVITE) 1 mg tablet, daily Except Friday, Disp: , Rfl:     hydrocodone-chlorpheniramine polistirex ([address]) 10-8 mg/5 mL ER suspension, Take 5 mL by mouth every 12 (twelve) hours as needed for coughMax Daily Amount: 10 mL (Patient not taking: Reported on 1/12/2021), Disp: 120 mL, Rfl: 0    methotrexate 2 5 mg tablet, 3 tablets once a week, Disp: , Rfl:     predniSONE 10 mg tablet, as needed , Disp: , Rfl:         Review of Systems:  Review of Systems   Constitutional: Positive for fatigue  Respiratory: Positive for cough and shortness of breath  Cardiovascular: Positive for palpitations  Musculoskeletal: Positive for arthralgias and myalgias  All other systems reviewed and are negative  Physical Exam:  Vitals:  Vitals:    01/12/21 0843   BP: 118/78   BP Location: Left arm   Patient Position: Sitting   Cuff Size: Standard   Pulse: 79   Temp: 99 °F (37 2 °C)   TempSrc: Temporal   SpO2: 96%   Weight: 90 3 kg (199 lb)   Height: 5' 6" (1 676 m)     Physical Exam   Constitutional: She appears healthy  No distress  Eyes: Pupils are equal, round, and reactive to light  Conjunctivae are normal    Musculoskeletal:         General: No deformity or edema  Neurological: She is alert and oriented to person, place, and time  Skin: No rash noted  Cardiographics:  EKG: Personally reviewed normal sinus rhythm with rate 80 beats per minute  Normal intervals and normal axis  Last known EF:  55 percent    This note was completed in part utilizing M-Modal Fluency Direct Software  Grammatical errors, random word insertions, spelling mistakes, and incomplete sentences can be an occasional consequence of this system secondary to software limitations, ambient noise, and hardware issues  If you have any questions or concerns about the content, text, or information contained within the body of this dictation, please contact the provider for clarification

## 2021-01-20 DIAGNOSIS — Z23 ENCOUNTER FOR IMMUNIZATION: ICD-10-CM

## 2021-01-25 ENCOUNTER — TRANSCRIBE ORDERS (OUTPATIENT)
Dept: LAB | Facility: CLINIC | Age: 76
End: 2021-01-25

## 2021-01-25 ENCOUNTER — LAB (OUTPATIENT)
Dept: LAB | Facility: CLINIC | Age: 76
End: 2021-01-25
Payer: MEDICARE

## 2021-01-25 DIAGNOSIS — R73.01 IMPAIRED FASTING GLUCOSE: Primary | ICD-10-CM

## 2021-01-25 DIAGNOSIS — Z13.6 SCREENING FOR ISCHEMIC HEART DISEASE: ICD-10-CM

## 2021-01-25 LAB
ALBUMIN SERPL BCP-MCNC: 3.7 G/DL (ref 3.5–5)
ALP SERPL-CCNC: 71 U/L (ref 46–116)
ALT SERPL W P-5'-P-CCNC: 19 U/L (ref 12–78)
ANION GAP SERPL CALCULATED.3IONS-SCNC: 5 MMOL/L (ref 4–13)
AST SERPL W P-5'-P-CCNC: 15 U/L (ref 5–45)
BILIRUB SERPL-MCNC: 0.98 MG/DL (ref 0.2–1)
BUN SERPL-MCNC: 16 MG/DL (ref 5–25)
CALCIUM SERPL-MCNC: 9.7 MG/DL (ref 8.3–10.1)
CHLORIDE SERPL-SCNC: 108 MMOL/L (ref 100–108)
CHOLEST SERPL-MCNC: 194 MG/DL (ref 50–200)
CO2 SERPL-SCNC: 30 MMOL/L (ref 21–32)
CREAT SERPL-MCNC: 0.87 MG/DL (ref 0.6–1.3)
ERYTHROCYTE [DISTWIDTH] IN BLOOD BY AUTOMATED COUNT: 14 % (ref 11.6–15.1)
GFR SERPL CREATININE-BSD FRML MDRD: 65 ML/MIN/1.73SQ M
GLUCOSE P FAST SERPL-MCNC: 87 MG/DL (ref 65–99)
HCT VFR BLD AUTO: 41.6 % (ref 34.8–46.1)
HDLC SERPL-MCNC: 43 MG/DL
HGB BLD-MCNC: 13.3 G/DL (ref 11.5–15.4)
LDLC SERPL CALC-MCNC: 119 MG/DL (ref 0–100)
MCH RBC QN AUTO: 28.7 PG (ref 26.8–34.3)
MCHC RBC AUTO-ENTMCNC: 32 G/DL (ref 31.4–37.4)
MCV RBC AUTO: 90 FL (ref 82–98)
PLATELET # BLD AUTO: 229 THOUSANDS/UL (ref 149–390)
PMV BLD AUTO: 10.9 FL (ref 8.9–12.7)
POTASSIUM SERPL-SCNC: 3.3 MMOL/L (ref 3.5–5.3)
PROT SERPL-MCNC: 7.1 G/DL (ref 6.4–8.2)
RBC # BLD AUTO: 4.63 MILLION/UL (ref 3.81–5.12)
SODIUM SERPL-SCNC: 143 MMOL/L (ref 136–145)
TRIGL SERPL-MCNC: 158 MG/DL
WBC # BLD AUTO: 4.89 THOUSAND/UL (ref 4.31–10.16)

## 2021-01-25 PROCEDURE — 36415 COLL VENOUS BLD VENIPUNCTURE: CPT

## 2021-01-25 PROCEDURE — 80053 COMPREHEN METABOLIC PANEL: CPT

## 2021-01-25 PROCEDURE — 85027 COMPLETE CBC AUTOMATED: CPT

## 2021-01-25 PROCEDURE — 80061 LIPID PANEL: CPT

## 2021-01-30 ENCOUNTER — OFFICE VISIT (OUTPATIENT)
Dept: URGENT CARE | Facility: CLINIC | Age: 76
End: 2021-01-30
Payer: MEDICARE

## 2021-01-30 VITALS
WEIGHT: 192 LBS | BODY MASS INDEX: 30.86 KG/M2 | OXYGEN SATURATION: 97 % | HEIGHT: 66 IN | TEMPERATURE: 97 F | HEART RATE: 86 BPM | DIASTOLIC BLOOD PRESSURE: 81 MMHG | SYSTOLIC BLOOD PRESSURE: 141 MMHG | RESPIRATION RATE: 16 BRPM

## 2021-01-30 DIAGNOSIS — R39.9 UTI SYMPTOMS: Primary | ICD-10-CM

## 2021-01-30 LAB
SL AMB  POCT GLUCOSE, UA: ABNORMAL
SL AMB LEUKOCYTE ESTERASE,UA: ABNORMAL
SL AMB POCT BILIRUBIN,UA: ABNORMAL
SL AMB POCT BLOOD,UA: ABNORMAL
SL AMB POCT CLARITY,UA: ABNORMAL
SL AMB POCT COLOR,UA: YELLOW
SL AMB POCT KETONES,UA: ABNORMAL
SL AMB POCT NITRITE,UA: ABNORMAL
SL AMB POCT PH,UA: 5
SL AMB POCT SPECIFIC GRAVITY,UA: 1.02
SL AMB POCT URINE PROTEIN: 300
SL AMB POCT UROBILINOGEN: 0.2

## 2021-01-30 PROCEDURE — 87186 SC STD MICRODIL/AGAR DIL: CPT | Performed by: FAMILY MEDICINE

## 2021-01-30 PROCEDURE — 81002 URINALYSIS NONAUTO W/O SCOPE: CPT | Performed by: FAMILY MEDICINE

## 2021-01-30 PROCEDURE — 99213 OFFICE O/P EST LOW 20 MIN: CPT | Performed by: FAMILY MEDICINE

## 2021-01-30 PROCEDURE — 87077 CULTURE AEROBIC IDENTIFY: CPT | Performed by: FAMILY MEDICINE

## 2021-01-30 PROCEDURE — 87086 URINE CULTURE/COLONY COUNT: CPT | Performed by: FAMILY MEDICINE

## 2021-01-30 RX ORDER — NITROFURANTOIN 25; 75 MG/1; MG/1
100 CAPSULE ORAL 2 TIMES DAILY
Qty: 10 CAPSULE | Refills: 0 | Status: SHIPPED | OUTPATIENT
Start: 2021-01-30 | End: 2021-05-07

## 2021-01-30 NOTE — PROGRESS NOTES
[facility]        NAME: Sharifa Graf is a 76 y o  female  : 1945    MRN: [id number]  DATE: 2021  TIME: 9:15 AM    Assessment and Plan   UTI symptoms [R39 9]  1  UTI symptoms  POCT urine dip    Urine culture    nitrofurantoin (MACROBID) 100 mg capsule         Patient Instructions     Patient Instructions   1  UTI symptoms  - urine dip performed in office shows blood, leukocytes, and positive nitrites   - urine sample sent for culture testing, patient is to call the office in 3 days to follow up culture results  - Macrobid x 5 days prescribed, complete as directed   - drink plenty of fluids  - follow up w/ pcp for re-check in 5-7 days   - if symptoms persist despite treatment, worsen, or any new symptoms present, should be seen in the ER       Follow up with PCP in 5-7 days  Proceed to  ER if symptoms worsen  Chief Complaint     Chief Complaint   Patient presents with    Possible UTI         History of Present Illness       77 yo female presents for a possible UTI  She is experiencing pelvic pressure and dysuria over the past week  She states her urine looks very cloudy  She took an at home OTC test for UTI which she states was positive  She denies seeing any blood in the urine  No fever/chills  No abdominal pain  No flank pain  She feels nausea, but no vomiting or diarrhea  No vaginal bleeding or discharge  She has taken Tylenol for the symptoms  She states she has a history of recurrent UTIs in the past for which she has seen urology  Review of Systems   Review of Systems   Constitutional: Negative  Respiratory: Negative  Cardiovascular: Negative  Gastrointestinal: Negative  Genitourinary:        As noted in HPI   Musculoskeletal: Negative  Skin: Negative  Hematological: Negative            Current Medications       Current Outpatient Medications:     acetaminophen-codeine (TYLENOL/CODEINE #3) 300-30 MG per tablet, Take 1 tablet by mouth every 6 (six) hours as needed for moderate pain (Patient not taking: Reported on 1/12/2021), Disp: 30 tablet, Rfl: 1    albuterol (PROVENTIL HFA,VENTOLIN HFA) 90 mcg/act inhaler, Inhale 2 puffs every 6 (six) hours as needed for wheezing or shortness of breath (Patient not taking: Reported on 1/12/2021), Disp: 1 Inhaler, Rfl: 5    aspirin 325 mg tablet, Take 325 mg by mouth as needed for mild pain May take every other day prn , Disp: , Rfl:     Cholecalciferol (VITAMIN D3) 2000 units capsule, Take 1 capsule by mouth every morning  , Disp: , Rfl:     docusate sodium (COLACE) 100 mg capsule, Take 100 mg by mouth 2 (two) times a day, Disp: , Rfl:     FLOVENT  MCG/ACT inhaler, USE 2 INHALATIONS TWICE A DAY (RINSE MOUTH AFTER USE), Disp: 36 g, Rfl: 3    folic acid (FOLVITE) 1 mg tablet, daily Except Friday, Disp: , Rfl:     furosemide (LASIX) 40 mg tablet, TAKE 1 TABLET DAILY EVERY MORNING, Disp: 90 tablet, Rfl: 3    hydrocodone-chlorpheniramine polistirex (TUSSIONEX) 10-8 mg/5 mL ER suspension, Take 5 mL by mouth every 12 (twelve) hours as needed for coughMax Daily Amount: 10 mL (Patient not taking: Reported on 1/12/2021), Disp: 120 mL, Rfl: 0    Magnesium Oxide -Mg Supplement 400 MG CAPS, Take 1 capsule by mouth every morning  , Disp: , Rfl:     methotrexate 2 5 mg tablet, 3 tablets once a week, Disp: , Rfl:     nitrofurantoin (MACROBID) 100 mg capsule, Take 1 capsule (100 mg total) by mouth 2 (two) times a day, Disp: 10 capsule, Rfl: 0    potassium chloride (K-DUR) 10 mEq tablet, TAKE 1 TABLET DAILY, NEEDS POTASSIUM CHLORIDE ER (WAX) TABLETS, Disp: 90 tablet, Rfl: 3    predniSONE 10 mg tablet, as needed , Disp: , Rfl:     senna (SENOKOT) 8 6 MG tablet, Take 1 tablet by mouth as needed for constipation, Disp: , Rfl:     Current Allergies     Allergies as of 01/30/2021 - Reviewed 01/30/2021   Allergen Reaction Noted    Celecoxib Other (See Comments) 06/25/2015            The following portions of the patient's history were reviewed and updated as appropriate: allergies, current medications, past family history, past medical history, past social history, past surgical history and problem list      Past Medical History:   Diagnosis Date    Anesthesia complication     "shivers"    Arthritis     Asthma     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 see colors, with over-use goes darker- from untreated amblyopia    Breathing difficulty     with extubation pt developed laryngeal spasm    Cataract     both eyes- right eye needs surgery    Colon polyps 2016    Diverticulitis     had episode in April 2018    Edema     lower legs    Generalized osteoarthritis     GERD (gastroesophageal reflux disease)     Hearing problem     unspecified laterality-hearing aid right ear    High cholesterol     Polyarthritis     last assessed: 6/26/2015    Rheumatoid arthritis ([facility] )     Sjogren's syndrome ([facility] )     last assessed: 7/18/2016    SS-A antibody positive     last assessed: 8/20/2015       Past Surgical History:   Procedure Laterality Date    APPENDECTOMY      AUGMENTATION BREAST Bilateral     breast surgery enlargement procedure elective bilateral    BILATERAL OOPHORECTOMY Bilateral 2003    BLADDER SURGERY      bladder lift,cystocele    CATARACT EXTRACTION      Right 9/2018; Left 10/2018    COLONOSCOPY  04/18/2011    COLPORRHAPHY  2014    anterior, repair of cystocele    HYSTERECTOMY  1673    with cystocele     JOINT REPLACEMENT Left 2016    knee    KNEE ARTHROPLASTY Left     KNEE ARTHROSCOPY  2009    KNEE SURGERY Left 2009    torn meniscus    OOPHORECTOMY      AL COLONOSCOPY FLX DX W/COLLJ SPEC WHEN PFRMD N/A 5/31/2018    Procedure: COLONOSCOPY;  Surgeon: David Ferguson MD;  Location: [facility];   Service: Gastroenterology    REDUCTION MAMMAPLASTY  2000    TONSILLECTOMY AND ADENOIDECTOMY      URETHRA SURGERY         Family History   Problem Relation Age of Onset    Coronary artery disease Mother     COPD Mother    [address] Hypertension Mother     Hyperlipidemia Mother         high cholesterol    Atrial fibrillation Mother     Coronary artery disease Father     Arthritis Father     COPD Father         Black lung    Hypertension Father     Hyperlipidemia Father         high cholesterol    Lung cancer Brother     Coronary artery disease Brother     Hypertension Brother     Hyperlipidemia Brother         high cholesterol    Atrial fibrillation Brother         fib/flutter    Other Sister         pericarditis    Arthritis Sister     Diabetes Maternal Grandmother         mellitus    Breast cancer Maternal Grandmother 79    Breast cancer Paternal Grandmother 79    Arthritis Family     Asthma Family     Cancer Brother         lung    Heart disease Brother         CAD-stents-smoker         Medications have been verified  Objective   /81   Pulse 86   Temp (!) 97 °F (36 1 °C)   Resp 16   Ht 5' 6" (1 676 m)   Wt 87 1 kg (192 lb)   SpO2 97%   BMI 30 99 kg/m²   No LMP recorded  Patient has had a hysterectomy  Physical Exam     Physical Exam  Vitals signs and nursing note reviewed  Constitutional:       General: She is awake  She is not in acute distress  Appearance: Normal appearance  She is well-developed, well-groomed and normal weight  She is not ill-appearing, toxic-appearing or diaphoretic  Cardiovascular:      Rate and Rhythm: Normal rate and regular rhythm  Pulses: Normal pulses  Heart sounds: Normal heart sounds  Pulmonary:      Effort: Pulmonary effort is normal  No tachypnea, accessory muscle usage or respiratory distress  Breath sounds: Normal breath sounds and air entry  Abdominal:      General: Abdomen is flat  There is no distension  Palpations: Abdomen is soft  Tenderness: There is no abdominal tenderness  There is no right CVA tenderness, left CVA tenderness, guarding or rebound  Skin:     General: Skin is warm and dry        Coloration: Skin is not pale    Neurological:      Mental Status: She is alert and oriented to person, place, and time  Mental status is at baseline  Psychiatric:         Attention and Perception: Attention and perception normal          Mood and Affect: Mood and affect normal          Speech: Speech normal          Behavior: Behavior normal  Behavior is cooperative  Thought Content:  Thought content normal          Cognition and Memory: Cognition and memory normal          Judgment: Judgment normal

## 2021-01-30 NOTE — PATIENT INSTRUCTIONS
1  UTI symptoms  - urine dip performed in office shows blood, leukocytes, and positive nitrites   - urine sample sent for culture testing, patient is to call the office in 3 days to follow up culture results     - Macrobid x 5 days prescribed, complete as directed   - drink plenty of fluids  - follow up w/ pcp for re-check in 5-7 days   - if symptoms persist despite treatment, worsen, or any new symptoms present, should be seen in the ER

## 2021-02-01 LAB — BACTERIA UR CULT: ABNORMAL

## 2021-02-03 ENCOUNTER — OFFICE VISIT (OUTPATIENT)
Dept: FAMILY MEDICINE CLINIC | Facility: CLINIC | Age: 76
End: 2021-02-03
Payer: MEDICARE

## 2021-02-03 VITALS
DIASTOLIC BLOOD PRESSURE: 70 MMHG | TEMPERATURE: 97 F | WEIGHT: 193 LBS | BODY MASS INDEX: 31.02 KG/M2 | SYSTOLIC BLOOD PRESSURE: 118 MMHG | HEIGHT: 66 IN | HEART RATE: 76 BPM | RESPIRATION RATE: 16 BRPM

## 2021-02-03 DIAGNOSIS — Z13.0 SCREENING FOR DEFICIENCY ANEMIA: ICD-10-CM

## 2021-02-03 DIAGNOSIS — E78.2 MIXED HYPERLIPIDEMIA: ICD-10-CM

## 2021-02-03 DIAGNOSIS — M35.00 SJOGREN'S SYNDROME, WITH UNSPECIFIED ORGAN INVOLVEMENT (HCC): ICD-10-CM

## 2021-02-03 DIAGNOSIS — E87.6 HYPOKALEMIA: ICD-10-CM

## 2021-02-03 DIAGNOSIS — R73.01 IMPAIRED FASTING GLUCOSE: ICD-10-CM

## 2021-02-03 DIAGNOSIS — Z00.00 MEDICARE ANNUAL WELLNESS VISIT, SUBSEQUENT: Primary | ICD-10-CM

## 2021-02-03 PROCEDURE — G0439 PPPS, SUBSEQ VISIT: HCPCS | Performed by: FAMILY MEDICINE

## 2021-02-03 PROCEDURE — 1123F ACP DISCUSS/DSCN MKR DOCD: CPT | Performed by: FAMILY MEDICINE

## 2021-02-03 NOTE — PROGRESS NOTES
Assessment and Plan:     Problem List Items Addressed This Visit     Hyperlipidemia    Relevant Orders    Lipid Panel with Direct LDL reflex    Impaired fasting glucose    Relevant Orders    Hemoglobin A1C    Sjogren's syndrome ([address] )     stable           Other Visit Diagnoses     Medicare annual wellness visit, subsequent    -  Primary    Hypokalemia        she had skipped the potassium for a few days due to indigestion, but is now back on it  Relevant Orders    Comprehensive metabolic panel    Screening for deficiency anemia        Relevant Orders    CBC        BMI Counseling: Body mass index is 31 15 kg/m²  The BMI is above normal  Exercise recommendations include exercising 3-5 times per week  Preventive health issues were discussed with patient, and age appropriate screening tests were ordered as noted in patient's After Visit Summary  Personalized health advice and appropriate referrals for health education or preventive services given if needed, as noted in patient's After Visit Summary       History of Present Illness:     Patient presents for Medicare Annual Wellness visit    She is occasionally taking her tylenol #3 for arthritis pain     Patient Care Team:  Elvis Patrick DO as PCP - DO Sydnie Doran Myrtice Millard, MD as Endoscopist     Problem List:     Patient Active Problem List   Diagnosis    Edema    Asthma, mild persistent    Exertional shortness of breath    Fatigue    GERD without esophagitis    Hyperlipidemia    Impaired fasting glucose    Left knee pain    Pain of left breast    Plantar fasciitis, right    Palpitations    Primary generalized (osteo)arthritis    Right hip pain    Sjogren's syndrome ([address] )    Diverticulitis of large intestine without perforation or abscess with bleeding    Chronic bilateral thoracic back pain    Rosacea    Cough    COVID-19    UTI symptoms      Past Medical and Surgical History: Past Medical History:   Diagnosis Date    Anesthesia complication     "shivers"    Arthritis     Asthma     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 see colors, with over-use goes darker- from untreated amblyopia    Breathing difficulty     with extubation pt developed laryngeal spasm    Cataract     both eyes- right eye needs surgery    Colon polyps 2016    Diverticulitis     had episode in April 2018    Edema     lower legs    Generalized osteoarthritis     GERD (gastroesophageal reflux disease)     Hearing problem     unspecified laterality-hearing aid right ear    High cholesterol     Polyarthritis     last assessed: 6/26/2015    Rheumatoid arthritis [facility] [address]     Sjogren's syndrome ([facility] [address] )     last assessed: 7/18/2016    SS-A antibody positive     last assessed: 8/20/2015     Past Surgical History:   Procedure Laterality Date    APPENDECTOMY      AUGMENTATION BREAST Bilateral     breast surgery enlargement procedure elective bilateral    BILATERAL OOPHORECTOMY Bilateral 2003    BLADDER SURGERY      bladder lift,cystocele    CATARACT EXTRACTION      Right 9/2018; Left 10/2018    COLONOSCOPY  04/18/2011    COLPORRHAPHY  2014    anterior, repair of cystocele    HYSTERECTOMY  [address]    with cystocele     JOINT REPLACEMENT Left 2016    knee    KNEE ARTHROPLASTY Left     KNEE ARTHROSCOPY  2009    KNEE SURGERY Left 2009    torn meniscus    OOPHORECTOMY      MO COLONOSCOPY FLX DX W/COLLJ SPEC WHEN PFRMD N/A 5/31/2018    Procedure: COLONOSCOPY;  Surgeon: Ramakrishna Phan MD;  Location: [facility];   Service: Gastroenterology    REDUCTION MAMMAPLASTY  2000    TONSILLECTOMY AND ADENOIDECTOMY      URETHRA SURGERY        Family History:     Family History   Problem Relation Age of Onset    Coronary artery disease Mother     COPD Mother     Hypertension Mother     Hyperlipidemia Mother         high cholesterol    Atrial fibrillation Mother     Coronary artery disease Father     Arthritis Father    Rey Stockton COPD Father         Black lung    Hypertension Father     Hyperlipidemia Father         high cholesterol    Lung cancer Brother     Coronary artery disease Brother     Hypertension Brother     Hyperlipidemia Brother         high cholesterol    Atrial fibrillation Brother         fib/flutter    Other Sister         pericarditis    Arthritis Sister     Diabetes Maternal Grandmother         mellitus    Breast cancer Maternal Grandmother 79    Breast cancer Paternal Grandmother 79    Arthritis Family     Asthma Family     Cancer Brother         lung    Heart disease Brother         CAD-stents-smoker      Social History:        Social History     Socioeconomic History    Marital status: /Civil Union     Spouse name: None    Number of children: None    Years of education: None    Highest education level: None   Occupational History    Occupation: retired   Social Needs    Financial resource strain: None    Food insecurity     Worry: None     Inability: None    Transportation needs     Medical: None     Non-medical: None   Tobacco Use    Smoking status: Never Smoker    Smokeless tobacco: Never Used   Substance and Sexual Activity    Alcohol use: Not Currently     Comment: rarely - wine 2 x month    Drug use: No    Sexual activity: None   Lifestyle    Physical activity     Days per week: None     Minutes per session: None    Stress: None   Relationships    Social connections     Talks on phone: None     Gets together: None     Attends Baptism service: None     Active member of club or organization: None     Attends meetings of clubs or organizations: None     Relationship status: None    Intimate partner violence     Fear of current or ex partner: None     Emotionally abused: None     Physically abused: None     Forced sexual activity: None   Other Topics Concern    None   Social History Narrative    Lack of adequate sleep-4-6 hrs/night    Lack of exercise      Medications and Allergies:     Current Outpatient Medications   Medication Sig Dispense Refill    acetaminophen-codeine (TYLENOL/CODEINE #3) 300-30 MG per tablet Take 1 tablet by mouth every 6 (six) hours as needed for moderate pain 30 tablet 1    albuterol (PROVENTIL HFA,VENTOLIN HFA) 90 mcg/act inhaler Inhale 2 puffs every 6 (six) hours as needed for wheezing or shortness of breath 1 Inhaler 5    aspirin 325 mg tablet Take 325 mg by mouth as needed for mild pain       Cholecalciferol (VITAMIN D3) 2000 units capsule Take 1 capsule by mouth every morning        docusate sodium (COLACE) 100 mg capsule Take 100 mg by mouth 2 (two) times a day      FLOVENT  MCG/ACT inhaler USE 2 INHALATIONS TWICE A DAY (RINSE MOUTH AFTER USE) 36 g 3    furosemide (LASIX) 40 mg tablet TAKE 1 TABLET DAILY EVERY MORNING 90 tablet 3    Magnesium Oxide -Mg Supplement 400 MG CAPS Take 1 capsule by mouth every morning        nitrofurantoin (MACROBID) 100 mg capsule Take 1 capsule (100 mg total) by mouth 2 (two) times a day 10 capsule 0    potassium chloride (K-DUR) 10 mEq tablet TAKE 1 TABLET DAILY, NEEDS POTASSIUM CHLORIDE ER (WAX) TABLETS 90 tablet 3    predniSONE 10 mg tablet as needed       senna (SENOKOT) 8 6 MG tablet Take 1 tablet by mouth as needed for constipation      folic acid (FOLVITE) 1 mg tablet daily Except Friday      methotrexate 2 5 mg tablet 3 tablets once a week       No current facility-administered medications for this visit  Allergies   Allergen Reactions    Celecoxib Other (See Comments)     Other reaction(s): Palpitations  Category:  Adverse Reaction;       Immunizations:     Immunization History   Administered Date(s) Administered    DT (pediatric) 04/01/2013    INFLUENZA 10/19/2018, 10/20/2020    Influenza Split High Dose Preservative Free IM 09/27/2016, 09/01/2017, 10/29/2019    Influenza, seasonal, injectable 11/01/2015    Pneumococcal Conjugate 13-Valent 05/29/2015    Pneumococcal Polysaccharide PPV23 11/01/2010    Zoster 04/01/2013      Health Maintenance:         Topic Date Due    DXA SCAN  03/19/2011    Colorectal Cancer Screening  05/31/2028    Hepatitis C Screening  Completed         Topic Date Due    DTaP,Tdap,and Td Vaccines (1 - Tdap) 07/25/1966      Medicare Health Risk Assessment:     /70   Pulse 76   Temp (!) 97 °F (36 1 °C)   Resp 16   Ht 5' 6" (1 676 m)   Wt 87 5 kg (193 lb)   BMI 31 15 kg/m²      Florian Castaneda is here for her Subsequent Wellness visit  Health Risk Assessment:   Patient rates overall health as good  Patient feels that their physical health rating is slightly worse  Eyesight was rated as same  Hearing was rated as same  Patient feels that their emotional and mental health rating is same  Pain experienced in the last 7 days has been some  Patient's pain rating has been 2/10  Patient states that she has experienced no weight loss or gain in last 6 months  Back pain, lower abdominal pain, knee pain  Pt has UTI and is being treated  Depression Screening:   PHQ-2 Score: 0      Fall Risk Screening: In the past year, patient has experienced: no history of falling in past year      Urinary Incontinence Screening:   Patient has not leaked urine accidently in the last six months  Home Safety:  Patient does not have trouble with stairs inside or outside of their home  Patient has working smoke alarms and has working carbon monoxide detector  Home safety hazards include: none  Nutrition:   Current diet is Low Saturated Fat and Low Carb  Medications:   Patient is currently taking over-the-counter supplements  OTC medications include: see medication list  Patient is able to manage medications  Activities of Daily Living (ADLs)/Instrumental Activities of Daily Living (IADLs):   Walk and transfer into and out of bed and chair?: Yes  Dress and groom yourself?: Yes    Bathe or shower yourself?: Yes    Feed yourself?  Yes  Do your laundry/housekeeping?: Yes  Manage your money, pay your bills and track your expenses?: Yes  Make your own meals?: Yes    Do your own shopping?: Yes    Previous Hospitalizations:   Any hospitalizations or ED visits within the last 12 months?: No      Advance Care Planning:   Living will: Yes    Durable POA for healthcare: Yes    Advanced directive: Yes    Advanced directive counseling given: Yes    End of Life Decisions reviewed with patient: Yes    Provider agrees with end of life decisions: Yes      Cognitive Screening:   Provider or family/friend/caregiver concerned regarding cognition?: No    PREVENTIVE SCREENINGS      Cardiovascular Screening:    General: Screening Not Indicated and History Lipid Disorder      Diabetes Screening:     General: Screening Current      Colorectal Cancer Screening:     General: Screening Current      Breast Cancer Screening:     General: Screening Current      Cervical Cancer Screening:    General: Screening Not Indicated      Osteoporosis Screening:    General: Risks and Benefits Discussed and Patient Declines      Abdominal Aortic Aneurysm (AAA) Screening:        General: Screening Not Indicated      Lung Cancer Screening:     General: Screening Not Indicated      Hepatitis C Screening:    General: Screening Current    Physical Exam  Vitals signs and nursing note reviewed  Constitutional:       Appearance: She is well-developed  HENT:      Head: Normocephalic and atraumatic  Right Ear: External ear normal       Left Ear: External ear normal       Nose: Nose normal    Cardiovascular:      Rate and Rhythm: Normal rate and regular rhythm  Heart sounds: Normal heart sounds  No murmur  No friction rub  Pulmonary:      Effort: No respiratory distress  Breath sounds: Normal breath sounds  No wheezing or rales  Musculoskeletal:      Right lower leg: No edema  Left lower leg: No edema  Neurological:      Mental Status: She is oriented to person, place, and time        Cranial Nerves: No cranial nerve deficit            Magaly Reus, DO

## 2021-02-03 NOTE — PATIENT INSTRUCTIONS
Medicare Preventive Visit Patient Instructions  Thank you for completing your Welcome to Medicare Visit or Medicare Annual Wellness Visit today  Your next wellness visit will be due in one year (2/3/2022)  The screening/preventive services that you may require over the next 5-10 years are detailed below  Some tests may not apply to you based off risk factors and/or age  Screening tests ordered at today's visit but not completed yet may show as past due  Also, please note that scanned in results may not display below  Preventive Screenings:  Service Recommendations Previous Testing/Comments   Colorectal Cancer Screening  * Colonoscopy    * Fecal Occult Blood Test (FOBT)/Fecal Immunochemical Test (FIT)  * Fecal DNA/Cologuard Test  * Flexible Sigmoidoscopy Age: 54-65 years old   Colonoscopy: every 10 years (may be performed more frequently if at higher risk)  OR  FOBT/FIT: every 1 year  OR  Cologuard: every 3 years  OR  Sigmoidoscopy: every 5 years  Screening may be recommended earlier than age 48 if at higher risk for colorectal cancer  Also, an individualized decision between you and your healthcare provider will decide whether screening between the ages of 74-80 would be appropriate  Colonoscopy: 05/31/2018  FOBT/FIT: Not on file  Cologuard: Not on file  Sigmoidoscopy: Not on file    Screening Current     Breast Cancer Screening Age: 36 years old  Frequency: every 1-2 years  Not required if history of left and right mastectomy Mammogram: 09/10/2020    Screening Current   Cervical Cancer Screening Between the ages of 21-29, pap smear recommended once every 3 years  Between the ages of 33-67, can perform pap smear with HPV co-testing every 5 years     Recommendations may differ for women with a history of total hysterectomy, cervical cancer, or abnormal pap smears in past  Pap Smear: Not on file    Screening Not Indicated   Hepatitis C Screening Once for adults born between 1945 and 1965  More frequently in patients at high risk for Hepatitis C Hep C Antibody: 11/27/2018    Screening Current   Diabetes Screening 1-2 times per year if you're at risk for diabetes or have pre-diabetes Fasting glucose: 87 mg/dL   A1C: 5 2 %    Screening Current   Cholesterol Screening Once every 5 years if you don't have a lipid disorder  May order more often based on risk factors  Lipid panel: 01/25/2021    Screening Not Indicated  History Lipid Disorder     Other Preventive Screenings Covered by Medicare:  1  Abdominal Aortic Aneurysm (AAA) Screening: covered once if your at risk  You're considered to be at risk if you have a family history of AAA  2  Lung Cancer Screening: covers low dose CT scan once per year if you meet all of the following conditions: (1) Age 50-69; (2) No signs or symptoms of lung cancer; (3) Current smoker or have quit smoking within the last 15 years; (4) You have a tobacco smoking history of at least 30 pack years (packs per day multiplied by number of years you smoked); (5) You get a written order from a healthcare provider  3  Glaucoma Screening: covered annually if you're considered high risk: (1) You have diabetes OR (2) Family history of glaucoma OR (3)  aged 48 and older OR (3)  American aged 72 and older  3  Osteoporosis Screening: covered every 2 years if you meet one of the following conditions: (1) You're estrogen deficient and at risk for osteoporosis based off medical history and other findings; (2) Have a vertebral abnormality; (3) On glucocorticoid therapy for more than 3 months; (4) Have primary hyperparathyroidism; (5) On osteoporosis medications and need to assess response to drug therapy  · Last bone density test (DXA Scan): 03/19/2009  5  HIV Screening: covered annually if you're between the age of 12-76  Also covered annually if you are younger than 13 and older than 72 with risk factors for HIV infection   For pregnant patients, it is covered up to 3 times per pregnancy  Immunizations:  Immunization Recommendations   Influenza Vaccine Annual influenza vaccination during flu season is recommended for all persons aged >= 6 months who do not have contraindications   Pneumococcal Vaccine (Prevnar and Pneumovax)  * Prevnar = PCV13  * Pneumovax = PPSV23   Adults 25-60 years old: 1-3 doses may be recommended based on certain risk factors  Adults 72 years old: Prevnar (PCV13) vaccine recommended followed by Pneumovax (PPSV23) vaccine  If already received PPSV23 since turning 65, then PCV13 recommended at least one year after PPSV23 dose  Hepatitis B Vaccine 3 dose series if at intermediate or high risk (ex: diabetes, end stage renal disease, liver disease)   Tetanus (Td) Vaccine - COST NOT COVERED BY MEDICARE PART B Following completion of primary series, a booster dose should be given every 10 years to maintain immunity against tetanus  Td may also be given as tetanus wound prophylaxis  Tdap Vaccine - COST NOT COVERED BY MEDICARE PART B Recommended at least once for all adults  For pregnant patients, recommended with each pregnancy  Shingles Vaccine (Shingrix) - COST NOT COVERED BY MEDICARE PART B  2 shot series recommended in those aged 48 and above     Health Maintenance Due:      Topic Date Due    DXA SCAN  03/19/2011    Colorectal Cancer Screening  05/31/2028    Hepatitis C Screening  Completed     Immunizations Due:      Topic Date Due    DTaP,Tdap,and Td Vaccines (1 - Tdap) 07/25/1966     Advance Directives   What are advance directives? Advance directives are legal documents that state your wishes and plans for medical care  These plans are made ahead of time in case you lose your ability to make decisions for yourself  Advance directives can apply to any medical decision, such as the treatments you want, and if you want to donate organs  What are the types of advance directives?   There are many types of advance directives, and each state has rules about how to use them  You may choose a combination of any of the following:  · Living will: This is a written record of the treatment you want  You can also choose which treatments you do not want, which to limit, and which to stop at a certain time  This includes surgery, medicine, IV fluid, and tube feedings  · Durable power of  for healthcare [facility]): This is a written record that states who you want to make healthcare choices for you when you are unable to make them for yourself  This person, called a proxy, is usually a family member or a friend  You may choose more than 1 proxy  · Do not resuscitate (DNR) order:  A DNR order is used in case your heart stops beating or you stop breathing  It is a request not to have certain forms of treatment, such as CPR  A DNR order may be included in other types of advance directives  · Medical directive: This covers the care that you want if you are in a coma, near death, or unable to make decisions for yourself  You can list the treatments you want for each condition  Treatment may include pain medicine, surgery, blood transfusions, dialysis, IV or tube feedings, and a ventilator (breathing machine)  · Values history: This document has questions about your views, beliefs, and how you feel and think about life  This information can help others choose the care that you would choose  Why are advance directives important? An advance directive helps you control your care  Although spoken wishes may be used, it is better to have your wishes written down  Spoken wishes can be misunderstood, or not followed  Treatments may be given even if you do not want them  An advance directive may make it easier for your family to make difficult choices about your care     Weight Management   Why it is important to manage your weight:  Being overweight increases your risk of health conditions such as heart disease, high blood pressure, type 2 diabetes, and certain types of cancer  It can also increase your risk for osteoarthritis, sleep apnea, and other respiratory problems  Aim for a slow, steady weight loss  Even a small amount of weight loss can lower your risk of health problems  How to lose weight safely:  A safe and healthy way to lose weight is to eat fewer calories and get regular exercise  You can lose up about 1 pound a week by decreasing the number of calories you eat by 500 calories each day  Healthy meal plan for weight management:  A healthy meal plan includes a variety of foods, contains fewer calories, and helps you stay healthy  A healthy meal plan includes the following:  · Eat whole-grain foods more often  A healthy meal plan should contain fiber  Fiber is the part of grains, fruits, and vegetables that is not broken down by your body  Whole-grain foods are healthy and provide extra fiber in your diet  Some examples of whole-grain foods are whole-wheat breads and pastas, oatmeal, brown rice, and bulgur  · Eat a variety of vegetables every day  Include dark, leafy greens such as spinach, kale, shruthi greens, and mustard greens  Eat yellow and orange vegetables such as carrots, sweet potatoes, and winter squash  · Eat a variety of fruits every day  Choose fresh or canned fruit (canned in its own juice or light syrup) instead of juice  Fruit juice has very little or no fiber  · Eat low-fat dairy foods  Drink fat-free (skim) milk or 1% milk  Eat fat-free yogurt and low-fat cottage cheese  Try low-fat cheeses such as mozzarella and other reduced-fat cheeses  · Choose meat and other protein foods that are low in fat  Choose beans or other legumes such as split peas or lentils  Choose fish, skinless poultry (chicken or turkey), or lean cuts of red meat (beef or pork)  Before you cook meat or poultry, cut off any visible fat  · Use less fat and oil  Try baking foods instead of frying them   Add less fat, such as margarine, sour cream, regular salad dressing and mayonnaise to foods  Eat fewer high-fat foods  Some examples of high-fat foods include french fries, doughnuts, ice cream, and cakes  · Eat fewer sweets  Limit foods and drinks that are high in sugar  This includes candy, cookies, regular soda, and sweetened drinks  Exercise:  Exercise at least 30 minutes per day on most days of the week  Some examples of exercise include walking, biking, dancing, and swimming  You can also fit in more physical activity by taking the stairs instead of the elevator or parking farther away from stores  Ask your healthcare provider about the best exercise plan for you  © Copyright [employer] 2018 Information is for End User's use only and may not be sold, redistributed or otherwise used for commercial purposes   All illustrations and images included in CareNotes® are the copyrighted property of A D A M , Inc  or [address] [place] [address] [employer]

## 2021-03-04 DIAGNOSIS — M15.0 PRIMARY GENERALIZED (OSTEO)ARTHRITIS: ICD-10-CM

## 2021-03-04 RX ORDER — ACETAMINOPHEN AND CODEINE PHOSPHATE 300; 30 MG/1; MG/1
1 TABLET ORAL EVERY 6 HOURS PRN
Qty: 30 TABLET | Refills: 1 | Status: SHIPPED | OUTPATIENT
Start: 2021-03-04 | End: 2021-08-03 | Stop reason: SDUPTHER

## 2021-03-11 ENCOUNTER — IMMUNIZATIONS (OUTPATIENT)
Dept: FAMILY MEDICINE CLINIC | Facility: HOSPITAL | Age: 76
End: 2021-03-11

## 2021-03-11 DIAGNOSIS — Z23 ENCOUNTER FOR IMMUNIZATION: Primary | ICD-10-CM

## 2021-03-11 PROCEDURE — 0011A SARS-COV-2 / COVID-19 MRNA VACCINE (MODERNA) 100 MCG: CPT

## 2021-03-11 PROCEDURE — 91301 SARS-COV-2 / COVID-19 MRNA VACCINE (MODERNA) 100 MCG: CPT

## 2021-04-09 ENCOUNTER — IMMUNIZATIONS (OUTPATIENT)
Dept: FAMILY MEDICINE CLINIC | Facility: HOSPITAL | Age: 76
End: 2021-04-09

## 2021-04-09 DIAGNOSIS — Z23 ENCOUNTER FOR IMMUNIZATION: Primary | ICD-10-CM

## 2021-04-09 PROCEDURE — 91301 SARS-COV-2 / COVID-19 MRNA VACCINE (MODERNA) 100 MCG: CPT

## 2021-04-09 PROCEDURE — 0012A SARS-COV-2 / COVID-19 MRNA VACCINE (MODERNA) 100 MCG: CPT

## 2021-04-11 DIAGNOSIS — H91.90 HEARING LOSS, UNSPECIFIED HEARING LOSS TYPE, UNSPECIFIED LATERALITY: Primary | ICD-10-CM

## 2021-04-28 ENCOUNTER — OFFICE VISIT (OUTPATIENT)
Dept: URGENT CARE | Facility: CLINIC | Age: 76
End: 2021-04-28
Payer: MEDICARE

## 2021-04-28 VITALS
BODY MASS INDEX: 31.47 KG/M2 | HEART RATE: 67 BPM | SYSTOLIC BLOOD PRESSURE: 110 MMHG | WEIGHT: 195.8 LBS | TEMPERATURE: 96.8 F | OXYGEN SATURATION: 100 % | DIASTOLIC BLOOD PRESSURE: 70 MMHG | HEIGHT: 66 IN | RESPIRATION RATE: 18 BRPM

## 2021-04-28 DIAGNOSIS — R10.9 BILATERAL FLANK PAIN: Primary | ICD-10-CM

## 2021-04-28 DIAGNOSIS — N12 PYELONEPHRITIS: ICD-10-CM

## 2021-04-28 LAB
SL AMB  POCT GLUCOSE, UA: NEGATIVE
SL AMB LEUKOCYTE ESTERASE,UA: ABNORMAL
SL AMB POCT BILIRUBIN,UA: NEGATIVE
SL AMB POCT BLOOD,UA: ABNORMAL
SL AMB POCT CLARITY,UA: ABNORMAL
SL AMB POCT COLOR,UA: ABNORMAL
SL AMB POCT KETONES,UA: NEGATIVE
SL AMB POCT NITRITE,UA: POSITIVE
SL AMB POCT PH,UA: 5
SL AMB POCT SPECIFIC GRAVITY,UA: 1.02
SL AMB POCT URINE PROTEIN: ABNORMAL
SL AMB POCT UROBILINOGEN: 0.2

## 2021-04-28 PROCEDURE — 81002 URINALYSIS NONAUTO W/O SCOPE: CPT | Performed by: PHYSICIAN ASSISTANT

## 2021-04-28 PROCEDURE — 87077 CULTURE AEROBIC IDENTIFY: CPT | Performed by: PHYSICIAN ASSISTANT

## 2021-04-28 PROCEDURE — 87086 URINE CULTURE/COLONY COUNT: CPT | Performed by: PHYSICIAN ASSISTANT

## 2021-04-28 PROCEDURE — 99213 OFFICE O/P EST LOW 20 MIN: CPT | Performed by: PHYSICIAN ASSISTANT

## 2021-04-28 PROCEDURE — 87186 SC STD MICRODIL/AGAR DIL: CPT | Performed by: PHYSICIAN ASSISTANT

## 2021-04-28 RX ORDER — LEVOFLOXACIN 750 MG/1
750 TABLET ORAL EVERY 24 HOURS
Qty: 7 TABLET | Refills: 0 | Status: SHIPPED | OUTPATIENT
Start: 2021-04-28 | End: 2021-05-05

## 2021-04-28 NOTE — PATIENT INSTRUCTIONS
Urine here today showed signs of UTI  Will prescribe Levoquin once a day for 7 days  Finish the entire dose of antibiotics even if feeling better  Increase the amount of water your drink while taking the antibiotics  If pain persists, you become nausea, are vomiting, feel feverish or you have abdominal pain and cramping go to the ER  Follow up with primary care doctor

## 2021-04-29 NOTE — PROGRESS NOTES
[facility]        NAME: Tony Huizar is a 76 y o  female  : 1945    MRN: [id number]  DATE: 2021  TIME: 8:23 PM    Assessment and Plan   Bilateral flank pain [R10 9]  1  Bilateral flank pain  POCT urine dip    Urine culture   2  Pyelonephritis  levofloxacin (LEVAQUIN) 750 mg tablet         Patient Instructions     Patient Instructions   Urine here today showed signs of UTI  Will prescribe Levoquin once a day for 7 days  Finish the entire dose of antibiotics even if feeling better  Increase the amount of water your drink while taking the antibiotics  If pain persists, you become nausea, are vomiting, feel feverish or you have abdominal pain and cramping go to the ER  Follow up with primary care doctor  Follow up with PCP in 3-5 days  Proceed to  ER if symptoms worsen  Chief Complaint     Chief Complaint   Patient presents with    Possible UTI     x 1 week - monica  flank pain that radiates to monica  groin  Having chills, nausea and foul odor  No dysuria, urgency or frequency  Took Tylenol at 10 am          History of Present Illness       77 y/o female presents with complaints b/l lower back and flank pain with new onset chills, nausea and odor in the last 2 days  She took tylenol for her symptoms last night  Pt has had UTIs in the past but they were always accompanied by urinary symptoms such as burning, frequency or urgency  Pt denies any of those symptoms today  Review of Systems   Review of Systems   Constitutional: Negative for chills, diaphoresis and fever  Cardiovascular: Negative for chest pain and palpitations  Gastrointestinal: Positive for abdominal pain  Genitourinary: Negative for dysuria, frequency and urgency  Musculoskeletal: Positive for back pain           Current Medications       Current Outpatient Medications:     acetaminophen-codeine (TYLENOL/CODEINE #3) 300-30 MG per tablet, Take 1 tablet by mouth every 6 (six) hours as needed for moderate pain, Disp: 30 tablet, Rfl: 1    albuterol (PROVENTIL HFA,VENTOLIN HFA) 90 mcg/act inhaler, Inhale 2 puffs every 6 (six) hours as needed for wheezing or shortness of breath, Disp: 1 Inhaler, Rfl: 5    aspirin 325 mg tablet, Take 325 mg by mouth as needed for mild pain , Disp: , Rfl:     Cholecalciferol (VITAMIN D3) 2000 units capsule, Take 1 capsule by mouth every morning  , Disp: , Rfl:     docusate sodium (COLACE) 100 mg capsule, Take 100 mg by mouth 2 (two) times a day, Disp: , Rfl:     FLOVENT  MCG/ACT inhaler, USE 2 INHALATIONS TWICE A DAY (RINSE MOUTH AFTER USE), Disp: 36 g, Rfl: 3    furosemide (LASIX) 40 mg tablet, TAKE 1 TABLET DAILY EVERY MORNING, Disp: 90 tablet, Rfl: 3    Magnesium Oxide -Mg Supplement 400 MG CAPS, Take 1 capsule by mouth every morning  , Disp: , Rfl:     potassium chloride (K-DUR) 10 mEq tablet, TAKE 1 TABLET DAILY, NEEDS POTASSIUM CHLORIDE ER (WAX) TABLETS, Disp: 90 tablet, Rfl: 3    predniSONE 10 mg tablet, as needed , Disp: , Rfl:     senna (SENOKOT) 8 6 MG tablet, Take 1 tablet by mouth as needed for constipation, Disp: , Rfl:     folic acid (FOLVITE) 1 mg tablet, daily Except Friday, Disp: , Rfl:     levofloxacin (LEVAQUIN) 750 mg tablet, Take 1 tablet (750 mg total) by mouth every 24 hours for 7 days, Disp: 7 tablet, Rfl: 0    methotrexate 2 5 mg tablet, 3 tablets once a week, Disp: , Rfl:     nitrofurantoin (MACROBID) 100 mg capsule, Take 1 capsule (100 mg total) by mouth 2 (two) times a day, Disp: 10 capsule, Rfl: 0    Current Allergies     Allergies as of 04/28/2021 - Reviewed 04/28/2021   Allergen Reaction Noted    Celecoxib Other (See Comments) 06/25/2015            The following portions of the patient's history were reviewed and updated as appropriate: allergies, current medications, past family history, past medical history, past social history, past surgical history and problem list      Past Medical History:   Diagnosis Date    Anesthesia complication     "shivers"    Arthritis     Asthma     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 see colors, with over-use goes darker- from untreated amblyopia    Breathing difficulty     with extubation pt developed laryngeal spasm    Cataract     both eyes- right eye needs surgery    Colon polyps 2016    Diverticulitis     had episode in April 2018    Edema     lower legs    Generalized osteoarthritis     GERD (gastroesophageal reflux disease)     Hearing problem     unspecified laterality-hearing aid right ear    High cholesterol     Polyarthritis     last assessed: 6/26/2015    [facility] [address] [facility]     Sjogren's syndrome ([facility] [address] [facility]     last assessed: 7/18/2016    SS-A antibody positive     last assessed: 8/20/2015    Urinary tract infection        Past Surgical History:   Procedure Laterality Date    APPENDECTOMY      AUGMENTATION BREAST Bilateral     breast surgery enlargement procedure elective bilateral    BILATERAL OOPHORECTOMY Bilateral 2003    BLADDER SURGERY      bladder lift,cystocele    CATARACT EXTRACTION      Right 9/2018; Left 10/2018    COLONOSCOPY  04/18/2011    COLPORRHAPHY  2014    anterior, repair of cystocele    HYSTERECTOMY  1673    with cystocele     JOINT REPLACEMENT Left 2016    knee    KNEE ARTHROPLASTY Left     KNEE ARTHROSCOPY  2009    KNEE SURGERY Left 2009    torn meniscus    OOPHORECTOMY      NM COLONOSCOPY FLX DX W/COLLJ SPEC WHEN PFRMD N/A 5/31/2018    Procedure: COLONOSCOPY;  Surgeon: Satish Esteban MD;  Location: [facility];   Service: Gastroenterology    REDUCTION MAMMAPLASTY  2000    TONSILLECTOMY AND ADENOIDECTOMY      URETHRA SURGERY         Family History   Problem Relation Age of Onset    Coronary artery disease Mother     COPD Mother     Hypertension Mother     Hyperlipidemia Mother         high cholesterol    Atrial fibrillation Mother     Coronary artery disease Father     Arthritis Father     COPD Father         Black lung    Hypertension Father     Hyperlipidemia Father         high cholesterol    Lung cancer Brother     Coronary artery disease Brother     Hypertension Brother     Hyperlipidemia Brother         high cholesterol    Atrial fibrillation Brother         fib/flutter    Other Sister         pericarditis    Arthritis Sister     Diabetes Maternal Grandmother         mellitus    Breast cancer Maternal Grandmother 79    Breast cancer Paternal Grandmother 79    Arthritis Family     Asthma Family     Cancer Brother         lung    Heart disease Brother         CAD-stents-smoker         Medications have been verified  Objective   /70   Pulse 67   Temp (!) 96 8 °F (36 °C)   Resp 18   Ht 5' 6" (1 676 m)   Wt 88 8 kg (195 lb 12 8 oz)   SpO2 100%   BMI 31 60 kg/m²        Physical Exam     Physical Exam  Vitals signs and nursing note reviewed  Constitutional:       Appearance: Normal appearance  HENT:      Head: Normocephalic  Eyes:      Extraocular Movements: Extraocular movements intact  Pupils: Pupils are equal, round, and reactive to light  Cardiovascular:      Rate and Rhythm: Normal rate and regular rhythm  Pulses: Normal pulses  Heart sounds: Normal heart sounds  Pulmonary:      Effort: Pulmonary effort is normal       Breath sounds: Normal breath sounds  Abdominal:      General: Bowel sounds are normal       Tenderness: There is no abdominal tenderness  There is right CVA tenderness and left CVA tenderness  Neurological:      Mental Status: She is alert and oriented to person, place, and time

## 2021-04-30 ENCOUNTER — HOSPITAL ENCOUNTER (OUTPATIENT)
Dept: RADIOLOGY | Facility: HOSPITAL | Age: 76
Discharge: HOME | End: 2021-04-30
Payer: MEDICARE

## 2021-04-30 VITALS — BODY MASS INDEX: 31.34 KG/M2 | WEIGHT: 195 LBS | HEIGHT: 66 IN

## 2021-04-30 DIAGNOSIS — Z12.39 SCREENING FOR BREAST CANCER: ICD-10-CM

## 2021-04-30 LAB — BACTERIA UR CULT: ABNORMAL

## 2021-04-30 PROCEDURE — 77067 SCR MAMMO BI INCL CAD: CPT

## 2021-04-30 PROCEDURE — 77063 BREAST TOMOSYNTHESIS BI: CPT

## 2021-05-04 ENCOUNTER — OFFICE VISIT (OUTPATIENT)
Dept: AUDIOLOGY | Facility: CLINIC | Age: 76
End: 2021-05-04
Payer: MEDICARE

## 2021-05-04 DIAGNOSIS — H90.A21 SENSORINEURAL HEARING LOSS, UNILATERAL, RIGHT EAR, WITH RESTRICTED HEARING ON THE CONTRALATERAL SIDE: Primary | ICD-10-CM

## 2021-05-04 PROCEDURE — 92557 COMPREHENSIVE HEARING TEST: CPT | Performed by: AUDIOLOGIST

## 2021-05-04 PROCEDURE — 92567 TYMPANOMETRY: CPT | Performed by: AUDIOLOGIST

## 2021-05-04 NOTE — PROGRESS NOTES
HEARING EVALUATION/HEARING AID VISIT    Name:  Darshan Alvares  :  1945  Age:  76 y o  Date of Evaluation: 21     History:  Known right perforation and hearing loss / mild loss left ear   Reason for visit: Darshan Alvares is being seen today at the request of Dr Melinda Avila for an evaluation of hearing  Patient reports intermittent difficulties communicating even with right device use  Otoscopic Evaluation:   Right Ear: Mild cerumen  / could not visualize entire TM today   Left Ear: Clear and healthy ear canal and tympanic membrane    Tympanometry:   Right: Type B (large ear canal volume) - perforated eardrum    Left: Type A - normal middle ear pressure and compliance    Audiogram Results:  L ear:  Minimal / mild -3000 Hz, sloping to a moderate / severe degree 8663-0423 Hz  R ear: Moderately severe to severe SHL / changes noted on audiogram    *see attached audiogram    Darsahn Alvares is fit with [employer] series i110 JONATAN hearing aid for the right ear  Right serial number [id number]  Left serial number N/A  Warranty date: OOW (Loss/Damage and repair)  Action:  1  Increased bass and mids slightly to account for change in audiogram   The high frequencies were already higher than prescription  RECOMMENDATIONS:  1  RTO as needed for hearing aid services  2  Discussed newer technology with her      3  Annual audiological evaluations for monitoring purposes     Danuta Cantor , CCC-A, [place]# [id number], Hearing Aid Dispenser, [place]# [id number]  Clinical Audiologist

## 2021-05-07 ENCOUNTER — OFFICE VISIT (OUTPATIENT)
Dept: URGENT CARE | Facility: CLINIC | Age: 76
End: 2021-05-07
Payer: MEDICARE

## 2021-05-07 VITALS
SYSTOLIC BLOOD PRESSURE: 140 MMHG | RESPIRATION RATE: 18 BRPM | TEMPERATURE: 97.9 F | BODY MASS INDEX: 31.5 KG/M2 | HEART RATE: 70 BPM | HEIGHT: 66 IN | OXYGEN SATURATION: 97 % | DIASTOLIC BLOOD PRESSURE: 90 MMHG | WEIGHT: 196 LBS

## 2021-05-07 DIAGNOSIS — R30.9 PAINFUL URINATION: ICD-10-CM

## 2021-05-07 DIAGNOSIS — R31.9 URINARY TRACT INFECTION WITH HEMATURIA, SITE UNSPECIFIED: Primary | ICD-10-CM

## 2021-05-07 DIAGNOSIS — N39.0 URINARY TRACT INFECTION WITH HEMATURIA, SITE UNSPECIFIED: Primary | ICD-10-CM

## 2021-05-07 LAB
SL AMB  POCT GLUCOSE, UA: ABNORMAL
SL AMB LEUKOCYTE ESTERASE,UA: ABNORMAL
SL AMB POCT BILIRUBIN,UA: ABNORMAL
SL AMB POCT BLOOD,UA: ABNORMAL
SL AMB POCT CLARITY,UA: ABNORMAL
SL AMB POCT COLOR,UA: ABNORMAL
SL AMB POCT KETONES,UA: ABNORMAL
SL AMB POCT NITRITE,UA: ABNORMAL
SL AMB POCT PH,UA: 6
SL AMB POCT SPECIFIC GRAVITY,UA: 1.01
SL AMB POCT URINE PROTEIN: 2000
SL AMB POCT UROBILINOGEN: 0.2

## 2021-05-07 PROCEDURE — 87186 SC STD MICRODIL/AGAR DIL: CPT | Performed by: PHYSICIAN ASSISTANT

## 2021-05-07 PROCEDURE — 99213 OFFICE O/P EST LOW 20 MIN: CPT | Performed by: PHYSICIAN ASSISTANT

## 2021-05-07 PROCEDURE — 87086 URINE CULTURE/COLONY COUNT: CPT | Performed by: PHYSICIAN ASSISTANT

## 2021-05-07 PROCEDURE — 81002 URINALYSIS NONAUTO W/O SCOPE: CPT | Performed by: PHYSICIAN ASSISTANT

## 2021-05-07 PROCEDURE — 87077 CULTURE AEROBIC IDENTIFY: CPT | Performed by: PHYSICIAN ASSISTANT

## 2021-05-07 RX ORDER — NITROFURANTOIN 25; 75 MG/1; MG/1
100 CAPSULE ORAL 2 TIMES DAILY
Qty: 10 CAPSULE | Refills: 0 | Status: SHIPPED | OUTPATIENT
Start: 2021-05-07 | End: 2021-05-12

## 2021-05-07 NOTE — PROGRESS NOTES
[facility]        NAME: Irene Dao is a 76 y o  female  : 1945    MRN: [id number]  DATE: May 7, 2021  TIME: 7:01 PM    Assessment and Plan   Urinary tract infection with hematuria, site unspecified [N39 0, R31 9]  1  Urinary tract infection with hematuria, site unspecified  Urine culture    nitrofurantoin (MACROBID) 100 mg capsule   2  Painful urination  POCT urine dip     Discussed with patient there are signs of UTI on urinalysis  Will begin empiric therapy with Macrobid and send urine for repeat culture  She was instructed to call for culture results and will change antibiotic based on this  Pt agreeable to tx plan  She is willing to f/u with urologist/gynecologist to discuss tx  For recurrent/frequent UTIs  All questions answered  Precautions given  Patient Instructions   Take the antibiotic as directed with food and water  Take a probiotic while taking this medication  Drink plenty of water  Call in 48-72 hours for the result of your urine culture  Changes to your treatment plan will be made at this time if necessary  Follow up with your family doctor or a urologist in 3-5 days  Proceed to the ER if symptoms worsen  Chief Complaint     Chief Complaint   Patient presents with    Possible UTI     Patient complains painful urination, increase in frequency starting this morning  History of Present Illness       77-year-old female presenting with concern for UTI  Patient was seen in this office approximately 9 days ago for UTI symptoms  At this time had aching mid to low back discomfort as well as abdominal discomfort  Was found to have UTI on urinalysis and  Treated with Levaquin 750 milligrams QD x 7 days  Reports some improvement of severity but sx never fully resolved  Today developed dysuria, frequency, and urgency, which were not present at time of first visit  Flank pain is felt equally bilaterally described as intermittent spasms    Right-sided flank pain does wrap around to the lower abdomen  Denies any fever, chills, sweats, abdominal discomfort, nausea or vomiting  No other treatments tried  Reports she previously had surgery for repair of cystocele,  which decreased frequency of UTI significant  Recently has noted a return of cystocele but has been unable to see urologist due to  being very ill  Reports 4-5 UTI's in the past 5 months  Review of Systems   Review of Systems   Constitutional: Negative for chills, diaphoresis and fever  Gastrointestinal: Positive for abdominal pain  Negative for nausea and vomiting  Genitourinary: Positive for dysuria, flank pain, frequency and urgency  Negative for decreased urine volume and difficulty urinating  Musculoskeletal: Positive for back pain  Neurological: Negative for light-headedness and headaches       Current Medications       Current Outpatient Medications:     acetaminophen-codeine (TYLENOL/CODEINE #3) 300-30 MG per tablet, Take 1 tablet by mouth every 6 (six) hours as needed for moderate pain, Disp: 30 tablet, Rfl: 1    albuterol (PROVENTIL HFA,VENTOLIN HFA) 90 mcg/act inhaler, Inhale 2 puffs every 6 (six) hours as needed for wheezing or shortness of breath, Disp: 1 Inhaler, Rfl: 5    aspirin 325 mg tablet, Take 325 mg by mouth as needed for mild pain , Disp: , Rfl:     Cholecalciferol (VITAMIN D3) 2000 units capsule, Take 1 capsule by mouth every morning  , Disp: , Rfl:     docusate sodium (COLACE) 100 mg capsule, Take 100 mg by mouth 2 (two) times a day, Disp: , Rfl:     FLOVENT  MCG/ACT inhaler, USE 2 INHALATIONS TWICE A DAY (RINSE MOUTH AFTER USE), Disp: 36 g, Rfl: 3    furosemide (LASIX) 40 mg tablet, TAKE 1 TABLET DAILY EVERY MORNING, Disp: 90 tablet, Rfl: 3    Magnesium Oxide -Mg Supplement 400 MG CAPS, Take 1 capsule by mouth every morning  , Disp: , Rfl:     potassium chloride (K-DUR) 10 mEq tablet, TAKE 1 TABLET DAILY, NEEDS POTASSIUM CHLORIDE ER (WAX) TABLETS, Disp: 90 tablet, Rfl: 3    predniSONE 10 mg tablet, as needed , Disp: , Rfl:     senna (SENOKOT) 8 6 MG tablet, Take 1 tablet by mouth as needed for constipation, Disp: , Rfl:     nitrofurantoin (MACROBID) 100 mg capsule, Take 1 capsule (100 mg total) by mouth 2 (two) times a day for 5 days, Disp: 10 capsule, Rfl: 0    Current Allergies     Allergies as of 05/07/2021 - Reviewed 05/07/2021   Allergen Reaction Noted    Celecoxib Other (See Comments) 06/25/2015            The following portions of the patient's history were reviewed and updated as appropriate: allergies, current medications, past family history, past medical history, past social history, past surgical history and problem list      Past Medical History:   Diagnosis Date    Anesthesia complication     "shivers"    Arthritis     Asthma     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 see colors, with over-use goes darker- from untreated amblyopia    Breathing difficulty     with extubation pt developed laryngeal spasm    Cataract     both eyes- right eye needs surgery    Colon polyps 2016    Diverticulitis     had episode in April 2018    Edema     lower legs    Generalized osteoarthritis     GERD (gastroesophageal reflux disease)     Hearing problem     unspecified laterality-hearing aid right ear    High cholesterol     Polyarthritis     last assessed: 6/26/2015    [facility] [address]     Sjogren's syndrome ([facility] [address] [facility]     last assessed: 7/18/2016    SS-A antibody positive     last assessed: 8/20/2015    Urinary tract infection        Past Surgical History:   Procedure Laterality Date    APPENDECTOMY      AUGMENTATION BREAST Bilateral     breast surgery enlargement procedure elective bilateral    BILATERAL OOPHORECTOMY Bilateral 2003    BLADDER SURGERY      bladder lift,cystocele    CATARACT EXTRACTION      Right 9/2018; Left 10/2018    COLONOSCOPY  04/18/2011    COLPORRHAPHY  2014    anterior, repair of cystocele    HYSTERECTOMY  1673    with cystocele  JOINT REPLACEMENT Left 2016    knee    KNEE ARTHROPLASTY Left     KNEE ARTHROSCOPY  2009    KNEE SURGERY Left 2009    torn meniscus    OOPHORECTOMY      FL COLONOSCOPY FLX DX W/COLLJ SPEC WHEN PFRMD N/A 5/31/2018    Procedure: COLONOSCOPY;  Surgeon: Diane Dominguez MD;  Location: [place] [address] [facility]; Service: Gastroenterology    REDUCTION MAMMAPLASTY  2000    TONSILLECTOMY AND ADENOIDECTOMY      URETHRA SURGERY         Family History   Problem Relation Age of Onset    Coronary artery disease Mother     COPD Mother     Hypertension Mother     Hyperlipidemia Mother         high cholesterol    Atrial fibrillation Mother     Coronary artery disease Father     Arthritis Father     COPD Father         Black lung    Hypertension Father     Hyperlipidemia Father         high cholesterol    Lung cancer Brother     Coronary artery disease Brother     Hypertension Brother     Hyperlipidemia Brother         high cholesterol    Atrial fibrillation Brother         fib/flutter    Other Sister         pericarditis    Arthritis Sister     Diabetes Maternal Grandmother         mellitus    Breast cancer Maternal Grandmother 79    Breast cancer Paternal Grandmother 79    Arthritis Family     Asthma Family     Cancer Brother         lung    Heart disease Brother         CAD-stents-smoker    No Known Problems Maternal Aunt     No Known Problems Maternal Aunt     No Known Problems Maternal Aunt     No Known Problems Maternal Aunt     No Known Problems Maternal Aunt     Breast cancer Other 40         Medications have been verified  Objective   /90   Pulse 70   Temp 97 9 °F (36 6 °C) (Tympanic)   Resp 18   Ht 5' 6" (1 676 m)   Wt 88 9 kg (196 lb)   SpO2 97%   BMI 31 64 kg/m²   No LMP recorded  Patient has had a hysterectomy      Urinalysis:  Component 5/7/21 6:33 PM   LEUKOCYTE ESTERASE,UA large    NITRITE,UA pos    SL AMB POCT UROBILINOGEN 0 2    POCT URINE PROTEIN 2,000     PH,UA 6  0    BLOOD,UA large    SPECIFIC GRAVITY,UA 1 015    KETONES,UA neg    BILIRUBIN,UA neg    GLUCOSE, UA neg     COLOR,UA erlinda    CLARITY,UA turbid        Physical Exam     Physical Exam  Vitals signs and nursing note reviewed  Constitutional:       General: She is not in acute distress  Appearance: She is well-developed  She is not ill-appearing or diaphoretic  HENT:      Head: Normocephalic and atraumatic  Eyes:      General: Lids are normal       Conjunctiva/sclera: Conjunctivae normal    Cardiovascular:      Rate and Rhythm: Normal rate and regular rhythm  Pulmonary:      Effort: Pulmonary effort is normal  No respiratory distress  Breath sounds: Normal breath sounds  No decreased breath sounds, wheezing, rhonchi or rales  Abdominal:      General: Bowel sounds are normal  There is no distension  Palpations: Abdomen is soft  Abdomen is not rigid  There is no mass  Tenderness: There is no abdominal tenderness  There is no right CVA tenderness, left CVA tenderness, guarding or rebound  Skin:     General: Skin is warm and dry  Neurological:      General: No focal deficit present  Mental Status: She is alert  She is not disoriented  Cranial Nerves: Cranial nerves are intact  Coordination: Coordination normal       Gait: Gait normal    Psychiatric:         Behavior: Behavior normal  Behavior is cooperative  Thought Content:  Thought content normal          Judgment: Judgment normal

## 2021-05-07 NOTE — PATIENT INSTRUCTIONS
Take the antibiotic as directed with food and water  Take a probiotic while taking this medication  Drink plenty of water  Call in 48-72 hours for the result of your urine culture  Changes to your treatment plan will be made at this time if necessary  Follow up with your family doctor or a urologist in 3-5 days  Proceed to the ER if symptoms worsen

## 2021-05-09 LAB — BACTERIA UR CULT: ABNORMAL

## 2021-05-14 ENCOUNTER — OFFICE VISIT (OUTPATIENT)
Dept: FAMILY MEDICINE CLINIC | Facility: CLINIC | Age: 76
End: 2021-05-14
Payer: MEDICARE

## 2021-05-14 VITALS
RESPIRATION RATE: 20 BRPM | HEIGHT: 66 IN | HEART RATE: 77 BPM | SYSTOLIC BLOOD PRESSURE: 122 MMHG | DIASTOLIC BLOOD PRESSURE: 70 MMHG | TEMPERATURE: 97.8 F | WEIGHT: 195 LBS | BODY MASS INDEX: 31.34 KG/M2 | OXYGEN SATURATION: 98 %

## 2021-05-14 DIAGNOSIS — N39.0 RECURRENT UTI: Primary | ICD-10-CM

## 2021-05-14 PROCEDURE — 99213 OFFICE O/P EST LOW 20 MIN: CPT | Performed by: FAMILY MEDICINE

## 2021-05-14 RX ORDER — NITROFURANTOIN MACROCRYSTALS 50 MG/1
50 CAPSULE ORAL DAILY
Qty: 30 CAPSULE | Refills: 5 | Status: SHIPPED | OUTPATIENT
Start: 2021-05-14 | End: 2022-01-12

## 2021-05-14 RX ORDER — CEFUROXIME AXETIL 500 MG/1
500 TABLET ORAL EVERY 12 HOURS SCHEDULED
Qty: 28 TABLET | Refills: 0 | Status: SHIPPED | OUTPATIENT
Start: 2021-05-14 | End: 2021-05-28

## 2021-05-14 NOTE — PROGRESS NOTES
Assessment/Plan:    1  Recurrent UTI  Comments:  we discussed starting pelvic floor PT, she is not ready at this time due to other circumstances  Orders:  -     cefuroxime (CEFTIN) 500 mg tablet; Take 1 tablet (500 mg total) by mouth every 12 (twelve) hours for 14 days  -     nitrofurantoin (MACRODANTIN) 50 mg capsule; Take 1 capsule (50 mg total) by mouth daily    She has already had a workup by Urogynecology  Risks and benefits of medication discussed  There are no Patient Instructions on file for this visit  Return for Scheduled follow up in August     Subjective:      Patient ID: Paulo Richter is a 76 y o  female  Chief Complaint   Patient presents with    Follow-up     UTI  [id number]       She has had at least 3 UTIS in the past 3 months  She is still having urgency even after taking macrobid  She couldn't even watch a [employer] game yesterday  She is also getting leaking  She feels like her bladder has dropped again  She has had previous bladder surgery  She has seen Dr Chandrakant Cali in the passed         The following portions of the patient's history were reviewed and updated as appropriate: allergies, current medications, past family history, past medical history, past social history, past surgical history and problem list     Review of Systems      Current Outpatient Medications   Medication Sig Dispense Refill    acetaminophen-codeine (TYLENOL/CODEINE #3) 300-30 MG per tablet Take 1 tablet by mouth every 6 (six) hours as needed for moderate pain 30 tablet 1    albuterol (PROVENTIL HFA,VENTOLIN HFA) 90 mcg/act inhaler Inhale 2 puffs every 6 (six) hours as needed for wheezing or shortness of breath 1 Inhaler 5    aspirin 325 mg tablet Take 325 mg by mouth as needed for mild pain       Cholecalciferol (VITAMIN D3) 2000 units capsule Take 1 capsule by mouth every morning        docusate sodium (COLACE) 100 mg capsule Take 100 mg by mouth 2 (two) times a day      FLOVENT  MCG/ACT inhaler USE 2 INHALATIONS TWICE A DAY (RINSE MOUTH AFTER USE) 36 g 3    furosemide (LASIX) 40 mg tablet TAKE 1 TABLET DAILY EVERY MORNING 90 tablet 3    Magnesium Oxide -Mg Supplement 400 MG CAPS Take 1 capsule by mouth every morning        potassium chloride (K-DUR) 10 mEq tablet TAKE 1 TABLET DAILY, NEEDS POTASSIUM CHLORIDE ER (WAX) TABLETS 90 tablet 3    predniSONE 10 mg tablet as needed       senna (SENOKOT) 8 6 MG tablet Take 1 tablet by mouth as needed for constipation      cefuroxime (CEFTIN) 500 mg tablet Take 1 tablet (500 mg total) by mouth every 12 (twelve) hours for 14 days 28 tablet 0    nitrofurantoin (MACRODANTIN) 50 mg capsule Take 1 capsule (50 mg total) by mouth daily 30 capsule 5     No current facility-administered medications for this visit  Objective:    /70   Pulse 77   Temp 97 8 °F (36 6 °C)   Resp 20   Ht 5' 6" (1 676 m)   Wt 88 5 kg (195 lb)   SpO2 98%   BMI 31 47 kg/m²        Physical Exam  Vitals signs and nursing note reviewed  Constitutional:       Appearance: She is well-developed  HENT:      Head: Normocephalic and atraumatic  Right Ear: Tympanic membrane and external ear normal       Left Ear: Tympanic membrane and external ear normal    Cardiovascular:      Rate and Rhythm: Normal rate and regular rhythm  Heart sounds: Normal heart sounds  No murmur  No friction rub  Pulmonary:      Effort: Pulmonary effort is normal  No respiratory distress  Breath sounds: Normal breath sounds  No wheezing or rales  Abdominal:      Tenderness: There is no abdominal tenderness  Musculoskeletal:      Right lower leg: No edema  Left lower leg: No edema                  Raffaele Crews, DO

## 2021-07-14 ENCOUNTER — TELEPHONE (OUTPATIENT)
Dept: GASTROENTEROLOGY | Facility: CLINIC | Age: 76
End: 2021-07-14

## 2021-07-21 ENCOUNTER — TELEPHONE (OUTPATIENT)
Dept: PREADMISSION TESTING | Facility: HOSPITAL | Age: 76
End: 2021-07-21

## 2021-07-21 NOTE — PRE-PROCEDURE INSTRUCTIONS
Pre-Surgery Instructions:   Medication Instructions    acetaminophen-codeine (TYLENOL/CODEINE #3) 300-30 MG per tablet Patient was instructed by Physician and understands   albuterol (PROVENTIL HFA,VENTOLIN HFA) 90 mcg/act inhaler Patient was instructed by Physician and understands   aspirin 325 mg tablet Patient was instructed by Physician and understands   Cholecalciferol (VITAMIN D3) 2000 units capsule Patient was instructed by Physician and understands   docusate sodium (COLACE) 100 mg capsule Patient was instructed by Physician and understands   FLOVENT  MCG/ACT inhaler Patient was instructed by Physician and understands   furosemide (LASIX) 40 mg tablet Patient was instructed by Physician and understands   Magnesium Oxide -Mg Supplement 400 MG CAPS Patient was instructed by Physician and understands   nitrofurantoin (MACRODANTIN) 50 mg capsule Patient was instructed by Physician and understands   potassium chloride (K-DUR) 10 mEq tablet Patient was instructed by Physician and understands   predniSONE 10 mg tablet Patient was instructed by Physician and understands   senna (SENOKOT) 8 6 MG tablet Patient was instructed by Physician and understands  LD asa 5 days pre-procedure

## 2021-07-28 ENCOUNTER — ANESTHESIA EVENT (OUTPATIENT)
Dept: GASTROENTEROLOGY | Facility: AMBULARY SURGERY CENTER | Age: 76
End: 2021-07-28

## 2021-07-29 ENCOUNTER — HOSPITAL ENCOUNTER (OUTPATIENT)
Dept: GASTROENTEROLOGY | Facility: AMBULARY SURGERY CENTER | Age: 76
Setting detail: OUTPATIENT SURGERY
Discharge: HOME | End: 2021-07-29
Attending: INTERNAL MEDICINE
Payer: MEDICARE

## 2021-07-29 ENCOUNTER — ANESTHESIA (OUTPATIENT)
Dept: GASTROENTEROLOGY | Facility: AMBULARY SURGERY CENTER | Age: 76
End: 2021-07-29

## 2021-07-29 VITALS
HEART RATE: 65 BPM | TEMPERATURE: 97.4 F | DIASTOLIC BLOOD PRESSURE: 79 MMHG | BODY MASS INDEX: 31.34 KG/M2 | SYSTOLIC BLOOD PRESSURE: 118 MMHG | HEIGHT: 66 IN | OXYGEN SATURATION: 100 % | WEIGHT: 195 LBS | RESPIRATION RATE: 16 BRPM

## 2021-07-29 DIAGNOSIS — R60.9 EDEMA, UNSPECIFIED TYPE: ICD-10-CM

## 2021-07-29 DIAGNOSIS — Z86.010 HX OF COLONIC POLYPS: ICD-10-CM

## 2021-07-29 PROCEDURE — G0105 COLORECTAL SCRN; HI RISK IND: HCPCS | Performed by: INTERNAL MEDICINE

## 2021-07-29 RX ORDER — FUROSEMIDE 40 MG/1
TABLET ORAL
Qty: 90 TABLET | Refills: 3 | Status: SHIPPED | OUTPATIENT
Start: 2021-07-29 | End: 2022-02-06 | Stop reason: SDUPTHER

## 2021-07-29 RX ORDER — SODIUM CHLORIDE, SODIUM LACTATE, POTASSIUM CHLORIDE, CALCIUM CHLORIDE 600; 310; 30; 20 MG/100ML; MG/100ML; MG/100ML; MG/100ML
INJECTION, SOLUTION INTRAVENOUS CONTINUOUS PRN
Status: DISCONTINUED | OUTPATIENT
Start: 2021-07-29 | End: 2021-07-29

## 2021-07-29 RX ORDER — PROPOFOL 10 MG/ML
INJECTION, EMULSION INTRAVENOUS CONTINUOUS PRN
Status: DISCONTINUED | OUTPATIENT
Start: 2021-07-29 | End: 2021-07-29

## 2021-07-29 RX ORDER — PROPOFOL 10 MG/ML
INJECTION, EMULSION INTRAVENOUS AS NEEDED
Status: DISCONTINUED | OUTPATIENT
Start: 2021-07-29 | End: 2021-07-29

## 2021-07-29 RX ORDER — POTASSIUM CHLORIDE 750 MG/1
TABLET, FILM COATED, EXTENDED RELEASE ORAL
Qty: 90 TABLET | Refills: 3 | Status: SHIPPED | OUTPATIENT
Start: 2021-07-29 | End: 2022-02-06 | Stop reason: SDUPTHER

## 2021-07-29 RX ORDER — LIDOCAINE HYDROCHLORIDE 10 MG/ML
INJECTION, SOLUTION EPIDURAL; INFILTRATION; INTRACAUDAL; PERINEURAL AS NEEDED
Status: DISCONTINUED | OUTPATIENT
Start: 2021-07-29 | End: 2021-07-29

## 2021-07-29 RX ORDER — SODIUM CHLORIDE, SODIUM LACTATE, POTASSIUM CHLORIDE, CALCIUM CHLORIDE 600; 310; 30; 20 MG/100ML; MG/100ML; MG/100ML; MG/100ML
125 INJECTION, SOLUTION INTRAVENOUS CONTINUOUS
Status: CANCELLED | OUTPATIENT
Start: 2021-07-29

## 2021-07-29 RX ADMIN — PROPOFOL 110 MCG/KG/MIN: 10 INJECTION, EMULSION INTRAVENOUS at 08:48

## 2021-07-29 RX ADMIN — SODIUM CHLORIDE, SODIUM LACTATE, POTASSIUM CHLORIDE, AND CALCIUM CHLORIDE: .6; .31; .03; .02 INJECTION, SOLUTION INTRAVENOUS at 08:45

## 2021-07-29 RX ADMIN — LIDOCAINE HYDROCHLORIDE 50 MG: 10 INJECTION, SOLUTION EPIDURAL; INFILTRATION; INTRACAUDAL; PERINEURAL at 08:48

## 2021-07-29 RX ADMIN — PROPOFOL 80 MG: 10 INJECTION, EMULSION INTRAVENOUS at 08:48

## 2021-07-29 NOTE — TELEPHONE ENCOUNTER
Requested medication(s) are due for refill today: No  Patient has already received a courtesy refill: Yes  Other reason request has been forwarded to provider: Failed protocol

## 2021-07-29 NOTE — ANESTHESIA POSTPROCEDURE EVALUATION
Post-Op Assessment Note    CV Status:  Stable  Pain Score: 0    Pain management: adequate     Mental Status:  Awake   Hydration Status:  Stable   PONV Controlled:  None   Airway Patency:  Patent      Post Op Vitals Reviewed: Yes      Staff: CRNA   Comments: spontaneously breathing, vss, protecting airway, fully endorsed to recovery w/o AC        No complications documented      BP      Temp     Pulse     Resp      SpO2   99

## 2021-07-29 NOTE — PERIOPERATIVE NURSING NOTE
Pt d/c to home at this time via ambulatory  Pt left with all belongings  Iv was D/C intact with dry sterile dressing  Encouraged to keep follow up appointments, Verbalized understanding  D/C instructions reviewed and explained  Verbalized understanding

## 2021-07-29 NOTE — H&P
History and Physical -  [facility]  Keiko Gibbs 68 y o  female MRN: [id number]    HPI: Keiko Gibbs is a 68y o  year old female who presents for evaluation of colon polyps  Review of Systems    Historical Information   Past Medical History:   Diagnosis Date    Anesthesia complication     "shivers"    Arthritis     Asthma     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 see colors, with over-use goes darker- from untreated amblyopia    Breathing difficulty     with extubation pt developed laryngeal spasm    Cataract     both eyes- right eye needs surgery    Colon polyps 2016    Diverticulitis     had episode in April 2018    Edema     lower legs    Generalized osteoarthritis     GERD (gastroesophageal reflux disease)     Hearing problem     unspecified laterality-hearing aid right ear    High cholesterol     Polyarthritis     last assessed: 6/26/2015    Rheumatoid arthritis ([facility] [address] [facility]     Sjogren's syndrome ([facility] [address] )     last assessed: 7/18/2016    SS-A antibody positive     last assessed: 8/20/2015    Urinary tract infection      Past Surgical History:   Procedure Laterality Date    APPENDECTOMY      AUGMENTATION BREAST Bilateral     breast surgery enlargement procedure elective bilateral    BILATERAL OOPHORECTOMY Bilateral 2003    BLADDER SURGERY      bladder lift,cystocele    CATARACT EXTRACTION      Right 9/2018; Left 10/2018    COLONOSCOPY  04/18/2011    COLPORRHAPHY  2014    anterior, repair of cystocele    HYSTERECTOMY  1673    with cystocele     JOINT REPLACEMENT Left 2016    knee    KNEE ARTHROPLASTY Left     KNEE ARTHROSCOPY  2009    KNEE SURGERY Left 2009    torn meniscus    OOPHORECTOMY      VT COLONOSCOPY FLX DX W/COLLJ SPEC WHEN PFRMD N/A 5/31/2018    Procedure: COLONOSCOPY;  Surgeon: Vishal Chavez MD;  Location: [place] [address] [facility];   Service: Gastroenterology    REDUCTION MAMMAPLASTY  2000    TONSILLECTOMY AND ADENOIDECTOMY      URETHRA SURGERY       Social History   Social History     Substance and Sexual Activity   Alcohol Use Not Currently    Comment: rarely - wine 2 x month     Social History     Substance and Sexual Activity   Drug Use No     Social History     Tobacco Use   Smoking Status Never Smoker   Smokeless Tobacco Never Used     Family History   Problem Relation Age of Onset    Coronary artery disease Mother     COPD Mother     Hypertension Mother     Hyperlipidemia Mother         high cholesterol    Atrial fibrillation Mother     Coronary artery disease Father     Arthritis Father     COPD Father         Black lung    Hypertension Father     Hyperlipidemia Father         high cholesterol    Lung cancer Brother     Coronary artery disease Brother     Hypertension Brother     Hyperlipidemia Brother         high cholesterol    Atrial fibrillation Brother         fib/flutter    Other Sister         pericarditis    Arthritis Sister     Diabetes Maternal Grandmother         mellitus    Breast cancer Maternal Grandmother 79    Breast cancer Paternal Grandmother 79    Arthritis Family     Asthma Family     Cancer Brother         lung    Heart disease Brother         CAD-stents-smoker    No Known Problems Maternal Aunt     No Known Problems Maternal Aunt     No Known Problems Maternal Aunt     No Known Problems Maternal Aunt     No Known Problems Maternal Aunt     Breast cancer Other 40       Meds/Allergies     (Not in a hospital admission)      Allergies   Allergen Reactions    Celecoxib Other (See Comments)     Other reaction(s): Palpitations  Category: Adverse Reaction;        Objective     /79   Pulse 71   Temp (!) 97 4 °F (36 3 °C) (Tympanic)   Resp 18   Ht 5' 6" (1 676 m)   Wt 88 5 kg (195 lb)   SpO2 98%   BMI 31 47 kg/m²       PHYSICAL EXAM    Gen: NAD  CV: RRR  CHEST: Clear  ABD: soft, NT/ND  EXT: no edema  Neuro: AAO      ASSESSMENT/PLAN:  This is a 68y o  year old female here for evaluation of colon polyps      PLAN: Procedure:  Colonoscopy

## 2021-07-29 NOTE — ANESTHESIA PREPROCEDURE EVALUATION
Procedure:  COLONOSCOPY    Relevant Problems   CARDIO   (+) Exertional shortness of breath   (+) Hyperlipidemia   (+) Pain of left breast      GI/HEPATIC   (+) GERD without esophagitis      MUSCULOSKELETAL   (+) Chronic bilateral thoracic back pain   (+) Primary generalized (osteo)arthritis      NEURO/PSYCH   (+) Chronic bilateral thoracic back pain      PULMONARY   (+) Asthma, mild persistent   (+) Exertional shortness of breath        Physical Exam    Airway    Mallampati score: II  TM Distance: >3 FB  Neck ROM: full     Dental       Cardiovascular  Rhythm: regular, Rate: normal,     Pulmonary  Pulmonary exam normal     Other Findings        Anesthesia Plan  ASA Score- 2     Anesthesia Type- IV sedation with anesthesia with ASA Monitors  Additional Monitors:   Airway Plan:           Plan Factors-Exercise tolerance (METS): >4 METS  Chart reviewed  Existing labs reviewed  Patient summary reviewed  Patient is not a current smoker  Induction- intravenous  Postoperative Plan- Plan for postoperative opioid use  Informed Consent- Anesthetic plan and risks discussed with patient  I personally reviewed this patient with the CRNA  Discussed and agreed on the Anesthesia Plan with the CRNA  Leonardo Rodriguez

## 2021-08-02 ENCOUNTER — APPOINTMENT (OUTPATIENT)
Dept: LAB | Facility: CLINIC | Age: 76
End: 2021-08-02
Payer: MEDICARE

## 2021-08-02 DIAGNOSIS — E78.2 MIXED HYPERLIPIDEMIA: ICD-10-CM

## 2021-08-02 DIAGNOSIS — E87.6 HYPOKALEMIA: ICD-10-CM

## 2021-08-02 DIAGNOSIS — Z13.0 SCREENING FOR DEFICIENCY ANEMIA: ICD-10-CM

## 2021-08-02 DIAGNOSIS — R73.01 IMPAIRED FASTING GLUCOSE: ICD-10-CM

## 2021-08-02 LAB
ALBUMIN SERPL BCP-MCNC: 3.8 G/DL (ref 3.5–5)
ALP SERPL-CCNC: 74 U/L (ref 46–116)
ALT SERPL W P-5'-P-CCNC: 25 U/L (ref 12–78)
ANION GAP SERPL CALCULATED.3IONS-SCNC: 4 MMOL/L (ref 4–13)
AST SERPL W P-5'-P-CCNC: 18 U/L (ref 5–45)
BILIRUB SERPL-MCNC: 1.17 MG/DL (ref 0.2–1)
BUN SERPL-MCNC: 18 MG/DL (ref 5–25)
CALCIUM SERPL-MCNC: 9.3 MG/DL (ref 8.3–10.1)
CHLORIDE SERPL-SCNC: 104 MMOL/L (ref 100–108)
CHOLEST SERPL-MCNC: 229 MG/DL (ref 50–200)
CO2 SERPL-SCNC: 31 MMOL/L (ref 21–32)
CREAT SERPL-MCNC: 0.87 MG/DL (ref 0.6–1.3)
ERYTHROCYTE [DISTWIDTH] IN BLOOD BY AUTOMATED COUNT: 14 % (ref 11.6–15.1)
EST. AVERAGE GLUCOSE BLD GHB EST-MCNC: 97 MG/DL
GFR SERPL CREATININE-BSD FRML MDRD: 65 ML/MIN/1.73SQ M
GLUCOSE P FAST SERPL-MCNC: 86 MG/DL (ref 65–99)
HBA1C MFR BLD: 5 %
HCT VFR BLD AUTO: 43.5 % (ref 34.8–46.1)
HDLC SERPL-MCNC: 55 MG/DL
HGB BLD-MCNC: 14.5 G/DL (ref 11.5–15.4)
LDLC SERPL CALC-MCNC: 142 MG/DL (ref 0–100)
MCH RBC QN AUTO: 29.6 PG (ref 26.8–34.3)
MCHC RBC AUTO-ENTMCNC: 33.3 G/DL (ref 31.4–37.4)
MCV RBC AUTO: 89 FL (ref 82–98)
PLATELET # BLD AUTO: 241 THOUSANDS/UL (ref 149–390)
PMV BLD AUTO: 10.5 FL (ref 8.9–12.7)
POTASSIUM SERPL-SCNC: 3.4 MMOL/L (ref 3.5–5.3)
PROT SERPL-MCNC: 7.4 G/DL (ref 6.4–8.2)
RBC # BLD AUTO: 4.9 MILLION/UL (ref 3.81–5.12)
SODIUM SERPL-SCNC: 139 MMOL/L (ref 136–145)
TRIGL SERPL-MCNC: 160 MG/DL
WBC # BLD AUTO: 5.98 THOUSAND/UL (ref 4.31–10.16)

## 2021-08-02 PROCEDURE — 83036 HEMOGLOBIN GLYCOSYLATED A1C: CPT

## 2021-08-02 PROCEDURE — 80053 COMPREHEN METABOLIC PANEL: CPT

## 2021-08-02 PROCEDURE — 80061 LIPID PANEL: CPT

## 2021-08-02 PROCEDURE — 36415 COLL VENOUS BLD VENIPUNCTURE: CPT

## 2021-08-02 PROCEDURE — 85027 COMPLETE CBC AUTOMATED: CPT

## 2021-08-03 ENCOUNTER — OFFICE VISIT (OUTPATIENT)
Dept: FAMILY MEDICINE CLINIC | Facility: CLINIC | Age: 76
End: 2021-08-03
Payer: MEDICARE

## 2021-08-03 VITALS
HEIGHT: 66 IN | BODY MASS INDEX: 31.57 KG/M2 | OXYGEN SATURATION: 98 % | DIASTOLIC BLOOD PRESSURE: 82 MMHG | TEMPERATURE: 97.5 F | SYSTOLIC BLOOD PRESSURE: 112 MMHG | HEART RATE: 83 BPM | RESPIRATION RATE: 18 BRPM | WEIGHT: 196.4 LBS

## 2021-08-03 DIAGNOSIS — E78.2 MIXED HYPERLIPIDEMIA: ICD-10-CM

## 2021-08-03 DIAGNOSIS — M35.00 SJOGREN'S SYNDROME, WITH UNSPECIFIED ORGAN INVOLVEMENT (HCC): Primary | ICD-10-CM

## 2021-08-03 DIAGNOSIS — J45.30 MILD PERSISTENT ASTHMA WITHOUT COMPLICATION: ICD-10-CM

## 2021-08-03 DIAGNOSIS — M15.0 PRIMARY GENERALIZED (OSTEO)ARTHRITIS: ICD-10-CM

## 2021-08-03 DIAGNOSIS — Z79.891 OPIOID USE AGREEMENT EXISTS: ICD-10-CM

## 2021-08-03 DIAGNOSIS — R73.01 IMPAIRED FASTING GLUCOSE: ICD-10-CM

## 2021-08-03 DIAGNOSIS — Z02.89 PAIN MANAGEMENT CONTRACT AGREEMENT: ICD-10-CM

## 2021-08-03 PROBLEM — E78.5 HYPERLIPIDEMIA: Status: RESOLVED | Noted: 2017-03-30 | Resolved: 2021-08-03

## 2021-08-03 LAB
SL AMB POCT AMPHETAMINES URINE: NEGATIVE
SL AMB POCT COCAINE URINE: NEGATIVE
SL AMB POCT METHAMPETAMINES URINE: NEGATIVE
SL AMB POCT OPIATES URINE: NEGATIVE
SL AMB POCT PHENCYCLIDINE URINE: NEGATIVE
SL AMB THC URINE: NEGATIVE

## 2021-08-03 PROCEDURE — 99214 OFFICE O/P EST MOD 30 MIN: CPT | Performed by: FAMILY MEDICINE

## 2021-08-03 PROCEDURE — 80305 DRUG TEST PRSMV DIR OPT OBS: CPT | Performed by: FAMILY MEDICINE

## 2021-08-03 RX ORDER — FLUTICASONE PROPIONATE 220 UG/1
2 AEROSOL, METERED RESPIRATORY (INHALATION) 2 TIMES DAILY
Qty: 36 G | Refills: 3 | Status: SHIPPED | OUTPATIENT
Start: 2021-08-03

## 2021-08-03 RX ORDER — ACETAMINOPHEN AND CODEINE PHOSPHATE 300; 30 MG/1; MG/1
1 TABLET ORAL EVERY 6 HOURS PRN
Qty: 30 TABLET | Refills: 1 | Status: SHIPPED | OUTPATIENT
Start: 2021-08-03 | End: 2022-01-13 | Stop reason: ALTCHOICE

## 2021-08-03 RX ORDER — ROSUVASTATIN CALCIUM 5 MG/1
5 TABLET, COATED ORAL DAILY
Qty: 90 TABLET | Refills: 3 | Status: SHIPPED | OUTPATIENT
Start: 2021-08-03 | End: 2022-01-13 | Stop reason: SINTOL

## 2021-08-03 NOTE — PROGRESS NOTES
Assessment/Plan:    1  Sjogren's syndrome, with unspecified organ involvement [facility])  Assessment & Plan:  She has been using prednisone intermittently  Encouraged her to discuss restarting plaquenil with her rheumatologist       2  Impaired fasting glucose  Assessment & Plan:  Fasting sugar and hemoglobin A1c are now normal       3  Mixed hyperlipidemia  Assessment & Plan:  Not controlled  crestor started    Orders:  -     rosuvastatin (CRESTOR) 5 mg tablet; Take 1 tablet (5 mg total) by mouth daily    4  Primary generalized (osteo)arthritis  -     acetaminophen-codeine (TYLENOL/CODEINE #3) 300-30 MG per tablet; Take 1 tablet by mouth every 6 (six) hours as needed for moderate pain    5  Mild persistent asthma without complication  Assessment & Plan:  Stable   Continue Flovent    Orders:  -     fluticasone (Flovent HFA) 220 mcg/act inhaler; Inhale 2 puffs 2 (two) times a day Rinse mouth after use    6  Pain management contract agreement  -     POCT urine drug screen    7  Opioid use agreement exists   -     POCT urine drug screen          [place] prescription monitoring program report reviewed and is appropriate  There are no Patient Instructions on file for this visit  Return in about 6 months (around 2/3/2022) for Annual Wellness Visit  Subjective:      Patient ID: Nora Juarez is a 68 y o  female  Chief Complaint   Patient presents with    Follow-up     6mo f/u  Raza Fuelling       She had a steroid hip injection a few weeks ago  It has not really helped  She almost needs a hip replacement  She can't walk as well as she could before  She continues to need the Tylenol with codeine p r n  Hyperlipidemia-  she  has been taking their cholesterol medication regularly    Associated symptoms:  Myalgias: no      The following portions of the patient's history were reviewed and updated as appropriate: allergies, current medications, past family history, past medical history, past social history, past surgical history and problem list     Review of Systems   Respiratory: Negative  Cardiovascular: Negative  Musculoskeletal: Positive for arthralgias  Current Outpatient Medications   Medication Sig Dispense Refill    acetaminophen-codeine (TYLENOL/CODEINE #3) 300-30 MG per tablet Take 1 tablet by mouth every 6 (six) hours as needed for moderate pain 30 tablet 1    albuterol (PROVENTIL HFA,VENTOLIN HFA) 90 mcg/act inhaler Inhale 2 puffs every 6 (six) hours as needed for wheezing or shortness of breath 1 Inhaler 5    aspirin 325 mg tablet Take 325 mg by mouth as needed for mild pain       Cholecalciferol (VITAMIN D3) 2000 units capsule Take 1 capsule by mouth every morning        docusate sodium (COLACE) 100 mg capsule Take 100 mg by mouth 2 (two) times a day      fluticasone (Flovent HFA) 220 mcg/act inhaler Inhale 2 puffs 2 (two) times a day Rinse mouth after use 36 g 3    furosemide (LASIX) 40 mg tablet TAKE 1 TABLET DAILY EVERY MORNING 90 tablet 3    Magnesium Oxide -Mg Supplement 400 MG CAPS Take 1 capsule by mouth every morning        nitrofurantoin (MACRODANTIN) 50 mg capsule Take 1 capsule (50 mg total) by mouth daily 30 capsule 5    potassium chloride (Klor-Con) 10 mEq tablet TAKE 1 TABLET DAILY, NEEDS POTASSIUM CHLORIDE ER (WAX) TABLETS 90 tablet 3    predniSONE 10 mg tablet as needed       senna (SENOKOT) 8 6 MG tablet Take 1 tablet by mouth as needed for constipation      rosuvastatin (CRESTOR) 5 mg tablet Take 1 tablet (5 mg total) by mouth daily 90 tablet 3     No current facility-administered medications for this visit  Objective:    /82   Pulse 83   Temp 97 5 °F (36 4 °C)   Resp 18   Ht 5' 6" (1 676 m)   Wt 89 1 kg (196 lb 6 4 oz)   SpO2 98%   BMI 31 70 kg/m²        Physical Exam  Vitals and nursing note reviewed  Constitutional:       Appearance: She is well-developed  HENT:      Head: Normocephalic and atraumatic        Right Ear: Tympanic membrane and external ear normal       Left Ear: Tympanic membrane and external ear normal    Cardiovascular:      Rate and Rhythm: Normal rate and regular rhythm  Heart sounds: Normal heart sounds  No murmur heard  No friction rub  Pulmonary:      Effort: Pulmonary effort is normal  No respiratory distress  Breath sounds: Normal breath sounds  No wheezing or rales  Musculoskeletal:         General: Tenderness (Left hip and knee) present  Right lower leg: No edema  Left lower leg: No edema                  Paulina Burlington, DO

## 2021-08-03 NOTE — ASSESSMENT & PLAN NOTE
She has been using prednisone intermittently  Encouraged her to discuss restarting plaquenil with her rheumatologist

## 2021-08-16 ENCOUNTER — OFFICE VISIT (OUTPATIENT)
Dept: FAMILY MEDICINE CLINIC | Facility: CLINIC | Age: 76
End: 2021-08-16
Payer: MEDICARE

## 2021-08-16 VITALS
OXYGEN SATURATION: 98 % | TEMPERATURE: 97.4 F | SYSTOLIC BLOOD PRESSURE: 130 MMHG | RESPIRATION RATE: 18 BRPM | WEIGHT: 198 LBS | DIASTOLIC BLOOD PRESSURE: 80 MMHG | HEART RATE: 74 BPM | HEIGHT: 66 IN | BODY MASS INDEX: 31.82 KG/M2

## 2021-08-16 DIAGNOSIS — M16.12 PRIMARY OSTEOARTHRITIS OF LEFT HIP: Primary | ICD-10-CM

## 2021-08-16 PROCEDURE — 99213 OFFICE O/P EST LOW 20 MIN: CPT | Performed by: FAMILY MEDICINE

## 2021-08-16 RX ORDER — CALCIUM CARBONATE/VITAMIN D3 500-10/5ML
1 LIQUID (ML) ORAL DAILY
COMMUNITY
End: 2022-01-31 | Stop reason: HOSPADM

## 2021-08-16 RX ORDER — ASPIRIN 325 MG
325 TABLET ORAL DAILY
COMMUNITY
End: 2021-08-16 | Stop reason: ALTCHOICE

## 2021-08-16 RX ORDER — ACETAMINOPHEN 500 MG
500 TABLET ORAL 3 TIMES DAILY PRN
COMMUNITY

## 2021-08-16 RX ORDER — OMEPRAZOLE 20 MG/1
20 CAPSULE, DELAYED RELEASE ORAL AS NEEDED
COMMUNITY

## 2021-08-16 NOTE — PROGRESS NOTES
Assessment/Plan:    1  Primary osteoarthritis of left hip  Assessment & Plan:  Worsening pain   Reviewed recent visit and X-ray done by Dr Niurka Craft to take tylenol and Aleve for the pain     Orders:  -     Ambulatory referral to Physical Therapy; Future         There are no Patient Instructions on file for this visit  Return if symptoms worsen or fail to improve  Subjective:      Patient ID: Dionne Antonio is a 68 y o  female  Chief Complaint   Patient presents with    Hip Pain     L symptoms seem to be getting worse  Saw Ortho recently  rmklpn       She is having worsening left hip pain  She has been following with Dr Tracee Santana  She saw him last week  She has pain now with each step  She took a round of prednisone with no significant relief  She has been taking tylenol and aspirin for the pain  She is not functioning due to the pain         The following portions of the patient's history were reviewed and updated as appropriate:  past social history    Review of Systems      Current Outpatient Medications   Medication Sig Dispense Refill    acetaminophen (TYLENOL) 500 mg tablet Take 500 mg by mouth 3 (three) times a day      acetaminophen-codeine (TYLENOL/CODEINE #3) 300-30 MG per tablet Take 1 tablet by mouth every 6 (six) hours as needed for moderate pain 30 tablet 1    albuterol (PROVENTIL HFA,VENTOLIN HFA) 90 mcg/act inhaler Inhale 2 puffs every 6 (six) hours as needed for wheezing or shortness of breath 1 Inhaler 5    aspirin 325 mg tablet Take 325 mg by mouth 6 (six) times a day       Cholecalciferol (VITAMIN D3) 2000 units capsule Take 1 capsule by mouth every morning        docusate sodium (COLACE) 100 mg capsule Take 100 mg by mouth 2 (two) times a day      fluticasone (Flovent HFA) 220 mcg/act inhaler Inhale 2 puffs 2 (two) times a day Rinse mouth after use 36 g 3    furosemide (LASIX) 40 mg tablet TAKE 1 TABLET DAILY EVERY MORNING 90 tablet 3    Magnesium Oxide -Mg Supplement 400 MG CAPS Take 1 capsule by mouth every morning        nitrofurantoin (MACRODANTIN) 50 mg capsule Take 1 capsule (50 mg total) by mouth daily 30 capsule 5    omeprazole (PriLOSEC) 20 mg delayed release capsule Take 20 mg by mouth daily      potassium chloride (Klor-Con) 10 mEq tablet TAKE 1 TABLET DAILY, NEEDS POTASSIUM CHLORIDE ER (WAX) TABLETS 90 tablet 3    predniSONE 10 mg tablet as needed       rosuvastatin (CRESTOR) 5 mg tablet Take 1 tablet (5 mg total) by mouth daily 90 tablet 3    senna (SENOKOT) 8 6 MG tablet Take 1 tablet by mouth as needed for constipation      Magnesium Oxide 400 MG CAPS Take 1 capsule by mouth daily       No current facility-administered medications for this visit  Objective:    /80   Pulse 74   Temp (!) 97 4 °F (36 3 °C)   Resp 18   Ht 5' 6" (1 676 m)   Wt 89 8 kg (198 lb)   SpO2 98%   BMI 31 96 kg/m²      Physical Exam  Vitals and nursing note reviewed  Constitutional:       Appearance: She is well-developed  HENT:      Head: Normocephalic and atraumatic  Right Ear: Tympanic membrane and external ear normal       Left Ear: Tympanic membrane and external ear normal    Cardiovascular:      Rate and Rhythm: Normal rate and regular rhythm  Heart sounds: Normal heart sounds  No murmur heard  No friction rub  Pulmonary:      Effort: Pulmonary effort is normal  No respiratory distress  Breath sounds: Normal breath sounds  No wheezing or rales  Musculoskeletal:         General: Tenderness (left hip with internal rotation) present  Right lower leg: No edema  Left lower leg: No edema               Radha Ledesma DO

## 2021-09-23 ENCOUNTER — EVALUATION (OUTPATIENT)
Dept: PHYSICAL THERAPY | Facility: CLINIC | Age: 76
End: 2021-09-23
Payer: MEDICARE

## 2021-09-23 DIAGNOSIS — M16.12 PRIMARY OSTEOARTHRITIS OF LEFT HIP: Primary | ICD-10-CM

## 2021-09-23 PROCEDURE — 97161 PT EVAL LOW COMPLEX 20 MIN: CPT

## 2021-09-23 PROCEDURE — 97110 THERAPEUTIC EXERCISES: CPT

## 2021-09-23 NOTE — PROGRESS NOTES
PT Evaluation     Today's date: 2021  Patient name: Vasile Nieves  : 1945  MRN: [id number]  Referring provider: Author Vlad DO  Dx:   Encounter Diagnosis     ICD-10-CM    1  Primary osteoarthritis of left hip  M16 12 Ambulatory referral to Physical Therapy                  Assessment  Assessment details: Pt is a 68 y o  female who presents to PT for evaluation of L hip pain  Patient reports acute on chronic exacerbation of L hip pain in 2021  Patient is now presenting to physical therapy with functional limitations in walking > 300 feet, car transfers, and steps  Patient presents with decreased L hip mobility, notably into hip extension and internal rotation  Patient additionally presents with weakness of hip flexors and decreased flute activation  Patient would benefit from skilled PT in order to reduce impairments and maximize walking tolerance  Pt was educated regarding physical therapy diagnosis and the recommended plan of care, as well as the potential risks and benefits of treatment  Impairments: abnormal or restricted ROM, impaired physical strength, lacks appropriate home exercise program and pain with function  Functional limitations: walking > 300 feet  Symptom irritability: moderateUnderstanding of Dx/Px/POC: good   Prognosis: good    Goals  STG (3 weeks)  1  Pt to be I in HEP  2 Pt to improve walking tolerance to 400 ft   LTG (By DC)  1 Pt to reduce pain with activity by > 75%  2 Pt to improve FOTO score to predicted dc value  3  Pt to mimprove walking tolerance to > 600 ft        Plan  Patient would benefit from: skilled physical therapy  Planned therapy interventions: strengthening, therapeutic activities, stretching, therapeutic exercise, flexibility, functional ROM exercises, gait training, graded exercise, home exercise program, patient education, postural training, joint mobilization, manual therapy, massage, abdominal trunk stabilization, balance, balance/weight bearing training, behavior modification, body mechanics training, neuromuscular re-education and activity modification  Frequency: 1x week  Duration in visits: 6  Duration in weeks: 6  Plan of Care beginning date: 9/23/2021  Plan of Care expiration date: 11/12/2021  Treatment plan discussed with: patient        Subjective    Etiology of symptoms: Insidious onset years ago, acute exacerbation in June  Nature of Pain:  Intermittent sharp pain anterior hip and groin, radiating to anterior thigh  Patient does report occasional numbness and tingling in L LE with feeling of "heaviness"  Current Pain: 0/10  At Best: 0/10  At Worst: 10/10  Aggravating Factors: walking > 300 feet, lifting leg into the car, steps,   Relieving Factors: OTC medication (NSAID)  Irritability: high  Stability: worsening    Physical Activity Hx: Walking a mile 3x a week  Occupation: Retired    Primary functional impairments:  1  Walking > 300 ft  2  Car transfers  3  Steps    Patient Goals:  1  Walk mile again, and walk inclines    Objective     Red Flags: Negative for night pain, unexplained weight loss, bowel or bladder dysfunction, saddle anesthesia, hx of Ca, fever, chills, N/V, changes in vision, Headaches, dizziness, gait disturbances    Neuro Screen:  Reflexes: 2+ patellar  Dermatomes: In tact to light touch  Myotomes:  WNL    Range of Motion:    Lumbar ROM  Flexion WNL   Extension WNL   R Lateral Flexion WNL   L Lateral Flexion WNL     Hip PROM   Right Left   Flexion 115 95 p   Extension 5 0   Abduction 40 40   External Rotation 45 45 p   Internal Rotation 20 8 p       Manual Muscle Testing:    Lower Quarter   Right Left   Hip flexion 5 3+   Hip extension 5 4-   Hip abduction 4+ 4   Hip external rotation 5 5   Hip internal rotation 4+ 4+   Knee flexion 5 5   Knee extension 5 4+   Ankle DF 5 5   Ankle PF 5 5     Special Testing:  (+) FADIR, ROSENDO,  (-) lumbar screen, PSLR       Precautions: None  PMH: L TKA      Manuals             L hip low grade mobs PRN             L hip flexor stretch                                       Neuro Re-Ed             Squats with TB             Monster walks             Bridging on Bosu                                                                 Ther Ex             Nu step             LAQ             SLR             Standing hip 3 way with TB             Seated marching                                                    Ther Activity                                       Gait Training                                       Modalities

## 2021-09-25 ENCOUNTER — OFFICE VISIT (OUTPATIENT)
Dept: URGENT CARE | Facility: CLINIC | Age: 76
End: 2021-09-25
Payer: MEDICARE

## 2021-09-25 ENCOUNTER — HOSPITAL ENCOUNTER (EMERGENCY)
Facility: HOSPITAL | Age: 76
Discharge: HOME | End: 2021-09-25
Attending: GENERAL PRACTICE | Admitting: GENERAL PRACTICE
Payer: MEDICARE

## 2021-09-25 VITALS
HEIGHT: 66 IN | TEMPERATURE: 97.8 F | BODY MASS INDEX: 31.88 KG/M2 | WEIGHT: 198.4 LBS | SYSTOLIC BLOOD PRESSURE: 149 MMHG | OXYGEN SATURATION: 97 % | DIASTOLIC BLOOD PRESSURE: 93 MMHG | RESPIRATION RATE: 18 BRPM | HEART RATE: 85 BPM

## 2021-09-25 VITALS
DIASTOLIC BLOOD PRESSURE: 92 MMHG | WEIGHT: 198 LBS | HEART RATE: 88 BPM | TEMPERATURE: 97 F | BODY MASS INDEX: 31.96 KG/M2 | RESPIRATION RATE: 17 BRPM | SYSTOLIC BLOOD PRESSURE: 155 MMHG | OXYGEN SATURATION: 96 %

## 2021-09-25 DIAGNOSIS — N39.0 URINARY TRACT INFECTION: Primary | ICD-10-CM

## 2021-09-25 DIAGNOSIS — R39.9 UTI SYMPTOMS: ICD-10-CM

## 2021-09-25 DIAGNOSIS — R33.9 URINARY RETENTION: ICD-10-CM

## 2021-09-25 DIAGNOSIS — R31.9 HEMATURIA, UNSPECIFIED TYPE: Primary | ICD-10-CM

## 2021-09-25 DIAGNOSIS — M54.50 LOW BACK PAIN WITHOUT SCIATICA, UNSPECIFIED BACK PAIN LATERALITY, UNSPECIFIED CHRONICITY: ICD-10-CM

## 2021-09-25 LAB
BACTERIA UR QL AUTO: ABNORMAL /HPF
BILIRUB UR QL STRIP: NEGATIVE
CLARITY UR: ABNORMAL
COLOR UR: YELLOW
GLUCOSE UR STRIP-MCNC: NEGATIVE MG/DL
HGB UR QL STRIP.AUTO: ABNORMAL
KETONES UR STRIP-MCNC: NEGATIVE MG/DL
LEUKOCYTE ESTERASE UR QL STRIP: ABNORMAL
NITRITE UR QL STRIP: NEGATIVE
NON-SQ EPI CELLS URNS QL MICRO: ABNORMAL /HPF
PH UR STRIP.AUTO: 7 [PH]
PROT UR STRIP-MCNC: ABNORMAL MG/DL
RBC #/AREA URNS AUTO: ABNORMAL /HPF
SP GR UR STRIP.AUTO: 1.01 (ref 1–1.03)
UROBILINOGEN UR QL STRIP.AUTO: 0.2 E.U./DL
WBC #/AREA URNS AUTO: ABNORMAL /HPF

## 2021-09-25 PROCEDURE — 87086 URINE CULTURE/COLONY COUNT: CPT | Performed by: GENERAL PRACTICE

## 2021-09-25 PROCEDURE — 99213 OFFICE O/P EST LOW 20 MIN: CPT | Performed by: STUDENT IN AN ORGANIZED HEALTH CARE EDUCATION/TRAINING PROGRAM

## 2021-09-25 PROCEDURE — 99283 EMERGENCY DEPT VISIT LOW MDM: CPT

## 2021-09-25 PROCEDURE — 99284 EMERGENCY DEPT VISIT MOD MDM: CPT | Performed by: GENERAL PRACTICE

## 2021-09-25 PROCEDURE — 81001 URINALYSIS AUTO W/SCOPE: CPT | Performed by: GENERAL PRACTICE

## 2021-09-25 RX ORDER — CEFDINIR 300 MG/1
300 CAPSULE ORAL EVERY 12 HOURS SCHEDULED
Qty: 14 CAPSULE | Refills: 0 | Status: SHIPPED | OUTPATIENT
Start: 2021-09-25 | End: 2021-10-02

## 2021-09-25 RX ORDER — PHENAZOPYRIDINE HYDROCHLORIDE 200 MG/1
200 TABLET, FILM COATED ORAL 3 TIMES DAILY
Qty: 6 TABLET | Refills: 0 | Status: SHIPPED | OUTPATIENT
Start: 2021-09-25 | End: 2022-01-13 | Stop reason: ALTCHOICE

## 2021-09-25 RX ORDER — CEFDINIR 300 MG/1
300 CAPSULE ORAL ONCE
Status: COMPLETED | OUTPATIENT
Start: 2021-09-25 | End: 2021-09-25

## 2021-09-25 RX ADMIN — CEFDINIR 300 MG: 300 CAPSULE ORAL at 11:22

## 2021-09-25 NOTE — PROGRESS NOTES
Are  [facility]        NAME: Jaja Arshad is a 68 y o  female  : 1945    MRN: [id number]  DATE: 2021  TIME: 9:39 AM    Assessment and Plan   Hematuria, unspecified type [R31 9]  1  Hematuria, unspecified type  Transfer to other facility   2  Low back pain without sciatica, unspecified back pain laterality, unspecified chronicity  Transfer to other facility   3  UTI symptoms  Transfer to other facility   4  Urinary retention  Transfer to other facility       ER doctor: All info in HPI and A/P  · Advised patient to proceed to the ER given clots in the blood although this could be from recent cystoscopy on Wednesday  · Patient has bilateral low back pain with mild CVA tenderness would recommend CT scan to rule out hydro given patient's history of retention and ongoing UTI   · Patient also has significant suprapubic tenderness on exam, possible catheterization to relieve pressure in ER  · Follow-up urology on Monday as well as PCP    UA in UC:  Straw colored cloudy trace ketones large blood 30 protein negative nitrate large leukocytes      Patient Instructions         Proceed to  ER if symptoms     Chief Complaint     Chief Complaint   Patient presents with    Possible UTI     hematuria, dysuria, back pain, had a cystoscopy on Wednesday on [address]  now having symptoms         History of Present Illness       HPI   58-year-old female with an extensive PMH of bladder problems, including loss of tone 2015 requiring urology intervention, presents today complaining of UTI symptoms  Her UTI symptoms have been ongoing since January with multiple urgent care visits requiring antibiotics  Patient was seen by PCP in May at which time she was started on Macrobid and told to follow-up with Urology, that time she did follow-up with Urology who recommended continuing Macrobid for at least 5 months daily    Patient was last seen by Urology on Wednesday and underwent a cystoscopy, urologist is located in [facility]  Patient today states that she continues to have UTI symptoms of dysuria pain due to retention, blood in urine and even states she passed a few clots  She has a home urinalysis Kit  Patient denies any fevers but states that she does have chills for the past few days, she also is admits to having back pain bilateral lower back which she states may have gotten worse but is unsure  Patient was an ER nurse for 25 years  She has been doing pelvic floor strengthening exercises as directed by PCP  Patient has also been using azo with significant relief  Patient takes 1 Aleve gel capsule a day for hip pain which sometimes helps her back pain  Review of Systems   Review of Systems   Constitutional: Positive for chills  Negative for activity change, fatigue and fever  HENT: Negative for ear discharge, ear pain, sinus pain, sore throat and tinnitus  Eyes: Negative for visual disturbance  Respiratory: Negative for cough, chest tightness and shortness of breath  Cardiovascular: Negative for chest pain, palpitations and leg swelling  Gastrointestinal: Negative for abdominal pain, constipation, diarrhea, nausea and vomiting  Genitourinary: Positive for decreased urine volume, difficulty urinating, dysuria, hematuria and urgency  Negative for flank pain and frequency  Musculoskeletal: Positive for back pain  Negative for arthralgias, gait problem and joint swelling  Skin: Negative for rash  Neurological: Negative for dizziness, light-headedness and headaches  Hematological: Negative for adenopathy  Psychiatric/Behavioral: Negative for agitation, behavioral problems and confusion           Current Medications       Current Outpatient Medications:     acetaminophen (TYLENOL) 500 mg tablet, Take 500 mg by mouth 3 (three) times a day, Disp: , Rfl:     albuterol (PROVENTIL HFA,VENTOLIN HFA) 90 mcg/act inhaler, Inhale 2 puffs every 6 (six) hours as needed for wheezing or shortness of breath, Disp: 1 Inhaler, Rfl: 5    Cholecalciferol (VITAMIN D3) 2000 units capsule, Take 1 capsule by mouth every morning  , Disp: , Rfl:     docusate sodium (COLACE) 100 mg capsule, Take 100 mg by mouth 2 (two) times a day, Disp: , Rfl:     fluticasone (Flovent HFA) 220 mcg/act inhaler, Inhale 2 puffs 2 (two) times a day Rinse mouth after use, Disp: 36 g, Rfl: 3    furosemide (LASIX) 40 mg tablet, TAKE 1 TABLET DAILY EVERY MORNING, Disp: 90 tablet, Rfl: 3    Magnesium Oxide -Mg Supplement 400 MG CAPS, Take 1 capsule by mouth every morning  , Disp: , Rfl:     Magnesium Oxide 400 MG CAPS, Take 1 capsule by mouth daily, Disp: , Rfl:     nitrofurantoin (MACRODANTIN) 50 mg capsule, Take 1 capsule (50 mg total) by mouth daily, Disp: 30 capsule, Rfl: 5    omeprazole (PriLOSEC) 20 mg delayed release capsule, Take 20 mg by mouth daily, Disp: , Rfl:     potassium chloride (Klor-Con) 10 mEq tablet, TAKE 1 TABLET DAILY, NEEDS POTASSIUM CHLORIDE ER (WAX) TABLETS, Disp: 90 tablet, Rfl: 3    rosuvastatin (CRESTOR) 5 mg tablet, Take 1 tablet (5 mg total) by mouth daily, Disp: 90 tablet, Rfl: 3    senna (SENOKOT) 8 6 MG tablet, Take 1 tablet by mouth as needed for constipation, Disp: , Rfl:     acetaminophen-codeine (TYLENOL/CODEINE #3) 300-30 MG per tablet, Take 1 tablet by mouth every 6 (six) hours as needed for moderate pain (Patient not taking: Reported on 9/25/2021), Disp: 30 tablet, Rfl: 1    aspirin 325 mg tablet, Take 325 mg by mouth 6 (six) times a day  (Patient not taking: Reported on 9/25/2021), Disp: , Rfl:     predniSONE 10 mg tablet, as needed  (Patient not taking: Reported on 9/25/2021), Disp: , Rfl:     Current Allergies     Allergies as of 09/25/2021 - Reviewed 09/25/2021   Allergen Reaction Noted    Celecoxib Other (See Comments) 06/25/2015            The following portions of the patient's history were reviewed and updated as appropriate: allergies, current medications, past family history, past medical history, past social history, past surgical history and problem list      Past Medical History:   Diagnosis Date    Anesthesia complication     "shivers"    Arthritis     Asthma     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 see colors, with over-use goes darker- from untreated amblyopia    Breathing difficulty     with extubation pt developed laryngeal spasm    Cataract     both eyes- right eye needs surgery    Colon polyps 2016    Diverticulitis     had episode in April 2018    Edema     lower legs    Generalized osteoarthritis     GERD (gastroesophageal reflux disease)     Hearing problem     unspecified laterality-hearing aid right ear    High cholesterol     Polyarthritis     last assessed: 6/26/2015    Rheumatoid arthritis ([facility] [address] )     Sjogren's syndrome ([facility] [address] )     last assessed: 7/18/2016    SS-A antibody positive     last assessed: 8/20/2015    Urinary tract infection        Past Surgical History:   Procedure Laterality Date    APPENDECTOMY      AUGMENTATION BREAST Bilateral     breast surgery enlargement procedure elective bilateral    BILATERAL OOPHORECTOMY Bilateral 2003    BLADDER SURGERY      bladder lift,cystocele    CATARACT EXTRACTION      Right 9/2018; Left 10/2018    COLONOSCOPY  04/18/2011    COLPORRHAPHY  2014    anterior, repair of cystocele    HYSTERECTOMY  1673    with cystocele     JOINT REPLACEMENT Left 2016    knee    KNEE ARTHROPLASTY Left     KNEE ARTHROSCOPY  2009    KNEE SURGERY Left 2009    torn meniscus    OOPHORECTOMY      ID COLONOSCOPY FLX DX W/COLLJ SPEC WHEN PFRMD N/A 5/31/2018    Procedure: COLONOSCOPY;  Surgeon: Rose Frazier MD;  [facility];   Service: Gastroenterology    REDUCTION MAMMAPLASTY  2000    TONSILLECTOMY AND ADENOIDECTOMY      URETHRA SURGERY         Family History   Problem Relation Age of Onset    Coronary artery disease Mother     COPD Mother     Hypertension Mother     Hyperlipidemia Mother         high cholesterol    Atrial fibrillation Mother     Coronary artery disease Father     Arthritis Father     COPD Father         Black lung    Hypertension Father     Hyperlipidemia Father         high cholesterol    Lung cancer Brother     Coronary artery disease Brother     Hypertension Brother     Hyperlipidemia Brother         high cholesterol    Atrial fibrillation Brother         fib/flutter    Other Sister         pericarditis    Arthritis Sister     Diabetes Maternal Grandmother         mellitus    Breast cancer Maternal Grandmother 79    Breast cancer Paternal Grandmother 79    Arthritis Family     Asthma Family     Cancer Brother         lung    Heart disease Brother         CAD-stents-smoker    No Known Problems Maternal Aunt     No Known Problems Maternal Aunt     No Known Problems Maternal Aunt     No Known Problems Maternal Aunt     No Known Problems Maternal Aunt     Breast cancer Other 40         Medications have been verified  Objective   /93   Pulse 85   Temp 97 8 °F (36 6 °C)   Resp 18   Ht 5' 6" (1 676 m)   Wt 90 kg (198 lb 6 4 oz)   SpO2 97%   BMI 32 02 kg/m²   No LMP recorded  Patient has had a hysterectomy  Physical Exam     Physical Exam  Constitutional:       Appearance: She is well-developed  HENT:      Head: Normocephalic and atraumatic  Right Ear: Tympanic membrane normal       Left Ear: Tympanic membrane normal       Nose: Nose normal       Mouth/Throat:      Mouth: Mucous membranes are moist       Pharynx: Oropharynx is clear  Eyes:      Conjunctiva/sclera: Conjunctivae normal       Pupils: Pupils are equal, round, and reactive to light  Cardiovascular:      Rate and Rhythm: Normal rate and regular rhythm  Heart sounds: Normal heart sounds  Pulmonary:      Effort: Pulmonary effort is normal  No respiratory distress  Breath sounds: Normal breath sounds  No wheezing  Chest:      Chest wall: No tenderness     Abdominal:      General: Bowel sounds are normal  There is no distension  Palpations: Abdomen is soft  There is no mass  Tenderness: There is no abdominal tenderness  Comments: -suprapubic tenderness noted and abdominal fullness   Musculoskeletal:         General: Tenderness present  No swelling  Cervical back: Normal range of motion and neck supple  Comments: -mild bilateral CVA tenderness, could be from chronic low back pain as well unclear  Lymphadenopathy:      Cervical: No cervical adenopathy  Skin:     General: Skin is warm  Neurological:      Mental Status: She is alert and oriented to person, place, and time

## 2021-09-25 NOTE — PATIENT INSTRUCTIONS
S Dysuria   WHAT YOU NEED TO KNOW:   What is dysuria? Dysuria is difficulty urinating, or pain, burning, or discomfort when you urinate  Dysuria is usually a symptom of another problem  What causes dysuria? The following are the most common causes of dysuria:  · Infections, such as urinary tract infections and sexually transmitted infections     · Trauma, such as bicycle injury or sexual abuse     · Abnormal structure, such as narrowing of the urethra     · Blockage, such as kidney stones     · Medical conditions, such as constipation, enlarged prostate, and reactive arthritis     · Chemicals, such as douches, spermicides, and bubble bath     · Medicines, such as chemotherapy    What increases my risk for dysuria? · Dehydration     · Loss of bladder muscle strength due to older age     · Holding urine in your bladder for a long period of time     · Caffeine, soda, alcohol, and citrus drinks    What other symptoms may I have with dysuria? · Fever     · Cloudy, bad smelling urine     · Urge to urinate often but urinating little     · Back, side, or abdominal pain     · Blood in your urine     · Discharge that smells bad     · Itching     · Swelling of your genitals     · Pain with ejaculation or bowel movement (for males)    How is dysuria diagnosed? Your healthcare provider will examine you and ask about your symptoms  Tell your healthcare provider about any medicines you are taking  You may need any of the following to find the cause of your dysuria:  · A urine test  may be done to look for bacteria, blood, or pus  · A blood test  may be done to look for signs of infection  · A cystoscopy  allows healthcare providers to look for problems inside your bladder  A scope is put into your bladder through your urethra  The scope is a flexible tube with a light and camera on the end  · An ultrasound  uses sound waves to show pictures on a monitor   An ultrasound may be done to show problems in your bladder  How is dysuria treated? Treatment will depend on what is causing your dysuria  Your healthcare provider may refer you to a specialist, such as a urologist or a nephrologist  [place] may need medicines to help treat a bacterial infection or help decrease bladder spasms  How can I manage my dysuria? · Drink more liquids  Liquids help flush out bacteria that may be causing an infection  Ask your healthcare provider how much liquid to drink each day and which liquids are best for you  · Take sitz baths as directed  Fill a bathtub with 4 to 6 inches of warm water  You may also use a sitz bath pan that fits over a toilet  Sit in the sitz bath for 20 minutes  Do this 2 to 3 times a day, or as directed  The warm water can help decrease pain and swelling  When should I seek immediate care? · You have severe back, side, or abdominal pain  · You have fever and shaking chills  · You vomit several times in a row  When should I contact my healthcare provider? · Your symptoms do not go away, even after treatment  · You have questions or concerns about your condition or care  CARE AGREEMENT:   You have the right to help plan your care  Learn about your health condition and how it may be treated  Discuss treatment options with your healthcare providers to decide what care you want to receive  You always have the right to refuse treatment  The above information is an  only  It is not intended as medical advice for individual conditions or treatments  Talk to your doctor, nurse or pharmacist before following any medical regimen to see if it is safe and effective for you  © Copyright [employer] 2021 Information is for End User's use only and may not be sold, redistributed or otherwise used for commercial purposes   All illustrations and images included in CareNotes® are the copyrighted property of [employer]  or [facility] [employer]

## 2021-09-26 LAB — BACTERIA UR CULT: NORMAL

## 2021-09-29 ENCOUNTER — OFFICE VISIT (OUTPATIENT)
Dept: PHYSICAL THERAPY | Facility: CLINIC | Age: 76
End: 2021-09-29
Payer: MEDICARE

## 2021-09-29 DIAGNOSIS — M16.12 PRIMARY OSTEOARTHRITIS OF LEFT HIP: Primary | ICD-10-CM

## 2021-09-29 PROCEDURE — 97110 THERAPEUTIC EXERCISES: CPT

## 2021-09-29 PROCEDURE — 97112 NEUROMUSCULAR REEDUCATION: CPT

## 2021-09-29 NOTE — PROGRESS NOTES
Daily Note     Today's date: 2021  Patient name: Jaye Robles  : 1945  MRN: [id number]  Referring provider: Abbey Grullon DO  Dx:   Encounter Diagnosis     ICD-10-CM    1  Primary osteoarthritis of left hip  M16 12                   Subjective: Pt reports her symptoms have much improved since starting HEP  Objective: See treatment diary below      Assessment: Tolerated treatment well  Patient tolerated strengthening progression well without adverse effects or increase in pain  Verbal cueing needed to prevent glute max compensation with hip abduction strengthening  Fatigue noted following session  Educated patient regarding exercise progression for home program  Patient exhibited good technique with therapeutic exercises and would benefit from continued PT  Plan: Continue per plan of care        Precautions: None  PMH: L TKA      Manuals             L hip low grade mobs PRN             L hip flexor stretch SD                                      Neuro Re-Ed             Squats with TB 3x10 yellow loop            Monster walks 2 laps yellow loop            Bridging on Bosu                                                                 Ther Ex             Nu step L2 10'            LAQ 3" 2x15 3#            Ecc SLR 3x10            Standing hip abd, ext 30x ea            Seated marching                                                    Ther Activity                                       Gait Training                                       Modalities

## 2021-10-01 ENCOUNTER — VBI (OUTPATIENT)
Dept: FAMILY MEDICINE CLINIC | Facility: CLINIC | Age: 76
End: 2021-10-01

## 2021-10-06 ENCOUNTER — OFFICE VISIT (OUTPATIENT)
Dept: PHYSICAL THERAPY | Facility: CLINIC | Age: 76
End: 2021-10-06
Payer: MEDICARE

## 2021-10-06 DIAGNOSIS — M16.12 PRIMARY OSTEOARTHRITIS OF LEFT HIP: Primary | ICD-10-CM

## 2021-10-06 PROCEDURE — 97112 NEUROMUSCULAR REEDUCATION: CPT

## 2021-10-06 PROCEDURE — 97110 THERAPEUTIC EXERCISES: CPT

## 2021-10-13 ENCOUNTER — APPOINTMENT (OUTPATIENT)
Dept: PHYSICAL THERAPY | Facility: CLINIC | Age: 76
End: 2021-10-13
Payer: MEDICARE

## 2021-10-20 ENCOUNTER — OFFICE VISIT (OUTPATIENT)
Dept: PHYSICAL THERAPY | Facility: CLINIC | Age: 76
End: 2021-10-20
Payer: MEDICARE

## 2021-10-20 DIAGNOSIS — M16.12 PRIMARY OSTEOARTHRITIS OF LEFT HIP: Primary | ICD-10-CM

## 2021-10-20 PROCEDURE — 97110 THERAPEUTIC EXERCISES: CPT

## 2021-10-27 ENCOUNTER — OFFICE VISIT (OUTPATIENT)
Dept: PHYSICAL THERAPY | Facility: CLINIC | Age: 76
End: 2021-10-27
Payer: MEDICARE

## 2021-10-27 DIAGNOSIS — M16.12 PRIMARY OSTEOARTHRITIS OF LEFT HIP: Primary | ICD-10-CM

## 2021-10-27 PROCEDURE — 97112 NEUROMUSCULAR REEDUCATION: CPT

## 2021-10-27 PROCEDURE — 97110 THERAPEUTIC EXERCISES: CPT

## 2021-12-27 ENCOUNTER — IMMUNIZATIONS (OUTPATIENT)
Dept: FAMILY MEDICINE CLINIC | Facility: HOSPITAL | Age: 76
End: 2021-12-27

## 2021-12-27 DIAGNOSIS — Z23 ENCOUNTER FOR IMMUNIZATION: Primary | ICD-10-CM

## 2021-12-27 PROCEDURE — 0064A COVID-19 MODERNA VACC 0.25 ML BOOSTER: CPT

## 2021-12-27 PROCEDURE — 91306 COVID-19 MODERNA VACC 0.25 ML BOOSTER: CPT

## 2022-01-12 DIAGNOSIS — N39.0 RECURRENT UTI: ICD-10-CM

## 2022-01-12 RX ORDER — NITROFURANTOIN MACROCRYSTALS 50 MG/1
CAPSULE ORAL
Qty: 30 CAPSULE | Refills: 5 | Status: SHIPPED | OUTPATIENT
Start: 2022-01-12 | End: 2022-07-21

## 2022-01-13 ENCOUNTER — OFFICE VISIT (OUTPATIENT)
Dept: CARDIOLOGY CLINIC | Facility: CLINIC | Age: 77
End: 2022-01-13
Payer: MEDICARE

## 2022-01-13 VITALS
HEIGHT: 66 IN | SYSTOLIC BLOOD PRESSURE: 126 MMHG | DIASTOLIC BLOOD PRESSURE: 76 MMHG | WEIGHT: 198 LBS | BODY MASS INDEX: 31.82 KG/M2 | TEMPERATURE: 97.8 F | OXYGEN SATURATION: 96 %

## 2022-01-13 DIAGNOSIS — R00.2 PALPITATIONS: ICD-10-CM

## 2022-01-13 DIAGNOSIS — R06.02 EXERTIONAL SHORTNESS OF BREATH: ICD-10-CM

## 2022-01-13 DIAGNOSIS — E78.2 MIXED HYPERLIPIDEMIA: ICD-10-CM

## 2022-01-13 DIAGNOSIS — Z01.810 PRE-OPERATIVE CARDIOVASCULAR EXAMINATION: Primary | ICD-10-CM

## 2022-01-13 PROCEDURE — 99214 OFFICE O/P EST MOD 30 MIN: CPT | Performed by: INTERNAL MEDICINE

## 2022-01-13 PROCEDURE — 93000 ELECTROCARDIOGRAM COMPLETE: CPT | Performed by: INTERNAL MEDICINE

## 2022-01-13 NOTE — PROGRESS NOTES
Cardiology   Sylwia Nuñez DO, Elaine Montalvo MD, Jb Stearns MD, Cassandra Sher MD, [facility]  -------------------------------------------------------------------  [facility]  [address], [address], [address] [place] [address], [address] [place] [address]  [phone]    Cardiology Follow Up  Manuel Molina  1945  [id number]          Assessment/Plan:  1  Pre-operative cardiovascular examination  - Patient is at low risk for perioperative complications based on history and good functional capacity  She may proceed with her procedure as planned without any further cardiac workup  - ECG done today showed normal sinus rhythm with no significant abnormalities  2  Palpitations  - resolved  Not currently on any therapy  Previous monitor showed rare episodes of SVT which did not correspond to her palpitations  - POCT ECG    3  Mixed hyperlipidemia  - patient could not tolerate Crestor 5 mg daily  Will obtain lipid panel  Consider using alternate statin such as pravastatin or alternate medication such as Zetia or Nexlitol    - Lipid panel      - She should call if any change in nature of palpitations, syncope or near syncope develop  - discussed diet and exercise  - Follow up in 1 year  Interval History:     Manuel Molina is 68 y o  female here for follow up of palpitations and to discuss preoperative risk stratification prior to undergoing a bladder stimulator procedure  Surgery scheduled for later this month  She has no prior complications with anesthesia  Since her last visit, she has been feeling well without any chest pain, shortness of breath or palpitations  Previously, she was having palpitations frequently which she described as a skipping and racing heartbeat that would occur at various times throughout the day  A 2 week monitor was done which showed rare episodes of SVT which did not correspond to her palpitations    Echocardiogram previously showed structurally normal heart with normal ejection fraction  She has not had similar palpitations recently  She uses furosemide daily because of lower extremity edema  If she does not use Lasix, edema returns  In July 2019 she had undergone echocardiogram and pulmonary function testing to her evaluation of dyspnea and cough  Cardiac workup was normal and pulmonary function testing was also normal   She was given Tagamet and Allegra which improved her cough but she stopped the medications  She has a history of dyslipidemia with an LDL cholesterol of 143 milligrams/deciliter  She was started on Crestor 5 mg daily but developed leg cramping and stopped it           Past Medical History:   Diagnosis Date    Anesthesia complication     "shivers"    Arthritis     Asthma     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 see colors, with over-use goes darker- from untreated amblyopia    Breathing difficulty     with extubation pt developed laryngeal spasm    Cataract     both eyes- right eye needs surgery    Colon polyps 2016    Diverticulitis     had episode in April 2018    Edema     lower legs    Generalized osteoarthritis     GERD (gastroesophageal reflux disease)     Hearing problem     unspecified laterality-hearing aid right ear    High cholesterol     Polyarthritis     last assessed: 6/26/2015    Rheumatoid arthritis ([facility] )     Sjogren's syndrome ([facility] )     last assessed: 7/18/2016    SS-A antibody positive     last assessed: 8/20/2015    Urinary tract infection      Social History     Socioeconomic History    Marital status: /Civil Union     Spouse name: Not on file    Number of children: Not on file    Years of education: Not on file    Highest education level: Not on file   Occupational History    Occupation: retired   Tobacco Use    Smoking status: Never Smoker    Smokeless tobacco: Never Used   Vaping Use    Vaping Use: Never used   Substance and Sexual Activity    Alcohol use: Not Currently     Comment: rarely - wine 2 x month    Drug use: No    Sexual activity: Not on file   Other Topics Concern    Not on file   Social History Narrative    Lack of adequate sleep-4-6 hrs/night    Lack of exercise     Social Determinants of Health     Financial Resource Strain: Not on file   Food Insecurity: Not on file   Transportation Needs: Not on file   Physical Activity: Not on file   Stress: Not on file   Social Connections: Not on file   Intimate Partner Violence: Not on file   Housing Stability: Not on file      Family History   Problem Relation Age of Onset    Coronary artery disease Mother     COPD Mother     Hypertension Mother     Hyperlipidemia Mother         high cholesterol    Atrial fibrillation Mother     Coronary artery disease Father     Arthritis Father     COPD Father         Black lung    Hypertension Father     Hyperlipidemia Father         high cholesterol    Lung cancer Brother     Coronary artery disease Brother     Hypertension Brother     Hyperlipidemia Brother         high cholesterol    Atrial fibrillation Brother         fib/flutter    Other Sister         pericarditis    Arthritis Sister     Diabetes Maternal Grandmother         mellitus    Breast cancer Maternal Grandmother 79    Breast cancer Paternal Grandmother 79    Arthritis Family     Asthma Family     Cancer Brother         lung    Heart disease Brother         CAD-stents-smoker    No Known Problems Maternal Aunt     No Known Problems Maternal Aunt     No Known Problems Maternal Aunt     No Known Problems Maternal Aunt     No Known Problems Maternal Aunt     Breast cancer Other 40     Past Surgical History:   Procedure Laterality Date    APPENDECTOMY      AUGMENTATION BREAST Bilateral     breast surgery enlargement procedure elective bilateral    BILATERAL OOPHORECTOMY Bilateral 2003    BLADDER SURGERY      bladder lift,cystocele    CATARACT EXTRACTION      Right 9/2018; Left 10/2018    COLONOSCOPY  04/18/2011    COLPORRHAPHY  2014    anterior, repair of cystocele    HYSTERECTOMY  [address]73    with cystocele     JOINT REPLACEMENT Left 2016    knee    KNEE ARTHROPLASTY Left     KNEE ARTHROSCOPY  2009    KNEE SURGERY Left 2009    torn meniscus    OOPHORECTOMY      NV COLONOSCOPY FLX DX W/COLLJ SPEC WHEN PFRMD N/A 5/31/2018    Procedure: COLONOSCOPY;  Surgeon: Soraya Fall MD;  Location: [place] [address] GI LAB;   Service: Gastroenterology    REDUCTION MAMMAPLASTY  2000    TONSILLECTOMY AND ADENOIDECTOMY      URETHRA SURGERY         Current Outpatient Medications:     acetaminophen (TYLENOL) 500 mg tablet, Take 500 mg by mouth 3 (three) times a day, Disp: , Rfl:     albuterol (PROVENTIL HFA,VENTOLIN HFA) 90 mcg/act inhaler, Inhale 2 puffs every 6 (six) hours as needed for wheezing or shortness of breath, Disp: 1 Inhaler, Rfl: 5    Cholecalciferol (VITAMIN D3) 2000 units capsule, Take 1 capsule by mouth every morning  , Disp: , Rfl:     docusate sodium (COLACE) 100 mg capsule, Take 100 mg by mouth 2 (two) times a day, Disp: , Rfl:     fluticasone (Flovent HFA) 220 mcg/act inhaler, Inhale 2 puffs 2 (two) times a day Rinse mouth after use, Disp: 36 g, Rfl: 3    furosemide (LASIX) 40 mg tablet, TAKE 1 TABLET DAILY EVERY MORNING, Disp: 90 tablet, Rfl: 3    Magnesium Oxide -Mg Supplement 400 MG CAPS, Take 1 capsule by mouth every morning  , Disp: , Rfl:     Magnesium Oxide 400 MG CAPS, Take 1 capsule by mouth daily, Disp: , Rfl:     nitrofurantoin (MACRODANTIN) 50 mg capsule, TAKE 1 CAPSULE BY MOUTH EVERY DAY, Disp: 30 capsule, Rfl: 5    omeprazole (PriLOSEC) 20 mg delayed release capsule, Take 20 mg by mouth daily, Disp: , Rfl:     potassium chloride (Klor-Con) 10 mEq tablet, TAKE 1 TABLET DAILY, NEEDS POTASSIUM CHLORIDE ER (WAX) TABLETS, Disp: 90 tablet, Rfl: 3    senna (SENOKOT) 8 6 MG tablet, Take 1 tablet by mouth as needed for constipation, Disp: , Rfl:    acetaminophen-codeine (TYLENOL/CODEINE #3) 300-30 MG per tablet, Take 1 tablet by mouth every 6 (six) hours as needed for moderate pain (Patient not taking: Reported on 9/25/2021), Disp: 30 tablet, Rfl: 1    aspirin 325 mg tablet, Take 325 mg by mouth 6 (six) times a day  (Patient not taking: Reported on 1/13/2022 ), Disp: , Rfl:     phenazopyridine (PYRIDIUM) 200 mg tablet, Take 1 tablet (200 mg total) by mouth 3 (three) times a day, Disp: 6 tablet, Rfl: 0    predniSONE 10 mg tablet, as needed  (Patient not taking: Reported on 9/25/2021), Disp: , Rfl:     rosuvastatin (CRESTOR) 5 mg tablet, Take 1 tablet (5 mg total) by mouth daily, Disp: 90 tablet, Rfl: 3        Review of Systems:  Review of Systems   Constitutional: Positive for fatigue  Respiratory: Negative for chest tightness and shortness of breath  Cardiovascular: Positive for leg swelling  Negative for chest pain and palpitations  Genitourinary: Positive for difficulty urinating  All other systems reviewed and are negative  Physical Exam:  Vitals:  Vitals:    01/13/22 0808   BP: 126/76   BP Location: Right arm   Patient Position: Sitting   Cuff Size: Large   Temp: 97 8 °F (36 6 °C)   SpO2: 96%   Weight: 89 8 kg (198 lb)   Height: 5' 6" (1 676 m)     Physical Exam   Constitutional: She appears healthy  No distress  Eyes: Pupils are equal, round, and reactive to light  Conjunctivae are normal    Neck: No JVD present  Cardiovascular: Normal rate, regular rhythm and normal heart sounds  Exam reveals no gallop and no friction rub  No murmur heard  Pulmonary/Chest: Effort normal and breath sounds normal  She has no wheezes  She has no rales  Musculoskeletal:         General: No tenderness, deformity or edema  Cervical back: Normal range of motion and neck supple  Neurological: She is alert and oriented to person, place, and time  Skin: Skin is warm and dry          Cardiographics:  EKG: Personally reviewed normal sinus rhythm with rate 80 beats per minute  Normal intervals and normal axis  Last known EF:  55 percent    This note was completed in part utilizing M-Modal Fluency Direct Software  Grammatical errors, random word insertions, spelling mistakes, and incomplete sentences can be an occasional consequence of this system secondary to software limitations, ambient noise, and hardware issues  If you have any questions or concerns about the content, text, or information contained within the body of this dictation, please contact the provider for clarification

## 2022-01-17 PROBLEM — N64.4 PAIN OF LEFT BREAST: Status: RESOLVED | Noted: 2017-10-16 | Resolved: 2022-01-17

## 2022-01-17 PROBLEM — N39.41 URGE INCONTINENCE OF URINE: Status: ACTIVE | Noted: 2022-01-17

## 2022-01-17 NOTE — PROGRESS NOTES
Presurgical Evaluation    Subjective:      Patient ID: Kelsie Arrington is a 68 y o  female  Chief Complaint   Patient presents with   Timo Walters 01/31/2022 [facility] Bladder stimulator  [id number]       She has been having issues with frequent UTIs  She saw Dr Jimbo Walters and was found to be retaining urine after emptying her bladder  This has been going on for over a year  Asthma - no wheezing  The following portions of the patient's history were reviewed and updated as appropriate: allergies, current medications, past family history, past medical history, past social history, past surgical history and problem list     Procedure date: January 31, 2022    Surgeon:  Dr Jimbo Walters  Planned procedure:  IMPLANTATION NEUROSTIMULATOR [address]; INSERTION NEUROSTIMULATOR; FLUORO; ELECTONIC ANALYSIS (N/A Bladder)  Diagnosis for procedure:  Urge incontinence [N39 41]    Prior anesthesia: Yes   General; Complications:  None / Tolerated well    CAD History: None   NOTE: Patient should see Cardiology if time available before surgery, and if appropriate  Pulmonary History: Asthma    Renal history: None    Diabetes History:  None     Neurological History: None     Personal history of venous thromboembolic disease? No    History of steroid use for >2 weeks within last year? No    Bleeding Risk: no recent abnormal bleeding    Current Anti-platelet/anticoagulation medication: none    On Immunosuppressant meds/biologics: No      Review of Systems   Respiratory: Negative  Cardiovascular: Negative            Current Outpatient Medications   Medication Sig Dispense Refill    acetaminophen (TYLENOL) 500 mg tablet Take 500 mg by mouth 3 (three) times a day as needed        acetaminophen-codeine (TYLENOL #3) 300-30 mg per tablet Take 1 tablet by mouth every 4 (four) hours as needed for moderate pain 30 tablet 1    albuterol (PROVENTIL HFA,VENTOLIN HFA) 90 mcg/act inhaler Inhale 2 puffs every 6 (six) hours as needed for wheezing or shortness of breath 1 Inhaler 5    Cholecalciferol (VITAMIN D3) 2000 units capsule Take 1 capsule by mouth every morning        cyanocobalamin (VITAMIN B-12) 1000 MCG tablet Take 1,000 mcg by mouth daily      docusate sodium (COLACE) 100 mg capsule Take 100 mg by mouth 2 (two) times a day      fluticasone (Flovent HFA) 220 mcg/act inhaler Inhale 2 puffs 2 (two) times a day Rinse mouth after use 36 g 3    furosemide (LASIX) 40 mg tablet TAKE 1 TABLET DAILY EVERY MORNING 90 tablet 3    Magnesium Oxide -Mg Supplement 400 MG CAPS Take 1 capsule by mouth every morning        nitrofurantoin (MACRODANTIN) 50 mg capsule TAKE 1 CAPSULE BY MOUTH EVERY DAY 30 capsule 5    omeprazole (PriLOSEC) 20 mg delayed release capsule Take 20 mg by mouth daily      potassium chloride (Klor-Con) 10 mEq tablet TAKE 1 TABLET DAILY, NEEDS POTASSIUM CHLORIDE ER (WAX) TABLETS 90 tablet 3    pravastatin (PRAVACHOL) 10 mg tablet Take 1 tablet (10 mg total) by mouth daily 30 tablet 5    senna (SENOKOT) 8 6 MG tablet Take 1 tablet by mouth as needed for constipation      Magnesium Oxide 400 MG CAPS Take 1 capsule by mouth daily (Patient not taking: Reported on 1/24/2022 )       No current facility-administered medications for this visit         Allergies on file:   Celecoxib    Patient Active Problem List   Diagnosis    Edema    Asthma, mild persistent    Exertional shortness of breath    Fatigue    GERD without esophagitis    Mixed hyperlipidemia    Impaired fasting glucose    Left knee pain    Palpitations    Primary generalized (osteo)arthritis    Right hip pain    Sjogren's syndrome ([address] )    Diverticulitis of large intestine without perforation or abscess with bleeding    Chronic bilateral thoracic back pain    Rosacea    Cough    UTI symptoms    Primary osteoarthritis of left hip    Urge incontinence of urine        Past Medical History:   Diagnosis Date    Anesthesia complication     "shivers"    Arthritis     Asthma     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 see colors, with over-use goes darker- from untreated amblyopia    Breathing difficulty     with extubation pt developed laryngeal spasm    Cataract     both eyes- right eye needs surgery    Colon polyps 2016    Diverticulitis     had episode in April 2018    Edema     lower legs    Generalized osteoarthritis     GERD (gastroesophageal reflux disease)     Hearing problem     unspecified laterality-hearing aid right ear    High cholesterol     History of transfusion     autologus    Incontinence of urine     Polyarthritis     last assessed: 6/26/2015    Rheumatoid arthritis ([facility]     Sjogren's syndrome ([facility]     last assessed: 7/18/2016    SS-A antibody positive     last assessed: 8/20/2015    Urinary tract infection     UTI (urinary tract infection)        Past Surgical History:   Procedure Laterality Date    APPENDECTOMY      AUGMENTATION BREAST Bilateral     breast surgery enlargement procedure elective bilateral    BILATERAL OOPHORECTOMY Bilateral 2003    BLADDER SURGERY      bladder lift,cystocele    CATARACT EXTRACTION      Right 9/2018; Left 10/2018    COLONOSCOPY  04/18/2011    COLPORRHAPHY  2014    anterior, repair of cystocele    HYSTERECTOMY  [address]    with cystocele     JOINT REPLACEMENT Left 2016    knee    KNEE ARTHROPLASTY Left     KNEE ARTHROSCOPY  2009    KNEE SURGERY Left 2009    torn meniscus    OOPHORECTOMY      [place] COLONOSCOPY FLX DX W/COLLJ SPEC WHEN PFRMD N/A 5/31/2018    Procedure: COLONOSCOPY;  Surgeon: Lily Lucas MD;  Location: [facility];   Service: Gastroenterology    REDUCTION MAMMAPLASTY  2000    TONSILLECTOMY AND ADENOIDECTOMY      URETHRA SURGERY         Family History   Problem Relation Age of Onset    Coronary artery disease Mother     COPD Mother     Hypertension Mother     Hyperlipidemia Mother         high cholesterol    Atrial fibrillation Mother     Coronary artery disease Father     Arthritis Father     COPD Father         Black lung    Hypertension Father     Hyperlipidemia Father         high cholesterol    Lung cancer Brother     Coronary artery disease Brother     Hypertension Brother     Hyperlipidemia Brother         high cholesterol    Atrial fibrillation Brother         fib/flutter    Other Sister         pericarditis    Arthritis Sister     Diabetes Maternal Grandmother         mellitus    Breast cancer Maternal Grandmother 79    Breast cancer Paternal Grandmother 79    Arthritis Family     Asthma Family     Cancer Brother         lung    Heart disease Brother         CAD-stents-smoker    No Known Problems Maternal Aunt     No Known Problems Maternal Aunt     No Known Problems Maternal Aunt     No Known Problems Maternal Aunt     No Known Problems Maternal Aunt     Breast cancer Other 40       Social History     Tobacco Use    Smoking status: Never Smoker    Smokeless tobacco: Never Used   Vaping Use    Vaping Use: Never used   Substance Use Topics    Alcohol use: Not Currently     Comment: rarely - wine 2 x month    Drug use: No       Objective:    Vitals:    01/24/22 1059   BP: 124/70   Pulse: 86   Resp: 18   Temp: (!) 97 2 °F (36 2 °C)   SpO2: 98%   Weight: 90 3 kg (199 lb)   Height: 5' 6" (1 676 m)        Physical Exam  Vitals and nursing note reviewed  Constitutional:       Appearance: She is well-developed  HENT:      Head: Normocephalic and atraumatic  Right Ear: Tympanic membrane and external ear normal       Left Ear: Tympanic membrane and external ear normal    Cardiovascular:      Rate and Rhythm: Normal rate and regular rhythm  Heart sounds: Normal heart sounds  No murmur heard  No friction rub  Pulmonary:      Effort: Pulmonary effort is normal  No respiratory distress  Breath sounds: Normal breath sounds  No wheezing or rales  Musculoskeletal:      Right lower leg: No edema        Left lower leg: No edema  Preop labs/testing available and reviewed: yes    eGFR   Date Value Ref Range Status   2022 54 ml/min/1 73sq m Final     WBC   Date Value Ref Range Status   2022 4 63 4 31 - 10 16 Thousand/uL Final          EKG yes      Functional capacity: Heavy Yard Work               4 Mets   Pick the highest level patient can comfortably perform   4 mets or greater for surgery    RCRI  High Risk surgery? 1 Point  CAD History:         1 Point   MI; Positive Stress Test; CP due to Mi;  Nitrate Usage to control Angina; Pathologic Q wave on EKG  CHF Active:         1 Point   Pulm Edema; Paroxysmal Nocturnal Dyspnea;  Bibasilar Rales (crackles);S3; CHF on CXR  Cerebrovascular Disease (TIA or CVA):     1 Point  DM on Insulin:        1 Point  Serum Creat >2 0 mg/dl:       1 Point          Total Points: 0     Scorin: Class I, Very Low Risk (0 4%)     1: Class II, Low risk (0 9%)     2: Class III Moderate (6 6%)     3: Class IV High (>11%)      BERNARDO Risk:  GFR:   eGFR   Date Value Ref Range Status   2022 54 ml/min/1 73sq m Final         For PCP:  If GFR>60, Hold ACE/ARB/Diuretic on the day of surgery, and NSAIDS 10 days before  If GFR<45, Consider PRE and POST op Nephrology Consult  If 46 <GFR> 59 : Has Patient had BERNARDO in last 6 Months? no   If YES: Preop Nephrology consult   If No:  [address] Nephrology consult  Assessment/Plan:    Patient is medically optimized (cleared) for the planned procedure  Further testing/evaluation is not required  Postop concerns: no    Medication Management/Recommendations:  - None, continue medication regimen including morning of surgery, with sip of water    1  Preoperative examination    2  Urge incontinence of urine    3  Mild persistent asthma without complication    4  Sjogren's syndrome, with unspecified organ involvement ([facility] )  Assessment & Plan:  stable      5   Primary generalized (osteo)arthritis  Assessment & Plan:  Patient to continue tylenol #3 as needed  She is not a good candidate for NSAIDS due to gastritis and last eGFR below 60     Orders:  -     acetaminophen-codeine (TYLENOL #3) 300-30 mg per tablet; Take 1 tablet by mouth every 4 (four) hours as needed for moderate pain    6  Mixed hyperlipidemia  -     Comprehensive metabolic panel; Future; Expected date: 04/09/2022  -     Lipid Panel with Direct LDL reflex; Future; Expected date: 04/09/2022    7  Impaired fasting glucose  -     Hemoglobin A1C; Future; Expected date: 04/09/2022    8  Screening for deficiency anemia  -     CBC; Future; Expected date: 04/09/2022       No outpatient medications have been marked as taking for the 1/24/22 encounter (Appointment) with Efrain Adam, DO  NOTE: Please use the above to review important meds for your specialty, the remainder "per anesthesia Guidelines "    NOTE: Please place an Inbasket message for "[facility]" pool for complicated patients

## 2022-01-19 ENCOUNTER — APPOINTMENT (OUTPATIENT)
Dept: LAB | Facility: CLINIC | Age: 77
End: 2022-01-19
Payer: MEDICARE

## 2022-01-19 ENCOUNTER — TELEPHONE (OUTPATIENT)
Dept: CARDIOLOGY CLINIC | Facility: CLINIC | Age: 77
End: 2022-01-19

## 2022-01-19 LAB
CHOLEST SERPL-MCNC: 240 MG/DL
HDLC SERPL-MCNC: 49 MG/DL
LDLC SERPL CALC-MCNC: 165 MG/DL (ref 0–100)
NONHDLC SERPL-MCNC: 191 MG/DL
TRIGL SERPL-MCNC: 130 MG/DL

## 2022-01-19 PROCEDURE — 80061 LIPID PANEL: CPT | Performed by: INTERNAL MEDICINE

## 2022-01-19 NOTE — TELEPHONE ENCOUNTER
----- Message from Karen Mcfarlane DO sent at 1/19/2022  3:38 PM EST -----  Can you please let the patient know his cholesterol levels are very high  His LDL is 165 - highest it has ever been  Should be on therapy  We can try pravastatin - he had problems with Crestor before      Can send in low dose if he is willing

## 2022-01-20 ENCOUNTER — APPOINTMENT (OUTPATIENT)
Dept: PREADMISSION TESTING | Facility: HOSPITAL | Age: 77
End: 2022-01-20
Payer: MEDICARE

## 2022-01-21 RX ORDER — ACETAMINOPHEN AND CODEINE PHOSPHATE 300; 30 MG/1; MG/1
1 TABLET ORAL EVERY 4 HOURS PRN
COMMUNITY
End: 2022-01-24 | Stop reason: SDUPTHER

## 2022-01-24 ENCOUNTER — OFFICE VISIT (OUTPATIENT)
Dept: FAMILY MEDICINE CLINIC | Facility: CLINIC | Age: 77
End: 2022-01-24
Payer: MEDICARE

## 2022-01-24 VITALS
TEMPERATURE: 97.2 F | WEIGHT: 199 LBS | HEART RATE: 86 BPM | HEIGHT: 66 IN | BODY MASS INDEX: 31.98 KG/M2 | DIASTOLIC BLOOD PRESSURE: 70 MMHG | OXYGEN SATURATION: 98 % | SYSTOLIC BLOOD PRESSURE: 124 MMHG | RESPIRATION RATE: 18 BRPM

## 2022-01-24 DIAGNOSIS — J45.30 MILD PERSISTENT ASTHMA WITHOUT COMPLICATION: ICD-10-CM

## 2022-01-24 DIAGNOSIS — Z01.818 PREOPERATIVE EXAMINATION: Primary | ICD-10-CM

## 2022-01-24 DIAGNOSIS — M35.00 SJOGREN'S SYNDROME, WITH UNSPECIFIED ORGAN INVOLVEMENT (HCC): ICD-10-CM

## 2022-01-24 DIAGNOSIS — E78.2 MIXED HYPERLIPIDEMIA: ICD-10-CM

## 2022-01-24 DIAGNOSIS — R73.01 IMPAIRED FASTING GLUCOSE: ICD-10-CM

## 2022-01-24 DIAGNOSIS — Z13.0 SCREENING FOR DEFICIENCY ANEMIA: ICD-10-CM

## 2022-01-24 DIAGNOSIS — N39.41 URGE INCONTINENCE OF URINE: ICD-10-CM

## 2022-01-24 DIAGNOSIS — M15.0 PRIMARY GENERALIZED (OSTEO)ARTHRITIS: ICD-10-CM

## 2022-01-24 PROBLEM — U07.1 COVID-19: Status: RESOLVED | Noted: 2020-12-19 | Resolved: 2022-01-24

## 2022-01-24 PROCEDURE — 99214 OFFICE O/P EST MOD 30 MIN: CPT | Performed by: FAMILY MEDICINE

## 2022-01-24 RX ORDER — ACETAMINOPHEN AND CODEINE PHOSPHATE 300; 30 MG/1; MG/1
1 TABLET ORAL EVERY 4 HOURS PRN
Qty: 30 TABLET | Refills: 1 | Status: SHIPPED | OUTPATIENT
Start: 2022-01-24 | End: 2022-04-26 | Stop reason: SDUPTHER

## 2022-01-24 NOTE — ASSESSMENT & PLAN NOTE
Patient to continue tylenol #3 as needed  She is not a good candidate for NSAIDS due to gastritis and last eGFR below 60

## 2022-01-27 ENCOUNTER — ANESTHESIA EVENT (OUTPATIENT)
Dept: PERIOP | Facility: HOSPITAL | Age: 77
End: 2022-01-27
Payer: MEDICARE

## 2022-01-31 ENCOUNTER — ANESTHESIA (OUTPATIENT)
Dept: PERIOP | Facility: HOSPITAL | Age: 77
End: 2022-01-31
Payer: MEDICARE

## 2022-01-31 ENCOUNTER — HOSPITAL ENCOUNTER (OUTPATIENT)
Facility: HOSPITAL | Age: 77
Setting detail: OUTPATIENT SURGERY
Discharge: HOME | End: 2022-01-31
Attending: OBSTETRICS & GYNECOLOGY | Admitting: OBSTETRICS & GYNECOLOGY
Payer: MEDICARE

## 2022-01-31 ENCOUNTER — APPOINTMENT (OUTPATIENT)
Dept: RADIOLOGY | Facility: HOSPITAL | Age: 77
End: 2022-01-31
Payer: MEDICARE

## 2022-01-31 VITALS
WEIGHT: 196.21 LBS | DIASTOLIC BLOOD PRESSURE: 82 MMHG | OXYGEN SATURATION: 98 % | SYSTOLIC BLOOD PRESSURE: 121 MMHG | BODY MASS INDEX: 31.67 KG/M2 | HEART RATE: 55 BPM | RESPIRATION RATE: 18 BRPM | TEMPERATURE: 97.9 F

## 2022-01-31 DIAGNOSIS — N39.41 URGE INCONTINENCE: ICD-10-CM

## 2022-01-31 PROBLEM — Z90.710 HISTORY OF HYSTERECTOMY: Status: ACTIVE | Noted: 2022-01-31

## 2022-01-31 PROCEDURE — C1778 LEAD, NEUROSTIMULATOR: HCPCS | Performed by: OBSTETRICS & GYNECOLOGY

## 2022-01-31 PROCEDURE — C1787 PATIENT PROGR, NEUROSTIM: HCPCS | Performed by: OBSTETRICS & GYNECOLOGY

## 2022-01-31 PROCEDURE — C1820 GENERATOR NEURO RECHG BAT SY: HCPCS | Performed by: OBSTETRICS & GYNECOLOGY

## 2022-01-31 PROCEDURE — 72220 X-RAY EXAM SACRUM TAILBONE: CPT

## 2022-01-31 DEVICE — NEUROSTIMULATOR
Type: IMPLANTABLE DEVICE | Site: NEO BLADDER | Status: FUNCTIONAL
Brand: AXONICS

## 2022-01-31 DEVICE — TINED LEAD KIT
Type: IMPLANTABLE DEVICE | Site: NEO BLADDER | Status: FUNCTIONAL
Brand: AXONICS

## 2022-01-31 RX ORDER — PROPOFOL 10 MG/ML
INJECTION, EMULSION INTRAVENOUS CONTINUOUS PRN
Status: DISCONTINUED | OUTPATIENT
Start: 2022-01-31 | End: 2022-01-31

## 2022-01-31 RX ORDER — ONDANSETRON 2 MG/ML
4 INJECTION INTRAMUSCULAR; INTRAVENOUS EVERY 6 HOURS PRN
Status: DISCONTINUED | OUTPATIENT
Start: 2022-01-31 | End: 2022-01-31 | Stop reason: HOSPADM

## 2022-01-31 RX ORDER — ACETAMINOPHEN 325 MG/1
650 TABLET ORAL EVERY 6 HOURS SCHEDULED
Status: DISCONTINUED | OUTPATIENT
Start: 2022-01-31 | End: 2022-01-31 | Stop reason: HOSPADM

## 2022-01-31 RX ORDER — FENTANYL CITRATE 50 UG/ML
INJECTION, SOLUTION INTRAMUSCULAR; INTRAVENOUS AS NEEDED
Status: DISCONTINUED | OUTPATIENT
Start: 2022-01-31 | End: 2022-01-31

## 2022-01-31 RX ORDER — CEFAZOLIN SODIUM 2 G/50ML
2000 SOLUTION INTRAVENOUS ONCE
Status: COMPLETED | OUTPATIENT
Start: 2022-01-31 | End: 2022-01-31

## 2022-01-31 RX ORDER — ACETAMINOPHEN 325 MG/1
975 TABLET ORAL ONCE
Status: COMPLETED | OUTPATIENT
Start: 2022-01-31 | End: 2022-01-31

## 2022-01-31 RX ORDER — ONDANSETRON 2 MG/ML
4 INJECTION INTRAMUSCULAR; INTRAVENOUS ONCE AS NEEDED
Status: DISCONTINUED | OUTPATIENT
Start: 2022-01-31 | End: 2022-01-31 | Stop reason: HOSPADM

## 2022-01-31 RX ORDER — BUPIVACAINE HYDROCHLORIDE AND EPINEPHRINE 2.5; 5 MG/ML; UG/ML
INJECTION, SOLUTION EPIDURAL; INFILTRATION; INTRACAUDAL; PERINEURAL AS NEEDED
Status: DISCONTINUED | OUTPATIENT
Start: 2022-01-31 | End: 2022-01-31 | Stop reason: HOSPADM

## 2022-01-31 RX ORDER — LIDOCAINE HYDROCHLORIDE 20 MG/ML
INJECTION, SOLUTION EPIDURAL; INFILTRATION; INTRACAUDAL; PERINEURAL AS NEEDED
Status: DISCONTINUED | OUTPATIENT
Start: 2022-01-31 | End: 2022-01-31

## 2022-01-31 RX ORDER — SODIUM CHLORIDE 9 MG/ML
125 INJECTION, SOLUTION INTRAVENOUS CONTINUOUS
Status: DISCONTINUED | OUTPATIENT
Start: 2022-01-31 | End: 2022-01-31 | Stop reason: HOSPADM

## 2022-01-31 RX ORDER — PROPOFOL 10 MG/ML
INJECTION, EMULSION INTRAVENOUS AS NEEDED
Status: DISCONTINUED | OUTPATIENT
Start: 2022-01-31 | End: 2022-01-31

## 2022-01-31 RX ORDER — ONDANSETRON 2 MG/ML
INJECTION INTRAMUSCULAR; INTRAVENOUS AS NEEDED
Status: DISCONTINUED | OUTPATIENT
Start: 2022-01-31 | End: 2022-01-31

## 2022-01-31 RX ORDER — FENTANYL CITRATE/PF 50 MCG/ML
25 SYRINGE (ML) INJECTION
Status: DISCONTINUED | OUTPATIENT
Start: 2022-01-31 | End: 2022-01-31 | Stop reason: HOSPADM

## 2022-01-31 RX ADMIN — FENTANYL CITRATE 25 MCG: 50 INJECTION INTRAMUSCULAR; INTRAVENOUS at 07:40

## 2022-01-31 RX ADMIN — GABAPENTIN 400 MG: 300 CAPSULE ORAL at 06:17

## 2022-01-31 RX ADMIN — SODIUM CHLORIDE 125 ML/HR: 0.9 INJECTION, SOLUTION INTRAVENOUS at 06:29

## 2022-01-31 RX ADMIN — PROPOFOL 30 MG: 10 INJECTION, EMULSION INTRAVENOUS at 07:36

## 2022-01-31 RX ADMIN — LIDOCAINE HYDROCHLORIDE 80 MG: 20 INJECTION, SOLUTION EPIDURAL; INFILTRATION; INTRACAUDAL; PERINEURAL at 07:36

## 2022-01-31 RX ADMIN — ACETAMINOPHEN 975 MG: 325 TABLET, FILM COATED ORAL at 06:17

## 2022-01-31 RX ADMIN — ONDANSETRON 4 MG: 2 INJECTION INTRAMUSCULAR; INTRAVENOUS at 07:57

## 2022-01-31 RX ADMIN — CEFAZOLIN SODIUM 2000 MG: 2 SOLUTION INTRAVENOUS at 07:20

## 2022-01-31 RX ADMIN — PROPOFOL 100 MCG/KG/MIN: 10 INJECTION, EMULSION INTRAVENOUS at 07:36

## 2022-01-31 RX ADMIN — SODIUM CHLORIDE 125 ML/HR: 0.9 INJECTION, SOLUTION INTRAVENOUS at 09:19

## 2022-01-31 RX ADMIN — FENTANYL CITRATE 25 MCG: 50 INJECTION INTRAMUSCULAR; INTRAVENOUS at 07:46

## 2022-01-31 NOTE — ANESTHESIA POSTPROCEDURE EVALUATION
Post-Op Assessment Note    CV Status:  Stable  Pain Score: 2    Pain management: adequate     Mental Status:  Alert and awake   Hydration Status:  Euvolemic   PONV Controlled:  Controlled   Airway Patency:  Patent      Post Op Vitals Reviewed: Yes      Staff: Anesthesiologist         No complications documented      /67 (01/31/22 0853)    Temp      Pulse 56 (01/31/22 0853)   Resp 16 (01/31/22 0853)    SpO2 100 % (01/31/22 0853)

## 2022-01-31 NOTE — DISCHARGE INSTRUCTIONS
[employer] Post Operative Instructions  The [address] [facility] consists of 4 key parts: What is [employer] SNM Therapy? ? A small implanted rechargeable Stimulator device that generates mild electrical pulses  ? A long insulated wire that is implanted near the sacral nerve  The wire delivers electrical pulses from the Stimulator to the area of the sacral nerve  ? A small handheld Remote Control device that allows the patient to monitor the Stimulator  ? A wireless  that charges the Stimulator battery  The Minh Robles has a battery that should last for 15 or more years under expected and worst-case stimulation settings  [address] may feel some pain or discomfort in the first couple of weeks after surgery in the area of the implant as your skin heals  Your doctor may also give you drugs to help with pain  Your doctor or their staff will give you detailed instructions on what to do following your surgery  In the first few weeks after your procedure, limit your activities  Limiting activities will help you avoid the moving of your implanted lead  This helps ensure that therapy will be effective  When cleared by your doctor, you can go back to regular day to day activities  You will need to continue to avoid some extreme activities (see the section of this guide on "Physical Activity Precautions")  Note: Similar to any surgical procedure, there are risks with this procedure  Risks include bleeding, bruising, swelling, and infection  Please discuss the procedure and any concerns with your doctor  What to Expect   Your Stimulator may be turned on when you leave the hospital or very soon thereafter  You may feel a similar sensation as what you felt during test stimulation  It should not be uncomfortable or painful  You should feel a small amount of sensation at all times, and you should increase your stimulation amplitude if you are not feeling sensation   Stimulation should be on for 24 hours per day, 7 days per week  If your therapy feels uncomfortable or painful, your doctor can change the stimulation  It may take more than one try by your doctor to find a stimulation setting that gives you both comfort and good symptom relief  The following items are important for managing your [employer] SNM System:   ? Follow-up appointments   ? Your patient ID card   ? Precautions about physical activity   ? Precautions about medical procedures   ? Precautions about electromagnetic interference   ? Your Remote Control      Recharging your Stimulator Note: The feeling of your stimulation can change over time  Contact your doctor if your stimulation becomes uncomfortable or if your symptoms worsen  Follow-up Visits     You will have regular doctor visits to check on your health and the [address] [facility]  Your doctor will help with problems and may change your stimulation settings  If you want to stop therapy you should discuss this with your doctor  Your doctor may or may not advise removal of the [employer] SNM system  Please bring your Remote Control to your follow-up visits  Physical Activity Precautions   Patients should avoid activities that put the implanted system under extreme stress  ? Avoid rubbing the Stimulator through the skin and activities that require excessive or repetitive twisting, bending, bouncing or stretching  These activities can damage the implanted system resulting in loss of symptom relief and additional surgery  Examples of activities to avoid are gymnastics, mountain biking, and charlee diving, skiing and other sports  Less extreme activities should not impact your system, like running, jogging, road biking, swimming, and sexual activity  ? Scuba diving below 10 meters (33 feet) of water or entering hyperbaric chambers above 200kPa should be avoided     ? A perceived increase in stimulation may be caused by electromagnetic interference, postural changes, and other activities  You may find this uncomfortable (a jolting or shocking feeling)  Before engaging in activities that receiving a jolt would be unsafe for you or those around you, lower the stimulation amplitude to the lowest setting and turn off the Neurostimulator

## 2022-01-31 NOTE — OP NOTE
PERATIVE REPORT  PATIENT NAME: Kyle Aguilar    :  1945  MRN: [id number]  Pt Location: [facility] OR ROOM 04    SURGERY DATE: 2022    Surgeon(s) and Role: * Lupe Akhtar MD - Primary     * Krupa Sy MD - Assisting    Preop Diagnosis:  Urge incontinence [N39 41]  Refractory overactive bladder    Post-Op Diagnosis Codes:  Urge incontinence [N39 41]  Refractory overactive bladder    Procedure(s) (LRB):  IMPLANTATION NEUROSTIMULATOR ELECTRODE ARRAY SACRAL NERVE; INSERTION NEUROSTIMULATOR; FLUORO; ELECTONIC ANALYSIS (N/A) - [employer] stage II    Specimen(s):  * No specimens in log *    Estimated Blood Loss:       Drains:  * No LDAs found *    Anesthesia Type:   IV Sedation with Anesthesia    Operative Indications:  Urge incontinence [N39 41]  Refractory overactive bladder    Operative Findings:  Placement of Neurostimulator (IPG) in the left upper buttock in the hollow of the ileum  Complications:   None    Procedure and Technique:   The patient was identified in the holding area by the operating room staff and attending physician  She was taken to the operating room  She was given 2gm Ancef per ACOG guidelines  She was placed in the prone position  Pillows were placed in the lower abdomen and hips to level and flatten the sacrum and allow the toes to dangle freely  SCDs were placed and turned on  IV sedation was administered without complications  The patient was prepped and draped in the usual sterile fashion  A ground pad was placed on the bottom of the patient's foot and the long test stimulation cable was connected to the ground pad and the external test stimulator  The medial edges of the foramina were identied and marked  Needle entry points were marked and infused with local analgesia  The foramen needle was placed in the superior, medial aspect of the S3 foramen at appropriate needle depth   A guide wire was placed with the needle and a j-hook cable was placed on the distal end of the guide wire  Needle location was confirmed by X ray and patient motor reflex visualized as anal terri  Great toe plantar flexion reflex was not seen  A c-arm was moved into AP position to provide confirmation of the correct placement  The c-arm was then moved into the lateral position to identify the S3 foramen  Once again proper S3 needle location was also confirmed by utilizing the j-hook patient cable and the external test stimulator and patient anal bellow reflex noted  The foramen needle stylet was removed by sliding over the directional guide  A small incision was made peripherally to the directional guide through the skin  The lead introducer with dilator was placed over the directional guide and utilizing fluoroscopic guidance, the lead introducer was advanced until the radiopaque giuliano was half-way through the foramen  The dilator was removed along with the directional guide  The tined lead was placed through the introducer until electrodes two and three straddled the anterior surface of the sacrum  All four electrodes were tested, checking with patient for anal bellow reflex and toe plantar flexion of stimulus utilizing the j-hook patient cable and the external test stimulator  After satisfactory lead positioning was confirmed, the introducer was retracted over the lead under continuous fluoroscopy, deploying the tines into presacral tissue  Retesting of all four electrodes confirming appropriate responses was completed  A potential internal neurostimulator pocket site was identied below the right iliac crest and lateral to the sacrum on the patient's left side  Local analgesia was administered and a 3cm incision was made into the subcutaneous tissue creating a connection site  Sharp and blunt dissection was used to create a small pocket below incision site  A tunneling tool with sheath was placed from the lead exit site subcutaneously to the small incised pocket site   The tunneling tool was removed and the lead was fed through the sheath, exiting at the pocket site  The sheath was removed  Irrigation with antibiotics were used to clean out the pocket and incision site  The lead was cleaned and dried  The lead was placed into the battery device and secured  The setscrew was tightened with the provided sterile screwdriver until audible clicks were heard  The connection components and battery were placed into the incision  The incisions were closed with several subcuticular interrupted vicryl sutures through the fat and subcuticular 4-0 Monocryl skin sutures  The tunneling puncture site was closed with a single interrupted vicryl suture and single 4-0 Monocryl suture  The incisions were secured with surgical glue  The sponge, needle and instrument count were correct x 2  The patient tolerated the procedure well  She was awakened from anesthesia and transferred to the recovery room in stable condition  Dr uMstapha Moreno was present for the entire procedure  The [employer] industry rep was present in the OR and available in PACU for further programming  Using the external test stimulator, the patient was programmed to the electrode of optimum sensation and provided utilization instructions prior to discharge  Patient will complete a voiding diary during testing period to help document results of this test procedure        Patient Disposition:  PACU       SIGNATURE: Cirilo Peña MD  DATE: January 31, 2022  TIME: 8:36 AM

## 2022-01-31 NOTE — INTERVAL H&P NOTE
H&P reviewed  After examining the patient I find no changes in the patients condition since the H&P had been written  She does endorse she is legally blind and prefers the device be inserted on her right  Post op instructions were reviewed with the patient       Vitals:    01/31/22 0607   BP: 147/82   Pulse: 70   Resp: 16   Temp: 98 2 °F (36 8 °C)   SpO2: 95%     Yarelis Phan MD  [facility] [address] [place] [address] for Female Pelvic Medicine

## 2022-01-31 NOTE — ANESTHESIA PREPROCEDURE EVALUATION
Procedure:  IMPLANTATION NEUROSTIMULATOR ELECTRODE ARRAY SACRAL NERVE; INSERTION NEUROSTIMULATOR; FLUORO; ELECTONIC ANALYSIS (N/A Bladder)    Relevant Problems   ANESTHESIA  post op rigors      CARDIO   (+) Chronic bilateral thoracic back pain   (+) Exertional shortness of breath   (+) Mixed hyperlipidemia      GI/HEPATIC   (+) GERD without esophagitis      GYN   (+) History of hysterectomy      MUSCULOSKELETAL   (+) Chronic bilateral thoracic back pain   (+) Primary generalized (osteo)arthritis   (+) Primary osteoarthritis of left hip      NEURO/PSYCH  blind in left eye    (+) Chronic bilateral thoracic back pain      PULMONARY   (+) Asthma, mild persistent   (+) Exertional shortness of breath      Other   (+) Fatigue   (+) Right hip pain   (+) Sjogren's syndrome (HCC)        Physical Exam    Airway    Mallampati score: II  TM Distance: >3 FB  Neck ROM: full     Dental   Comment: Upper permanent bridge,     Cardiovascular  Rhythm: regular, Rate: normal, Cardiovascular exam normal    Pulmonary  Pulmonary exam normal Breath sounds clear to auscultation,     Other Findings        Anesthesia Plan  ASA Score- 2     Anesthesia Type- IV sedation with anesthesia with ASA Monitors  Additional Monitors:   Airway Plan:           Plan Factors-Exercise tolerance (METS): >4 METS  Chart reviewed  EKG reviewed  Existing labs reviewed  Patient summary reviewed  Patient is not a current smoker  Patient not instructed to abstain from smoking on day of procedure  Patient did not smoke on day of surgery  Induction- intravenous  Postoperative Plan- Plan for postoperative opioid use  Informed Consent- Anesthetic plan and risks discussed with patient and daughter Dangelo Vasquez

## 2022-02-06 DIAGNOSIS — R60.9 EDEMA, UNSPECIFIED TYPE: ICD-10-CM

## 2022-02-07 RX ORDER — POTASSIUM CHLORIDE 750 MG/1
10 TABLET, FILM COATED, EXTENDED RELEASE ORAL DAILY
Qty: 90 TABLET | Refills: 1 | Status: SHIPPED | OUTPATIENT
Start: 2022-02-07 | End: 2022-08-01

## 2022-02-07 RX ORDER — FUROSEMIDE 40 MG/1
40 TABLET ORAL EVERY MORNING
Qty: 90 TABLET | Refills: 1 | Status: SHIPPED | OUTPATIENT
Start: 2022-02-07 | End: 2022-08-01

## 2022-04-11 ENCOUNTER — TELEPHONE (OUTPATIENT)
Dept: FAMILY MEDICINE CLINIC | Facility: CLINIC | Age: 77
End: 2022-04-11

## 2022-04-11 NOTE — TELEPHONE ENCOUNTER
Pt dropping off form for Dr Lucio Dotson to sign to renew her handicap placard  When form is complete please place in mail and call pt that the form has been completed  Placed in nurse pending one

## 2022-04-21 ENCOUNTER — APPOINTMENT (OUTPATIENT)
Dept: LAB | Facility: CLINIC | Age: 77
End: 2022-04-21
Payer: MEDICARE

## 2022-04-21 DIAGNOSIS — Z13.0 SCREENING FOR DEFICIENCY ANEMIA: ICD-10-CM

## 2022-04-21 DIAGNOSIS — R73.01 IMPAIRED FASTING GLUCOSE: ICD-10-CM

## 2022-04-21 DIAGNOSIS — E78.2 MIXED HYPERLIPIDEMIA: ICD-10-CM

## 2022-04-21 LAB
ALBUMIN SERPL BCP-MCNC: 3.8 G/DL (ref 3.5–5)
ALP SERPL-CCNC: 71 U/L (ref 46–116)
ALT SERPL W P-5'-P-CCNC: 26 U/L (ref 12–78)
ANION GAP SERPL CALCULATED.3IONS-SCNC: 3 MMOL/L (ref 4–13)
AST SERPL W P-5'-P-CCNC: 14 U/L (ref 5–45)
BILIRUB SERPL-MCNC: 0.87 MG/DL (ref 0.2–1)
BUN SERPL-MCNC: 20 MG/DL (ref 5–25)
CALCIUM SERPL-MCNC: 9.3 MG/DL (ref 8.3–10.1)
CHLORIDE SERPL-SCNC: 111 MMOL/L (ref 100–108)
CHOLEST SERPL-MCNC: 180 MG/DL
CO2 SERPL-SCNC: 30 MMOL/L (ref 21–32)
CREAT SERPL-MCNC: 0.88 MG/DL (ref 0.6–1.3)
ERYTHROCYTE [DISTWIDTH] IN BLOOD BY AUTOMATED COUNT: 13.7 % (ref 11.6–15.1)
EST. AVERAGE GLUCOSE BLD GHB EST-MCNC: 100 MG/DL
GFR SERPL CREATININE-BSD FRML MDRD: 64 ML/MIN/1.73SQ M
GLUCOSE P FAST SERPL-MCNC: 92 MG/DL (ref 65–99)
HBA1C MFR BLD: 5.1 %
HCT VFR BLD AUTO: 38.2 % (ref 34.8–46.1)
HDLC SERPL-MCNC: 50 MG/DL
HGB BLD-MCNC: 12.9 G/DL (ref 11.5–15.4)
LDLC SERPL CALC-MCNC: 113 MG/DL (ref 0–100)
MCH RBC QN AUTO: 29.5 PG (ref 26.8–34.3)
MCHC RBC AUTO-ENTMCNC: 33.8 G/DL (ref 31.4–37.4)
MCV RBC AUTO: 87 FL (ref 82–98)
PLATELET # BLD AUTO: 215 THOUSANDS/UL (ref 149–390)
PMV BLD AUTO: 10.6 FL (ref 8.9–12.7)
POTASSIUM SERPL-SCNC: 3.6 MMOL/L (ref 3.5–5.3)
PROT SERPL-MCNC: 6.9 G/DL (ref 6.4–8.2)
RBC # BLD AUTO: 4.38 MILLION/UL (ref 3.81–5.12)
SODIUM SERPL-SCNC: 144 MMOL/L (ref 136–145)
TRIGL SERPL-MCNC: 87 MG/DL
WBC # BLD AUTO: 7.12 THOUSAND/UL (ref 4.31–10.16)

## 2022-04-21 PROCEDURE — 36415 COLL VENOUS BLD VENIPUNCTURE: CPT

## 2022-04-21 PROCEDURE — 83036 HEMOGLOBIN GLYCOSYLATED A1C: CPT

## 2022-04-21 PROCEDURE — 80061 LIPID PANEL: CPT

## 2022-04-21 PROCEDURE — 80053 COMPREHEN METABOLIC PANEL: CPT

## 2022-04-21 PROCEDURE — 85027 COMPLETE CBC AUTOMATED: CPT

## 2022-04-26 ENCOUNTER — OFFICE VISIT (OUTPATIENT)
Dept: FAMILY MEDICINE CLINIC | Facility: CLINIC | Age: 77
End: 2022-04-26
Payer: MEDICARE

## 2022-04-26 ENCOUNTER — TELEPHONE (OUTPATIENT)
Dept: ADMINISTRATIVE | Facility: HOSPITAL | Age: 77
End: 2022-04-26

## 2022-04-26 VITALS
TEMPERATURE: 97.7 F | BODY MASS INDEX: 32.24 KG/M2 | HEART RATE: 75 BPM | OXYGEN SATURATION: 98 % | WEIGHT: 200.6 LBS | SYSTOLIC BLOOD PRESSURE: 126 MMHG | HEIGHT: 66 IN | DIASTOLIC BLOOD PRESSURE: 70 MMHG | RESPIRATION RATE: 18 BRPM

## 2022-04-26 DIAGNOSIS — M15.0 PRIMARY GENERALIZED (OSTEO)ARTHRITIS: ICD-10-CM

## 2022-04-26 DIAGNOSIS — N39.0 RECURRENT UTI: ICD-10-CM

## 2022-04-26 DIAGNOSIS — Z79.891 OPIOID USE AGREEMENT EXISTS: ICD-10-CM

## 2022-04-26 DIAGNOSIS — R06.02 EXERTIONAL SHORTNESS OF BREATH: ICD-10-CM

## 2022-04-26 DIAGNOSIS — Z12.31 ENCOUNTER FOR SCREENING MAMMOGRAM FOR BREAST CANCER: Primary | ICD-10-CM

## 2022-04-26 DIAGNOSIS — Z00.00 MEDICARE ANNUAL WELLNESS VISIT, SUBSEQUENT: Primary | ICD-10-CM

## 2022-04-26 PROCEDURE — 1123F ACP DISCUSS/DSCN MKR DOCD: CPT | Performed by: FAMILY MEDICINE

## 2022-04-26 PROCEDURE — G0439 PPPS, SUBSEQ VISIT: HCPCS | Performed by: FAMILY MEDICINE

## 2022-04-26 RX ORDER — METHENAMINE HIPPURATE 1000 MG/1
1 TABLET ORAL 2 TIMES DAILY WITH MEALS
Qty: 60 TABLET | Refills: 3 | Status: SHIPPED | OUTPATIENT
Start: 2022-04-26

## 2022-04-26 RX ORDER — ACETAMINOPHEN AND CODEINE PHOSPHATE 300; 30 MG/1; MG/1
1 TABLET ORAL EVERY 4 HOURS PRN
Qty: 30 TABLET | Refills: 1 | Status: SHIPPED | OUTPATIENT
Start: 2022-04-26 | End: 2022-07-26 | Stop reason: SDUPTHER

## 2022-04-26 NOTE — ASSESSMENT & PLAN NOTE
She is compliant with her pain agreement  Urine drug screen collected today    She continues to have significant arthritis pain  Tylenol #3 is helping her function around her home and yard  [place] prescription monitoring program report reviewed and is appropriate

## 2022-04-26 NOTE — PATIENT INSTRUCTIONS
Medicare Preventive Visit Patient Instructions  Thank you for completing your Welcome to Medicare Visit or Medicare Annual Wellness Visit today  Your next wellness visit will be due in one year (4/27/2023)  The screening/preventive services that you may require over the next 5-10 years are detailed below  Some tests may not apply to you based off risk factors and/or age  Screening tests ordered at today's visit but not completed yet may show as past due  Also, please note that scanned in results may not display below  Preventive Screenings:  Service Recommendations Previous Testing/Comments   Colorectal Cancer Screening  * Colonoscopy    * Fecal Occult Blood Test (FOBT)/Fecal Immunochemical Test (FIT)  * Fecal DNA/Cologuard Test  * Flexible Sigmoidoscopy Age: 54-65 years old   Colonoscopy: every 10 years (may be performed more frequently if at higher risk)  OR  FOBT/FIT: every 1 year  OR  Cologuard: every 3 years  OR  Sigmoidoscopy: every 5 years  Screening may be recommended earlier than age 48 if at higher risk for colorectal cancer  Also, an individualized decision between you and your healthcare provider will decide whether screening between the ages of 74-80 would be appropriate  Colonoscopy: 07/29/2021  FOBT/FIT: Not on file  Cologuard: Not on file  Sigmoidoscopy: Not on file          Breast Cancer Screening Age: 36 years old  Frequency: every 1-2 years  Not required if history of left and right mastectomy Mammogram: 04/30/2021    Screening Current   Cervical Cancer Screening Between the ages of 21-29, pap smear recommended once every 3 years  Between the ages of 33-67, can perform pap smear with HPV co-testing every 5 years     Recommendations may differ for women with a history of total hysterectomy, cervical cancer, or abnormal pap smears in past  Pap Smear: Not on file    Screening Not Indicated   Hepatitis C Screening Once for adults born between 1945 and 1965  More frequently in patients at high risk for Hepatitis C Hep C Antibody: 11/27/2018    Screening Current   Diabetes Screening 1-2 times per year if you're at risk for diabetes or have pre-diabetes Fasting glucose: 92 mg/dL   A1C: 5 1 %    Screening Current   Cholesterol Screening Once every 5 years if you don't have a lipid disorder  May order more often based on risk factors  Lipid panel: 04/21/2022    Screening Not Indicated  History Lipid Disorder     Other Preventive Screenings Covered by Medicare:  1  Abdominal Aortic Aneurysm (AAA) Screening: covered once if your at risk  You're considered to be at risk if you have a family history of AAA  2  Lung Cancer Screening: covers low dose CT scan once per year if you meet all of the following conditions: (1) Age 50-69; (2) No signs or symptoms of lung cancer; (3) Current smoker or have quit smoking within the last 15 years; (4) You have a tobacco smoking history of at least 30 pack years (packs per day multiplied by number of years you smoked); (5) You get a written order from a healthcare provider  3  Glaucoma Screening: covered annually if you're considered high risk: (1) You have diabetes OR (2) Family history of glaucoma OR (3)  aged 48 and older OR (3)  American aged 72 and older  3  Osteoporosis Screening: covered every 2 years if you meet one of the following conditions: (1) You're estrogen deficient and at risk for osteoporosis based off medical history and other findings; (2) Have a vertebral abnormality; (3) On glucocorticoid therapy for more than 3 months; (4) Have primary hyperparathyroidism; (5) On osteoporosis medications and need to assess response to drug therapy  · Last bone density test (DXA Scan): 03/19/2009  5  HIV Screening: covered annually if you're between the age of 12-76  Also covered annually if you are younger than 13 and older than 72 with risk factors for HIV infection   For pregnant patients, it is covered up to 3 times per pregnancy  Immunizations:  Immunization Recommendations   Influenza Vaccine Annual influenza vaccination during flu season is recommended for all persons aged >= 6 months who do not have contraindications   Pneumococcal Vaccine (Prevnar and Pneumovax)  * Prevnar = PCV13  * Pneumovax = PPSV23   Adults 25-60 years old: 1-3 doses may be recommended based on certain risk factors  Adults 72 years old: Prevnar (PCV13) vaccine recommended followed by Pneumovax (PPSV23) vaccine  If already received PPSV23 since turning 65, then PCV13 recommended at least one year after PPSV23 dose  Hepatitis B Vaccine 3 dose series if at intermediate or high risk (ex: diabetes, end stage renal disease, liver disease)   Tetanus (Td) Vaccine - COST NOT COVERED BY MEDICARE PART B Following completion of primary series, a booster dose should be given every 10 years to maintain immunity against tetanus  Td may also be given as tetanus wound prophylaxis  Tdap Vaccine - COST NOT COVERED BY MEDICARE PART B Recommended at least once for all adults  For pregnant patients, recommended with each pregnancy  Shingles Vaccine (Shingrix) - COST NOT COVERED BY MEDICARE PART B  2 shot series recommended in those aged 48 and above     Health Maintenance Due:      Topic Date Due    DXA SCAN  03/19/2011    Breast Cancer Screening: Mammogram  04/30/2022    Hepatitis C Screening  Completed     Immunizations Due:      Topic Date Due    DTaP,Tdap,and Td Vaccines (1 - Tdap) 04/02/2013     Advance Directives   What are advance directives? Advance directives are legal documents that state your wishes and plans for medical care  These plans are made ahead of time in case you lose your ability to make decisions for yourself  Advance directives can apply to any medical decision, such as the treatments you want, and if you want to donate organs  What are the types of advance directives?   There are many types of advance directives, and each state has rules about how to use them  You may choose a combination of any of the following:  · Living will: This is a written record of the treatment you want  You can also choose which treatments you do not want, which to limit, and which to stop at a certain time  This includes surgery, medicine, IV fluid, and tube feedings  · Durable power of  for healthcare [facility] [place] [facility]): This is a written record that states who you want to make healthcare choices for you when you are unable to make them for yourself  This person, called a proxy, is usually a family member or a friend  You may choose more than 1 proxy  · Do not resuscitate (DNR) order:  A DNR order is used in case your heart stops beating or you stop breathing  It is a request not to have certain forms of treatment, such as CPR  A DNR order may be included in other types of advance directives  · Medical directive: This covers the care that you want if you are in a coma, near death, or unable to make decisions for yourself  You can list the treatments you want for each condition  Treatment may include pain medicine, surgery, blood transfusions, dialysis, IV or tube feedings, and a ventilator (breathing machine)  · Values history: This document has questions about your views, beliefs, and how you feel and think about life  This information can help others choose the care that you would choose  Why are advance directives important? An advance directive helps you control your care  Although spoken wishes may be used, it is better to have your wishes written down  Spoken wishes can be misunderstood, or not followed  Treatments may be given even if you do not want them  An advance directive may make it easier for your family to make difficult choices about your care     Weight Management   Why it is important to manage your weight:  Being overweight increases your risk of health conditions such as heart disease, high blood pressure, type 2 diabetes, and certain types of cancer  It can also increase your risk for osteoarthritis, sleep apnea, and other respiratory problems  Aim for a slow, steady weight loss  Even a small amount of weight loss can lower your risk of health problems  How to lose weight safely:  A safe and healthy way to lose weight is to eat fewer calories and get regular exercise  You can lose up about 1 pound a week by decreasing the number of calories you eat by 500 calories each day  Healthy meal plan for weight management:  A healthy meal plan includes a variety of foods, contains fewer calories, and helps you stay healthy  A healthy meal plan includes the following:  · Eat whole-grain foods more often  A healthy meal plan should contain fiber  Fiber is the part of grains, fruits, and vegetables that is not broken down by your body  Whole-grain foods are healthy and provide extra fiber in your diet  Some examples of whole-grain foods are whole-wheat breads and pastas, oatmeal, brown rice, and bulgur  · Eat a variety of vegetables every day  Include dark, leafy greens such as spinach, kale, shruthi greens, and mustard greens  Eat yellow and orange vegetables such as carrots, sweet potatoes, and winter squash  · Eat a variety of fruits every day  Choose fresh or canned fruit (canned in its own juice or light syrup) instead of juice  Fruit juice has very little or no fiber  · Eat low-fat dairy foods  Drink fat-free (skim) milk or 1% milk  Eat fat-free yogurt and low-fat cottage cheese  Try low-fat cheeses such as mozzarella and other reduced-fat cheeses  · Choose meat and other protein foods that are low in fat  Choose beans or other legumes such as split peas or lentils  Choose fish, skinless poultry (chicken or turkey), or lean cuts of red meat (beef or pork)  Before you cook meat or poultry, cut off any visible fat  · Use less fat and oil  Try baking foods instead of frying them   Add less fat, such as margarine, sour cream, regular salad dressing and bassem to foods  Eat fewer high-fat foods  Some examples of high-fat foods include french fries, doughnuts, ice cream, and cakes  · Eat fewer sweets  Limit foods and drinks that are high in sugar  This includes candy, cookies, regular soda, and sweetened drinks  Exercise:  Exercise at least 30 minutes per day on most days of the week  Some examples of exercise include walking, biking, dancing, and swimming  You can also fit in more physical activity by taking the stairs instead of the elevator or parking farther away from stores  Ask your healthcare provider about the best exercise plan for you  Narcotic (Opioid) Safety    Use narcotics safely:  · Take prescribed narcotics exactly as directed  · Do not give narcotics to others or take narcotics that belong to someone else  · Do not mix narcotics without medicines or alcohol  · Do not drive or operate heavy machinery after you take the narcotic  · Monitor for side effects and notify your healthcare provider if you experienced side effects such as nausea, sleepiness, itching, or trouble thinking clearly  Manage constipation:    Constipation is the most common side effect of narcotic medicine  Constipation is when you have hard, dry bowel movements, or you go longer than usual between bowel movements  Tell your healthcare provider about all changes in your bowel movements while you are taking narcotics  He or she may recommend laxative medicine to help you have a bowel movement  He or she may also change the kind of narcotic you are taking, or change when you take it  The following are more ways you can prevent or relieve constipation:    · Drink liquids as directed  You may need to drink extra liquids to help soften and move your bowels  Ask how much liquid to drink each day and which liquids are best for you  · Eat high-fiber foods  This may help decrease constipation by adding bulk to your bowel movements   High-fiber foods include fruits, vegetables, whole-grain breads and cereals, and beans  Your healthcare provider or dietitian can help you create a high-fiber meal plan  Your provider may also recommend a fiber supplement if you cannot get enough fiber from food  · Exercise regularly  Regular physical activity can help stimulate your intestines  Walking is a good exercise to prevent or relieve constipation  Ask which exercises are best for you  · Schedule a time each day to have a bowel movement  This may help train your body to have regular bowel movements  Bend forward while you are on the toilet to help move the bowel movement out  Sit on the toilet for at least 10 minutes, even if you do not have a bowel movement  Store narcotics safely:   · Store narcotics where others cannot easily get them  Keep them in a locked cabinet or secure area  Do not  keep them in a purse or other bag you carry with you  A person may be looking for something else and find the narcotics  · Make sure narcotics are stored out of the reach of children  A child can easily overdose on narcotics  Narcotics may look like candy to a small child  The best way to dispose of narcotics: The laws vary by country and area  In the [place], the best way is to return the narcotics through a take-back program  This program is offered by the [employer]  The following are options for using the program:  · Take the narcotics to a M[employer] collection site  The site is often a law enforcement center  Call your local law enforcement center for scheduled take-back days in your area  You will be given information on where to go if the collection site is in a different location  · Take the narcotics to an approved pharmacy or hospital   A pharmacy or hospital may be set up as a collection site  You will need to ask if it is a M[employer] collection site if you were not directed there   A pharmacy or doctor's office may not be able to take back narcotics unless it is a M[employer] site  · Use a mail-back system  This means you are given containers to put the narcotics into  You will then mail them in the containers  · Use a take-back drop box  This is a place to leave the narcotics at any time  People and animals will not be able to get into the box  Your local law enforcement agency can tell you where to find a drop box in your area  Other ways to manage pain:   · Ask your healthcare provider about non-narcotic medicines to control pain  Nonprescription medicines include NSAIDs (such as ibuprofen) and acetaminophen  Prescription medicines include muscle relaxers, antidepressants, and steroids  · Pain may be managed without any medicines  Some ways to relieve pain include massage, aromatherapy, or meditation  Physical or occupational therapy may also help  For more information:   · Drug Enforcement Administration  [facility] [address]  Phone: 2- 005 - [phone]  Web Address: [facility]/drug_disposal/    · [address]  and Drug Administration  [facility] , [address] [place] [address]  Phone: 2- 240 - [phone]  Web Address: http://[employer]/     © Copyright [employer] 2018 Information is for End User's use only and may not be sold, redistributed or otherwise used for commercial purposes  All illustrations and images included in CareNotes® are the copyrighted property of [employer] , [employer]  or [address] [place] [address]:  Maximize your health and quality of life by:   · Increasing your level of function and activity  · Decreasing the negative effects of pain on your life  · Minimizing the risks and side effects of medications and ensuring safe use of opioid medication     Ways for you to help meet your goals:  Maintain a healthy lifestyle  This includes proper nutrition, regular physical activity as able, try for 8 hours of sleep per night, use stress reduction strategies, avoid triggers        Risks and side effects of opioid use:  Prescription opioids carry serious risks of addiction and  overdose, especially with prolonged use  An opioid overdose,  often marked by slowed breathing, can cause sudden death  The  use of prescription opioids can have a number of side effects as  well, even when taken as directed:  · Tolerance--meaning you might need to take more of a medication for the same pain relief  · Physical dependence--meaning you have symptoms of withdrawal when a medication is stopped  · Increased sensitivity to pain  · Constipation  · Nausea, vomiting, dry mouth  · Sleepiness and dizziness   · Confusion  · Depression  · Low levels of testosterone that can result in lower sex drive, energy, and strength  · Itching and sweating    If you are prescribed opioids for pain:  · Never take opioids in greater amounts or more often than prescribed  · Help prevent misuse and abuse  - Never sell or share prescription opioids         - Never use another persons prescription opioids  · Store prescription opioids in a secure place and out of reach of others (this may include visitors, children, friends, and family)  · Safely dispose of unused prescription opioids: Find your community drug take-back program or your pharmacy mail-back program, or flush them down the toilet, following guidance from the Food and Drug Administration (www fda gov/Drugs/ResourcesForYou)  · Visit www cdc gov/drugoverdose to learn about the risks of opioid abuse and overdose  · If you believe you may be struggling with addiction, tell your health care provider and ask for guidance or call [address] at [phone]-KQKY

## 2022-04-26 NOTE — TELEPHONE ENCOUNTER
Dr Reddy,     When scheduling Diane's PFT & echo, she asked for her mammogram order  Can you please add new mammo order and I will schedule? Thank you!

## 2022-04-26 NOTE — PROGRESS NOTES
Assessment and Plan:     Problem List Items Addressed This Visit     Exertional shortness of breath    Relevant Orders    Complete PFT with post bronchodilator    Echo complete w/ contrast if indicated    Primary generalized (osteo)arthritis     She is compliant with her pain agreement  Urine drug screen collected today    She continues to have significant arthritis pain  Tylenol #3 is helping her function around her home and yard  [place] prescription monitoring program report reviewed and is appropriate  Relevant Medications    acetaminophen-codeine (TYLENOL #3) 300-30 mg per tablet      Other Visit Diagnoses     Medicare annual wellness visit, subsequent    -  Primary    Opioid use agreement exists        Relevant Orders    [facility] All Prescribed Meds    Amphetamines, Methamphetamines    Butalbital    Phenobarbital    Secobarbital    Temazepam    Alprazolam    Clonazepam    Diazepam    Lorazepam    Oxazepam    Gabapentin    Pregabalin    Cocaine    Heroin    Buprenorphine    Levorphanol    Meperidine    Naltrexone    Fentanyl    Methadone    Oxycodone    Oxymorphone    Tapentadol    Tramadol    Codeine, Hydrocodone, Hydropmorphone, Morphine    Bath Salts    Kratom    Spice    Methylphenidate    Phentermine    Validity Oxidant    Validity Creatinine    Validity pH    Validity Specific    Recurrent UTI        goal is to get her off of macrodantin, started methanamine     Relevant Medications    methenamine hippurate (HIPREX) 1 g tablet        Return in 3 months (on 7/26/2022)  BMI Counseling: Body mass index is 32 38 kg/m²  The BMI is above normal  Exercise recommendations include exercising 3-5 times per week  Rationale for BMI follow-up plan is due to patient being overweight or obese  Depression Screening and Follow-up Plan: Patient was screened for depression during today's encounter  They screened negative with a PHQ-2 score of 1        Preventive health issues were discussed with patient, and age appropriate screening tests were ordered as noted in patient's After Visit Summary  Personalized health advice and appropriate referrals for health education or preventive services given if needed, as noted in patient's After Visit Summary       History of Present Illness:     Patient presents for Medicare Annual Wellness visit    Patient Care Team:  Jens Parks DO as PCP - DO Christa Molina MD as Endoscopist     Problem List:     Patient Active Problem List   Diagnosis    Edema    Asthma, mild persistent    Exertional shortness of breath    Fatigue    GERD without esophagitis    Mixed hyperlipidemia    Impaired fasting glucose    Left knee pain    Palpitations    Primary generalized (osteo)arthritis    Right hip pain    Sjogren's syndrome ([address] )    Diverticulitis of large intestine without perforation or abscess with bleeding    Chronic bilateral thoracic back pain    Rosacea    Cough    UTI symptoms    Primary osteoarthritis of left hip    Urge incontinence of urine    History of hysterectomy      Past Medical and Surgical History:     Past Medical History:   Diagnosis Date    Anesthesia complication     "shivers"    Arthritis     Asthma     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 see colors, with over-use goes darker- from untreated amblyopia    Breathing difficulty     with extubation pt developed laryngeal spasm    Cataract     both eyes- right eye needs surgery    Colon polyps 2016    Diverticulitis     had episode in April 2018    Edema     lower legs    Generalized osteoarthritis     GERD (gastroesophageal reflux disease)     Hearing problem     unspecified laterality-hearing aid right ear    High cholesterol     History of transfusion     autologus    Incontinence of urine     Polyarthritis     last assessed: 6/26/2015    Rheumatoid arthritis ([address] )     Sjogren's syndrome ([address] )     last assessed: 7/18/2016    SS-A antibody positive     last assessed: 8/20/2015    Urinary tract infection     UTI (urinary tract infection)      Past Surgical History:   Procedure Laterality Date    APPENDECTOMY      AUGMENTATION BREAST Bilateral     breast surgery enlargement procedure elective bilateral    BILATERAL OOPHORECTOMY Bilateral 2003    BLADDER SURGERY      bladder lift,cystocele    CATARACT EXTRACTION      Right 9/2018; Left 10/2018    COLONOSCOPY  04/18/2011    COLPORRHAPHY  2014    anterior, repair of cystocele    HYSTERECTOMY  [address]    with cystocele     JOINT REPLACEMENT Left 2016    knee    KNEE ARTHROPLASTY Left     KNEE ARTHROSCOPY  2009    KNEE SURGERY Left 2009    torn meniscus    OOPHORECTOMY      DE COLONOSCOPY FLX DX W/COLLJ SPEC WHEN PFRMD N/A 5/31/2018    Procedure: COLONOSCOPY;  Surgeon: Zach Machado MD;  Location: [place] [address] [facility];   Service: Gastroenterology    REDUCTION MAMMAPLASTY  2000    SACRAL NERVE STIMULATOR PLACEMENT N/A 1/31/2022    Procedure: IMPLANTATION NEUROSTIMULATOR ELECTRODE ARRAY SACRAL NERVE; INSERTION NEUROSTIMULATOR; FLUORO; ELECTONIC ANALYSIS;  Surgeon: Cora Aldrich MD;  Location: [facility];  Service: UroGynecology           TONSILLECTOMY AND ADENOIDECTOMY      URETHRA SURGERY        Family History:     Family History   Problem Relation Age of Onset    Coronary artery disease Mother     COPD Mother     Hypertension Mother     Hyperlipidemia Mother         high cholesterol    Atrial fibrillation Mother     Coronary artery disease Father     Arthritis Father     COPD Father         Black lung    Hypertension Father     Hyperlipidemia Father         high cholesterol    Lung cancer Brother     Coronary artery disease Brother     Hypertension Brother     Hyperlipidemia Brother         high cholesterol    Atrial fibrillation Brother         fib/flutter    Other Sister         pericarditis    Arthritis Sister     Diabetes Maternal Grandmother mellitus    Breast cancer Maternal Grandmother 79    Breast cancer Paternal Grandmother 79    Arthritis Family     Asthma Family     Cancer Brother         lung    Heart disease Brother         CAD-stents-smoker    No Known Problems Maternal Aunt     No Known Problems Maternal Aunt     No Known Problems Maternal Aunt     No Known Problems Maternal Aunt     No Known Problems Maternal Aunt     Breast cancer Other 36      Social History:     Social History     Socioeconomic History    Marital status: /Civil Union     Spouse name: None    Number of children: None    Years of education: None    Highest education level: None   Occupational History    Occupation: retired   Tobacco Use    Smoking status: Never Smoker    Smokeless tobacco: Never Used   Vaping Use    Vaping Use: Never used   Substance and Sexual Activity    Alcohol use: Not Currently     Comment: rarely - wine 2 x month    Drug use: No    Sexual activity: Not Currently   Other Topics Concern    None   Social History Narrative    Lack of adequate sleep-4-6 hrs/night    Lack of exercise     Social Determinants of Health     Financial Resource Strain: Not on file   Food Insecurity: Not on file   Transportation Needs: Not on file   Physical Activity: Not on file   Stress: Not on file   Social Connections: Not on file   Intimate Partner Violence: Not on file   Housing Stability: Not on file      Medications and Allergies:     Current Outpatient Medications   Medication Sig Dispense Refill    acetaminophen (TYLENOL) 500 mg tablet Take 500 mg by mouth 3 (three) times a day as needed        acetaminophen-codeine (TYLENOL #3) 300-30 mg per tablet Take 1 tablet by mouth every 4 (four) hours as needed for moderate pain 30 tablet 1    albuterol (PROVENTIL HFA,VENTOLIN HFA) 90 mcg/act inhaler Inhale 2 puffs every 6 (six) hours as needed for wheezing or shortness of breath 1 Inhaler 5    Cholecalciferol (VITAMIN D3) 2000 units capsule Take 1 capsule by mouth every morning        cyanocobalamin (VITAMIN B-12) 1000 MCG tablet Take 1,000 mcg by mouth daily      docusate sodium (COLACE) 100 mg capsule Take 100 mg by mouth 2 (two) times a day      fluticasone (Flovent HFA) 220 mcg/act inhaler Inhale 2 puffs 2 (two) times a day Rinse mouth after use 36 g 3    furosemide (LASIX) 40 mg tablet Take 1 tablet (40 mg total) by mouth every morning 90 tablet 1    Magnesium Oxide -Mg Supplement 400 MG CAPS Take 1 capsule by mouth every morning        nitrofurantoin (MACRODANTIN) 50 mg capsule TAKE 1 CAPSULE BY MOUTH EVERY DAY 30 capsule 5    omeprazole (PriLOSEC) 20 mg delayed release capsule Take 20 mg by mouth as needed        potassium chloride (Klor-Con) 10 mEq tablet Take 1 tablet (10 mEq total) by mouth in the morning 90 tablet 1    pravastatin (PRAVACHOL) 10 mg tablet Take 1 tablet (10 mg total) by mouth daily 30 tablet 5    senna (SENOKOT) 8 6 MG tablet Take 1 tablet by mouth as needed for constipation      methenamine hippurate (HIPREX) 1 g tablet Take 1 tablet (1 g total) by mouth 2 (two) times a day with meals 60 tablet 3     No current facility-administered medications for this visit  Allergies   Allergen Reactions    Celecoxib Other (See Comments)     Other reaction(s): Palpitations  Category:  Adverse Reaction;       Immunizations:     Immunization History   Administered Date(s) Administered    COVID-19 MODERNA VACC 0 25 ML IM BOOSTER 12/27/2021    COVID-19 MODERNA VACC 0 5 ML IM 03/11/2021, 04/09/2021    DT (pediatric) 04/01/2013    INFLUENZA 10/19/2018, 10/20/2020, 10/06/2021    Influenza Split High Dose Preservative Free IM 09/27/2016, 09/01/2017, 10/29/2019    Influenza, seasonal, injectable 11/01/2015    Pneumococcal Conjugate 13-Valent 05/29/2015    Pneumococcal Polysaccharide PPV23 11/01/2010    Zoster 04/01/2013      Health Maintenance:         Topic Date Due    DXA SCAN  03/19/2011    Breast Cancer Screening: Mammogram  04/30/2022    Hepatitis C Screening  Completed         Topic Date Due    DTaP,Tdap,and Td Vaccines (1 - Tdap) 04/02/2013      Medicare Health Risk Assessment:     /70   Pulse 75   Temp 97 7 °F (36 5 °C)   Resp 18   Ht 5' 6" (1 676 m)   Wt 91 kg (200 lb 9 6 oz)   SpO2 98%   BMI 32 38 kg/m²      Preet Londono is here for her Subsequent Wellness visit  Health Risk Assessment:   Patient rates overall health as very good  Patient feels that their physical health rating is slightly worse  Patient is satisfied with their life  Eyesight was rated as same  Hearing was rated as same  Patient feels that their emotional and mental health rating is slightly worse  Patients states they are sometimes angry  Patient states they are often unusually tired/fatigued  Pain experienced in the last 7 days has been some  Patient's pain rating has been 4/10  Patient states that she has experienced no weight loss or gain in last 6 months  Depression Screening:   PHQ-2 Score: 1      Fall Risk Screening: In the past year, patient has experienced: no history of falling in past year      Urinary Incontinence Screening:   Patient has not leaked urine accidently in the last six months  Home Safety:  Patient does not have trouble with stairs inside or outside of their home  Patient has working smoke alarms and has working carbon monoxide detector  Home safety hazards include: none  Nutrition:   Current diet is No Added Salt and Low Carb  Medications:   Patient is currently taking over-the-counter supplements  OTC medications include: see medication list  Patient is able to manage medications  Activities of Daily Living (ADLs)/Instrumental Activities of Daily Living (IADLs):   Walk and transfer into and out of bed and chair?: Yes  Dress and groom yourself?: Yes    Bathe or shower yourself?: Yes    Feed yourself?  Yes  Do your laundry/housekeeping?: Yes  Manage your money, pay your bills and track your expenses?: Yes  Make your own meals?: Yes    Do your own shopping?: Yes    Previous Hospitalizations:   Any hospitalizations or ED visits within the last 12 months?: No      Advance Care Planning:   Living will: Yes    Durable POA for healthcare: Yes    Advanced directive: Yes    Advanced directive counseling given: Yes    End of Life Decisions reviewed with patient: Yes    Provider agrees with end of life decisions: Yes      Cognitive Screening:   Provider or family/friend/caregiver concerned regarding cognition?: No    PREVENTIVE SCREENINGS      Cardiovascular Screening:    General: Screening Not Indicated and History Lipid Disorder      Diabetes Screening:     General: Screening Current      Colorectal Cancer Screening:     General: Screening Not Indicated      Breast Cancer Screening:     General: Screening Current      Cervical Cancer Screening:    General: Screening Not Indicated      Osteoporosis Screening:    General: Risks and Benefits Discussed and Patient Declines      Abdominal Aortic Aneurysm (AAA) Screening:        General: Screening Not Indicated      Lung Cancer Screening:     General: Screening Not Indicated      Hepatitis C Screening:    General: Screening Current    Screening, Brief Intervention, and Referral to Treatment (SBIRT)    Screening  Typical number of drinks in a day: 0  Typical number of drinks in a week: 0  Interpretation: Low risk drinking behavior  AUDIT-C Screenin) How often did you have a drink containing alcohol in the past year? never  2) How many drinks did you have on a typical day when you were drinking in the past year? 0  3) How often did you have 6 or more drinks on one occasion in the past year? never    AUDIT-C Score: 0  Interpretation: Score 0-2 (female): Negative screen for alcohol misuse    Single Item Drug Screening:  How often have you used an illegal drug (including marijuana) or a prescription medication for non-medical reasons in the past year? never    Single Item Drug Screen Score: 0  Interpretation: Negative screen for possible drug use disorder    Review of Current Opioid Use    Opioid Risk Tool (ORT) Interpretation: Complete Opioid Risk Tool (ORT)    Physical Exam  Vitals and nursing note reviewed  Constitutional:       Appearance: She is well-developed  HENT:      Head: Normocephalic and atraumatic  Right Ear: External ear normal       Left Ear: External ear normal       Nose: Nose normal    Cardiovascular:      Rate and Rhythm: Normal rate and regular rhythm  Heart sounds: Normal heart sounds  No murmur heard  No friction rub  Pulmonary:      Effort: No respiratory distress  Breath sounds: Normal breath sounds  No wheezing or rales  Musculoskeletal:      Right lower leg: No edema  Left lower leg: No edema  Neurological:      Mental Status: She is oriented to person, place, and time  Cranial Nerves: No cranial nerve deficit            Fifi Wyatt, DO

## 2022-04-28 LAB
6MAM UR QL CFM: NEGATIVE NG/ML
7AMINOCLONAZEPAM UR QL CFM: NEGATIVE NG/ML
A-OH ALPRAZ UR QL CFM: NEGATIVE NG/ML
ACCEPTABLE CREAT UR QL: ABNORMAL MG/DL
ACCEPTIBLE SP GR UR QL: NORMAL
AMPHET UR QL CFM: NEGATIVE NG/ML
BUPRENORPHINE UR QL CFM: NEGATIVE NG/ML
BUTALBITAL UR QL CFM: NEGATIVE NG/ML
BZE UR QL CFM: NEGATIVE NG/ML
CODEINE UR QL CFM: ABNORMAL NG/ML
EDDP UR QL CFM: NEGATIVE NG/ML
EUTYLONE UR QL: NEGATIVE NG/ML
FENTANYL UR QL CFM: NEGATIVE NG/ML
GLIADIN IGG SER IA-ACNC: NEGATIVE NG/ML
HYDROCODONE UR QL CFM: ABNORMAL NG/ML
HYDROMORPHONE UR QL CFM: ABNORMAL NG/ML
LORAZEPAM UR QL CFM: NEGATIVE NG/ML
ME-PHENIDATE UR QL CFM: NEGATIVE NG/ML
MEPERIDINE UR QL CFM: NEGATIVE NG/ML
METHADONE UR QL CFM: NEGATIVE NG/ML
METHAMPHET UR QL CFM: NEGATIVE NG/ML
MORPHINE UR QL CFM: ABNORMAL NG/ML
NALTREXONE UR QL CFM: NEGATIVE NG/ML
NITRITE UR QL: NORMAL UG/ML
NORBUPRENORPHINE UR QL CFM: NEGATIVE NG/ML
NORDIAZEPAM UR QL CFM: NEGATIVE NG/ML
NORFENTANYL UR QL CFM: NEGATIVE NG/ML
NORHYDROCODONE UR QL CFM: ABNORMAL NG/ML
NORMEPERIDINE UR QL CFM: NEGATIVE NG/ML
NOROXYCODONE UR QL CFM: NEGATIVE NG/ML
OXAZEPAM UR QL CFM: NEGATIVE NG/ML
OXYCODONE UR QL CFM: NEGATIVE NG/ML
OXYMORPHONE UR QL CFM: NEGATIVE NG/ML
OXYMORPHONE UR QL CFM: NEGATIVE NG/ML
PHENOBARB UR QL CFM: NEGATIVE NG/ML
RESULT ALL_PRESCRIBED MEDS AND SPECIAL INSTRUCTIONS: NORMAL
SECOBARBITAL UR QL CFM: NEGATIVE NG/ML
SL AMB 3-METHYL-FENTANYL QUANTIFICATION: NORMAL NG/ML
SL AMB 4-ANPP QUANTIFICATION: NORMAL NG/ML
SL AMB 4-FIBF QUANTIFICATION: NORMAL NG/ML
SL AMB 5F-ADB-M7 METABOLITE QUANTIFICATION: NEGATIVE NG/ML
SL AMB 7-OH-MITRAGYNINE (KRATOM ALKALOID) QUANTIFICATION: NEGATIVE NG/ML
SL AMB AB-FUBINACA-M3 METABOLITE QUANTIFICATION: NEGATIVE NG/ML
SL AMB ACETYL FENTANYL QUANTIFICATION: NORMAL NG/ML
SL AMB ACETYL NORFENTANYL QUANTIFICATION: NORMAL NG/ML
SL AMB ACRYL FENTANYL QUANTIFICATION: NORMAL NG/ML
SL AMB BUTRYL FENTANYL QUANTIFICATION: NORMAL NG/ML
SL AMB CARFENTANIL QUANTIFICATION: NORMAL NG/ML
SL AMB CYCLOPROPYL FENTANYL QUANTIFICATION: NORMAL NG/ML
SL AMB DEXTRORPHAN (DEXTROMETHORPHAN METABOLITE) QUANT: NEGATIVE NG/ML
SL AMB FURANYL FENTANYL QUANTIFICATION: NORMAL NG/ML
SL AMB GABAPENTIN QUANTIFICATION: NEGATIVE
SL AMB JWH018 METABOLITE QUANTIFICATION: NEGATIVE NG/ML
SL AMB JWH073 METABOLITE QUANTIFICATION: NEGATIVE NG/ML
SL AMB MDMB-FUBINACA-M1 METABOLITE QUANTIFICATION: NEGATIVE NG/ML
SL AMB METHOXYACETYL FENTANYL QUANTIFICATION: NORMAL NG/ML
SL AMB METHYLONE QUANTIFICATION: NEGATIVE NG/ML
SL AMB N-DESMETHYL U-47700 QUANTIFICATION: NORMAL NG/ML
SL AMB N-DESMETHYL-TRAMADOL QUANTIFICATION: NEGATIVE NG/ML
SL AMB PHENTERMINE QUANTIFICATION: NEGATIVE NG/ML
SL AMB PREGABALIN QUANTIFICATION: NEGATIVE
SL AMB RCS4 METABOLITE QUANTIFICATION: NEGATIVE NG/ML
SL AMB RITALINIC ACID QUANTIFICATION: NEGATIVE NG/ML
SL AMB U-47700 QUANTIFICATION: NORMAL NG/ML
SMOOTH MUSCLE AB TITR SER IF: NEGATIVE NG/ML
SPECIMEN DRAWN SERPL: NEGATIVE NG/ML
SPECIMEN PH ACCEPTABLE UR: NORMAL
TAPENTADOL UR QL CFM: NEGATIVE NG/ML
TEMAZEPAM UR QL CFM: NEGATIVE NG/ML
TEMAZEPAM UR QL CFM: NEGATIVE NG/ML
TRAMADOL UR QL CFM: NEGATIVE NG/ML
URATE/CREAT 24H UR: NEGATIVE NG/ML

## 2022-05-06 ENCOUNTER — OFFICE VISIT (OUTPATIENT)
Dept: FAMILY MEDICINE CLINIC | Facility: CLINIC | Age: 77
End: 2022-05-06
Payer: MEDICARE

## 2022-05-06 VITALS
RESPIRATION RATE: 20 BRPM | HEART RATE: 80 BPM | HEIGHT: 66 IN | SYSTOLIC BLOOD PRESSURE: 136 MMHG | BODY MASS INDEX: 32.14 KG/M2 | WEIGHT: 200 LBS | TEMPERATURE: 99.4 F | DIASTOLIC BLOOD PRESSURE: 82 MMHG

## 2022-05-06 DIAGNOSIS — N30.00 ACUTE CYSTITIS WITHOUT HEMATURIA: Primary | ICD-10-CM

## 2022-05-06 DIAGNOSIS — R60.9 EDEMA, UNSPECIFIED TYPE: ICD-10-CM

## 2022-05-06 DIAGNOSIS — E78.2 MIXED HYPERLIPIDEMIA: ICD-10-CM

## 2022-05-06 DIAGNOSIS — J45.30 MILD PERSISTENT ASTHMA WITHOUT COMPLICATION: ICD-10-CM

## 2022-05-06 LAB
SL AMB  POCT GLUCOSE, UA: ABNORMAL
SL AMB LEUKOCYTE ESTERASE,UA: ABNORMAL
SL AMB POCT BILIRUBIN,UA: ABNORMAL
SL AMB POCT BLOOD,UA: ABNORMAL
SL AMB POCT CLARITY,UA: ABNORMAL
SL AMB POCT COLOR,UA: ABNORMAL
SL AMB POCT KETONES,UA: ABNORMAL
SL AMB POCT NITRITE,UA: ABNORMAL
SL AMB POCT PH,UA: 5.5
SL AMB POCT SPECIFIC GRAVITY,UA: 1.02
SL AMB POCT URINE PROTEIN: 30
SL AMB POCT UROBILINOGEN: 0.2

## 2022-05-06 PROCEDURE — 81003 URINALYSIS AUTO W/O SCOPE: CPT | Performed by: NURSE PRACTITIONER

## 2022-05-06 PROCEDURE — 99214 OFFICE O/P EST MOD 30 MIN: CPT | Performed by: NURSE PRACTITIONER

## 2022-05-06 PROCEDURE — 87086 URINE CULTURE/COLONY COUNT: CPT | Performed by: NURSE PRACTITIONER

## 2022-05-06 RX ORDER — CIPROFLOXACIN 250 MG/1
250 TABLET, FILM COATED ORAL 2 TIMES DAILY
Qty: 10 TABLET | Refills: 0 | Status: SHIPPED | OUTPATIENT
Start: 2022-05-06 | End: 2022-05-09

## 2022-05-06 NOTE — PROGRESS NOTES
Assessment/Plan:    1  Acute cystitis without hematuria  -     POCT urine dip auto non-scope  -     Urine culture  -     ciprofloxacin (CIPRO) 250 mg tablet; Take 1 tablet (250 mg total) by mouth 2 (two) times a day for 5 days    2  Mild persistent asthma without complication  Comments:  stable with current regimen    3  Edema, unspecified type  Comments:  complaint with lasix    4  Mixed hyperlipidemia  Comments:  complaint with statin and tolearting it well      Patient Instructions: Take antibiotics until finished with food  Drink plenty of fluids  Follow up advised for persistent urinary symptoms or if you develop flank pain, fevers, nausea, or vomiting  Please call the office if symptoms do not improve after completion of the antibiotics  Supportive care discussed and advised  Advised to RTO for any worsening and no improvement  Follow up for no improvement and worsening of conditions  Patient advised and educated when to see immediate medical care  Return if symptoms worsen or fail to improve  Future Appointments   Date Time Provider Claudia Felipe   5/19/2022  2:00 PM [facility] [address] [facility]   5/19/2022  3:00 PM [id number] [address] [facility]   5/31/2022 11:30 AM [address]   7/26/2022 11:15 AM DO URIEL [facility]           Subjective:      Patient ID: Ilda Britt is a 68 y o  female  Chief Complaint   Patient presents with    Difficulty Urinating     Started last pm [id number]         Vitals:  /82   Pulse 80   Temp 99 4 °F (37 4 °C)   Resp 20   Ht 5' 6" (1 676 m)   Wt 90 7 kg (200 lb)   BMI 32 28 kg/m²     HPI  Patient has h/o recurrent UTIs and was having UTI symptoms couple of days ago and took macorbid and improved and her PCP ordered hiprex which she did not start and now restarted with discomfort with urination, frequency, urgency and bodyaches  Denies fever, chills and sob   Under care of urogynecologist as h/o urinary retention too  Complaint with her medications for chronic illnesses and tolerating it well  Stated that once current infection improves then will start hiprex        The following portions of the patient's history were reviewed and updated as appropriate: allergies, current medications, past family history, past medical history, past social history, past surgical history and problem list       Review of Systems   Constitutional: Negative for chills, diaphoresis, fatigue, fever and unexpected weight change  Respiratory: Negative  Cardiovascular: Negative  Gastrointestinal: Negative for abdominal pain, nausea and vomiting  Genitourinary: Positive for dysuria, frequency and urgency  Negative for decreased urine volume, difficulty urinating, flank pain, genital sores and hematuria  Musculoskeletal: Positive for myalgias  Skin: Negative  Neurological: Negative for dizziness and headaches  Objective:    Social History     Tobacco Use   Smoking Status Never Smoker   Smokeless Tobacco Never Used       Allergies: Allergies   Allergen Reactions    Celecoxib Other (See Comments)     Other reaction(s): Palpitations  Category:  Adverse Reaction;          Current Outpatient Medications   Medication Sig Dispense Refill    acetaminophen (TYLENOL) 500 mg tablet Take 500 mg by mouth 3 (three) times a day as needed        acetaminophen-codeine (TYLENOL #3) 300-30 mg per tablet Take 1 tablet by mouth every 4 (four) hours as needed for moderate pain 30 tablet 1    albuterol (PROVENTIL HFA,VENTOLIN HFA) 90 mcg/act inhaler Inhale 2 puffs every 6 (six) hours as needed for wheezing or shortness of breath 1 Inhaler 5    Cholecalciferol (VITAMIN D3) 2000 units capsule Take 1 capsule by mouth every morning        cyanocobalamin (VITAMIN B-12) 1000 MCG tablet Take 1,000 mcg by mouth daily      docusate sodium (COLACE) 100 mg capsule Take 100 mg by mouth 2 (two) times a day      fluticasone (Flovent HFA) 220 mcg/act inhaler Inhale 2 puffs 2 (two) times a day Rinse mouth after use 36 g 3    furosemide (LASIX) 40 mg tablet Take 1 tablet (40 mg total) by mouth every morning 90 tablet 1    Magnesium Oxide -Mg Supplement 400 MG CAPS Take 1 capsule by mouth every morning        methenamine hippurate (HIPREX) 1 g tablet Take 1 tablet (1 g total) by mouth 2 (two) times a day with meals 60 tablet 3    nitrofurantoin (MACRODANTIN) 50 mg capsule TAKE 1 CAPSULE BY MOUTH EVERY DAY (Patient taking differently: Not always taking it every day as per patient ) 30 capsule 5    omeprazole (PriLOSEC) 20 mg delayed release capsule Take 20 mg by mouth as needed        potassium chloride (Klor-Con) 10 mEq tablet Take 1 tablet (10 mEq total) by mouth in the morning 90 tablet 1    pravastatin (PRAVACHOL) 10 mg tablet Take 1 tablet (10 mg total) by mouth daily 30 tablet 5    senna (SENOKOT) 8 6 MG tablet Take 1 tablet by mouth as needed for constipation      ciprofloxacin (CIPRO) 250 mg tablet Take 1 tablet (250 mg total) by mouth 2 (two) times a day for 5 days 10 tablet 0     No current facility-administered medications for this visit  Physical Exam  Vitals reviewed  Constitutional:       Appearance: She is well-developed  Cardiovascular:      Rate and Rhythm: Normal rate and regular rhythm  Heart sounds: Normal heart sounds  Pulmonary:      Effort: Pulmonary effort is normal       Breath sounds: Normal breath sounds  Abdominal:      General: Bowel sounds are normal       Palpations: Abdomen is soft  Tenderness: There is no abdominal tenderness  Skin:     General: Skin is warm and dry  Neurological:      Mental Status: She is alert and oriented to person, place, and time  Psychiatric:         Behavior: Behavior normal          Thought Content:  Thought content normal          Judgment: Judgment normal              Recent Results (from the past 24 hour(s))   POCT urine dip auto non-scope Collection Time: 05/06/22 11:10 AM   Result Value Ref Range     COLOR,UA erlinda     CLARITY,UA cloudy     SPECIFIC GRAVITY,UA 1 020      PH,UA 5 5     LEUKOCYTE ESTERASE,UA Large     NITRITE,UA neg     GLUCOSE, UA neg     KETONES,UA neg     BILIRUBIN,UA neg     BLOOD,UA MOD     POCT URINE PROTEIN 30     SL AMB POCT UROBILINOGEN 0 2              MATA Gilliland

## 2022-05-08 LAB — BACTERIA UR CULT: ABNORMAL

## 2022-05-19 ENCOUNTER — HOSPITAL ENCOUNTER (OUTPATIENT)
Dept: NON INVASIVE DIAGNOSTICS | Facility: HOSPITAL | Age: 77
Discharge: HOME | End: 2022-05-19
Payer: MEDICARE

## 2022-05-19 ENCOUNTER — HOSPITAL ENCOUNTER (OUTPATIENT)
Dept: PULMONOLOGY | Facility: HOSPITAL | Age: 77
Discharge: HOME | End: 2022-05-19
Payer: MEDICARE

## 2022-05-19 VITALS
DIASTOLIC BLOOD PRESSURE: 77 MMHG | HEART RATE: 77 BPM | BODY MASS INDEX: 32.14 KG/M2 | SYSTOLIC BLOOD PRESSURE: 169 MMHG | WEIGHT: 200 LBS | HEIGHT: 66 IN

## 2022-05-19 DIAGNOSIS — R06.02 EXERTIONAL SHORTNESS OF BREATH: ICD-10-CM

## 2022-05-19 PROCEDURE — 93306 TTE W/DOPPLER COMPLETE: CPT

## 2022-05-19 PROCEDURE — 94729 DIFFUSING CAPACITY: CPT

## 2022-05-19 PROCEDURE — 94726 PLETHYSMOGRAPHY LUNG VOLUMES: CPT | Performed by: INTERNAL MEDICINE

## 2022-05-19 PROCEDURE — 94760 N-INVAS EAR/PLS OXIMETRY 1: CPT

## 2022-05-19 PROCEDURE — 94060 EVALUATION OF WHEEZING: CPT

## 2022-05-19 PROCEDURE — 94726 PLETHYSMOGRAPHY LUNG VOLUMES: CPT

## 2022-05-19 PROCEDURE — 94729 DIFFUSING CAPACITY: CPT | Performed by: INTERNAL MEDICINE

## 2022-05-19 PROCEDURE — 94060 EVALUATION OF WHEEZING: CPT | Performed by: INTERNAL MEDICINE

## 2022-05-19 RX ORDER — ALBUTEROL SULFATE 2.5 MG/3ML
2.5 SOLUTION RESPIRATORY (INHALATION) ONCE
Status: COMPLETED | OUTPATIENT
Start: 2022-05-19 | End: 2022-05-19

## 2022-05-19 RX ADMIN — ALBUTEROL SULFATE 2.5 MG: 2.5 SOLUTION RESPIRATORY (INHALATION) at 15:18

## 2022-05-20 LAB
AORTIC ROOT: 3 CM
APICAL FOUR CHAMBER EJECTION FRACTION: 70 %
ASCENDING AORTA: 3.4 CM
E WAVE DECELERATION TIME: 194 MS
FRACTIONAL SHORTENING: 46 % (ref 28–44)
INTERVENTRICULAR SEPTUM IN DIASTOLE (PARASTERNAL SHORT AXIS VIEW): 0.7 CM
INTERVENTRICULAR SEPTUM: 0.7 CM (ref 0.6–1.1)
LAAS-AP2: 19.9 CM2
LAAS-AP4: 16.6 CM2
LEFT ATRIUM SIZE: 3.7 CM
LEFT INTERNAL DIMENSION IN SYSTOLE: 2.7 CM (ref 2.1–4)
LEFT VENTRICULAR INTERNAL DIMENSION IN DIASTOLE: 5 CM (ref 3.5–6)
LEFT VENTRICULAR POSTERIOR WALL IN END DIASTOLE: 1.1 CM
LEFT VENTRICULAR STROKE VOLUME: 90 ML
LVSV (TEICH): 90 ML
MV E'TISSUE VEL-SEP: 9 CM/S
MV PEAK A VEL: 0.99 M/S
MV PEAK E VEL: 96 CM/S
MV STENOSIS PRESSURE HALF TIME: 56 MS
MV VALVE AREA P 1/2 METHOD: 3.93 CM2
RIGHT ATRIUM AREA SYSTOLE A4C: 13.5 CM2
RIGHT VENTRICLE ID DIMENSION: 2.7 CM
SL CV LEFT ATRIUM LENGTH A2C: 5.4 CM
SL CV LV EF: 65
SL CV PED ECHO LEFT VENTRICLE DIASTOLIC VOLUME (MOD BIPLANE) 2D: 117 ML
SL CV PED ECHO LEFT VENTRICLE SYSTOLIC VOLUME (MOD BIPLANE) 2D: 27 ML

## 2022-05-20 PROCEDURE — 93306 TTE W/DOPPLER COMPLETE: CPT | Performed by: INTERNAL MEDICINE

## 2022-05-23 ENCOUNTER — TELEPHONE (OUTPATIENT)
Dept: FAMILY MEDICINE CLINIC | Facility: CLINIC | Age: 77
End: 2022-05-23

## 2022-05-23 NOTE — TELEPHONE ENCOUNTER
5/23/2022 8:28 AM Called Mauri Garcia regarding her PFT and echocardiogram results  PFT is normal   Echo- shows mild pulmonic valve thickening  EF 17%, systolic function is normal, Diastolic is normal for age  Message left on her machine to call the office      Smooth Sagastume DO

## 2022-05-24 NOTE — TELEPHONE ENCOUNTER
5/24/2022 1:01 PM Called Darcy Bush to discuss her results  We discussed her results  She thinks her shortness of breath is related to being out of shape and allergy season  Offered a chest x-ray and she declined  She was able to go for a longer walk yesterday and managed it fine      Fifi Wyatt, DO

## 2022-05-31 ENCOUNTER — HOSPITAL ENCOUNTER (OUTPATIENT)
Dept: RADIOLOGY | Facility: HOSPITAL | Age: 77
Discharge: HOME | End: 2022-05-31
Payer: MEDICARE

## 2022-05-31 VITALS — WEIGHT: 200 LBS | HEIGHT: 66 IN | BODY MASS INDEX: 32.14 KG/M2

## 2022-05-31 DIAGNOSIS — Z12.31 ENCOUNTER FOR SCREENING MAMMOGRAM FOR BREAST CANCER: ICD-10-CM

## 2022-05-31 PROCEDURE — 77063 BREAST TOMOSYNTHESIS BI: CPT

## 2022-05-31 PROCEDURE — 77067 SCR MAMMO BI INCL CAD: CPT

## 2022-06-30 ENCOUNTER — OFFICE VISIT (OUTPATIENT)
Dept: FAMILY MEDICINE CLINIC | Facility: CLINIC | Age: 77
End: 2022-06-30
Payer: MEDICARE

## 2022-06-30 VITALS
DIASTOLIC BLOOD PRESSURE: 78 MMHG | BODY MASS INDEX: 32.14 KG/M2 | HEIGHT: 66 IN | SYSTOLIC BLOOD PRESSURE: 116 MMHG | RESPIRATION RATE: 18 BRPM | WEIGHT: 200 LBS | TEMPERATURE: 98.8 F | OXYGEN SATURATION: 96 % | HEART RATE: 76 BPM

## 2022-06-30 DIAGNOSIS — N30.00 ACUTE CYSTITIS WITHOUT HEMATURIA: Primary | ICD-10-CM

## 2022-06-30 LAB
SL AMB  POCT GLUCOSE, UA: ABNORMAL
SL AMB LEUKOCYTE ESTERASE,UA: ABNORMAL
SL AMB POCT BILIRUBIN,UA: ABNORMAL
SL AMB POCT BLOOD,UA: ABNORMAL
SL AMB POCT CLARITY,UA: ABNORMAL
SL AMB POCT COLOR,UA: ABNORMAL
SL AMB POCT KETONES,UA: ABNORMAL
SL AMB POCT NITRITE,UA: POSITIVE
SL AMB POCT PH,UA: 5.5
SL AMB POCT SPECIFIC GRAVITY,UA: 1.02
SL AMB POCT URINE PROTEIN: 30
SL AMB POCT UROBILINOGEN: 0.2

## 2022-06-30 PROCEDURE — 99213 OFFICE O/P EST LOW 20 MIN: CPT | Performed by: NURSE PRACTITIONER

## 2022-06-30 PROCEDURE — 87186 SC STD MICRODIL/AGAR DIL: CPT | Performed by: NURSE PRACTITIONER

## 2022-06-30 PROCEDURE — 81003 URINALYSIS AUTO W/O SCOPE: CPT | Performed by: NURSE PRACTITIONER

## 2022-06-30 PROCEDURE — 87077 CULTURE AEROBIC IDENTIFY: CPT | Performed by: NURSE PRACTITIONER

## 2022-06-30 PROCEDURE — 87086 URINE CULTURE/COLONY COUNT: CPT | Performed by: NURSE PRACTITIONER

## 2022-06-30 RX ORDER — SULFAMETHOXAZOLE AND TRIMETHOPRIM 800; 160 MG/1; MG/1
1 TABLET ORAL 2 TIMES DAILY
Qty: 10 TABLET | Refills: 0 | Status: SHIPPED | OUTPATIENT
Start: 2022-06-30 | End: 2022-07-05

## 2022-06-30 NOTE — PATIENT INSTRUCTIONS
Take antibiotics until finished with food  Drink plenty of fluids  Follow up advised for persistent urinary symptoms or if you develop flank pain, fevers, nausea, or vomiting  Please call the office if symptoms do not improve after completion of the antibiotics  Urinary Tract Infection in Women   AMBULATORY CARE:   A urinary tract infection (UTI)  is caused by bacteria that get inside your urinary tract  Most bacteria that enter your urinary tract come out when you urinate  If the bacteria stay in your urinary tract, you may get an infection  Your urinary tract includes your kidneys, ureters, bladder, and urethra  Urine is made in your kidneys, and it flows from the ureters to the bladder  Urine leaves the bladder through the urethra  A UTI is more common in your lower urinary tract, which includes your bladder and urethra  Common symptoms include the following:   Urinating more often or waking from sleep to urinate    Pain or burning when you urinate    Pain or pressure in your lower abdomen     Urine that smells bad    Blood in your urine    Leaking urine    Seek care immediately if:   You are urinating very little or not at all  You have a high fever with shaking chills  You have side or back pain that gets worse  Call your doctor if:   You have a fever  You do not feel better after 2 days of taking antibiotics  You are vomiting  You have questions or concerns about your condition or care  Treatment for a UTI  may include antibiotics to treat a bacterial infection  You may also need medicines to decrease pain and burning, or decrease the urge to urinate often  If you have UTIs often (called recurrent UTIs), you may be given antibiotics to take regularly  You will be given directions for when and how to use antibiotics  The goal is to prevent UTIs but not cause antibiotic resistance by using antibiotics too often  Prevent a UTI:   Empty your bladder often    Urinate and empty your bladder as soon as you feel the need  Do not hold your urine for long periods of time  Wipe from front to back after you urinate or have a bowel movement  This will help prevent germs from getting into your urinary tract through your urethra  Drink liquids as directed  Ask how much liquid to drink each day and which liquids are best for you  You may need to drink more liquids than usual to help flush out the bacteria  Do not drink alcohol, caffeine, or citrus juices  These can irritate your bladder and increase your symptoms  Your healthcare provider may recommend cranberry juice to help prevent a UTI  Urinate after you have sex  This can help flush out bacteria passed during sex  Do not douche or use feminine deodorants  These can change the chemical balance in your vagina  Change sanitary pads or tampons often  This will help prevent germs from getting into your urinary tract  Talk to your healthcare provider about your birth control method  You may need to change your method if it is increasing your risk for UTIs  Wear cotton underwear and clothes that are loose  Tight pants and nylon underwear can trap moisture and cause bacteria to grow  Vaginal estrogen may be recommended  This medicine helps prevent UTIs in women who have gone through menopause or are in jet-menopause  Do pelvic muscle exercises often  Pelvic muscle exercises may help you start and stop urinating  Strong pelvic muscles may help you empty your bladder easier  Squeeze these muscles tightly for 5 seconds like you are trying to hold back urine  Then relax for 5 seconds  Gradually work up to squeezing for 10 seconds  Do 3 sets of 15 repetitions a day, or as directed  Follow up with your doctor as directed:  Write down your questions so you remember to ask them during your visits     © Copyright [employer] 2022 Information is for End User's use only and may not be sold, redistributed or otherwise used for commercial purposes  All illustrations and images included in CareNotes® are the copyrighted property of A D [employer] M [employer] Inc  or Lillian Cramer  The above information is an  only  It is not intended as medical advice for individual conditions or treatments  Talk to your doctor, nurse or pharmacist before following any medical regimen to see if it is safe and effective for you  Sulfamethoxazole/Trimethoprim (By mouth)   Sulfamethoxazole (sul-fa-meth-OX-a-zole), Trimethoprim (trye-METH-oh-prim)  Treats or prevents infections  Brand Name(s): Bactrim, Bactrim DS, SMZ-TMP Pediatric, Sulfatrim Pediatric   There may be other brand names for this medicine  When This Medicine Should Not Be Used: This medicine is not right for everyone  Do not use it if you had an allergic reaction to trimethoprim, sulfamethoxazole, or any sulfa drug  Do not use this medicine if you are pregnant, if you have kidney disease, liver disease, anemia caused by low levels of folic acid, or if you have a history of drug-induced thrombocytopenia  How to Use This Medicine:   Liquid, Tablet  Your doctor will tell you how much medicine to use  Do not use more than directed  Oral liquid: Measure the oral liquid medicine with a marked measuring spoon, oral syringe, or medicine cup  Take all of the medicine in your prescription to clear up your infection, even if you feel better after the first few doses  Drink extra fluids so you will urinate more often and help prevent kidney problems  Missed dose: Take a dose as soon as you remember  If it is almost time for your next dose, wait until then and take a regular dose  Do not take extra medicine to make up for a missed dose  Store the medicine in a closed container at room temperature, away from heat, moisture, and direct light  Do not freeze the oral liquid    Drugs and Foods to Avoid:   Ask your doctor or pharmacist before using any other medicine, including over-the-counter medicines, vitamins, and herbal products  Do not use this medicine if you are also using dofetilide  Do not use this medicine for Pneumocystis jiroveci pneumonia (PCP) if you are also using leucovorin  Some medicines can affect how this medicine works  Tell your doctor if you also use the following:   Amantadine, cyclosporine, digoxin, indomethacin, memantine, methotrexate, phenytoin, procainamide, pyrimethamine, warfarin, zidovudine  Blood pressure medicine (including an ACE inhibitor)  Diabetes medicine (including glipizide, glyburide, metformin, pioglitazone, repaglinide, rosiglitazone)  Diuretic (water pill, including acetazolamide, hydrochlorothiazide)  Medicine to prevent seizures  Medicine to treat depression (including TCAs)  Warnings While Using This Medicine: It is not safe to take this medicine during pregnancy  It could harm an unborn baby  Tell your doctor right away if you become pregnant  Tell your doctor if you are breastfeeding, or if you have diabetes, malabsorption or malnutrition, folate deficiency, G6PD deficiency, porphyria, thyroid problems, or a history of alcoholism  Tell your doctor if you have asthma or severe allergies, especially if you are allergic to any medicines  It is important for your doctor to know if you have HIV or AIDS, because this medicine might work differently for you  This medicine may cause the following problems:   Serious skin reactions, including Mathews-David syndrome, toxic epidermal necrolysis, drug reaction with eosinophilia and systemic symptoms (DRESS), acute generalized exanthematous pustulosis (AGEP), or acute febrile neutrophilic dermatosis (AFND)  Liver problems  Hypoglycemia (low blood sugar)  This medicine lowers the number of certain blood cells, so you may bleed or bruise more easily  Be careful to avoid injuries  This medicine can cause diarrhea  Call your doctor if the diarrhea becomes severe, does not stop, or is bloody   Do not take any medicine to stop diarrhea until you have talked to your doctor  Diarrhea can occur 2 months or more after you stop taking this medicine  Tell any doctor or dentist who treats you that you are using this medicine  This medicine may affect certain medical test results  Your doctor will do lab tests at regular visits to check on the effects of this medicine  Keep all appointments  Keep all medicine out of the reach of children  Never share your medicine with anyone  Possible Side Effects While Using This Medicine:   Call your doctor right away if you notice any of these side effects: Allergic reaction: Itching or hives, swelling in your face or hands, swelling or tingling in your mouth or throat, chest tightness, trouble breathing  Blistering, peeling, red skin rash  Change in how much or how often you urinate, painful urination, lower back or side pain, blood in the urine  Dark urine or pale stools, nausea, vomiting, loss of appetite, stomach pain, yellow skin or eyes  Chest pain, cough, or trouble breathing  Confusion, weakness, muscle twitching, seizures  Fainting, dizziness, lightheadedness  Fast, pounding, or uneven heartbeat  Numbness, tingling, or burning pain in your hands, arms, legs, or feet  Severe diarrhea, stomach pain, cramps, bloating  Skin rash, purple spots on your skin, or very pale or yellow skin  Sore throat, fever, muscle pain  Unusual bleeding, bruising, or weakness  If you notice these less serious side effects, talk with your doctor:   Headache  If you notice other side effects that you think are caused by this medicine, tell your doctor  Call your doctor for medical advice about side effects  You may report side effects to FDA at 2-661-FDA-0436    © Copyright [employer] 2022 Information is for End User's use only and may not be sold, redistributed or otherwise used for commercial purposes  The above information is an  only   It is not intended as medical advice for individual conditions or treatments  Talk to your doctor, nurse or pharmacist before following any medical regimen to see if it is safe and effective for you

## 2022-06-30 NOTE — PROGRESS NOTES
Assessment/Plan:    1  Acute cystitis without hematuria  -     POCT urine dip auto non-scope  -     Urine culture  -     sulfamethoxazole-trimethoprim (BACTRIM DS) 800-160 mg per tablet; Take 1 tablet by mouth 2 (two) times a day for 5 days        Patient Instructions: Take antibiotics until finished with food  Drink plenty of fluids  Follow up advised for persistent urinary symptoms or if you develop flank pain, fevers, nausea, or vomiting  Please call the office if symptoms do not improve after completion of the antibiotics  Return if symptoms worsen or fail to improve  Future Appointments   Date Time Provider Claudia Felipe   7/26/2022 11:15 AM [facility]           Subjective:      Patient ID: Jacoby Pardo is a 68 y o  female  Chief Complaint   Patient presents with    Urinary Tract Infection     Sas/cma         Vitals:  /78   Pulse 76   Temp 98 8 °F (37 1 °C)   Resp 18   Ht 5' 6" (1 676 m)   Wt 90 7 kg (200 lb)   SpO2 96%   BMI 32 28 kg/m²     Urinary Tract Infection   Associated symptoms include chills, flank pain, frequency, hematuria, hesitancy, nausea and urgency  Pertinent negatives include no discharge or vomiting  Difficulty Urinating   This is a recurrent problem  The current episode started today  The problem occurs intermittently  The problem has been gradually worsening  The quality of the pain is described as aching, burning and shooting  There has been no fever  She is not sexually active  There is a history of pyelonephritis  Associated symptoms include chills, flank pain, frequency, hematuria, hesitancy, nausea and urgency  Pertinent negatives include no discharge or vomiting  Patient started with symptoms last night with chills, burning with urination, nausea, urinary frequency, urgency and flank pain with hematuria  Stated that overall symptoms improving today morning  Denies fever, chills  Missed her dose of macrobid  The following portions of the patient's history were reviewed and updated as appropriate: allergies, current medications, past family history, past medical history, past social history, past surgical history and problem list       Review of Systems   Constitutional: Positive for chills  Negative for diaphoresis, fatigue, fever and unexpected weight change  Respiratory: Negative  Cardiovascular: Negative  Gastrointestinal: Positive for nausea  Negative for abdominal pain and vomiting  Genitourinary: Positive for dysuria, flank pain, frequency, hematuria, hesitancy and urgency  Negative for decreased urine volume, difficulty urinating and genital sores  Skin: Negative  Neurological: Negative for dizziness and headaches  Objective:    Social History     Tobacco Use   Smoking Status Never Smoker   Smokeless Tobacco Never Used       Allergies: Allergies   Allergen Reactions    Celecoxib Other (See Comments)     Other reaction(s): Palpitations  Category:  Adverse Reaction;          Current Outpatient Medications   Medication Sig Dispense Refill    acetaminophen (TYLENOL) 500 mg tablet Take 500 mg by mouth 3 (three) times a day as needed        acetaminophen-codeine (TYLENOL #3) 300-30 mg per tablet Take 1 tablet by mouth every 4 (four) hours as needed for moderate pain 30 tablet 1    albuterol (PROVENTIL HFA,VENTOLIN HFA) 90 mcg/act inhaler Inhale 2 puffs every 6 (six) hours as needed for wheezing or shortness of breath 1 Inhaler 5    cyanocobalamin (VITAMIN B-12) 1000 MCG tablet Take 1,000 mcg by mouth daily      docusate sodium (COLACE) 100 mg capsule Take 100 mg by mouth 2 (two) times a day      fluticasone (Flovent HFA) 220 mcg/act inhaler Inhale 2 puffs 2 (two) times a day Rinse mouth after use 36 g 3    furosemide (LASIX) 40 mg tablet Take 1 tablet (40 mg total) by mouth every morning 90 tablet 1    Magnesium Oxide -Mg Supplement 400 MG CAPS Take 1 capsule by mouth every morning        methenamine hippurate (HIPREX) 1 g tablet Take 1 tablet (1 g total) by mouth 2 (two) times a day with meals 60 tablet 3    nitrofurantoin (MACRODANTIN) 50 mg capsule TAKE 1 CAPSULE BY MOUTH EVERY DAY (Patient taking differently: Not always taking it every day as per patient) 30 capsule 5    omeprazole (PriLOSEC) 20 mg delayed release capsule Take 20 mg by mouth as needed        potassium chloride (Klor-Con) 10 mEq tablet Take 1 tablet (10 mEq total) by mouth in the morning 90 tablet 1    pravastatin (PRAVACHOL) 10 mg tablet Take 1 tablet (10 mg total) by mouth daily 30 tablet 5    senna (SENOKOT) 8 6 MG tablet Take 1 tablet by mouth as needed for constipation      sulfamethoxazole-trimethoprim (BACTRIM DS) 800-160 mg per tablet Take 1 tablet by mouth 2 (two) times a day for 5 days 10 tablet 0     No current facility-administered medications for this visit  Physical Exam  Vitals reviewed  Constitutional:       Appearance: She is well-developed  Cardiovascular:      Rate and Rhythm: Normal rate and regular rhythm  Heart sounds: Normal heart sounds  Pulmonary:      Effort: Pulmonary effort is normal       Breath sounds: Normal breath sounds  Abdominal:      General: Bowel sounds are normal       Palpations: Abdomen is soft  Tenderness: There is abdominal tenderness in the suprapubic area  Skin:     General: Skin is warm and dry  Neurological:      Mental Status: She is alert and oriented to person, place, and time  Psychiatric:         Behavior: Behavior normal          Thought Content:  Thought content normal          Judgment: Judgment normal                  Recent Results (from the past 24 hour(s))   POCT urine dip auto non-scope    Collection Time: 06/30/22  8:51 AM   Result Value Ref Range     COLOR,UA med yellow     CLARITY,UA cloudy     SPECIFIC GRAVITY,UA 1 020      PH,UA 5 5     LEUKOCYTE ESTERASE,UA large     NITRITE,UA positive     GLUCOSE, UA neg KETONES,UA neg     BILIRUBIN,UA neg     BLOOD,UA mod     POCT URINE PROTEIN 30     SL AMB POCT UROBILINOGEN 0 2          MATA Judd

## 2022-07-02 LAB — BACTERIA UR CULT: ABNORMAL

## 2022-07-20 ENCOUNTER — RA CDI HCC (OUTPATIENT)
Dept: OTHER | Facility: HOSPITAL | Age: 77
End: 2022-07-20

## 2022-07-21 DIAGNOSIS — N39.0 RECURRENT UTI: ICD-10-CM

## 2022-07-21 RX ORDER — NITROFURANTOIN MACROCRYSTALS 50 MG/1
CAPSULE ORAL
Qty: 30 CAPSULE | Refills: 5 | Status: SHIPPED | OUTPATIENT
Start: 2022-07-21 | End: 2022-08-29 | Stop reason: ALTCHOICE

## 2022-07-21 NOTE — TELEPHONE ENCOUNTER
Requested medication(s) are due for refill today: Yes  Patient has already received a courtesy refill: No  Other reason request has been forwarded to provider:   Failed Protocol/Dr Escalante Fus Patient

## 2022-07-23 DIAGNOSIS — E78.2 MIXED HYPERLIPIDEMIA: ICD-10-CM

## 2022-07-25 RX ORDER — PRAVASTATIN SODIUM 10 MG
TABLET ORAL
Qty: 90 TABLET | Refills: 3 | Status: SHIPPED | OUTPATIENT
Start: 2022-07-25

## 2022-07-26 ENCOUNTER — OFFICE VISIT (OUTPATIENT)
Dept: FAMILY MEDICINE CLINIC | Facility: CLINIC | Age: 77
End: 2022-07-26
Payer: MEDICARE

## 2022-07-26 VITALS
WEIGHT: 202 LBS | HEART RATE: 69 BPM | TEMPERATURE: 97 F | DIASTOLIC BLOOD PRESSURE: 66 MMHG | BODY MASS INDEX: 32.47 KG/M2 | HEIGHT: 66 IN | RESPIRATION RATE: 18 BRPM | SYSTOLIC BLOOD PRESSURE: 120 MMHG | OXYGEN SATURATION: 99 %

## 2022-07-26 DIAGNOSIS — E78.2 MIXED HYPERLIPIDEMIA: Primary | ICD-10-CM

## 2022-07-26 DIAGNOSIS — R73.01 IMPAIRED FASTING GLUCOSE: ICD-10-CM

## 2022-07-26 DIAGNOSIS — Z13.0 SCREENING FOR DEFICIENCY ANEMIA: ICD-10-CM

## 2022-07-26 DIAGNOSIS — M15.0 PRIMARY GENERALIZED (OSTEO)ARTHRITIS: ICD-10-CM

## 2022-07-26 PROBLEM — F11.20 CONTINUOUS OPIOID DEPENDENCE (HCC): Status: ACTIVE | Noted: 2022-07-26

## 2022-07-26 PROCEDURE — 99214 OFFICE O/P EST MOD 30 MIN: CPT | Performed by: FAMILY MEDICINE

## 2022-07-26 RX ORDER — ACETAMINOPHEN AND CODEINE PHOSPHATE 300; 30 MG/1; MG/1
1 TABLET ORAL EVERY 4 HOURS PRN
Qty: 30 TABLET | Refills: 1 | Status: SHIPPED | OUTPATIENT
Start: 2022-07-26 | End: 2022-10-27 | Stop reason: SDUPTHER

## 2022-07-26 NOTE — PATIENT INSTRUCTIONS

## 2022-07-31 DIAGNOSIS — R60.9 EDEMA, UNSPECIFIED TYPE: ICD-10-CM

## 2022-08-01 RX ORDER — FUROSEMIDE 40 MG/1
TABLET ORAL
Qty: 90 TABLET | Refills: 1 | Status: SHIPPED | OUTPATIENT
Start: 2022-08-01

## 2022-08-01 RX ORDER — POTASSIUM CHLORIDE 750 MG/1
10 TABLET, FILM COATED, EXTENDED RELEASE ORAL DAILY
Qty: 90 TABLET | Refills: 1 | Status: SHIPPED | OUTPATIENT
Start: 2022-08-01

## 2022-08-29 ENCOUNTER — OFFICE VISIT (OUTPATIENT)
Dept: FAMILY MEDICINE CLINIC | Facility: CLINIC | Age: 77
End: 2022-08-29
Payer: MEDICARE

## 2022-08-29 VITALS
OXYGEN SATURATION: 96 % | TEMPERATURE: 98.3 F | DIASTOLIC BLOOD PRESSURE: 72 MMHG | HEART RATE: 79 BPM | SYSTOLIC BLOOD PRESSURE: 134 MMHG | HEIGHT: 66 IN | WEIGHT: 201 LBS | BODY MASS INDEX: 32.3 KG/M2 | RESPIRATION RATE: 16 BRPM

## 2022-08-29 DIAGNOSIS — N39.0 ACUTE UTI: Primary | ICD-10-CM

## 2022-08-29 LAB
SL AMB  POCT GLUCOSE, UA: ABNORMAL
SL AMB LEUKOCYTE ESTERASE,UA: ABNORMAL
SL AMB POCT BILIRUBIN,UA: ABNORMAL
SL AMB POCT BLOOD,UA: ABNORMAL
SL AMB POCT CLARITY,UA: ABNORMAL
SL AMB POCT COLOR,UA: ABNORMAL
SL AMB POCT KETONES,UA: ABNORMAL
SL AMB POCT NITRITE,UA: ABNORMAL
SL AMB POCT PH,UA: 6
SL AMB POCT SPECIFIC GRAVITY,UA: 1.02
SL AMB POCT URINE PROTEIN: ABNORMAL
SL AMB POCT UROBILINOGEN: 0.2

## 2022-08-29 PROCEDURE — 87086 URINE CULTURE/COLONY COUNT: CPT | Performed by: FAMILY MEDICINE

## 2022-08-29 PROCEDURE — 87186 SC STD MICRODIL/AGAR DIL: CPT | Performed by: FAMILY MEDICINE

## 2022-08-29 PROCEDURE — 81003 URINALYSIS AUTO W/O SCOPE: CPT | Performed by: FAMILY MEDICINE

## 2022-08-29 PROCEDURE — 99213 OFFICE O/P EST LOW 20 MIN: CPT | Performed by: FAMILY MEDICINE

## 2022-08-29 PROCEDURE — 87077 CULTURE AEROBIC IDENTIFY: CPT | Performed by: FAMILY MEDICINE

## 2022-08-29 RX ORDER — CEFUROXIME AXETIL 500 MG/1
500 TABLET ORAL EVERY 12 HOURS SCHEDULED
Qty: 14 TABLET | Refills: 0 | Status: SHIPPED | OUTPATIENT
Start: 2022-08-29 | End: 2022-09-05

## 2022-08-29 NOTE — PROGRESS NOTES
Assessment/Plan:    1  Acute UTI  Comments:  Daily nitrofurantoin discontinued  Orders:  -     cefuroxime (CEFTIN) 500 mg tablet; Take 1 tablet (500 mg total) by mouth every 12 (twelve) hours for 7 days  -     Urine culture  -     POCT urine dip auto non-scope         There are no Patient Instructions on file for this visit  Return if symptoms worsen or fail to improve  Subjective:      Patient ID: Dominic Carroll is a 68 y o  female  Chief Complaint   Patient presents with    Urinary Tract Infection     Bakari Floyd, MA         She has been all sweaty and unable to have time to bathe after having bowel movements  She started with fatigue yesterday  She has had frequency and had a hard time urinating  The following portions of the patient's history were reviewed and updated as appropriate:  past social history    Review of Systems   Constitutional: Positive for fatigue  Genitourinary: Positive for frequency           Current Outpatient Medications   Medication Sig Dispense Refill    acetaminophen (TYLENOL) 500 mg tablet Take 500 mg by mouth 3 (three) times a day as needed        acetaminophen-codeine (TYLENOL #3) 300-30 mg per tablet Take 1 tablet by mouth every 4 (four) hours as needed for moderate pain 30 tablet 1    albuterol (PROVENTIL HFA,VENTOLIN HFA) 90 mcg/act inhaler Inhale 2 puffs every 6 (six) hours as needed for wheezing or shortness of breath 1 Inhaler 5    cefuroxime (CEFTIN) 500 mg tablet Take 1 tablet (500 mg total) by mouth every 12 (twelve) hours for 7 days 14 tablet 0    cyanocobalamin (VITAMIN B-12) 1000 MCG tablet Take 1,000 mcg by mouth daily      docusate sodium (COLACE) 100 mg capsule Take 100 mg by mouth 2 (two) times a day      fluticasone (Flovent HFA) 220 mcg/act inhaler Inhale 2 puffs 2 (two) times a day Rinse mouth after use 36 g 3    furosemide (LASIX) 40 mg tablet TAKE 1 TABLET BY MOUTH EVERY DAY IN THE MORNING 90 tablet 1    Magnesium Oxide -Mg Supplement 400 MG CAPS Take 1 capsule by mouth every morning        omeprazole (PriLOSEC) 20 mg delayed release capsule Take 20 mg by mouth as needed        potassium chloride (Klor-Con) 10 mEq tablet TAKE 1 TABLET (10 MEQ TOTAL) BY MOUTH IN THE MORNING 90 tablet 1    pravastatin (PRAVACHOL) 10 mg tablet TAKE 1 TABLET BY MOUTH EVERY DAY 90 tablet 3    senna (SENOKOT) 8 6 MG tablet Take 1 tablet by mouth as needed for constipation      methenamine hippurate (HIPREX) 1 g tablet Take 1 tablet (1 g total) by mouth 2 (two) times a day with meals (Patient not taking: Reported on 8/29/2022) 60 tablet 3     No current facility-administered medications for this visit  Objective:    /72   Pulse 79   Temp 98 3 °F (36 8 °C)   Resp 16   Ht 5' 6" (1 676 m)   Wt 91 2 kg (201 lb)   SpO2 96%   BMI 32 44 kg/m²      Physical Exam  Vitals and nursing note reviewed  Constitutional:       Appearance: She is well-developed  Cardiovascular:      Rate and Rhythm: Normal rate and regular rhythm  Heart sounds: Normal heart sounds  Pulmonary:      Effort: Pulmonary effort is normal  No respiratory distress  Breath sounds: Normal breath sounds  No wheezing  Abdominal:      Tenderness: There is no abdominal tenderness               Veronica Myers, DO

## 2022-09-01 LAB — BACTERIA UR CULT: ABNORMAL

## 2022-09-21 ENCOUNTER — OFFICE VISIT (OUTPATIENT)
Dept: URGENT CARE | Facility: CLINIC | Age: 77
End: 2022-09-21
Payer: MEDICARE

## 2022-09-21 VITALS — OXYGEN SATURATION: 97 % | TEMPERATURE: 97.9 F | HEART RATE: 75 BPM | RESPIRATION RATE: 14 BRPM

## 2022-09-21 DIAGNOSIS — N30.01 ACUTE CYSTITIS WITH HEMATURIA: Primary | ICD-10-CM

## 2022-09-21 LAB
SL AMB  POCT GLUCOSE, UA: ABNORMAL
SL AMB LEUKOCYTE ESTERASE,UA: ABNORMAL
SL AMB POCT BILIRUBIN,UA: ABNORMAL
SL AMB POCT BLOOD,UA: ABNORMAL
SL AMB POCT CLARITY,UA: ABNORMAL
SL AMB POCT COLOR,UA: YELLOW
SL AMB POCT KETONES,UA: ABNORMAL
SL AMB POCT NITRITE,UA: ABNORMAL
SL AMB POCT PH,UA: 5
SL AMB POCT SPECIFIC GRAVITY,UA: 1.02
SL AMB POCT URINE PROTEIN: 100
SL AMB POCT UROBILINOGEN: 0.2

## 2022-09-21 PROCEDURE — 99213 OFFICE O/P EST LOW 20 MIN: CPT | Performed by: PHYSICIAN ASSISTANT

## 2022-09-21 PROCEDURE — 81002 URINALYSIS NONAUTO W/O SCOPE: CPT | Performed by: PHYSICIAN ASSISTANT

## 2022-09-21 PROCEDURE — 87077 CULTURE AEROBIC IDENTIFY: CPT | Performed by: PHYSICIAN ASSISTANT

## 2022-09-21 PROCEDURE — 87086 URINE CULTURE/COLONY COUNT: CPT | Performed by: PHYSICIAN ASSISTANT

## 2022-09-21 PROCEDURE — 87186 SC STD MICRODIL/AGAR DIL: CPT | Performed by: PHYSICIAN ASSISTANT

## 2022-09-21 RX ORDER — CEPHALEXIN 500 MG/1
500 CAPSULE ORAL EVERY 12 HOURS SCHEDULED
Qty: 14 CAPSULE | Refills: 0 | Status: SHIPPED | OUTPATIENT
Start: 2022-09-21 | End: 2022-09-28

## 2022-09-21 NOTE — PATIENT INSTRUCTIONS
Take antibiotic as instructed for the next 7 days  Will send urine for culture  Discussed importance of following up with urologist for further evaluation management of recurrent UTIs

## 2022-09-21 NOTE — PROGRESS NOTES
[facility]        NAME: Le Roberson is a 68 y o  female  : 1945    MRN: [id number]  DATE: 2022  TIME: 7:15 PM    Assessment and Plan   Acute cystitis with hematuria [N30 01]  1  Acute cystitis with hematuria  POCT urine dip    Urine culture    cephalexin (KEFLEX) 500 mg capsule         Patient Instructions     Patient Instructions   Take antibiotic as instructed for the next 7 days  Will send urine for culture  Discussed importance of following up with urologist for further evaluation management of recurrent UTIs  Follow up with PCP in 3-5 days  Proceed to  ER if symptoms worsen  Chief Complaint     Chief Complaint   Patient presents with    Possible UTI     Pt presents with frequency ,urgency, painful urination, low back pain bilateral; started today         History of Present Illness       Patient is a 57-year-old female presenting today with UTI symptoms x4 days  Patient notes she has had several UTIs this year, was recently treated for UTI 3 weeks ago, notes 1 week after completing antibiotic symptoms returned, over the last few days has been experiencing dysuria, increased frequency of urination and sensation of urgency  Denies fever, chills, flank pain, hematuria  Notes that she will be contacting her urologist tomorrow  Review of Systems   Review of Systems   Constitutional: Negative for chills and fever  HENT: Negative for sore throat  Respiratory: Negative for cough  Cardiovascular: Negative for chest pain  Gastrointestinal: Negative for abdominal pain, diarrhea, nausea and vomiting  Genitourinary: Positive for dysuria, frequency and urgency  Negative for flank pain and hematuria  Musculoskeletal: Negative for arthralgias and myalgias  Skin: Negative for rash  Neurological: Negative for headaches           Current Medications       Current Outpatient Medications:     acetaminophen (TYLENOL) 500 mg tablet, Take 500 mg by mouth 3 (three) times a day as needed  , Disp: , Rfl:     acetaminophen-codeine (TYLENOL #3) 300-30 mg per tablet, Take 1 tablet by mouth every 4 (four) hours as needed for moderate pain, Disp: 30 tablet, Rfl: 1    albuterol (PROVENTIL HFA,VENTOLIN HFA) 90 mcg/act inhaler, Inhale 2 puffs every 6 (six) hours as needed for wheezing or shortness of breath, Disp: 1 Inhaler, Rfl: 5    cephalexin (KEFLEX) 500 mg capsule, Take 1 capsule (500 mg total) by mouth every 12 (twelve) hours for 7 days, Disp: 14 capsule, Rfl: 0    cyanocobalamin (VITAMIN B-12) 1000 MCG tablet, Take 1,000 mcg by mouth daily, Disp: , Rfl:     docusate sodium (COLACE) 100 mg capsule, Take 100 mg by mouth 2 (two) times a day, Disp: , Rfl:     furosemide (LASIX) 40 mg tablet, TAKE 1 TABLET BY MOUTH EVERY DAY IN THE MORNING, Disp: 90 tablet, Rfl: 1    Magnesium Oxide -Mg Supplement 400 MG CAPS, Take 1 capsule by mouth every morning  , Disp: , Rfl:     omeprazole (PriLOSEC) 20 mg delayed release capsule, Take 20 mg by mouth as needed  , Disp: , Rfl:     potassium chloride (Klor-Con) 10 mEq tablet, TAKE 1 TABLET (10 MEQ TOTAL) BY MOUTH IN THE MORNING, Disp: 90 tablet, Rfl: 1    pravastatin (PRAVACHOL) 10 mg tablet, TAKE 1 TABLET BY MOUTH EVERY DAY, Disp: 90 tablet, Rfl: 3    senna (SENOKOT) 8 6 MG tablet, Take 1 tablet by mouth as needed for constipation, Disp: , Rfl:     fluticasone (Flovent HFA) 220 mcg/act inhaler, Inhale 2 puffs 2 (two) times a day Rinse mouth after use (Patient not taking: Reported on 9/21/2022), Disp: 36 g, Rfl: 3    methenamine hippurate (HIPREX) 1 g tablet, Take 1 tablet (1 g total) by mouth 2 (two) times a day with meals (Patient not taking: No sig reported), Disp: 60 tablet, Rfl: 3    Current Allergies     Allergies as of 09/21/2022 - Reviewed 09/21/2022   Allergen Reaction Noted    Celecoxib Other (See Comments) 06/25/2015            The following portions of the patient's history were reviewed and updated as appropriate: allergies, current medications, past family history, past medical history, past social history, past surgical history and problem list      Past Medical History:   Diagnosis Date    Anesthesia complication     "shivers"    Arthritis     Asthma     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 see colors, with over-use goes darker- from untreated amblyopia    Breathing difficulty     with extubation pt developed laryngeal spasm    Cataract     both eyes- right eye needs surgery    Colon polyps 2016    Diverticulitis     had episode in April 2018    Edema     lower legs    Generalized osteoarthritis     GERD (gastroesophageal reflux disease)     Hearing problem     unspecified laterality-hearing aid right ear    High cholesterol     History of transfusion     autologus    Incontinence of urine     Polyarthritis     last assessed: 6/26/2015    Rheumatoid arthritis ([facility] [address] )     Sjogren's syndrome ([facility] [address] )     last assessed: 7/18/2016    SS-A antibody positive     last assessed: 8/20/2015    Urinary tract infection     UTI (urinary tract infection)        Past Surgical History:   Procedure Laterality Date    APPENDECTOMY      AUGMENTATION BREAST Bilateral     breast surgery enlargement procedure elective bilateral    BILATERAL OOPHORECTOMY Bilateral 2003    BLADDER SURGERY      bladder lift,cystocele    CATARACT EXTRACTION      Right 9/2018; Left 10/2018    COLONOSCOPY  04/18/2011    COLPORRHAPHY  2014    anterior, repair of cystocele    HYSTERECTOMY  1673    with cystocele     JOINT REPLACEMENT Left 2016    knee    KNEE ARTHROPLASTY Left     KNEE ARTHROSCOPY  2009    KNEE SURGERY Left 2009    torn meniscus    OOPHORECTOMY      VA COLONOSCOPY FLX DX W/COLLJ SPEC WHEN PFRMD N/A 5/31/2018    Procedure: COLONOSCOPY;  Surgeon: Justice Richmond MD;  Location: [facility];   Service: Gastroenterology    REDUCTION MAMMAPLASTY  2000    SACRAL NERVE STIMULATOR PLACEMENT N/A 1/31/2022    Procedure: Giulia Collado NEUROSTIMULATOR ELECTRODE ARRAY SACRAL NERVE; INSERTION NEUROSTIMULATOR; FLUORO; ELECTONIC ANALYSIS;  Surgeon: Rosa Rendon MD;  Location: [facility];  Service: UroGynecology           TONSILLECTOMY AND ADENOIDECTOMY      URETHRA SURGERY         Family History   Problem Relation Age of Onset    Coronary artery disease Mother     COPD Mother     Hypertension Mother     Hyperlipidemia Mother         high cholesterol    Atrial fibrillation Mother     Coronary artery disease Father     Arthritis Father     COPD Father         Black lung    Hypertension Father     Hyperlipidemia Father         high cholesterol    Lung cancer Brother     Coronary artery disease Brother     Hypertension Brother     Hyperlipidemia Brother         high cholesterol    Atrial fibrillation Brother         fib/flutter    Other Sister         pericarditis    Arthritis Sister     Diabetes Maternal Grandmother         mellitus    Breast cancer Maternal Grandmother 79    Breast cancer Paternal Grandmother 79    Arthritis Family     Asthma Family     Cancer Brother         lung    Heart disease Brother         CAD-stents-smoker    No Known Problems Maternal Aunt     No Known Problems Maternal Aunt     No Known Problems Maternal Aunt     No Known Problems Maternal Aunt     No Known Problems Maternal Aunt     Breast cancer Other 40         Medications have been verified  Objective   Pulse 75   Temp 97 9 °F (36 6 °C)   Resp 14   SpO2 97%        Physical Exam     Physical Exam  Vitals reviewed  Constitutional:       Appearance: Normal appearance  HENT:      Head: Normocephalic and atraumatic  Right Ear: External ear normal       Left Ear: External ear normal    Eyes:      Conjunctiva/sclera: Conjunctivae normal    Cardiovascular:      Rate and Rhythm: Normal rate and regular rhythm  Pulses: Normal pulses  Heart sounds: Normal heart sounds     Pulmonary:      Effort: Pulmonary effort is normal       Breath sounds: Normal breath sounds  Abdominal:      General: Abdomen is flat  Bowel sounds are normal       Palpations: Abdomen is soft  Tenderness: There is no abdominal tenderness  There is no right CVA tenderness or left CVA tenderness  Skin:     General: Skin is warm  Capillary Refill: Capillary refill takes less than 2 seconds  Neurological:      General: No focal deficit present  Mental Status: She is alert and oriented to person, place, and time

## 2022-09-23 LAB
BACTERIA UR CULT: ABNORMAL
BACTERIA UR CULT: ABNORMAL

## 2022-09-26 ENCOUNTER — OFFICE VISIT (OUTPATIENT)
Dept: DERMATOLOGY | Age: 77
End: 2022-09-26
Payer: MEDICARE

## 2022-09-26 VITALS — WEIGHT: 199.2 LBS | HEIGHT: 66 IN | BODY MASS INDEX: 32.02 KG/M2 | TEMPERATURE: 98.6 F

## 2022-09-26 DIAGNOSIS — B07.9 VERRUCA VULGARIS: ICD-10-CM

## 2022-09-26 DIAGNOSIS — D48.5 NEOPLASM OF UNCERTAIN BEHAVIOR OF SKIN: Primary | ICD-10-CM

## 2022-09-26 PROCEDURE — 88305 TISSUE EXAM BY PATHOLOGIST: CPT | Performed by: STUDENT IN AN ORGANIZED HEALTH CARE EDUCATION/TRAINING PROGRAM

## 2022-09-26 PROCEDURE — 17110 DESTRUCTION B9 LES UP TO 14: CPT | Performed by: DERMATOLOGY

## 2022-09-26 PROCEDURE — 99204 OFFICE O/P NEW MOD 45 MIN: CPT | Performed by: DERMATOLOGY

## 2022-09-26 PROCEDURE — 11102 TANGNTL BX SKIN SINGLE LES: CPT | Performed by: DERMATOLOGY

## 2022-09-26 NOTE — PATIENT INSTRUCTIONS
VERRUCA VULGARIS ("Common Warts")      Assessment and Plan:  Based on a thorough discussion of this condition and the management approach to it (including a comprehensive discussion of the known risks, side effects and potential benefits of treatment), the patient (family) agrees to implement the following specific plan:  Cryotherapy done in office today   Area will become red and puffy  Scab up and fall off in 2-3 weeks     Verruca Vulgaris  A verruca is a common growth of the skin caused by infection by human papilloma virus (HPV)  There are many strains of the virus that cause different types of warts on the body  The virus infects the most superficial layers of the skin, causing increased production of skin cells and thickening  Warts can be spread through direct contact with infected skin and may spread to other parts of the body if scratched or picked  NEOPLASM OF UNCERTAIN BEHAVIOR OF SKIN  III  POST-PROCEDURAL CARE (WHAT YOU WILL NEED TO DO "AFTER THE BIOPSY" TO OPTIMIZE HEALING)    Keep the area clean and dry  Try NOT to remove the bandage or get it wet for the first 24 hours  Gently clean the area and apply surgical ointment (such as Vaseline petrolatum ointment, which is available "over the counter" and not a prescription) to the biopsy site for up to 2 weeks straight  This acts to protect the wound from the outside world  Sutures are not usually placed in this procedure  If any sutures were placed, return for suture removal as instructed (generally 1 week for the face, 2 weeks for the body)  Take Acetaminophen (Tylenol) for discomfort, if no contraindications  Ibuprofen or aspirin could make bleeding worse  Call our office immediately for signs of infection: fever, chills, increased redness, warmth, tenderness, discomfort/pain, or pus or foul smell coming from the wound  WHAT TO DO IF THERE IS ANY BLEEDING?   If a small amount of bleeding is noticed, place a clean cloth over the area and apply firm pressure for ten minutes  Check the wound after 10 minutes of direct pressure  If bleeding persists, try one more time for an additional 10 minutes of direct pressure on the area  If the bleeding becomes heavier or does not stop after the second attempt, or if you have any other questions about this procedure, then please call your [facility]'s Dermatologist by calling [phone] (SKIN)  I hereby acknowledge that I have reviewed and verified the site with my Dermatologist and have requested and authorized my Dermatologist to proceed with the procedure

## 2022-09-26 NOTE — PROGRESS NOTES
[facility] Note     Patient Name: Kyle Aguilar  Encounter Date: 09/26/2022     Have you been cared for by a [facility] in the last 3 years and, if so, which one? No    · Have you traveled outside of the [address] [place] [address] in the past 3 months or outside of the [facility] area in the last 2 weeks? No     May we call your Preferred Phone number to discuss your specific medical information? Yes     May we leave a detailed message that includes your specific medical information? Yes      Today's Chief Concerns:   Concern #1: Spot of concern     Past Medical History:  Have you personally ever had or currently have any of the following? · Skin cancer (such as Melanoma, Basal Cell Carcinoma, Squamous Cell Carcinoma? (If Yes, please provide more detail)- No  · Eczema: No  · Psoriasis: No  · HIV/AIDS: No  · Hepatitis B or C: No  · Tuberculosis: No  · Systemic Immunosuppression such as Diabetes, Biologic or Immunotherapy, Chemotherapy, Organ Transplantation, Bone Marrow Transplantation (If YES, please provide more detail): No  · Radiation Treatment (If YES, please provide more detail): No  · Any other major medical conditions/concerns? (If Yes, which types)- No    Social History:     What is/was your primary occupation? Retired      What are your hobbies/past-times? N/a     Family History:  Have any of your "first degree relatives" (parent, brother, sister, or child) had any of the following       · Skin cancer such as Melanoma or Merkel Cell Carcinoma or Pancreatic Cancer? YES, maternal uncle with pancreatic cancer   · Eczema, Asthma, Hay Fever or Seasonal Allergies: YES, mother and grand daughter with eczema   · Psoriasis or Psoriatic Arthritis: No  · Do any other medical conditions seem to run in your family? If Yes, what condition and which relatives?   No    Current Medications:   (please update all dermatological medications before printing patient's AVS!)      Current Outpatient Medications:     acetaminophen (TYLENOL) 500 mg tablet, Take 500 mg by mouth 3 (three) times a day as needed  , Disp: , Rfl:     acetaminophen-codeine (TYLENOL #3) 300-30 mg per tablet, Take 1 tablet by mouth every 4 (four) hours as needed for moderate pain, Disp: 30 tablet, Rfl: 1    albuterol (PROVENTIL HFA,VENTOLIN HFA) 90 mcg/act inhaler, Inhale 2 puffs every 6 (six) hours as needed for wheezing or shortness of breath, Disp: 1 Inhaler, Rfl: 5    cephalexin (KEFLEX) 500 mg capsule, Take 1 capsule (500 mg total) by mouth every 12 (twelve) hours for 7 days, Disp: 14 capsule, Rfl: 0    cyanocobalamin (VITAMIN B-12) 1000 MCG tablet, Take 1,000 mcg by mouth daily, Disp: , Rfl:     docusate sodium (COLACE) 100 mg capsule, Take 100 mg by mouth 2 (two) times a day, Disp: , Rfl:     furosemide (LASIX) 40 mg tablet, TAKE 1 TABLET BY MOUTH EVERY DAY IN THE MORNING, Disp: 90 tablet, Rfl: 1    Magnesium Oxide -Mg Supplement 400 MG CAPS, Take 1 capsule by mouth every morning  , Disp: , Rfl:     omeprazole (PriLOSEC) 20 mg delayed release capsule, Take 20 mg by mouth as needed  , Disp: , Rfl:     potassium chloride (Klor-Con) 10 mEq tablet, TAKE 1 TABLET (10 MEQ TOTAL) BY MOUTH IN THE MORNING, Disp: 90 tablet, Rfl: 1    pravastatin (PRAVACHOL) 10 mg tablet, TAKE 1 TABLET BY MOUTH EVERY DAY, Disp: 90 tablet, Rfl: 3    senna (SENOKOT) 8 6 MG tablet, Take 1 tablet by mouth as needed for constipation, Disp: , Rfl:     fluticasone (Flovent HFA) 220 mcg/act inhaler, Inhale 2 puffs 2 (two) times a day Rinse mouth after use (Patient not taking: No sig reported), Disp: 36 g, Rfl: 3    methenamine hippurate (HIPREX) 1 g tablet, Take 1 tablet (1 g total) by mouth 2 (two) times a day with meals (Patient not taking: No sig reported), Disp: 60 tablet, Rfl: 3      Review of Systems:  Have you recently had or currently have any of the following?   If YES, what are you doing for the problem? · Fever, chills or unintended weight loss: No  · Sudden loss or change in your vision: No  · Nausea, vomiting or blood in your stool: No  · Painful or swollen joints: No  · Wheezing or cough: No  · Changing mole or non-healing wound: No  · Nosebleeds: No  · Excessive sweating: No  · Easy or prolonged bleeding? No  · Over the last 2 weeks, how often have you been bothered by the following problems? · Taking little interest or pleasure in doing things: 1 - Not at All  · Feeling down, depressed, or hopeless: 1 - Not at All  · Rapid heartbeat with epinephrine:  No    · FEMALES ONLY:     · Are you pregnant or planning to become pregnant? No  · Are you currently or planning to be nursing or breast feeding? No    · Any known allergies? Allergies   Allergen Reactions    Celecoxib Other (See Comments)     Other reaction(s): Palpitations  Category: Adverse Reaction;          Physical Exam:     Was a chaperone (Derm Clinical Assistant) present throughout the entire Physical Exam? Yes     Did the Dermatology Team specifically  the patient on the importance of a Full Skin Exam to be sure that nothing is missed clinically?  Yes}  o Did the patient ultimately request or accept a Full Skin Exam?  Yes  o Did the patient specifically refuse to have the areas "under-the-bra" examined by the Dermatologist? No  o Did the patient specifically refuse to have the areas "under-the-underwear" examined by the Dermatologist? No    CONSTITUTIONAL:   Vitals:    09/26/22 0933   Temp: 98 6 °F (37 °C)   TempSrc: Temporal   Weight: 90 4 kg (199 lb 3 2 oz)   Height: 5' 6" (1 676 m)       PSYCH: Normal mood and affect  EYES: Normal conjunctiva  ENT: Normal lips and oral mucosa  CARDIOVASCULAR: No edema  RESPIRATORY: Normal respirations  HEME/LYMPH/IMMUNO:  No regional lymphadenopathy except as noted below in "ASSESSMENT AND PLAN BY DIAGNOSIS"    SKIN:  FULL ORGAN SYSTEM EXAM  Hair, Scalp, Ears, Face Normal except as noted below in Assessment   Neck, Cervical Chain Nodes Normal except as noted below in Assessment   Abdomen, Umbilicus Normal except as noted below in Assessment   Back/Spine Normal except as noted below in Assessment        Assessment and Plan by Diagnosis:    History of Present Condition:     Duration:  How long has this been an issue for you?    o  months on back   o Couple years on face  o Couple years on nose    Location Affected:  Where on the body is this affecting you?    o  back, face and nose    Quality:  Is there any bleeding, pain, itch, burning/irritation, or redness associated with the skin lesion? o  itchy, irritated due to bathing brush on back      Severity:  Describe any bleeding, pain, itch, burning/irritation, or redness on a scale of 1 to 10 (with 10 being the worst)  o     Timing:  Does this condition seem to be there pretty constantly or do you notice it more at specific times throughout the day? o  constant    Context:  Have you ever noticed that this condition seems to be associated with specific activities you do?    o  denies    Modifying Factors:    o Anything that seems to make the condition worse?    -  denies   o What have you tried to do to make the condition better?    -  denies    Associated Signs and Symptoms:  Does this skin lesion seem to be associated with any of the following:  o  SL AMB DERM SIGNS AND SYMPTOMS: Redness       VERRUCA VULGARIS ("Common Warts")    Physical Exam:   Anatomic Location Affected:  Left cheek    Morphological Description:  Pink verruca papule    Pertinent Positives:   Pertinent Negatives: Additional History of Present Condition:  Present for months   Attempted to shave it but still present     Assessment and Plan:  Based on a thorough discussion of this condition and the management approach to it (including a comprehensive discussion of the known risks, side effects and potential benefits of treatment), the patient (family) agrees to implement the following specific plan:   Cryotherapy done in office today    Area will become red and puffy  Scab up and fall off in 2-3 weeks     Verruca Vulgaris  A verruca is a common growth of the skin caused by infection by human papilloma virus (HPV)  There are many strains of the virus that cause different types of warts on the body  The virus infects the most superficial layers of the skin, causing increased production of skin cells and thickening  Warts can be spread through direct contact with infected skin and may spread to other parts of the body if scratched or picked  A verruca is more commonly called a "wart " Warts are particularly common in school-aged children but can arise at any age  Patients who have a history of eczema are especially prone due to impaired skin barrier  Those taking immunosuppressive drugs or with HIV infections may experience prolonged symptoms despite treatment  Warts generally have a rough surface with a tiny black dot sometimes observed in the middle of each scaly spot  They can range in size from a small bump to large scaly growths  Common warts are often found on the backs of fingers or toes, around the nails, and on the knees  Plantar warts can grow inwardly on the soles of the feet causing pain  There are many possible ways to treat warts and sometimes several different treatments are needed to get the warts to go away completely  There is no single perfect treatment for warts, and successful treatment can take many months  In-office treatments usually require multiple visits, and include:  1) Cryotherapy  a cold spray with liquid nitrogen will destroy the infected cells but may lead to discomfort and blistering  It may also leave a permanent white giuliano or scar  2) Electrosurgery (curettage and cautery) can be used for large resistant warts which involves shaving the growth down and burning the base   It is performed under local anesthesia and may leave a permanent scar    3) Candida (yeast) antigen injections  These are extracts of the common yeast (Candida) that cannot cause an infection  The medication is injected into/under the wart  It is thought to stimulate the immune system to recognize the wart virus and attack it  Multiple injections are often needed about one month apart  There are also several at-home wart treatments:    1) Soak the warts in warm water for 5 minutes every night followed by gentle filing with a nail file or pumice stone  2) Topical salicylic acid or similar compounds work by removing the dead surface skin cells  a  Apply the medicine directly to the wart, wait for it to dry completely, then cover with duct tape overnight   b  Repeat until the wart is gone, which can take 2-4 months  c  Do not use on the face or groin area   d  If the wart paint makes the skin sore, stop treatment until the discomfort has settled, then recommence as above   e  Take care to keep the chemical off normal skin  3) Podophyllin is a cytotoxic agent used in some products and must not be used in pregnancy or women considering pregnancy  4) Some prescription medications include   a  Topical retinoids (adapalene, tretinoin, tazarotene), 5-fluorouracil (Efudex) or imiquimod (Aldara) creams are sometimes used to treat flat warts or warts on the face and other sensitive anatomical areas  They are usually applied directly to the warts once a day for 2-4 months and can be irritating  These treatments should only be used as directed by your health care provider  b  Systemic treatment with oral cimetidine (Tagamet) may help boost the immune system against the wart virus in patients, some of the time  Initiation of cimetidine therapy should ONLY be done under the supervision of your health care provider, who can discuss possible side effects and drug-to-drug interactions of this specific treatment     SEBORRHEIC KERATOSIS; NON-INFLAMED    Physical Exam:   Anatomic Location Affected:  Left nasal bridge   Morphological Description:  Flat and raised, waxy, smooth to warty textured, yellow to brownish-grey to dark brown to blackish, discrete, "stuck-on" appearing papules   Pertinent Positives:   Pertinent Negatives: Additional History of Present Condition:  Patient reports new bumps on the skin  Denies itch, burn, pain, bleeding or ulceration  Present constantly; nothing seems to make it worse or better  No prior treatment  Assessment and Plan:  Based on a thorough discussion of this condition and the management approach to it (including a comprehensive discussion of the known risks, side effects and potential benefits of treatment), the patient (family) agrees to implement the following specific plan:   Reassured benign     Seborrheic Keratosis  A seborrheic keratosis is a harmless warty spot that appears during adult life as a common sign of skin aging  Seborrheic keratoses can arise on any area of skin, covered or uncovered, with the usual exception of the palms and soles  They do not arise from mucous membranes  Seborrheic keratoses can have highly variable appearance  Seborrheic keratoses are extremely common  It has been estimated that over 90% of adults over the age of 61 years have one or more of them  They occur in males and females of all races, typically beginning to erupt in the 35s or 45s  They are uncommon under the age of 21 years  The precise cause of seborrhoeic keratoses is not known  Seborrhoeic keratoses are considered degenerative in nature  As time goes by, seborrheic keratoses tend to become more numerous  Some people inherit a tendency to develop a very large number of them; some people may have hundreds of them      The name "seborrheic keratosis" is misleading, because these lesions are not limited to a seborrhoeic distribution (scalp, mid-face, chest, upper back), nor are they formed from sebaceous glands, nor are they associated with sebum -- which is greasy  Seborrheic keratosis may also be called "SK," "Seb K," "basal cell papilloma," "senile wart," or "barnacle "      Researchers have noted:   Eruptive seborrhoeic keratoses can follow sunburn or dermatitis   Skin friction may be the reason they appear in body folds   Viral cause (e g , human papillomavirus) seems unlikely   Stable and clonal mutations or activation of FRFR3, PIK3CA, EDENILSON, AKT1 and EGFR genes are found in seborrhoeic keratoses   Seborrhoeic keratosis can arise from solar lentigo   FRFR3 mutations also arise in solar lentigines  These mutations are associated with increased age and location on the head and neck, suggesting a role of ultraviolet radiation in these lesions   Seborrheic keratoses do not harbour tumour suppressor gene mutations   Epidermal growth factor receptor inhibitors, which are used to treat some cancers, often result in an increase in verrucal (warty) keratoses  There is no easy way to remove multiple lesions on a single occasion  Unless a specific lesion is "inflamed" and is causing pain or stinging/burning or is bleeding, most insurance companies do not authorize treatment  NEOPLASM OF UNCERTAIN BEHAVIOR OF SKIN    Physical Exam:   (Anatomic Location); (Size and Morphological Description); (Differential Diagnosis):  o Right upper paraspinal; 1 cm pink plaque with telangiectasia (diffdx) isk rule out bcc    Pertinent Positives:   Pertinent Negatives: Additional History of Present Condition:  Present for months   noticed the area and advised she have it examined     Assessment and Plan:   I have discussed with the patient that a sample of skin via a "skin biopsy would be potentially helpful to further make a specific diagnosis under the microscope     Based on a thorough discussion of this condition and the management approach to it (including a comprehensive discussion of the known risks, side effects and potential benefits of treatment), the patient (family) agrees to implement the following specific plan:    o Procedure:  Skin Biopsy  After a thorough discussion of treatment options and risk/benefits/alternatives (including but not limited to local pain, scarring, dyspigmentation, blistering, possible superinfection, and inability to confirm a diagnosis via histopathology), verbal and written consent were obtained and portion of the rash was biopsied for tissue sample  See below for consent that was obtained from patient and subsequent Procedure Note  PROCEDURE TANGENTIAL (SHAVE) BIOPSY NOTE:     Performing Physician: Jennifer Church Anatomic Location; Clinical Description with size (cm); Pre-Op Diagnosis:   Right upper paraspinal; 1 cm pink plaque with telangiectasia (diffdx) isk rule out bcc    Post-op diagnosis: Same      Local anesthesia: 1% Lidocaine HCL      Topical anesthesia: None     Hemostasis: Aluminum chloride       After obtaining informed consent  at which time there was a discussion about the purpose of biopsy  and low risks of infection and bleeding  The area was prepped and draped in the usual fashion  Anesthesia was obtained with 1% lidocaine with epinephrine  A shave biopsy to an appropriate sampling depth was obtained by Shave (Dermablade or 15 blade) The resulting wound was covered with surgical ointment and bandaged appropriately  The patient tolerated the procedure well without complications and was without signs of functional compromise  Specimen has been sent for review by Dermatopathology  Standard post-procedure care has been explained and has been included in written form within the patient's copy of Informed Consent  INFORMED CONSENT DISCUSSION AND POST-OPERATIVE INSTRUCTIONS FOR PATIENT    I   RATIONALE FOR PROCEDURE  I understand that a skin biopsy allows the Dermatologist to test a lesion or rash under the microscope to obtain a diagnosis    It usually involves numbing the area with numbing medication and removing a small piece of skin; sometimes the area will be closed with sutures  In this specific procedure, sutures are not usually needed  If any sutures are placed, then they are usually need to be removed in 2 weeks or less  I understand that my Dermatologist recommends that a skin "shave" biopsy be performed today  A local anesthetic, similar to the kind that a dentist uses when filling a cavity, will be injected with a very small needle into the skin area to be sampled  The injected skin and tissue underneath "will go to sleep and become numb so no pain should be felt afterwards  An instrument shaped like a tiny "razor blade" (shave biopsy instrument) will be used to cut a small piece of tissue and skin from the area so that a sample of tissue can be taken and examined more closely under the microscope  A slight amount of bleeding will occur, but it will be stopped with direct pressure and a pressure bandage and any other appropriate methods  I understands that a scar will form where the wound was created  Surgical ointment will be applied to help protect the wound  Sutures are not usually needed  II   RISKS AND POTENTIAL COMPLICATIONS   I understand the risks and potential complications of a skin biopsy include but are not limited to the following:   Bleeding   Infection   Pain   Scar/keloid   Skin discoloration   Incomplete Removal   Recurrence   Nerve Damage/Numbness/Loss of Function   Allergic Reaction to Anesthesia   Biopsies are diagnostic procedures and based on findings additional treatment or evaluation may be required   Loss or destruction of specimen resulting in no additional findings    My Dermatologist has explained to me the nature of the condition, the nature of the procedure, and the benefits to be reasonably expected compared with alternative approaches    My Dermatologist has discussed the likelihood of major risks or complications of this procedure including the specific risks listed above, such as bleeding, infection, and scarring/keloid  I understand that a scar is expected after this procedure  I understand that my physician cannot predict if the scar will form a "keloid," which extends beyond the borders of the wound that is created  A keloid is a thick, painful, and bumpy scar  A keloid can be difficult to treat, as it does not always respond well to therapy, which includes injecting cortisone directly into the keloid every few weeks  While this usually reduces the pain and size of the scar, it does not eliminate it  I understand that photographs may be taken before and after the procedure  These will be maintained as part of the medical providers confidential records and may not be made available to me  I further authorize the medical provider to use the photographs for teaching purposes or to illustrate scientific papers, books, or lectures if in his/her judgment, medical research, education, or science may benefit from its use  I have had an opportunity to fully inquire about the risks and benefits of this procedure and its alternatives  I have been given ample time and opportunity to ask questions and to seek a second opinion if I wished to do so  I acknowledge that there have specifically been no guarantees as to the cosmetic results from the procedure  I am aware that with any procedure there is always the possibility of an unexpected complication  III  POST-PROCEDURAL CARE (WHAT YOU WILL NEED TO DO "AFTER THE BIOPSY" TO OPTIMIZE HEALING)     Keep the area clean and dry  Try NOT to remove the bandage or get it wet for the first 24 hours   Gently clean the area and apply surgical ointment (such as Vaseline petrolatum ointment, which is available "over the counter" and not a prescription) to the biopsy site for up to 2 weeks straight  This acts to protect the wound from the outside world   Sutures are not usually placed in this procedure  If any sutures were placed, return for suture removal as instructed (generally 1 week for the face, 2 weeks for the body)   Take Acetaminophen (Tylenol) for discomfort, if no contraindications  Ibuprofen or aspirin could make bleeding worse   Call our office immediately for signs of infection: fever, chills, increased redness, warmth, tenderness, discomfort/pain, or pus or foul smell coming from the wound  WHAT TO DO IF THERE IS ANY BLEEDING? If a small amount of bleeding is noticed, place a clean cloth over the area and apply firm pressure for ten minutes  Check the wound after 10 minutes of direct pressure  If bleeding persists, try one more time for an additional 10 minutes of direct pressure on the area  If the bleeding becomes heavier or does not stop after the second attempt, or if you have any other questions about this procedure, then please call your [facility]at[facility] by calling [phone] (SKIN)  I hereby acknowledge that I have reviewed and verified the site with my Dermatologist and have requested and authorized my Dermatologist to proceed with the procedure  PROCEDURE:  DESTRUCTION OF BENIGN LESIONS  After a thorough discussion of treatment options and risk/benefits/alternatives (including but not limited to local pain, scarring, dyspigmentation, blistering, and possible superinfection), verbal and written consent were obtained and the aforementioned lesions were treated on with cryotherapy using liquid nitrogen x 1 cycle for 5-10 seconds   TOTAL NUMBER of 1 lesions were treated today on the ANATOMIC LOCATION: left cheek   The patient tolerated the procedure well, and after-care instructions were provided      Scribe Attestation    I,:  Moris Bowman am acting as a scribe while in the presence of the attending physician :       I,:  Lottie Kamara MD personally performed the services described in this documentation    as scribed in my presence :         Scribe Attestation    I,:  Brian Alejandro am acting as a scribe while in the presence of the attending physician :       I,:  Damien Vásquez MD personally performed the services described in this documentation    as scribed in my presence :

## 2022-10-05 NOTE — RESULT ENCOUNTER NOTE
Tissue Exam: [id number]  Order: [id number]  Status: Final result    Visible to patient: Yes (not seen)    Dx: Neoplasm of uncertain behavior of skin    0 Result Notes    Component   Case Report  Surgical Pathology Report                         Case: [id number]                                   Authorizing Provider: Nitza Giang MD      Collected:           09/26/2022 0959              Ordering Location:     [facility]      Received:            09/26/2022 0959                                     [place]                                                                   Pathologist:           Elijah Mackenzie MD                                                           Specimen:    Skin, Other, Specimen A: right upper paraspinal                                          Final Diagnosis  A  Skin,  right upper paraspinal, shave biopsy:     BASAL CELL CARCINOMA (SUPERFICIAL AND NODULAR TYPE)        Electronically signed by Elijah Mackenzie MD on 10/5/2022 at 12:39 PM      DERMATOPATHOLOGY RESULT NOTE    Results reviewed by ordering physician  Called patient to personally discuss results         Instructions for Clinical Derm Team:   (remember to route Result Note to appropriate staff):    Call patient and schedule for excision    Result & Plan by Specimen:    Specimen A: malignant  Plan: excision

## 2022-10-11 ENCOUNTER — APPOINTMENT (OUTPATIENT)
Dept: LAB | Facility: CLINIC | Age: 77
End: 2022-10-11
Payer: MEDICARE

## 2022-10-11 DIAGNOSIS — E78.2 MIXED HYPERLIPIDEMIA: ICD-10-CM

## 2022-10-11 DIAGNOSIS — Z13.0 SCREENING FOR DEFICIENCY ANEMIA: ICD-10-CM

## 2022-10-11 DIAGNOSIS — R73.01 IMPAIRED FASTING GLUCOSE: ICD-10-CM

## 2022-10-11 LAB
ALBUMIN SERPL BCP-MCNC: 4.1 G/DL (ref 3.5–5)
ALP SERPL-CCNC: 74 U/L (ref 46–116)
ALT SERPL W P-5'-P-CCNC: 23 U/L (ref 12–78)
ANION GAP SERPL CALCULATED.3IONS-SCNC: 3 MMOL/L (ref 4–13)
AST SERPL W P-5'-P-CCNC: 15 U/L (ref 5–45)
BILIRUB SERPL-MCNC: 1 MG/DL (ref 0.2–1)
BUN SERPL-MCNC: 18 MG/DL (ref 5–25)
CALCIUM SERPL-MCNC: 9.3 MG/DL (ref 8.3–10.1)
CHLORIDE SERPL-SCNC: 107 MMOL/L (ref 96–108)
CHOLEST SERPL-MCNC: 190 MG/DL
CO2 SERPL-SCNC: 30 MMOL/L (ref 21–32)
CREAT SERPL-MCNC: 0.93 MG/DL (ref 0.6–1.3)
ERYTHROCYTE [DISTWIDTH] IN BLOOD BY AUTOMATED COUNT: 13.3 % (ref 11.6–15.1)
EST. AVERAGE GLUCOSE BLD GHB EST-MCNC: 111 MG/DL
GFR SERPL CREATININE-BSD FRML MDRD: 59 ML/MIN/1.73SQ M
GLUCOSE P FAST SERPL-MCNC: 96 MG/DL (ref 65–99)
HBA1C MFR BLD: 5.5 %
HCT VFR BLD AUTO: 41.1 % (ref 34.8–46.1)
HDLC SERPL-MCNC: 44 MG/DL
HGB BLD-MCNC: 13.5 G/DL (ref 11.5–15.4)
LDLC SERPL CALC-MCNC: 122 MG/DL (ref 0–100)
MCH RBC QN AUTO: 29.8 PG (ref 26.8–34.3)
MCHC RBC AUTO-ENTMCNC: 32.8 G/DL (ref 31.4–37.4)
MCV RBC AUTO: 91 FL (ref 82–98)
PLATELET # BLD AUTO: 222 THOUSANDS/UL (ref 149–390)
PMV BLD AUTO: 10.3 FL (ref 8.9–12.7)
POTASSIUM SERPL-SCNC: 3.6 MMOL/L (ref 3.5–5.3)
PROT SERPL-MCNC: 7.1 G/DL (ref 6.4–8.4)
RBC # BLD AUTO: 4.53 MILLION/UL (ref 3.81–5.12)
SODIUM SERPL-SCNC: 140 MMOL/L (ref 135–147)
TRIGL SERPL-MCNC: 122 MG/DL
WBC # BLD AUTO: 4.67 THOUSAND/UL (ref 4.31–10.16)

## 2022-10-11 PROCEDURE — 36415 COLL VENOUS BLD VENIPUNCTURE: CPT

## 2022-10-11 PROCEDURE — 80061 LIPID PANEL: CPT

## 2022-10-11 PROCEDURE — 83036 HEMOGLOBIN GLYCOSYLATED A1C: CPT

## 2022-10-11 PROCEDURE — 80053 COMPREHEN METABOLIC PANEL: CPT

## 2022-10-11 PROCEDURE — 85027 COMPLETE CBC AUTOMATED: CPT

## 2022-10-11 NOTE — ASSESSMENT & PLAN NOTE
Worsening pain   Reviewed recent visit and X-ray done by Dr Ynes Escoto to take tylenol and Aleve for the pain
No

## 2022-10-24 ENCOUNTER — OFFICE VISIT (OUTPATIENT)
Dept: UROLOGY | Facility: CLINIC | Age: 77
End: 2022-10-24
Payer: MEDICARE

## 2022-10-24 VITALS
OXYGEN SATURATION: 100 % | BODY MASS INDEX: 32.17 KG/M2 | SYSTOLIC BLOOD PRESSURE: 130 MMHG | HEIGHT: 66 IN | DIASTOLIC BLOOD PRESSURE: 70 MMHG | HEART RATE: 75 BPM | WEIGHT: 200.2 LBS

## 2022-10-24 DIAGNOSIS — N39.0 RECURRENT UTI: ICD-10-CM

## 2022-10-24 DIAGNOSIS — N95.2 ATROPHIC VAGINITIS: Primary | ICD-10-CM

## 2022-10-24 LAB
POST-VOID RESIDUAL VOLUME, ML POC: 328 ML
SL AMB  POCT GLUCOSE, UA: NORMAL
SL AMB LEUKOCYTE ESTERASE,UA: NORMAL
SL AMB POCT BILIRUBIN,UA: NORMAL
SL AMB POCT BLOOD,UA: NORMAL
SL AMB POCT CLARITY,UA: CLEAR
SL AMB POCT COLOR,UA: YELLOW
SL AMB POCT KETONES,UA: NORMAL
SL AMB POCT NITRITE,UA: NORMAL
SL AMB POCT PH,UA: 6
SL AMB POCT SPECIFIC GRAVITY,UA: 1010
SL AMB POCT URINE PROTEIN: NORMAL
SL AMB POCT UROBILINOGEN: 0.2

## 2022-10-24 PROCEDURE — 87077 CULTURE AEROBIC IDENTIFY: CPT | Performed by: PHYSICIAN ASSISTANT

## 2022-10-24 PROCEDURE — 81002 URINALYSIS NONAUTO W/O SCOPE: CPT | Performed by: PHYSICIAN ASSISTANT

## 2022-10-24 PROCEDURE — 99203 OFFICE O/P NEW LOW 30 MIN: CPT | Performed by: PHYSICIAN ASSISTANT

## 2022-10-24 PROCEDURE — 87186 SC STD MICRODIL/AGAR DIL: CPT | Performed by: PHYSICIAN ASSISTANT

## 2022-10-24 PROCEDURE — 51798 US URINE CAPACITY MEASURE: CPT | Performed by: PHYSICIAN ASSISTANT

## 2022-10-24 PROCEDURE — 87086 URINE CULTURE/COLONY COUNT: CPT | Performed by: PHYSICIAN ASSISTANT

## 2022-10-24 RX ORDER — CEFUROXIME AXETIL 500 MG/1
500 TABLET ORAL EVERY 12 HOURS SCHEDULED
Qty: 14 TABLET | Refills: 2 | Status: SHIPPED | OUTPATIENT
Start: 2022-10-24 | End: 2022-10-31

## 2022-10-24 RX ORDER — METHENAMINE HIPPURATE 1000 MG/1
1 TABLET ORAL 2 TIMES DAILY WITH MEALS
Qty: 60 TABLET | Refills: 4 | Status: SHIPPED | OUTPATIENT
Start: 2022-10-24

## 2022-10-24 RX ORDER — SACCHAROMYCES BOULARDII 250 MG
250 CAPSULE ORAL DAILY
Qty: 30 CAPSULE | Refills: 6 | Status: SHIPPED | OUTPATIENT
Start: 2022-10-24

## 2022-10-24 RX ORDER — CONJUGATED ESTROGENS 0.62 MG/G
1 CREAM VAGINAL 3 TIMES WEEKLY
Qty: 30 G | Refills: 2 | Status: SHIPPED | OUTPATIENT
Start: 2022-10-24 | End: 2022-10-27

## 2022-10-24 NOTE — PROGRESS NOTES
1  Atrophic vaginitis  estrogens, conjugated (Premarin) vaginal cream    Urine culture   2  Recurrent UTI  POCT urine dip    POCT Measure PVR    US kidney and bladder with pvr    saccharomyces boulardii (FLORASTOR) 250 mg capsule    methenamine hippurate (HIPREX) 1 g tablet    Urine culture    cefuroxime (CEFTIN) 500 mg tablet    Urine culture    Urine culture         Assessment and plan:     1  Recurrent UTIs  2  Incomplete bladder emptying  3  Atrophic vaginitis  - standing urine culture  - premarin  - hiprex  - renal US  - ceftin for current infection  - if persistent symptoms, then needs cystoscopy to ensure no mesh erosion    [id number]      Chief Complaint     Chief Complaint   Patient presents with   • Urinary Tract Infection     NEW PT          History of Present Illness     Remigio Macdonald is a 68 y o  female presenting today for consultation of recurrent urinary tract infections  Urine cultures:  E coli (6/5/2022)  E coli (6/30/2022)  E coli (8/29/2022  E coli (09/21/2022)    History of anterior colporrhaphy  Follows with Dr Martínez De La Torre with urogynecology -underwent recent interstim placement in early 2022  Medical comorbidities include rheumatoid arthritis, GERD, diverticulosis, rosacea, Sjogren's, asthma, cataract  Urine dip leukocyte and nitrite positive, blood negative  PVR 328mL    Laboratory     Lab Results   Component Value Date    CREATININE 0 93 10/11/2022       Review of Systems     Review of Systems   Constitutional: Negative for activity change, appetite change, chills, diaphoresis, fatigue, fever and unexpected weight change  Respiratory: Negative for chest tightness and shortness of breath  Cardiovascular: Negative for chest pain, palpitations and leg swelling  Gastrointestinal: Negative for abdominal distention, abdominal pain, constipation, diarrhea, nausea and vomiting  Genitourinary: Positive for difficulty urinating   Negative for decreased urine volume, dysuria, enuresis, flank pain, frequency, genital sores, hematuria and urgency  Musculoskeletal: Negative for back pain, gait problem and myalgias  Skin: Negative for color change, pallor, rash and wound  Psychiatric/Behavioral: Negative for behavioral problems  The patient is not nervous/anxious  Allergies     Allergies   Allergen Reactions   • Celecoxib Other (See Comments)     Other reaction(s): Palpitations  Category: Adverse Reaction;        Physical Exam     Physical Exam  Constitutional:       General: She is not in acute distress  Appearance: Normal appearance  She is normal weight  She is not ill-appearing, toxic-appearing or diaphoretic  HENT:      Head: Normocephalic and atraumatic  Eyes:      General:         Right eye: No discharge  Left eye: No discharge  Conjunctiva/sclera: Conjunctivae normal    Pulmonary:      Effort: Pulmonary effort is normal  No respiratory distress  Genitourinary:     Comments: Vaginal atrophy  Musculoskeletal:         General: No swelling or tenderness  Normal range of motion  Skin:     General: Skin is warm and dry  Coloration: Skin is not jaundiced or pale  Neurological:      General: No focal deficit present  Mental Status: She is alert and oriented to person, place, and time  Psychiatric:         Mood and Affect: Mood normal          Behavior: Behavior normal          Thought Content:  Thought content normal            Vital Signs     Vitals:    10/24/22 0858   BP: 130/70   BP Location: Left arm   Patient Position: Sitting   Cuff Size: Standard   Pulse: 75   SpO2: 100%   Weight: 90 8 kg (200 lb 3 2 oz)   Height: 5' 6" (1 676 m)         Current Medications       Current Outpatient Medications:   •  acetaminophen (TYLENOL) 500 mg tablet, Take 500 mg by mouth 3 (three) times a day as needed  , Disp: , Rfl:   •  acetaminophen-codeine (TYLENOL #3) 300-30 mg per tablet, Take 1 tablet by mouth every 4 (four) hours as needed for moderate pain, Disp: 30 tablet, Rfl: 1  •  albuterol (PROVENTIL HFA,VENTOLIN HFA) 90 mcg/act inhaler, Inhale 2 puffs every 6 (six) hours as needed for wheezing or shortness of breath, Disp: 1 Inhaler, Rfl: 5  •  cefuroxime (CEFTIN) 500 mg tablet, Take 1 tablet (500 mg total) by mouth every 12 (twelve) hours for 7 days, Disp: 14 tablet, Rfl: 2  •  cyanocobalamin (VITAMIN B-12) 1000 MCG tablet, Take 1,000 mcg by mouth daily, Disp: , Rfl:   •  docusate sodium (COLACE) 100 mg capsule, Take 100 mg by mouth 2 (two) times a day, Disp: , Rfl:   •  estrogens, conjugated (Premarin) vaginal cream, Insert 1 g into the vagina 3 (three) times a week, Disp: 30 g, Rfl: 2  •  fluticasone (Flovent HFA) 220 mcg/act inhaler, Inhale 2 puffs 2 (two) times a day Rinse mouth after use, Disp: 36 g, Rfl: 3  •  furosemide (LASIX) 40 mg tablet, TAKE 1 TABLET BY MOUTH EVERY DAY IN THE MORNING, Disp: 90 tablet, Rfl: 1  •  Magnesium Oxide -Mg Supplement 400 MG CAPS, Take 1 capsule by mouth every morning  , Disp: , Rfl:   •  methenamine hippurate (HIPREX) 1 g tablet, Take 1 tablet (1 g total) by mouth 2 (two) times a day with meals, Disp: 60 tablet, Rfl: 4  •  omeprazole (PriLOSEC) 20 mg delayed release capsule, Take 20 mg by mouth as needed  , Disp: , Rfl:   •  potassium chloride (Klor-Con) 10 mEq tablet, TAKE 1 TABLET (10 MEQ TOTAL) BY MOUTH IN THE MORNING, Disp: 90 tablet, Rfl: 1  •  pravastatin (PRAVACHOL) 10 mg tablet, TAKE 1 TABLET BY MOUTH EVERY DAY, Disp: 90 tablet, Rfl: 3  •  saccharomyces boulardii (FLORASTOR) 250 mg capsule, Take 1 capsule (250 mg total) by mouth in the morning, Disp: 30 capsule, Rfl: 6  •  senna (SENOKOT) 8 6 MG tablet, Take 1 tablet by mouth as needed for constipation, Disp: , Rfl:       Active Problems     Patient Active Problem List   Diagnosis   • Edema   • Asthma, mild persistent   • Exertional shortness of breath   • Fatigue   • GERD without esophagitis   • Mixed hyperlipidemia   • Impaired fasting glucose   • Left knee pain   • Palpitations   • Primary generalized (osteo)arthritis   • Right hip pain   • Sjogren's syndrome (HCC)   • Diverticulitis of large intestine without perforation or abscess with bleeding   • Chronic bilateral thoracic back pain   • Rosacea   • Cough   • UTI symptoms   • Primary osteoarthritis of left hip   • Urge incontinence of urine   • History of hysterectomy         Past Medical History     Past Medical History:   Diagnosis Date   • Anesthesia complication     "shivers"   • Arthritis    • Asthma    • 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 see colors, with over-use goes darker- from untreated amblyopia   • Breathing difficulty     with extubation pt developed laryngeal spasm   • Cataract     both eyes- right eye needs surgery   • Colon polyps 2016   • Diverticulitis     had episode in April 2018   • Edema     lower legs   • Generalized osteoarthritis    • GERD (gastroesophageal reflux disease)    • Hearing problem     unspecified laterality-hearing aid right ear   • High cholesterol    • History of transfusion     autologus   • Incontinence of urine    • Polyarthritis     last assessed: 6/26/2015   • Rheumatoid arthritis ([facility] [address] )    • Sjogren's syndrome ([facility] [address] )     last assessed: 7/18/2016   • SS-A antibody positive     last assessed: 8/20/2015   • Urinary tract infection    • UTI (urinary tract infection)          Surgical History     Past Surgical History:   Procedure Laterality Date   • APPENDECTOMY     • AUGMENTATION BREAST Bilateral     breast surgery enlargement procedure elective bilateral   • BILATERAL OOPHORECTOMY Bilateral 2003   • BLADDER SURGERY      bladder lift,cystocele   • CATARACT EXTRACTION      Right 9/2018; Left 10/2018   • COLONOSCOPY  04/18/2011   • COLPORRHAPHY  2014    anterior, repair of cystocele   • HYSTERECTOMY  1673    with cystocele    • JOINT REPLACEMENT Left 2016    knee   • KNEE ARTHROPLASTY Left    • KNEE ARTHROSCOPY  2009   • KNEE SURGERY Left 2009    torn meniscus   • OOPHORECTOMY • AZ COLONOSCOPY FLX DX W/COLLJ SPEC WHEN PFRMD N/A 5/31/2018    Procedure: COLONOSCOPY;  Surgeon: Demetria Hooper MD;  Location: [facility] GI LAB;   Service: Gastroenterology   • REDUCTION MAMMAPLASTY  2000   • SACRAL NERVE STIMULATOR PLACEMENT N/A 1/31/2022    Procedure: IMPLANTATION NEUROSTIMULATOR ELECTRODE ARRAY SACRAL NERVE; INSERTION NEUROSTIMULATOR; FLUORO; ELECTONIC ANALYSIS;  Surgeon: Fei Mcarthur MD;  Location: [facility] OR;  Service: UroGynecology          • TONSILLECTOMY AND ADENOIDECTOMY     • URETHRA SURGERY           Family History     Family History   Problem Relation Age of Onset   • Coronary artery disease Mother    • COPD Mother    • Hypertension Mother    • Hyperlipidemia Mother         high cholesterol   • Atrial fibrillation Mother    • Coronary artery disease Father    • Arthritis Father    • COPD Father         Black lung   • Hypertension Father    • Hyperlipidemia Father         high cholesterol   • Lung cancer Brother    • Coronary artery disease Brother    • Hypertension Brother    • Hyperlipidemia Brother         high cholesterol   • Atrial fibrillation Brother         fib/flutter   • Other Sister         pericarditis   • Arthritis Sister    • Diabetes Maternal Grandmother         mellitus   • Breast cancer Maternal Grandmother 79   • Breast cancer Paternal Grandmother 79   • Arthritis Family    • Asthma Family    • Cancer Brother         lung   • Heart disease Brother         CAD-stents-smoker   • No Known Problems Maternal Aunt    • No Known Problems Maternal Aunt    • No Known Problems Maternal Aunt    • No Known Problems Maternal Aunt    • No Known Problems Maternal Aunt    • Breast cancer Other 36         Social History     Social History       Radiology

## 2022-10-26 LAB — BACTERIA UR CULT: ABNORMAL

## 2022-10-27 ENCOUNTER — OFFICE VISIT (OUTPATIENT)
Dept: FAMILY MEDICINE CLINIC | Facility: CLINIC | Age: 77
End: 2022-10-27
Payer: MEDICARE

## 2022-10-27 ENCOUNTER — TELEPHONE (OUTPATIENT)
Dept: UROLOGY | Facility: CLINIC | Age: 77
End: 2022-10-27

## 2022-10-27 VITALS
HEIGHT: 66 IN | RESPIRATION RATE: 17 BRPM | WEIGHT: 201.6 LBS | SYSTOLIC BLOOD PRESSURE: 130 MMHG | HEART RATE: 73 BPM | TEMPERATURE: 97.7 F | BODY MASS INDEX: 32.4 KG/M2 | OXYGEN SATURATION: 99 % | DIASTOLIC BLOOD PRESSURE: 76 MMHG

## 2022-10-27 DIAGNOSIS — N95.2 VAGINAL ATROPHY: Primary | ICD-10-CM

## 2022-10-27 DIAGNOSIS — E78.2 MIXED HYPERLIPIDEMIA: ICD-10-CM

## 2022-10-27 DIAGNOSIS — R73.01 IMPAIRED FASTING GLUCOSE: Primary | ICD-10-CM

## 2022-10-27 DIAGNOSIS — M15.0 PRIMARY GENERALIZED (OSTEO)ARTHRITIS: ICD-10-CM

## 2022-10-27 DIAGNOSIS — N39.0 RECURRENT UTI: Primary | ICD-10-CM

## 2022-10-27 DIAGNOSIS — Z23 NEED FOR VACCINATION: ICD-10-CM

## 2022-10-27 PROCEDURE — 99214 OFFICE O/P EST MOD 30 MIN: CPT | Performed by: FAMILY MEDICINE

## 2022-10-27 PROCEDURE — 90662 IIV NO PRSV INCREASED AG IM: CPT

## 2022-10-27 PROCEDURE — G0008 ADMIN INFLUENZA VIRUS VAC: HCPCS

## 2022-10-27 RX ORDER — ACETAMINOPHEN AND CODEINE PHOSPHATE 300; 30 MG/1; MG/1
1 TABLET ORAL EVERY 4 HOURS PRN
Qty: 30 TABLET | Refills: 1 | Status: SHIPPED | OUTPATIENT
Start: 2022-10-27

## 2022-10-27 RX ORDER — LEVOFLOXACIN 500 MG/1
500 TABLET, FILM COATED ORAL EVERY 24 HOURS
Qty: 5 TABLET | Refills: 0 | Status: SHIPPED | OUTPATIENT
Start: 2022-10-27 | End: 2022-11-01

## 2022-10-27 RX ORDER — ESTRADIOL 0.1 MG/G
1 CREAM VAGINAL 3 TIMES WEEKLY
Qty: 42.5 G | Refills: 6 | Status: SHIPPED | OUTPATIENT
Start: 2022-10-28

## 2022-10-27 NOTE — PATIENT INSTRUCTIONS

## 2022-10-27 NOTE — TELEPHONE ENCOUNTER
Left generic message requesting patient call office    When patient calls back and if staff member is clinical, please inform her the antibiotic needed to be changed to Levaquin due to final culture results

## 2022-10-27 NOTE — PROGRESS NOTES
Assessment/Plan     Problem List Items Addressed This Visit     Impaired fasting glucose - Primary     Stable         Mixed hyperlipidemia     Stable         Primary generalized (osteo)arthritis    Relevant Medications    acetaminophen-codeine (TYLENOL #3) 300-30 mg per tablet      Other Visit Diagnoses     Need for vaccination        Relevant Orders    influenza vaccine, high-dose, PF 0 7 mL (FLUZONE HIGH-DOSE) (Completed)       Return in 3 months (on 1/27/2023)  Treatment Plan: Continue Tylenol with codeine    Treatment Goals: Pain maximum level of function at home    Opiate risks  There are risks associated with opioid medications, including dependence, addiction and tolerance  The patient understands and agrees to use these medications only as prescribed  Potential side effects of the medications include, but are not limited to, constipation, drowsiness, addiction, impaired judgment and risk of fatal overdose if not taken as prescribed  The patient was warned against driving while taking sedation medications  Sharing medications is a felony  At this point in time, the patient is showing no signs of addiction, abuse, diversion or suicidal ideation  PDMP review  [place] or [place]  reviewed  No red flags were identified; safe to proceed with prescription          Subjective     Opioid Management:   Type of visit: Follow-up    Screening Tools/Assessments:    PHQ-2/9:  Last PHQ-2 score: 0 (Last PHQ-2 date: 7/26/2022)    Brief Pain Inventory (BPI):  1) Throughout our lives, most of us have had pain from time to time (such as minor headaches, sprains, and toothaches)  Have you had pain other than these everyday kinds of pain today?  Yes  2) Where is your pain located? see picture  3) Rate your pain at its worst in the last 24 hours: 5  4) Rate your pain at its least in the last 24 hours: 1  5) Rate your average level of pain: 3  6) Rate your pain right now: 3  7) What treatments or medications are you receiving for your pain? Tylenol with codeine  8) In the past 24 hours, how much relief have pain treatments or medication provided? 30%  9) During the past 24 hours, pain has interfered with your:     A) General activity: 3     B) Mood: 1     C) Walking ability: 4     D) Normal work (work outside the home & housework): 4     E) Relations with other people: 0     F) Sleep: 3     G) Enjoyment of life: 1    Drug Screen:  Date of last drug screen: 4/28/2022    Opioid agreement:  Active Opioid agreement on file?: Yes    Opioid agreement signed date: 7/26/2022  Opioid agreement expiration date: 7/26/2023    Naloxone:  Currently prescribed Naloxone (Narcan): No      She went to Urology for her persistent UTIs  Pain Medications             acetaminophen (TYLENOL) 500 mg tablet Take 500 mg by mouth 3 (three) times a day as needed      acetaminophen-codeine (TYLENOL #3) 300-30 mg per tablet Take 1 tablet by mouth every 4 (four) hours as needed for moderate pain         Outpatient Morphine Milligram Equivalents Per Day     10/27/22 and after 27 MME/Day    Order Name Dose Route Frequency Maximum MME/Day     acetaminophen-codeine (TYLENOL #3) 300-30 mg per tablet 1 tablet Oral Every 4 hours PRN 27 MME/Day    Total Potential Morphine Milligram Equivalents Per Day 27 MME/Day    Calculation Information        acetaminophen-codeine (TYLENOL #3) 300-30 mg per tablet    acetaminophen-codeine 300-30 mg Tabs: single dose of 30 mg of opioid in combo * 6 doses per day * morphine equivalence factor of 0 15 = 27 MME/Day                         PDMP Review       Value Time User    PDMP Reviewed  Yes 10/27/2022 10:38 AM Rolan Santiago DO         Review of Systems  Objective     /76   Pulse 73   Temp 97 7 °F (36 5 °C)   Resp 17   Ht 5' 6" (1 676 m)   Wt 91 4 kg (201 lb 9 6 oz)   SpO2 99%   BMI 32 54 kg/m²     Physical Exam  Vitals and nursing note reviewed  Constitutional:       Appearance: She is well-developed     HENT: Head: Normocephalic and atraumatic  Right Ear: External ear normal       Left Ear: External ear normal       Nose: Nose normal    Cardiovascular:      Rate and Rhythm: Normal rate and regular rhythm  Heart sounds: Normal heart sounds  No murmur heard  No friction rub  Pulmonary:      Effort: No respiratory distress  Breath sounds: Normal breath sounds  No wheezing or rales  Musculoskeletal:      Right lower leg: No edema  Left lower leg: No edema  Neurological:      Mental Status: She is oriented to person, place, and time  Cranial Nerves: No cranial nerve deficit           Sariah Barr DO

## 2022-10-27 NOTE — TELEPHONE ENCOUNTER
----- Message from Guillermo 90JUDITH Paulino sent at 10/27/2022  6:32 AM EDT -----  Please let the patient know her antibiotic needed to be changed   Levaquin sent to pharmacy

## 2022-10-27 NOTE — TELEPHONE ENCOUNTER
Advised patient of medication change  Advised to stop other medication and start the levaquin  Patient verbalized understanding  Patient then asked for alternative for the premarin cream as it is too expensive  Will ask for providers for a switch to estrace  Advised patient that if that is also not covered under her insurance casey Ricardo will take a good discount

## 2022-11-01 ENCOUNTER — HOSPITAL ENCOUNTER (OUTPATIENT)
Dept: RADIOLOGY | Facility: HOSPITAL | Age: 77
Discharge: HOME | End: 2022-11-01

## 2022-11-01 DIAGNOSIS — N39.0 RECURRENT UTI: ICD-10-CM

## 2022-11-14 ENCOUNTER — APPOINTMENT (OUTPATIENT)
Dept: LAB | Facility: CLINIC | Age: 77
End: 2022-11-14

## 2022-11-16 ENCOUNTER — TELEPHONE (OUTPATIENT)
Dept: UROLOGY | Facility: CLINIC | Age: 77
End: 2022-11-16

## 2022-11-16 DIAGNOSIS — N39.0 RECURRENT UTI: Primary | ICD-10-CM

## 2022-11-16 LAB — BACTERIA UR CULT: ABNORMAL

## 2022-11-16 RX ORDER — NITROFURANTOIN 25; 75 MG/1; MG/1
100 CAPSULE ORAL 2 TIMES DAILY
Qty: 10 CAPSULE | Refills: 0 | Status: SHIPPED | OUTPATIENT
Start: 2022-11-16 | End: 2022-11-21

## 2022-11-16 NOTE — TELEPHONE ENCOUNTER
Left message for patient requesting to call the office  If she calls back, please inform her nitrofurantoin (an antibiotic) was sent to her pharmacy on file

## 2022-11-18 NOTE — TELEPHONE ENCOUNTER
L/m stating I wasn't sure who had called her but that it was most likely just to remind her of the medication sent to pharmacy, number left to call back if she has any other questions

## 2022-11-18 NOTE — TELEPHONE ENCOUNTER
Patient returning a missed call she stated she received 5 minutes ago from our office      Patient can be reached at [phone]

## 2022-11-22 DIAGNOSIS — R60.9 EDEMA, UNSPECIFIED TYPE: ICD-10-CM

## 2022-11-23 RX ORDER — POTASSIUM CHLORIDE 750 MG/1
10 TABLET, FILM COATED, EXTENDED RELEASE ORAL DAILY
Qty: 90 TABLET | Refills: 1 | Status: SHIPPED | OUTPATIENT
Start: 2022-11-23

## 2022-11-23 RX ORDER — FUROSEMIDE 40 MG/1
TABLET ORAL
Qty: 90 TABLET | Refills: 1 | Status: SHIPPED | OUTPATIENT
Start: 2022-11-23

## 2022-11-30 ENCOUNTER — PROCEDURE VISIT (OUTPATIENT)
Dept: DERMATOLOGY | Age: 77
End: 2022-11-30

## 2022-11-30 VITALS — TEMPERATURE: 98.2 F | WEIGHT: 207.8 LBS | BODY MASS INDEX: 33.4 KG/M2 | HEIGHT: 66 IN

## 2022-11-30 DIAGNOSIS — C44.612 BASAL CELL CARCINOMA (BCC) OF SKIN OF RIGHT UPPER EXTREMITY INCLUDING SHOULDER: Primary | ICD-10-CM

## 2022-11-30 NOTE — PROGRESS NOTES
PROCEDURE:  EXCISION WITH INTERMEDIATE LAYERED CLOSURE     Attending: Azam Sahu  Assistant: Magdalene Mireles    Pre-Op Diagnosis: Basal Cell Carcinoma  Post-Op Diagnosis: Same   Benign versus Malignant Malignant      Lesion Anatomic Location: Right Upper Paraspinal (Previous Accession Number: [id number] )  Pre-op size: 1 cm  Size of defect:  5 5 cm (with 2 centimeter margins)  Final repaired wound length:  5 5 cm    Written and verbal, witnessed informed consent was obtained  I discussed that excision is a method of removing lesions both benign and malignant lesions  A portion of normal skin is often taken to ensure completeness of removal   I reviewed that procedure will include numbing the area,  cutting around and under defect, undermining tissue, and closing the wound with sutures both inside and out  These sutures are usually removed in 7 to 14 days  Risks (bleeding, pain, infection, scarring, recurrence) and benefits discussed  It was discussed with patient that every effort is made to minimize scar, but scarring is influenced also by extrinsic factor such as location, age and genetics  Time Out: performed:  yes  Correct patient: yes  Correct site per Clinic Report: yes  Correct site per Patient Report: yes    LOCAL ANESTHESIA: 1% xylocaine with epi     DESCRIPTION OF PROCEDURE: The patient was brought back into the procedure room, anesthetized locally, prepped and draped in the usual fashion  Using a #15 blade with a scalpel, the lesion was excised in elliptical fashion  The wound was  undermined in the  fascial plane  Hemostasis was achieved with light electrocoagulation  Purpose of undermining was to decrease wound tension and facilitate closure      The wound was closed with subcutaneous sutures as follows:    Deep suture:3-0 Vicryl      Epidermal edge closure was accomplished with superficial sutures as follows:    Superficial suture: 4-0 Ethilon  Superficial suture type: running    Estimated blood loss less than 3ml  The patient tolerated the procedure well without any complications  The wound was cleaned with sterile saline, dried off, surgical vaseline ointment was applied, and the wound was covered  A pressure dressing was applied for stabilization and light pressure  The patient was given detailed oral and written instructions on postoperative care as detailed in consent  The patient left in good medical condition  POSTOP DISCUSSION DISCUSSION AND INSTRUCTIONS FOR PATIENT      Rationale for Procedure  A skin excision allows the dermatologist to remove a lesion  The procedure involves a local numbing medication and removing the entire lesion  Typically, the lesion is being removed because it is atypical, traumatized, or for significant appearance reasons  The area will be open like a brush burn and allowed to heal    There will be no sutures  Tissue is sent to pathologist who will reconfirm diagnosis and verify completeness of lesion removal     Description of Procedure  We would like to perform a skin excision today  A local anesthetic, similar to the kind that a dentist uses when filling a cavity, will be injected with a very small needle into the skin area to be sampled  The injected skin and tissue underneath should “go to sleep” and become numbed so that no further pain should be felt  A scalpel will be used to cut around the lesion and tissue will be submitted to pathology for examination  Depending on the diagnosis the lesion will be excised with a certain amount of normal skin to help assure completeness of lesion removal   The physician will discuss in advance the anticipated size and extent of removal    Bleeding will occur, but it will stopped with direct pressure, sutures, and electrocautery  Surgical “Vaseline-type” ointment will also applied after the procedure to help create a barrier between the wound and the outside world        Risks and Potential Complications  The advantage of a skin excision is that it allows us to remove a problem lesion quickly  Although this usually permits the lesion to heal as soon as possible with the least scarring, there are some risks and potential complications that include but are not limited to the following:  - Some bleeding is normal at time of procedure and some bleeding on gauze is normal  the first few days after surgery  Profuse bleeding and bleeding with swelling and pain should be reported as detailed  below  - Infection is uncommon in skin surgery  Infection should be reported and is indicated by pain, redness, and discharge of purulent material   - Some dull to at time sharp pain could occur initially the day after surgery  Persistent pain or increasing pain days after surgery is not expected and should be reported  - Every effort is made to minimize scar, but location, size, and genetics do play a role in scar appearance  A surgical wound does not achieve its optimal appearance until 6 months  There are several treatments available if scarring would be problematic including scar creams, silicone pad, laser and scar revision   - Skin discoloration can occur especially in people of color  Its important to avoid sun on wound in first 6 months after surgery  Treatment is available if pigment is problematic   - Incomplete removal of the lesion or recurrence of lesion can occur and this would then require further treatment and more surgery   - Nerve Damage/Numbness/Loss of Function is very rare, but is most likely to occur if lesion is large or if it is in a high risk location  - Allergic Reaction to lidocaine is rare  More commonly,  epinephrine is used  with the lidocaine  Occasionally, epinephrine (aka adrenalin) may cause a brief  feeling of anxiety or jitteriness  - The person at the microscope  (pathologist) may provide additional information that was unexpected   This unexpected finding could provoke the need for additional treatment or evaluation  What You Will Need to Do After the Procedure  1  Keep the area clean and dry the first day  Try NOT to remove the bandage for the first day  2  Gently clean the area with soap and water and apply Vaseline ointment (this is over the counter and not a prescription) to the excision  site for up to 2 weeks  3  Apply a clean appropriately sized bandage to area  Gauze and paper tape are recommended for sensitive skin  4  Return for suture removal as instructed (generally 1 week for the face, 2 weeks for the body)  5  Take Acetaminophen (Tylenol) for discomfort, if no contraindications  Do NOT take Ibuprofen or aspirin unless specifically told to do so by your Dermatologist because these medications can make bleeding worse  6  Call our office immediately for signs of infection: fever, chills, increased redness, warmth, tenderness, discomfort/pain, or pus or foul smell coming from the wound  If bleeding is noticed, place a clean cloth over the area and apply firm pressure for thirty minutes  Check the wound ONLY after 30 minutes of direct pressure; do not cheat and sneak a peak, as that does not count  If bleeding persists after 30 minutes of legitimate direct pressure, then try one more round of direct pressure for an additional 10 minutes to the area  Should the bleeding become heavier or not stop after the second attempt, call [facility] directly at [phone] (SKIN) or, if after hours, go to your local Emergency Room/Emergency Department        Scribe Attestation    I,:  Monica Sanchez am acting as a scribe while in the presence of the attending physician :       I,:  Tonya Sherman MD personally performed the services described in this documentation    as scribed in my presence :

## 2022-11-30 NOTE — PATIENT INSTRUCTIONS
POSTOP DISCUSSION DISCUSSION AND INSTRUCTIONS FOR PATIENT      Rationale for Procedure  A skin excision allows the dermatologist to remove a lesion  The procedure involves a local numbing medication and removing the entire lesion  Typically, the lesion is being removed because it is atypical, traumatized, or for significant appearance reasons  The area will be open like a brush burn and allowed to heal    There will be no sutures  Tissue is sent to pathologist who will reconfirm diagnosis and verify completeness of lesion removal     Description of Procedure  We would like to perform a skin excision today  A local anesthetic, similar to the kind that a dentist uses when filling a cavity, will be injected with a very small needle into the skin area to be sampled  The injected skin and tissue underneath should “go to sleep” and become numbed so that no further pain should be felt  A scalpel will be used to cut around the lesion and tissue will be submitted to pathology for examination  Depending on the diagnosis the lesion will be excised with a certain amount of normal skin to help assure completeness of lesion removal   The physician will discuss in advance the anticipated size and extent of removal    Bleeding will occur, but it will stopped with direct pressure, sutures, and electrocautery  Surgical “Vaseline-type” ointment will also applied after the procedure to help create a barrier between the wound and the outside world  Risks and Potential Complications  The advantage of a skin excision is that it allows us to remove a problem lesion quickly  Although this usually permits the lesion to heal as soon as possible with the least scarring, there are some risks and potential complications that include but are not limited to the following:  Some bleeding is normal at time of procedure and some bleeding on gauze is normal  the first few days after surgery    Profuse bleeding and bleeding with swelling and pain should be reported as detailed  below  Infection is uncommon in skin surgery  Infection should be reported and is indicated by pain, redness, and discharge of purulent material   Some dull to at time sharp pain could occur initially the day after surgery  Persistent pain or increasing pain days after surgery is not expected and should be reported  Every effort is made to minimize scar, but location, size, and genetics do play a role in scar appearance  A surgical wound does not achieve its optimal appearance until 6 months  There are several treatments available if scarring would be problematic including scar creams, silicone pad, laser and scar revision  Skin discoloration can occur especially in people of color  Its important to avoid sun on wound in first 6 months after surgery  Treatment is available if pigment is problematic  Incomplete removal of the lesion or recurrence of lesion can occur and this would then require further treatment and more surgery  Nerve Damage/Numbness/Loss of Function is very rare, but is most likely to occur if lesion is large or if it is in a high risk location  Allergic Reaction to lidocaine is rare  More commonly,  epinephrine is used  with the lidocaine  Occasionally, epinephrine (aka adrenalin) may cause a brief  feeling of anxiety or jitteriness  The person at the microscope  (pathologist) may provide additional information that was unexpected  This unexpected finding could provoke the need for additional treatment or evaluation  What You Will Need to Do After the Procedure  Keep the area clean and dry the first day  Try NOT to remove the bandage for the first day  Gently clean the area with soap and water and apply Vaseline ointment (this is over the counter and not a prescription) to the excision  site for up to 2 weeks  Apply a clean appropriately sized bandage to area  Gauze and paper tape are recommended for sensitive skin    Return for suture removal as instructed (generally 1 week for the face, 2 weeks for the body)  Take Acetaminophen (Tylenol) for discomfort, if no contraindications  Do NOT take Ibuprofen or aspirin unless specifically told to do so by your Dermatologist because these medications can make bleeding worse  Call our office immediately for signs of infection: fever, chills, increased redness, warmth, tenderness, discomfort/pain, or pus or foul smell coming from the wound  If bleeding is noticed, place a clean cloth over the area and apply firm pressure for thirty minutes  Check the wound ONLY after 30 minutes of direct pressure; do not cheat and sneak a peak, as that does not count  If bleeding persists after 30 minutes of legitimate direct pressure, then try one more round of direct pressure for an additional 10 minutes to the area  Should the bleeding become heavier or not stop after the second attempt, call [facility] directly at [phone] (SKIN) or, if after hours, go to your local Emergency Room/Emergency Department

## 2022-12-14 ENCOUNTER — OFFICE VISIT (OUTPATIENT)
Dept: DERMATOLOGY | Age: 77
End: 2022-12-14

## 2022-12-14 DIAGNOSIS — D48.5 NEOPLASM OF UNCERTAIN BEHAVIOR OF SKIN: Primary | ICD-10-CM

## 2022-12-14 NOTE — PROGRESS NOTES
Suture removal    Date/Time: 12/14/2022 10:34 AM  Performed by: Nile Byrd  Authorized by: Lavinia Dickerson MD   Universal Protocol:  Consent: Verbal consent obtained  Consent given by: patient  Timeout called at: 12/14/2022 10:34 AM   Patient understanding: patient states understanding of the procedure being performed  Patient consent: the patient's understanding of the procedure matches consent given  Procedure consent: procedure consent matches procedure scheduled  Patient identity confirmed: verbally with patient        Patient location:  Clinic  Location:     Laterality:  Right    Location: paraspinal   Procedure details: Tools used:  Suture removal kit    Wound appearance:  No sign(s) of infection, good wound healing, clean, nonpurulent and nontender    Number of sutures removed:  1 (running)  Post-procedure details:     Post-removal:  No dressing applied    Patient tolerance of procedure:   Tolerated well, no immediate complications

## 2023-01-27 ENCOUNTER — OFFICE VISIT (OUTPATIENT)
Dept: FAMILY MEDICINE CLINIC | Facility: CLINIC | Age: 78
End: 2023-01-27

## 2023-01-27 VITALS
WEIGHT: 201 LBS | SYSTOLIC BLOOD PRESSURE: 126 MMHG | HEART RATE: 70 BPM | RESPIRATION RATE: 16 BRPM | BODY MASS INDEX: 32.3 KG/M2 | TEMPERATURE: 97.8 F | DIASTOLIC BLOOD PRESSURE: 70 MMHG | HEIGHT: 66 IN

## 2023-01-27 DIAGNOSIS — R73.01 IMPAIRED FASTING GLUCOSE: ICD-10-CM

## 2023-01-27 DIAGNOSIS — M35.00 SJOGREN'S SYNDROME, WITH UNSPECIFIED ORGAN INVOLVEMENT (HCC): ICD-10-CM

## 2023-01-27 DIAGNOSIS — M15.0 PRIMARY GENERALIZED (OSTEO)ARTHRITIS: ICD-10-CM

## 2023-01-27 DIAGNOSIS — M19.042 PRIMARY OSTEOARTHRITIS OF BOTH HANDS: Primary | ICD-10-CM

## 2023-01-27 DIAGNOSIS — M19.041 PRIMARY OSTEOARTHRITIS OF BOTH HANDS: Primary | ICD-10-CM

## 2023-01-27 DIAGNOSIS — Z13.0 SCREENING FOR DEFICIENCY ANEMIA: ICD-10-CM

## 2023-01-27 DIAGNOSIS — N39.0 RECURRENT UTI: ICD-10-CM

## 2023-01-27 DIAGNOSIS — Z13.6 SCREENING FOR CARDIOVASCULAR CONDITION: ICD-10-CM

## 2023-01-27 RX ORDER — OMEPRAZOLE 20 MG/1
TABLET, DELAYED RELEASE ORAL EVERY 24 HOURS
COMMUNITY

## 2023-01-27 RX ORDER — NITROFURANTOIN 25; 75 MG/1; MG/1
100 CAPSULE ORAL 2 TIMES DAILY
Qty: 14 CAPSULE | Refills: 0 | Status: SHIPPED | OUTPATIENT
Start: 2023-01-27 | End: 2023-02-03

## 2023-01-27 RX ORDER — ACETAMINOPHEN AND CODEINE PHOSPHATE 300; 30 MG/1; MG/1
1 TABLET ORAL EVERY 4 HOURS PRN
Qty: 30 TABLET | Refills: 1 | Status: SHIPPED | OUTPATIENT
Start: 2023-01-27

## 2023-01-27 RX ORDER — UBIDECARENONE 75 MG
CAPSULE ORAL DAILY
COMMUNITY
End: 2023-01-27 | Stop reason: ALTCHOICE

## 2023-01-27 RX ORDER — AMPICILLIN TRIHYDRATE 500 MG
CAPSULE ORAL DAILY
COMMUNITY

## 2023-01-27 NOTE — PROGRESS NOTES
Assessment/Plan     Problem List Items Addressed This Visit     Impaired fasting glucose     Stable         Relevant Orders    Hemoglobin A1C    Primary generalized (osteo)arthritis    Relevant Medications    acetaminophen-codeine (TYLENOL #3) 300-30 mg per tablet    Primary osteoarthritis of both hands - Primary    Relevant Orders    Ambulatory Referral to Occupational Therapy    Recurrent UTI     Advised to follow-up with this         Relevant Medications    nitrofurantoin (MACROBID) 100 mg capsule    Sjogren's syndrome ([address] )   Other Visit Diagnoses     Screening for cardiovascular condition        Relevant Orders    Comprehensive metabolic panel    Lipid Panel with Direct LDL reflex    Screening for deficiency anemia        Relevant Orders    CBC         Treatment Plan: Continue Tylenol with codeine as needed  Will continue Tylenol and aspirin on other days  Treatment Goals: Bolus to maintain level of function    Opiate risks  There are risks associated with opioid medications, including dependence, addiction and tolerance  The patient understands and agrees to use these medications only as prescribed  Potential side effects of the medications include, but are not limited to, constipation, drowsiness, addiction, impaired judgment and risk of fatal overdose if not taken as prescribed  The patient was warned against driving while taking sedation medications  Sharing medications is a felony  At this point in time, the patient is showing no signs of addiction, abuse, diversion or suicidal ideation  PDMP review  [place] or [place]  reviewed  No red flags were identified; safe to proceed with prescription          Subjective     Opioid Management:   Type of visit: Follow-up    Interval history: Her aches and pains have been worse over the past couple of months  She thinks the weather is getting to her  Her hands are getting worse  She can't lift anythig       Screening Tools/Assessments:    PHQ-2/9:  PHQ-2 score: 0    Brief Pain Inventory (BPI):  1) Throughout our lives, most of us have had pain from time to time (such as minor headaches, sprains, and toothaches)  Have you had pain other than these everyday kinds of pain today? Yes  2) Where is your pain located? Hands, hips, knees  3) Rate your pain at its worst in the last 24 hours: 6  4) Rate your pain at its least in the last 24 hours: 6  5) Rate your average level of pain: 3  6) Rate your pain right now: 3  7) What treatments or medications are you receiving for your pain? 8) In the past 24 hours, how much relief have pain treatments or medication provided?  80%  9) During the past 24 hours, pain has interfered with your:    A) General activity: 6     B) Mood: 4     C) Walking ability: 6     D) Normal work (work outside the home & housework): 6     E) Relations with other people: 5     F) Sleep: 7     G) Enjoyment of life: 3    COMM:  Current COMM Score: 2 (negative, low risk patient)    Drug Screen:  Date of last drug screen: 4/28/2022    Opioid agreement:  Active Opioid agreement on file?: Yes    Opioid agreement signed date: 10/27/2022  Opioid agreement expiration date: 10/27/2023    Naloxone:  Currently prescribed Naloxone (Narcan): No      HPI  Pain Medications             acetaminophen (TYLENOL) 500 mg tablet Take 500 mg by mouth 3 (three) times a day as needed      acetaminophen-codeine (TYLENOL #3) 300-30 mg per tablet Take 1 tablet by mouth every 4 (four) hours as needed for moderate pain         Outpatient Morphine Milligram Equivalents Per Day     1/27/23 and after 27 MME/Day    Order Name Dose Route Frequency Maximum MME/Day     acetaminophen-codeine (TYLENOL #3) 300-30 mg per tablet 1 tablet Oral Every 4 hours PRN 27 MME/Day    Total Potential Morphine Milligram Equivalents Per Day 27 MME/Day    Calculation Information        acetaminophen-codeine (TYLENOL #3) 300-30 mg per tablet    acetaminophen-codeine 300-30 mg Tabs: single dose of 30 mg of opioid in combo * 6 doses per day * morphine equivalence factor of 0 15 = 27 MME/Day                         PDMP Review       Value Time User    PDMP Reviewed  Yes 1/27/2023  9:53 AM Marden March, DO         Review of Systems  Objective     /70   Pulse 70   Temp 97 8 °F (36 6 °C)   Resp 16   Ht 5' 6" (1 676 m)   Wt 91 2 kg (201 lb)   BMI 32 44 kg/m²     Physical Exam  Vitals and nursing note reviewed  Constitutional:       General: She is not in acute distress  Appearance: She is well-developed  HENT:      Head: Normocephalic and atraumatic  Eyes:      Conjunctiva/sclera: Conjunctivae normal    Cardiovascular:      Rate and Rhythm: Normal rate and regular rhythm  Heart sounds: No murmur heard  Pulmonary:      Effort: Pulmonary effort is normal  No respiratory distress  Breath sounds: Normal breath sounds  Musculoskeletal:         General: No swelling  Cervical back: Neck supple  Skin:     General: Skin is warm and dry  Capillary Refill: Capillary refill takes less than 2 seconds  Neurological:      Mental Status: She is alert     Psychiatric:         Mood and Affect: Mood normal          Marden March, DO

## 2023-01-27 NOTE — PATIENT INSTRUCTIONS

## 2023-02-09 ENCOUNTER — OFFICE VISIT (OUTPATIENT)
Dept: CARDIOLOGY CLINIC | Facility: CLINIC | Age: 78
End: 2023-02-09

## 2023-02-09 VITALS
HEIGHT: 66 IN | SYSTOLIC BLOOD PRESSURE: 134 MMHG | HEART RATE: 72 BPM | WEIGHT: 201 LBS | BODY MASS INDEX: 32.3 KG/M2 | DIASTOLIC BLOOD PRESSURE: 80 MMHG | OXYGEN SATURATION: 97 %

## 2023-02-09 DIAGNOSIS — R00.2 PALPITATIONS: Primary | ICD-10-CM

## 2023-02-09 DIAGNOSIS — E78.2 MIXED HYPERLIPIDEMIA: ICD-10-CM

## 2023-02-09 RX ORDER — ASPIRIN 325 MG
325 TABLET ORAL DAILY
COMMUNITY

## 2023-02-09 NOTE — PROGRESS NOTES
Cardiology   Flaquito Montiel DO, Justyna Castillo MD, Lucrecia Shaikh MD, Josh Hutson MD, [facility] - [place]  -------------------------------------------------------------------  [facility]  [address], [address], [address] [place] [address], [address] [place] [address] Avenue  [phone]    Cardiology Follow Up  Bee Ward  1945  [id number]          Assessment/Plan:  1  Palpitations  - resolved  Not currently on any therapy  Previous monitor showed rare episodes of SVT which did not correspond to her palpitations  3  Mixed hyperlipidemia  - patient could not tolerate Crestor 5 mg daily  She is tolerating pravastatin 10 mg daily    - LDL cholesterol was 122 mg/dL on last check  Improved from 165 mg/dL prior to initiating pravastatin  - She should call if any change in nature of palpitations, syncope or near syncope develop  - discussed diet and exercise  - Follow up in 1 year  Interval History:     Bee Ward is 68 y o  female here for follow up of palpitations and dyslipidemia  Since her last visit, she was started on pravastatin after LDL was elevated  In the past, she could not tolerate Rosuvastatin 5 mg daily  She is tolerating pravastatin  Her last LDL cholesterol was 122  LDL was 165 mg/dL prior to starting statin  She denies any significant episodes of palpitations, chest pain or shortness of breath  Previously, she was having palpitations frequently which she described as a skipping and racing heartbeat that would occur at various times throughout the day  A 2 week monitor was done which showed rare episodes of SVT which did not correspond to her palpitations  Echocardiogram previously showed structurally normal heart with normal ejection fraction  She uses furosemide daily because of lower extremity edema  If she does not use Lasix, edema returns      In July 2019 she had undergone echocardiogram and pulmonary function testing to her evaluation of dyspnea and cough  Cardiac workup was normal and pulmonary function testing was also normal   She was given Tagamet and Allegra which improved her cough but she stopped the medications           Past Medical History:   Diagnosis Date   • Anesthesia complication     "shivers"   • Arthritis    • Asthma    • 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 see colors, with over-use goes darker- from untreated amblyopia   • Breathing difficulty     with extubation pt developed laryngeal spasm   • Cataract     both eyes- right eye needs surgery   • Colon polyps 2016   • Diverticulitis     had episode in April 2018   • Edema     lower legs   • Generalized osteoarthritis    • GERD (gastroesophageal reflux disease)    • Hearing problem     unspecified laterality-hearing aid right ear   • High cholesterol    • History of transfusion     autologus   • Incontinence of urine    • Polyarthritis     last assessed: 6/26/2015   • Rheumatoid arthritis (HCC)    • Sjogren's syndrome ([facility] [place] [facility] )     last assessed: 7/18/2016   • SS-A antibody positive     last assessed: 8/20/2015   • Urinary tract infection    • UTI (urinary tract infection)      Social History     Socioeconomic History   • Marital status: /Civil Union     Spouse name: Not on file   • Number of children: Not on file   • Years of education: Not on file   • Highest education level: Not on file   Occupational History   • Occupation: retired   Tobacco Use   • Smoking status: Never   • Smokeless tobacco: Never   Vaping Use   • Vaping Use: Never used   Substance and Sexual Activity   • Alcohol use: Not Currently   • Drug use: No   • Sexual activity: Not Currently   Other Topics Concern   • Not on file   Social History Narrative    Lack of adequate sleep-4-6 hrs/night    Lack of exercise     Social Determinants of Health     Financial Resource Strain: Not on file   Food Insecurity: Not on file   Transportation Needs: Not on file   Physical Activity: Not on file   Stress: Not on file   Social Connections: Not on file   Intimate Partner Violence: Not on file   Housing Stability: Not on file      Family History   Problem Relation Age of Onset   • Coronary artery disease Mother    • COPD Mother    • Hypertension Mother    • Hyperlipidemia Mother         high cholesterol   • Atrial fibrillation Mother    • Coronary artery disease Father    • Arthritis Father    • COPD Father         Black lung   • Hypertension Father    • Hyperlipidemia Father         high cholesterol   • Lung cancer Brother    • Coronary artery disease Brother    • Hypertension Brother    • Hyperlipidemia Brother         high cholesterol   • Atrial fibrillation Brother         fib/flutter   • Other Sister         pericarditis   • Arthritis Sister    • Diabetes Maternal Grandmother         mellitus   • Breast cancer Maternal Grandmother 79   • Breast cancer Paternal Grandmother 79   • Arthritis Family    • Asthma Family    • Cancer Brother         lung   • Heart disease Brother         CAD-stents-smoker   • No Known Problems Maternal Aunt    • No Known Problems Maternal Aunt    • No Known Problems Maternal Aunt    • No Known Problems Maternal Aunt    • No Known Problems Maternal Aunt    • Breast cancer Other 40     Past Surgical History:   Procedure Laterality Date   • APPENDECTOMY     • AUGMENTATION BREAST Bilateral     breast surgery enlargement procedure elective bilateral   • BILATERAL OOPHORECTOMY Bilateral 2003   • BLADDER SURGERY      bladder lift,cystocele   • CATARACT EXTRACTION      Right 9/2018; Left 10/2018   • COLONOSCOPY  04/18/2011   • COLPORRHAPHY  2014    anterior, repair of cystocele   • HYSTERECTOMY  1673    with cystocele    • JOINT REPLACEMENT Left 2016    knee   • KNEE ARTHROPLASTY Left    • KNEE ARTHROSCOPY  2009   • KNEE SURGERY Left 2009    torn meniscus   • OOPHORECTOMY     • MN COLONOSCOPY FLX DX W/COLLJ SPEC WHEN PFRMD N/A 5/31/2018    Procedure: COLONOSCOPY;  Surgeon: Yaquelin Sánchez MD; Location: [place] [address] GI LAB;   Service: Gastroenterology   • REDUCTION MAMMAPLASTY  2000   • SACRAL NERVE STIMULATOR PLACEMENT N/A 1/31/2022    Procedure: IMPLANTATION NEUROSTIMULATOR ELECTRODE ARRAY SACRAL NERVE; INSERTION NEUROSTIMULATOR; FLUORO; ELECTONIC ANALYSIS;  Surgeon: Chuyita Bueno MD;  Location: [facility] OR;  Service: UroGynecology          • TONSILLECTOMY AND ADENOIDECTOMY     • URETHRA SURGERY         Current Outpatient Medications:   •  acetaminophen (TYLENOL) 500 mg tablet, Take 500 mg by mouth 3 (three) times a day as needed  , Disp: , Rfl:   •  acetaminophen-codeine (TYLENOL #3) 300-30 mg per tablet, Take 1 tablet by mouth every 4 (four) hours as needed for moderate pain, Disp: 30 tablet, Rfl: 1  •  albuterol (PROVENTIL HFA,VENTOLIN HFA) 90 mcg/act inhaler, Inhale 2 puffs every 6 (six) hours as needed for wheezing or shortness of breath, Disp: 1 Inhaler, Rfl: 5  •  aspirin 325 mg tablet, Take 325 mg by mouth daily Patient takes 2 daily for arthritis, Disp: , Rfl:   •  Cranberry 450 MG CAPS, daily, Disp: , Rfl:   •  cyanocobalamin (VITAMIN B-12) 1000 MCG tablet, Take 1,000 mcg by mouth daily, Disp: , Rfl:   •  docusate sodium (COLACE) 100 mg capsule, Take 100 mg by mouth 2 (two) times a day, Disp: , Rfl:   •  estradiol (ESTRACE VAGINAL) 0 1 mg/g vaginal cream, Insert 1 g into the vagina 3 (three) times a week, Disp: 42 5 g, Rfl: 6  •  fluticasone (Flovent HFA) 220 mcg/act inhaler, Inhale 2 puffs 2 (two) times a day Rinse mouth after use, Disp: 36 g, Rfl: 3  •  furosemide (LASIX) 40 mg tablet, TAKE 1 TABLET BY MOUTH EVERY DAY IN THE MORNING, Disp: 90 tablet, Rfl: 1  •  Magnesium Oxide -Mg Supplement 400 MG CAPS, Take 1 capsule by mouth every morning  , Disp: , Rfl:   •  methenamine hippurate (HIPREX) 1 g tablet, Take 1 tablet (1 g total) by mouth 2 (two) times a day with meals, Disp: 60 tablet, Rfl: 4  •  omeprazole (PriLOSEC OTC) 20 MG tablet, every 24 hours, Disp: , Rfl:   •  potassium chloride (Klor-Con) 10 mEq tablet, TAKE 1 TABLET (10 MEQ TOTAL) BY MOUTH IN THE MORNING, Disp: 90 tablet, Rfl: 1  •  pravastatin (PRAVACHOL) 10 mg tablet, TAKE 1 TABLET BY MOUTH EVERY DAY, Disp: 90 tablet, Rfl: 3  •  saccharomyces boulardii (FLORASTOR) 250 mg capsule, Take 1 capsule (250 mg total) by mouth in the morning, Disp: 30 capsule, Rfl: 6  •  senna (SENOKOT) 8 6 MG tablet, Take 1 tablet by mouth as needed for constipation, Disp: , Rfl:         Review of Systems:  Review of Systems   Constitutional: Positive for fatigue  Respiratory: Negative for shortness of breath  Cardiovascular: Negative for chest pain, palpitations and leg swelling  Musculoskeletal: Positive for arthralgias  All other systems reviewed and are negative  Physical Exam:  Vitals:  Vitals:    02/09/23 0952   BP: 134/80   BP Location: Left arm   Patient Position: Sitting   Cuff Size: Standard   Pulse: 72   SpO2: 97%   Weight: 91 2 kg (201 lb)   Height: 5' 6" (1 676 m)     Physical Exam   Constitutional: She appears healthy  No distress  Eyes: Pupils are equal, round, and reactive to light  Conjunctivae are normal    Neck: No JVD present  Cardiovascular: Normal rate, regular rhythm and normal heart sounds  Exam reveals no gallop and no friction rub  No murmur heard  Pulmonary/Chest: Effort normal and breath sounds normal  She has no wheezes  She has no rales  Musculoskeletal:         General: No tenderness, deformity or edema  Cervical back: Normal range of motion and neck supple  Neurological: She is alert and oriented to person, place, and time  Skin: Skin is warm and dry  Cardiographics:  EKG: Personally reviewed normal sinus rhythm with rate 80 beats per minute  Normal intervals and normal axis  Last known EF:  55 percent    This note was completed in part utilizing [employer] Direct Software    Grammatical errors, random word insertions, spelling mistakes, and incomplete sentences can be an occasional consequence of this system secondary to software limitations, ambient noise, and hardware issues  If you have any questions or concerns about the content, text, or information contained within the body of this dictation, please contact the provider for clarification

## 2023-02-19 DIAGNOSIS — N39.0 RECURRENT UTI: ICD-10-CM

## 2023-02-20 RX ORDER — METHENAMINE HIPPURATE 1000 MG/1
TABLET ORAL
Qty: 60 TABLET | Refills: 4 | Status: SHIPPED | OUTPATIENT
Start: 2023-02-20

## 2023-03-16 ENCOUNTER — OFFICE VISIT (OUTPATIENT)
Dept: DERMATOLOGY | Age: 78
End: 2023-03-16

## 2023-03-16 VITALS — WEIGHT: 197 LBS | TEMPERATURE: 98.1 F | BODY MASS INDEX: 31.66 KG/M2 | HEIGHT: 66 IN

## 2023-03-16 DIAGNOSIS — D22.70 MULTIPLE BENIGN MELANOCYTIC NEVI OF UPPER AND LOWER EXTREMITIES AND TRUNK: Primary | ICD-10-CM

## 2023-03-16 DIAGNOSIS — D22.60 MULTIPLE BENIGN MELANOCYTIC NEVI OF UPPER AND LOWER EXTREMITIES AND TRUNK: Primary | ICD-10-CM

## 2023-03-16 DIAGNOSIS — L81.4 SOLAR LENTIGO: ICD-10-CM

## 2023-03-16 DIAGNOSIS — D18.01 CHERRY ANGIOMA: ICD-10-CM

## 2023-03-16 DIAGNOSIS — D22.5 MULTIPLE BENIGN MELANOCYTIC NEVI OF UPPER AND LOWER EXTREMITIES AND TRUNK: Primary | ICD-10-CM

## 2023-03-16 DIAGNOSIS — L82.1 SEBORRHEIC KERATOSIS: ICD-10-CM

## 2023-03-16 NOTE — PROGRESS NOTES
[facility] Note     Patient Name: Roxy Apoorva  Encounter Date: 03/16/2023     Have you been cared for by a [facility] Dermatologist in the last 3 years and, if so, which description applies to you? Yes  I have been here within the last 3 years, and my medical history has NOT changed since that time  I am FEMALE/of child-bearing potential     REVIEW OF SYSTEMS:  Have you recently had or currently have any of the following? · No changes in my recent health  PAST MEDICAL HISTORY:  Have you personally ever had or currently have any of the following? If "YES," then please provide more detail  · No changes in my medical history  FAMILY HISTORY:  Any "first degree relatives" (parent, brother, sister, or child) with the following? • No changes in my family's known health  PATIENT EXPERIENCE:    • Do you want the Dermatologist to perform a COMPLETE skin exam today including a clinical examination under the "bra and underwear" areas? Yes  • If necessary, do we have your permission to call and leave a detailed message on your Preferred Phone number that includes your specific medical information? Yes      Allergies   Allergen Reactions   • Celecoxib Other (See Comments)     Other reaction(s): Palpitations  Category:  Adverse Reaction;       Current Outpatient Medications:   •  acetaminophen (TYLENOL) 500 mg tablet, Take 500 mg by mouth 3 (three) times a day as needed  , Disp: , Rfl:   •  acetaminophen-codeine (TYLENOL #3) 300-30 mg per tablet, Take 1 tablet by mouth every 4 (four) hours as needed for moderate pain, Disp: 30 tablet, Rfl: 1  •  albuterol (PROVENTIL HFA,VENTOLIN HFA) 90 mcg/act inhaler, Inhale 2 puffs every 6 (six) hours as needed for wheezing or shortness of breath, Disp: 1 Inhaler, Rfl: 5  •  aspirin 325 mg tablet, Take 325 mg by mouth daily Patient takes 2 daily for arthritis, Disp: , Rfl:   •  Cranberry 450 MG CAPS, daily, Disp: , Rfl:   •  cyanocobalamin (VITAMIN B-12) 1000 MCG tablet, Take 1,000 mcg by mouth daily, Disp: , Rfl:   •  docusate sodium (COLACE) 100 mg capsule, Take 100 mg by mouth 2 (two) times a day, Disp: , Rfl:   •  estradiol (ESTRACE VAGINAL) 0 1 mg/g vaginal cream, Insert 1 g into the vagina 3 (three) times a week, Disp: 42 5 g, Rfl: 6  •  fluticasone (Flovent HFA) 220 mcg/act inhaler, Inhale 2 puffs 2 (two) times a day Rinse mouth after use, Disp: 36 g, Rfl: 3  •  furosemide (LASIX) 40 mg tablet, TAKE 1 TABLET BY MOUTH EVERY DAY IN THE MORNING, Disp: 90 tablet, Rfl: 1  •  Magnesium Oxide -Mg Supplement 400 MG CAPS, Take 1 capsule by mouth every morning  , Disp: , Rfl:   •  methenamine hippurate (HIPREX) 1 g tablet, TAKE 1 TABLET BY MOUTH 2 TIMES A DAY WITH MEALS , Disp: 60 tablet, Rfl: 4  •  omeprazole (PriLOSEC OTC) 20 MG tablet, every 24 hours, Disp: , Rfl:   •  potassium chloride (Klor-Con) 10 mEq tablet, TAKE 1 TABLET (10 MEQ TOTAL) BY MOUTH IN THE MORNING, Disp: 90 tablet, Rfl: 1  •  pravastatin (PRAVACHOL) 10 mg tablet, TAKE 1 TABLET BY MOUTH EVERY DAY, Disp: 90 tablet, Rfl: 3  •  saccharomyces boulardii (FLORASTOR) 250 mg capsule, Take 1 capsule (250 mg total) by mouth in the morning, Disp: 30 capsule, Rfl: 6  •  senna (SENOKOT) 8 6 MG tablet, Take 1 tablet by mouth as needed for constipation, Disp: , Rfl:           • Whom besides the patient is providing clinical information about today's encounter?   o NO ADDITIONAL HISTORIAN (patient alone provided history)    Physical Exam and Assessment/Plan by Diagnosis:    HISTORY OF BASAL CELL CARCINOMA    Physical Exam:  • Anatomic Location Affected:  Right paraspinal   • Morphological Description of scar:  Well healed   • Suspected Recurrence: No  • Pertinent Positives:  • Pertinent Negatives: Additional History of Present Condition:  History of basal cell carcinoma with no sign of recurrence   Excised by Dr Eva Campos 11/30/22     Assessment and Plan:  Based on a thorough discussion of this condition and the management approach to it (including a comprehensive discussion of the known risks, side effects and potential benefits of treatment), the patient (family) agrees to implement the following specific plan:  •  monitor for changes   • Routine skin exam recommended yearly     How can basal cell carcinoma be prevented? The most important way to prevent BCC is to avoid sunburn  This is especially important in childhood and early life  Fair skinned individuals and those with a personal or family history of BCC should protect their skin from sun exposure daily, year-round and lifelong  • Stay indoors or under the shade in the middle of the day   • Wear covering clothing   • Apply high protection factor SPF50+ broad-spectrum sunscreens generously to exposed skin if outdoors   • Avoid indoor tanning (sun beds, solaria)  • Oral nicotinamide (vitamin B3) in a dose of 500 mg twice daily may reduce the number and severity of BCCs  What is the outlook for basal cell carcinoma? Most BCCs are cured by treatment  Cure is most likely if treatment is undertaken when the lesion is small  About 50% of people with BCC develop a second one within 3 years of the first  They are also at increased risk of other skin cancers, especially melanoma  Regular self-skin examinations and long-term annual skin checks by an experienced health professional are recommended  MELANOCYTIC NEVI ("Moles")    Physical Exam:  • Anatomic Location Affected:   Mostly on sun-exposed areas of the trunk and extremities  • Morphological Description:  Scattered, 1-4mm round to ovoid, symmetrical-appearing, even bordered, skin colored to dark brown macules/papules, mostly in sun-exposed areas  • Pertinent Positives:  • Pertinent Negatives:     Additional History of Present Condition:  Here for skin exam     Assessment and Plan:  Based on a thorough discussion of this condition and the management approach to it (including a comprehensive discussion of the known risks, side effects and potential benefits of treatment), the patient (family) agrees to implement the following specific plan:  • When outside we recommend using a wide brim hat, sunglasses, long sleeve and pants, sunscreen with SPF 71+ with reapplication every 2 hours, or SPF specific clothing  • Monitor for changes    Melanocytic Nevi  Melanocytic nevi ("moles") are tan or brown, raised or flat areas of the skin which have an increased number of melanocytes  Melanocytes are the cells in our body which make pigment and account for skin color  Some moles are present at birth (I e , "congenital nevi"), while others come up later in life (i e , "acquired nevi")  The sun can stimulate the body to make more moles  Sunburns are not the only thing that triggers more moles  Chronic sun exposure can do it too  Clinically distinguishing a healthy mole from melanoma may be difficult, even for experienced dermatologists  The "ABCDE's" of moles have been suggested as a means of helping to alert a person to a suspicious mole and the possible increased risk of melanoma  The suggestions for raising alert are as follows:    Asymmetry: Healthy moles tend to be symmetric, while melanomas are often asymmetric  Asymmetry means if you draw a line through the mole, the two halves do not match in color, size, shape, or surface texture  Asymmetry can be a result of rapid enlargement of a mole, the development of a raised area on a previously flat lesion, scaling, ulceration, bleeding or scabbing within the mole  Any mole that starts to demonstrate "asymmetry" should be examined promptly by a board certified dermatologist      Border: Healthy moles tend to have discrete, even borders  The border of a melanoma often blends into the normal skin and does not sharply delineate the mole from normal skin    Any mole that starts to demonstrate "uneven borders" should be examined promptly by a board certified dermatologist  Color: Healthy moles tend to be one color throughout  Melanomas tend to be made up of different colors ranging from dark black, blue, white, or red  Any mole that demonstrates a color change should be examined promptly by a board certified dermatologist      Diameter: Healthy moles tend to be smaller than 0 6 cm in size; an exception are "congenital nevi" that can be larger  Melanomas tend to grow and can often be greater than 0 6 cm (1/4 of an inch, or the size of a pencil eraser)  This is only a guideline, and many normal moles may be larger than 0 6 cm without being unhealthy  Any mole that starts to change in size (small to bigger or bigger to smaller) should be examined promptly by a board certified dermatologist      Evolving: Healthy moles tend to "stay the same "  Melanomas may often show signs of change or evolution such as a change in size, shape, color, or elevation  Any mole that starts to itch, bleed, crust, burn, hurt, or ulcerate or demonstrate a change or evolution should be examined promptly by a board certified dermatologist         LENTIGO    Physical Exam:  • Anatomic Location Affected:  Trunk   • Morphological Description:  Light brown macules  • Pertinent Positives:  • Pertinent Negatives: Additional History of Present Condition:      Assessment and Plan:  Based on a thorough discussion of this condition and the management approach to it (including a comprehensive discussion of the known risks, side effects and potential benefits of treatment), the patient (family) agrees to implement the following specific plan:  • When outside we recommend using a wide brim hat, sunglasses, long sleeve and pants, sunscreen with SPF 72+ with reapplication every 2 hours, or SPF specific clothing       What is a lentigo? A lentigo is a pigmented flat or slightly raised lesion with a clearly defined edge  Unlike an ephelis (freckle), it does not fade in the winter months   There are several kinds of lentigo  The name lentigo originally referred to its appearance resembling a small lentil  The plural of lentigo is lentigines, although “lentigos” is also in common use  Who gets lentigines? Lentigines can affect males and females of all ages and races  Solar lentigines are especially prevalent in fair skinned adults  Lentigines associated with syndromes are present at birth or arise during childhood  What causes lentigines? Common forms of lentigo are due to exposure to ultraviolet radiation:  • Sun damage including sunburn   • Indoor tanning   • Phototherapy, especially photochemotherapy (PUVA)    Ionizing radiation, eg radiation therapy, can also cause lentigines  Several familial syndromes associated with widespread lentigines originate from mutations in Jim-MAP kinase, mTOR signaling and PTEN pathways  What is the treatment for lentigines? Most lentigines are left alone  Attempts to lighten them may not be successful  The following approaches are used:  • SPF 50+ broad-spectrum sunscreen   • Hydroquinone bleaching cream   • Alpha hydroxy acids   • Vitamin C   • Retinoids   • Azelaic acid   • Chemical peels  Individual lesions can be permanently removed using:  • Cryotherapy   • Intense pulsed light   • Pigment lasers    How can lentigines be prevented? Lentigines associated with exposure ultraviolet radiation can be prevented by very careful sun protection  Clothing is more successful at preventing new lentigines than are sunscreens  What is the outlook for lentigines? Lentigines usually persist  They may increase in number with age and sun exposure  Some in sun-protected sites may fade and disappear  BUTT ANGIOMAS    Physical Exam:  • Anatomic Location Affected:  trunk  • Morphological Description:  Scattered cherry red, 1-4 mm papules  • Pertinent Positives:  • Pertinent Negatives:     Additional History of Present Condition:      Assessment and Plan:  Based on a thorough discussion of this condition and the management approach to it (including a comprehensive discussion of the known risks, side effects and potential benefits of treatment), the patient (family) agrees to implement the following specific plan:  • Reassured benign   •     Assessment and Plan:    Cherry angioma, also known as [place] spots, are benign vascular skin lesions  A "cherry angioma" is a firm red, blue or purple papule, 0 1-1 cm in diameter  When thrombosed, they can appear black in colour until evaluated with a dermatoscope when the red or purple colour is more easily seen  Cherry angioma may develop on any part of the body but most often appear on the scalp, face, lips and trunk  An angioma is due to proliferating endothelial cells; these are the cells that line the inside of a blood vessel  Angiomas can arise in early life or later in life; the most common type of angioma is a cherry angioma  Cherry angiomas are very common in males and females of any age or race  They are more noticeable in white skin than in skin of colour  They markedly increase in number from about the age of 36  There may be a family history of similar lesions  Eruptive cherry angiomas have been rarely reported to be associated with internal malignancy  The cause of angiomas is unknown  Genetic analysis of cherry angiomas has shown that they frequently carry specific somatic missense mutations in the GNAQ and GNA11 (Q209H) genes, which are involved in other vascular and melanocytic proliferations  SEBORRHEIC KERATOSIS; NON-INFLAMED    Physical Exam:  • Anatomic Location Affected:  trunk  • Morphological Description:  Flat and raised, waxy, smooth to warty textured, yellow to brownish-grey to dark brown to blackish, discrete, "stuck-on" appearing papules  • Pertinent Positives:  • Pertinent Negatives:     Additional History of Present Condition:      Assessment and Plan:  Based on a thorough discussion of this condition and the management approach to it (including a comprehensive discussion of the known risks, side effects and potential benefits of treatment), the patient (family) agrees to implement the following specific plan:  • Reassured benign     Seborrheic Keratosis  A seborrheic keratosis is a harmless warty spot that appears during adult life as a common sign of skin aging  Seborrheic keratoses can arise on any area of skin, covered or uncovered, with the usual exception of the palms and soles  They do not arise from mucous membranes  Seborrheic keratoses can have highly variable appearance  Seborrheic keratoses are extremely common  It has been estimated that over 90% of adults over the age of 61 years have one or more of them  They occur in males and females of all races, typically beginning to erupt in the 35s or 45s  They are uncommon under the age of 21 years  The precise cause of seborrhoeic keratoses is not known  Seborrhoeic keratoses are considered degenerative in nature  As time goes by, seborrheic keratoses tend to become more numerous  Some people inherit a tendency to develop a very large number of them; some people may have hundreds of them      Scribe Attestation    I,:  Renato Johnson am acting as a scribe while in the presence of the attending physician :       I,:  Riki Gee MD personally performed the services described in this documentation    as scribed in my presence :

## 2023-03-16 NOTE — PATIENT INSTRUCTIONS
HISTORY OF BASAL CELL CARCINOMA    Assessment and Plan:  Based on a thorough discussion of this condition and the management approach to it (including a comprehensive discussion of the known risks, side effects and potential benefits of treatment), the patient (family) agrees to implement the following specific plan:   monitor for changes   Routine skin exam recommended yearly     How can basal cell carcinoma be prevented? The most important way to prevent BCC is to avoid sunburn  This is especially important in childhood and early life  Fair skinned individuals and those with a personal or family history of BCC should protect their skin from sun exposure daily, year-round and lifelong  Stay indoors or under the shade in the middle of the day   Wear covering clothing   Apply high protection factor SPF50+ broad-spectrum sunscreens generously to exposed skin if outdoors   Avoid indoor tanning (sun beds, solaria)  Oral nicotinamide (vitamin B3) in a dose of 500 mg twice daily may reduce the number and severity of BCCs  What is the outlook for basal cell carcinoma? Most BCCs are cured by treatment  Cure is most likely if treatment is undertaken when the lesion is small  About 50% of people with BCC develop a second one within 3 years of the first  They are also at increased risk of other skin cancers, especially melanoma  Regular self-skin examinations and long-term annual skin checks by an experienced health professional are recommended        MELANOCYTIC NEVI ("Moles")    Assessment and Plan:  Based on a thorough discussion of this condition and the management approach to it (including a comprehensive discussion of the known risks, side effects and potential benefits of treatment), the patient (family) agrees to implement the following specific plan:  When outside we recommend using a wide brim hat, sunglasses, long sleeve and pants, sunscreen with SPF 12+ with reapplication every 2 hours, or SPF specific clothing  Monitor for changes    Melanocytic Nevi  Melanocytic nevi ("moles") are tan or brown, raised or flat areas of the skin which have an increased number of melanocytes  Melanocytes are the cells in our body which make pigment and account for skin color  Some moles are present at birth (I e , "congenital nevi"), while others come up later in life (i e , "acquired nevi")  The sun can stimulate the body to make more moles  Sunburns are not the only thing that triggers more moles  Chronic sun exposure can do it too  Clinically distinguishing a healthy mole from melanoma may be difficult, even for experienced dermatologists  The "ABCDE's" of moles have been suggested as a means of helping to alert a person to a suspicious mole and the possible increased risk of melanoma  The suggestions for raising alert are as follows:    Asymmetry: Healthy moles tend to be symmetric, while melanomas are often asymmetric  Asymmetry means if you draw a line through the mole, the two halves do not match in color, size, shape, or surface texture  Asymmetry can be a result of rapid enlargement of a mole, the development of a raised area on a previously flat lesion, scaling, ulceration, bleeding or scabbing within the mole  Any mole that starts to demonstrate "asymmetry" should be examined promptly by a board certified dermatologist      Border: Healthy moles tend to have discrete, even borders  The border of a melanoma often blends into the normal skin and does not sharply delineate the mole from normal skin  Any mole that starts to demonstrate "uneven borders" should be examined promptly by a board certified dermatologist      Color: Healthy moles tend to be one color throughout  Melanomas tend to be made up of different colors ranging from dark black, blue, white, or red    Any mole that demonstrates a color change should be examined promptly by a board certified dermatologist      Diameter: Healthy moles tend to be smaller than 0 6 cm in size; an exception are "congenital nevi" that can be larger  Melanomas tend to grow and can often be greater than 0 6 cm (1/4 of an inch, or the size of a pencil eraser)  This is only a guideline, and many normal moles may be larger than 0 6 cm without being unhealthy  Any mole that starts to change in size (small to bigger or bigger to smaller) should be examined promptly by a board certified dermatologist      Evolving: Healthy moles tend to "stay the same "  Melanomas may often show signs of change or evolution such as a change in size, shape, color, or elevation  Any mole that starts to itch, bleed, crust, burn, hurt, or ulcerate or demonstrate a change or evolution should be examined promptly by a board certified dermatologist       Dion Mustafa and Plan:  Based on a thorough discussion of this condition and the management approach to it (including a comprehensive discussion of the known risks, side effects and potential benefits of treatment), the patient (family) agrees to implement the following specific plan:  When outside we recommend using a wide brim hat, sunglasses, long sleeve and pants, sunscreen with SPF 97+ with reapplication every 2 hours, or SPF specific clothing       What is a lentigo? A lentigo is a pigmented flat or slightly raised lesion with a clearly defined edge  Unlike an ephelis (freckle), it does not fade in the winter months  There are several kinds of lentigo  The name lentigo originally referred to its appearance resembling a small lentil  The plural of lentigo is lentigines, although “lentigos” is also in common use  Who gets lentigines? Lentigines can affect males and females of all ages and races  Solar lentigines are especially prevalent in fair skinned adults  Lentigines associated with syndromes are present at birth or arise during childhood      BUTT ANGIOMAS  Assessment and Plan:  Based on a thorough discussion of this condition and the management approach to it (including a comprehensive discussion of the known risks, side effects and potential benefits of treatment), the patient (family) agrees to implement the following specific plan:  Reassured benign       Assessment and Plan:    Cherry angioma, also known as [place] spots, are benign vascular skin lesions  A "cherry angioma" is a firm red, blue or purple papule, 0 1-1 cm in diameter  When thrombosed, they can appear black in colour until evaluated with a dermatoscope when the red or purple colour is more easily seen  Cherry angioma may develop on any part of the body but most often appear on the scalp, face, lips and trunk  An angioma is due to proliferating endothelial cells; these are the cells that line the inside of a blood vessel  Angiomas can arise in early life or later in life; the most common type of angioma is a cherry angioma  Cherry angiomas are very common in males and females of any age or race  They are more noticeable in white skin than in skin of colour  They markedly increase in number from about the age of 36  There may be a family history of similar lesions  Eruptive cherry angiomas have been rarely reported to be associated with internal malignancy  The cause of angiomas is unknown  Genetic analysis of cherry angiomas has shown that they frequently carry specific somatic missense mutations in the GNAQ and GNA11 (Q209H) genes, which are involved in other vascular and melanocytic proliferations      SEBORRHEIC KERATOSIS; NON-INFLAMED    Assessment and Plan:  Based on a thorough discussion of this condition and the management approach to it (including a comprehensive discussion of the known risks, side effects and potential benefits of treatment), the patient (family) agrees to implement the following specific plan:  Reassured benign     Seborrheic Keratosis  A seborrheic keratosis is a harmless warty spot that appears during adult life as a common sign of skin aging  Seborrheic keratoses can arise on any area of skin, covered or uncovered, with the usual exception of the palms and soles  They do not arise from mucous membranes  Seborrheic keratoses can have highly variable appearance  Seborrheic keratoses are extremely common  It has been estimated that over 90% of adults over the age of 61 years have one or more of them  They occur in males and females of all races, typically beginning to erupt in the 35s or 45s  They are uncommon under the age of 21 years  The precise cause of seborrhoeic keratoses is not known  Seborrhoeic keratoses are considered degenerative in nature  As time goes by, seborrheic keratoses tend to become more numerous  Some people inherit a tendency to develop a very large number of them; some people may have hundreds of them

## 2023-03-17 NOTE — PRE-PROCEDURE INSTRUCTIONS
Pre-Surgery Instructions:   Medication Instructions    acetaminophen (TYLENOL) 500 mg tablet Instructed patient per Anesthesia Guidelines  Take morning of surgery with sip water if needed    acetaminophen-codeine (TYLENOL #3) 300-30 mg per tablet Instructed patient per Anesthesia Guidelines  Take morning of surgery with sip water if needed    albuterol (PROVENTIL HFA,VENTOLIN HFA) 90 mcg/act inhaler Instructed patient per Anesthesia Guidelines  Take morning of surgery if needed    Cholecalciferol (VITAMIN D3) 2000 units capsule Instructed patient per Anesthesia Guidelines  Stop 1/24    cyanocobalamin (VITAMIN B-12) 1000 MCG tablet Instructed patient per Anesthesia Guidelines  Stop 1/24    docusate sodium (COLACE) 100 mg capsule Instructed patient per Anesthesia Guidelines  Take morning of surgery with sip water     fluticasone (Flovent HFA) 220 mcg/act inhaler Instructed patient per Anesthesia Guidelines  Take morning of surgery    furosemide (LASIX) 40 mg tablet Instructed patient per Anesthesia Guidelines  Do not take morning of surgery    Magnesium Oxide -Mg Supplement 400 MG CAPS Instructed patient per Anesthesia Guidelines  Stop 1/24    nitrofurantoin (MACRODANTIN) 50 mg capsule do not take morning of surgery    omeprazole (PriLOSEC) 20 mg delayed release capsule take morning of surgery with sip water    potassium chloride (Klor-Con) 10 mEq tablet do not take morning of surgery    pravastatin (PRAVACHOL) 10 mg tablet Instructed patient per Anesthesia Guidelines  Take morning of surgery with sip water     senna (SENOKOT) 8 6 MG tablet Instructed patient per Anesthesia Guidelines  Take morning of surgery with sip water if needed   Covid screening negative as per patient  Fully vaccinated  Reviewed pre op medicine and showering instructions with patient via phone call, verbalizes understanding  Advised patient to stop taking non prescribed vitamins, herbal meds and ASA 7 days pre op  Advised to stop taking NSAID's 3 days pre op but may take Tylenol products if needed         Advised NPO after MN (1/30) and surgical services will call (1/28) with scheduled time of hospital arrival  No

## 2023-04-26 ENCOUNTER — APPOINTMENT (OUTPATIENT)
Dept: LAB | Facility: CLINIC | Age: 78
End: 2023-04-26

## 2023-04-26 DIAGNOSIS — Z13.6 SCREENING FOR CARDIOVASCULAR CONDITION: ICD-10-CM

## 2023-04-26 DIAGNOSIS — R73.01 IMPAIRED FASTING GLUCOSE: ICD-10-CM

## 2023-04-26 DIAGNOSIS — Z13.0 SCREENING FOR DEFICIENCY ANEMIA: ICD-10-CM

## 2023-04-27 LAB
ALBUMIN SERPL BCP-MCNC: 3.9 G/DL (ref 3.5–5)
ALP SERPL-CCNC: 79 U/L (ref 46–116)
ALT SERPL W P-5'-P-CCNC: 26 U/L (ref 12–78)
ANION GAP SERPL CALCULATED.3IONS-SCNC: 1 MMOL/L (ref 4–13)
AST SERPL W P-5'-P-CCNC: 20 U/L (ref 5–45)
BILIRUB SERPL-MCNC: 0.84 MG/DL (ref 0.2–1)
BUN SERPL-MCNC: 24 MG/DL (ref 5–25)
CALCIUM SERPL-MCNC: 9.4 MG/DL (ref 8.3–10.1)
CHLORIDE SERPL-SCNC: 108 MMOL/L (ref 96–108)
CHOLEST SERPL-MCNC: 197 MG/DL
CO2 SERPL-SCNC: 29 MMOL/L (ref 21–32)
CREAT SERPL-MCNC: 1.02 MG/DL (ref 0.6–1.3)
ERYTHROCYTE [DISTWIDTH] IN BLOOD BY AUTOMATED COUNT: 13.7 % (ref 11.6–15.1)
GFR SERPL CREATININE-BSD FRML MDRD: 53 ML/MIN/1.73SQ M
GLUCOSE P FAST SERPL-MCNC: 100 MG/DL (ref 65–99)
HCT VFR BLD AUTO: 41.1 % (ref 34.8–46.1)
HDLC SERPL-MCNC: 48 MG/DL
HGB BLD-MCNC: 13.7 G/DL (ref 11.5–15.4)
LDLC SERPL CALC-MCNC: 118 MG/DL (ref 0–100)
MCH RBC QN AUTO: 29.5 PG (ref 26.8–34.3)
MCHC RBC AUTO-ENTMCNC: 33.3 G/DL (ref 31.4–37.4)
MCV RBC AUTO: 89 FL (ref 82–98)
PLATELET # BLD AUTO: 251 THOUSANDS/UL (ref 149–390)
PMV BLD AUTO: 10.9 FL (ref 8.9–12.7)
POTASSIUM SERPL-SCNC: 4.5 MMOL/L (ref 3.5–5.3)
PROT SERPL-MCNC: 7.1 G/DL (ref 6.4–8.4)
RBC # BLD AUTO: 4.64 MILLION/UL (ref 3.81–5.12)
SODIUM SERPL-SCNC: 138 MMOL/L (ref 135–147)
TRIGL SERPL-MCNC: 153 MG/DL
WBC # BLD AUTO: 5.06 THOUSAND/UL (ref 4.31–10.16)

## 2023-04-28 ENCOUNTER — OFFICE VISIT (OUTPATIENT)
Dept: UROLOGY | Facility: CLINIC | Age: 78
End: 2023-04-28

## 2023-04-28 VITALS
HEART RATE: 84 BPM | SYSTOLIC BLOOD PRESSURE: 120 MMHG | WEIGHT: 196 LBS | BODY MASS INDEX: 31.5 KG/M2 | HEIGHT: 66 IN | DIASTOLIC BLOOD PRESSURE: 80 MMHG | OXYGEN SATURATION: 96 %

## 2023-04-28 DIAGNOSIS — N39.0 RECURRENT UTI: Primary | ICD-10-CM

## 2023-04-28 LAB
POST-VOID RESIDUAL VOLUME, ML POC: 213 ML
SL AMB  POCT GLUCOSE, UA: NORMAL
SL AMB LEUKOCYTE ESTERASE,UA: NORMAL
SL AMB POCT BILIRUBIN,UA: NORMAL
SL AMB POCT BLOOD,UA: NORMAL
SL AMB POCT CLARITY,UA: CLEAR
SL AMB POCT COLOR,UA: YELLOW
SL AMB POCT KETONES,UA: NORMAL
SL AMB POCT NITRITE,UA: NORMAL
SL AMB POCT PH,UA: 6
SL AMB POCT SPECIFIC GRAVITY,UA: 1005
SL AMB POCT URINE PROTEIN: NORMAL
SL AMB POCT UROBILINOGEN: NORMAL

## 2023-04-28 RX ORDER — NITROFURANTOIN 25; 75 MG/1; MG/1
100 CAPSULE ORAL 2 TIMES DAILY
Qty: 10 CAPSULE | Refills: 3 | Status: SHIPPED | OUTPATIENT
Start: 2023-04-28 | End: 2023-05-03

## 2023-04-28 NOTE — PROGRESS NOTES
1  Recurrent UTI  POCT urine dip    POCT Measure PVR    Urine culture    nitrofurantoin (MACROBID) 100 mg capsule    Urine culture        Assessment and plan:     1  Recurrent UTIs  2  Incomplete bladder emptying  3  Atrophic vaginitis  - standing urine culture + self start nitrofurantoin  - premarin  - hiprex + vitamin C  - renal US normal  - if ongoing infections, then restart CIC daily    [id number]      Chief Complaint     Recurrent UTI    History of Present Illness     Maria Fernanda Cody is a 68 y o  female presenting today for consultation of recurrent urinary tract infections  Urine cultures:  E coli (6/5/2022)  E coli (6/30/2022)  E coli (8/29/2022  E coli (09/21/2022)  E coli (11/14/22)    Has had 3 infections over the past 4 months  Responds well to nitrofurantoin  History of anterior colporrhaphy  Follows with Dr Kareem Boo with urogynecology -underwent recent interstim placement in early 2022  Previously had performed CIC  Has not performed CIC in the past year   kidney and bladder (11/1/22) with moderate postvoid residual of 168mL  No upper tract obstruction  Medical comorbidities include rheumatoid arthritis, GERD, diverticulosis, rosacea, Sjogren's, asthma, cataract  Urine dip leukocyte, nitrite, blood negative  PVR 213mL    Laboratory     Lab Results   Component Value Date    CREATININE 1 02 04/26/2023       Review of Systems     Review of Systems   Constitutional: Negative for activity change, appetite change, chills, diaphoresis, fatigue, fever and unexpected weight change  Respiratory: Negative for chest tightness and shortness of breath  Cardiovascular: Negative for chest pain, palpitations and leg swelling  Gastrointestinal: Negative for abdominal distention, abdominal pain, constipation, diarrhea, nausea and vomiting  Genitourinary: Positive for difficulty urinating   Negative for decreased urine volume, dysuria, enuresis, flank pain, frequency, genital sores, "hematuria and urgency  Musculoskeletal: Negative for back pain, gait problem and myalgias  Skin: Negative for color change, pallor, rash and wound  Psychiatric/Behavioral: Negative for behavioral problems  The patient is not nervous/anxious  Allergies     Allergies   Allergen Reactions   • Celecoxib Other (See Comments)     Other reaction(s): Palpitations  Category: Adverse Reaction;        Physical Exam     Physical Exam  Constitutional:       General: She is not in acute distress  Appearance: Normal appearance  She is normal weight  She is not ill-appearing, toxic-appearing or diaphoretic  HENT:      Head: Normocephalic and atraumatic  Eyes:      General:         Right eye: No discharge  Left eye: No discharge  Conjunctiva/sclera: Conjunctivae normal    Pulmonary:      Effort: Pulmonary effort is normal  No respiratory distress  Genitourinary:     Comments: Vaginal atrophy  Musculoskeletal:         General: No swelling or tenderness  Normal range of motion  Skin:     General: Skin is warm and dry  Coloration: Skin is not jaundiced or pale  Neurological:      General: No focal deficit present  Mental Status: She is alert and oriented to person, place, and time  Psychiatric:         Mood and Affect: Mood normal          Behavior: Behavior normal          Thought Content:  Thought content normal            Vital Signs     Vitals:    04/28/23 0731   BP: 120/80   BP Location: Left arm   Patient Position: Sitting   Cuff Size: Standard   Pulse: 84   SpO2: 96%   Weight: 88 9 kg (196 lb)   Height: 5' 6\" (1 676 m)         Current Medications       Current Outpatient Medications:   •  acetaminophen (TYLENOL) 500 mg tablet, Take 500 mg by mouth 3 (three) times a day as needed  , Disp: , Rfl:   •  acetaminophen-codeine (TYLENOL #3) 300-30 mg per tablet, Take 1 tablet by mouth every 4 (four) hours as needed for moderate pain, Disp: 30 tablet, Rfl: 1  •  albuterol (PROVENTIL " HFA,VENTOLIN HFA) 90 mcg/act inhaler, Inhale 2 puffs every 6 (six) hours as needed for wheezing or shortness of breath, Disp: 1 Inhaler, Rfl: 5  •  aspirin 325 mg tablet, Take 325 mg by mouth daily Patient takes 2 daily for arthritis, Disp: , Rfl:   •  Cranberry 450 MG CAPS, daily, Disp: , Rfl:   •  cyanocobalamin (VITAMIN B-12) 1000 MCG tablet, Take 1,000 mcg by mouth daily, Disp: , Rfl:   •  docusate sodium (COLACE) 100 mg capsule, Take 100 mg by mouth 2 (two) times a day, Disp: , Rfl:   •  estradiol (ESTRACE VAGINAL) 0 1 mg/g vaginal cream, Insert 1 g into the vagina 3 (three) times a week, Disp: 42 5 g, Rfl: 6  •  fluticasone (Flovent HFA) 220 mcg/act inhaler, Inhale 2 puffs 2 (two) times a day Rinse mouth after use, Disp: 36 g, Rfl: 3  •  furosemide (LASIX) 40 mg tablet, TAKE 1 TABLET BY MOUTH EVERY DAY IN THE MORNING, Disp: 90 tablet, Rfl: 1  •  Magnesium Oxide -Mg Supplement 400 MG CAPS, Take 1 capsule by mouth every morning  , Disp: , Rfl:   •  methenamine hippurate (HIPREX) 1 g tablet, TAKE 1 TABLET BY MOUTH 2 TIMES A DAY WITH MEALS , Disp: 60 tablet, Rfl: 4  •  nitrofurantoin (MACROBID) 100 mg capsule, Take 1 capsule (100 mg total) by mouth 2 (two) times a day for 5 days, Disp: 10 capsule, Rfl: 3  •  omeprazole (PriLOSEC OTC) 20 MG tablet, every 24 hours, Disp: , Rfl:   •  potassium chloride (Klor-Con) 10 mEq tablet, TAKE 1 TABLET (10 MEQ TOTAL) BY MOUTH IN THE MORNING, Disp: 90 tablet, Rfl: 1  •  pravastatin (PRAVACHOL) 10 mg tablet, TAKE 1 TABLET BY MOUTH EVERY DAY, Disp: 90 tablet, Rfl: 3  •  saccharomyces boulardii (FLORASTOR) 250 mg capsule, Take 1 capsule (250 mg total) by mouth in the morning, Disp: 30 capsule, Rfl: 6  •  senna (SENOKOT) 8 6 MG tablet, Take 1 tablet by mouth as needed for constipation, Disp: , Rfl:       Active Problems     Patient Active Problem List   Diagnosis   • Edema   • Asthma, mild persistent   • Exertional shortness of breath   • Fatigue   • GERD without esophagitis   • "Mixed hyperlipidemia   • Impaired fasting glucose   • Left knee pain   • Palpitations   • Primary generalized (osteo)arthritis   • Right hip pain   • Sjogren's syndrome (HCC)   • Diverticulitis of large intestine without perforation or abscess with bleeding   • Chronic bilateral thoracic back pain   • Rosacea   • Cough   • UTI symptoms   • Primary osteoarthritis of left hip   • Urge incontinence of urine   • History of hysterectomy   • Primary osteoarthritis of both hands   • Recurrent UTI         Past Medical History     Past Medical History:   Diagnosis Date   • Anesthesia complication     \"shivers\"   • Arthritis    • Asthma    • 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 see colors, with over-use goes darker- from untreated amblyopia   • Breathing difficulty     with extubation pt developed laryngeal spasm   • Cataract     both eyes- right eye needs surgery   • Colon polyps 2016   • Diverticulitis     had episode in April 2018   • Edema     lower legs   • Generalized osteoarthritis    • GERD (gastroesophageal reflux disease)    • Hearing problem     unspecified laterality-hearing aid right ear   • High cholesterol    • History of transfusion     autologus   • Incontinence of urine    • Polyarthritis     last assessed: 6/26/2015   • Rheumatoid arthritis ([facility] [address] )    • Sjogren's syndrome ([facility] [address] )     last assessed: 7/18/2016   • SS-A antibody positive     last assessed: 8/20/2015   • Urinary tract infection    • UTI (urinary tract infection)          Surgical History     Past Surgical History:   Procedure Laterality Date   • APPENDECTOMY     • AUGMENTATION BREAST Bilateral     breast surgery enlargement procedure elective bilateral   • BILATERAL OOPHORECTOMY Bilateral 2003   • BLADDER SURGERY      bladder lift,cystocele   • CATARACT EXTRACTION      Right 9/2018; Left 10/2018   • COLONOSCOPY  04/18/2011   • COLPORRHAPHY  2014    anterior, repair of cystocele   • HYSTERECTOMY  1673    with cystocele    • JOINT REPLACEMENT Left 2016    knee   • " KNEE ARTHROPLASTY Left    • KNEE ARTHROSCOPY  2009   • KNEE SURGERY Left 2009    torn meniscus   • OOPHORECTOMY     • CA COLONOSCOPY FLX DX W/COLLJ SPEC WHEN PFRMD N/A 5/31/2018    Procedure: COLONOSCOPY;  Surgeon: Gretchen Zelaya MD;  Location: [facility] GI LAB;   Service: Gastroenterology   • REDUCTION MAMMAPLASTY  2000   • SACRAL NERVE STIMULATOR PLACEMENT N/A 1/31/2022    Procedure: IMPLANTATION NEUROSTIMULATOR ELECTRODE ARRAY SACRAL NERVE; INSERTION NEUROSTIMULATOR; FLUORO; ELECTONIC ANALYSIS;  Surgeon: Ida Hall MD;  Location: [facility] OR;  Service: UroGynecology          • TONSILLECTOMY AND ADENOIDECTOMY     • URETHRA SURGERY           Family History     Family History   Problem Relation Age of Onset   • Coronary artery disease Mother    • COPD Mother    • Hypertension Mother    • Hyperlipidemia Mother         high cholesterol   • Atrial fibrillation Mother    • Coronary artery disease Father    • Arthritis Father    • COPD Father         Black lung   • Hypertension Father    • Hyperlipidemia Father         high cholesterol   • Lung cancer Brother    • Coronary artery disease Brother    • Hypertension Brother    • Hyperlipidemia Brother         high cholesterol   • Atrial fibrillation Brother         fib/flutter   • Other Sister         pericarditis   • Arthritis Sister    • Diabetes Maternal Grandmother         mellitus   • Breast cancer Maternal Grandmother 79   • Breast cancer Paternal Grandmother 79   • Arthritis Family    • Asthma Family    • Cancer Brother         lung   • Heart disease Brother         CAD-stents-smoker   • No Known Problems Maternal Aunt    • No Known Problems Maternal Aunt    • No Known Problems Maternal Aunt    • No Known Problems Maternal Aunt    • No Known Problems Maternal Aunt    • Breast cancer Other 36         Social History     Social History       Radiology

## 2023-04-30 LAB
EST. AVERAGE GLUCOSE BLD GHB EST-MCNC: 85 MG/DL
HBA1C MFR BLD: 4.6 %

## 2023-05-02 ENCOUNTER — OFFICE VISIT (OUTPATIENT)
Dept: FAMILY MEDICINE CLINIC | Facility: CLINIC | Age: 78
End: 2023-05-02

## 2023-05-02 VITALS
DIASTOLIC BLOOD PRESSURE: 60 MMHG | RESPIRATION RATE: 16 BRPM | HEART RATE: 79 BPM | TEMPERATURE: 97.5 F | OXYGEN SATURATION: 94 % | SYSTOLIC BLOOD PRESSURE: 118 MMHG | HEIGHT: 66 IN | BODY MASS INDEX: 31.82 KG/M2 | WEIGHT: 198 LBS

## 2023-05-02 DIAGNOSIS — N18.31 STAGE 3A CHRONIC KIDNEY DISEASE (HCC): ICD-10-CM

## 2023-05-02 DIAGNOSIS — Z00.00 MEDICARE ANNUAL WELLNESS VISIT, SUBSEQUENT: Primary | ICD-10-CM

## 2023-05-02 DIAGNOSIS — Z02.89 PAIN MANAGEMENT CONTRACT AGREEMENT: ICD-10-CM

## 2023-05-02 DIAGNOSIS — M15.0 PRIMARY GENERALIZED (OSTEO)ARTHRITIS: ICD-10-CM

## 2023-05-02 DIAGNOSIS — E78.2 MIXED HYPERLIPIDEMIA: ICD-10-CM

## 2023-05-02 DIAGNOSIS — R32 URINARY INCONTINENCE, UNSPECIFIED TYPE: ICD-10-CM

## 2023-05-02 DIAGNOSIS — R73.01 IMPAIRED FASTING GLUCOSE: ICD-10-CM

## 2023-05-02 RX ORDER — ACETAMINOPHEN AND CODEINE PHOSPHATE 300; 30 MG/1; MG/1
1 TABLET ORAL EVERY 4 HOURS PRN
Qty: 30 TABLET | Refills: 1 | Status: SHIPPED | OUTPATIENT
Start: 2023-05-02

## 2023-05-02 NOTE — PATIENT INSTRUCTIONS
Medicare Preventive Visit Patient Instructions  Thank you for completing your Welcome to Medicare Visit or Medicare Annual Wellness Visit today  Your next wellness visit will be due in one year (5/2/2024)  The screening/preventive services that you may require over the next 5-10 years are detailed below  Some tests may not apply to you based off risk factors and/or age  Screening tests ordered at today's visit but not completed yet may show as past due  Also, please note that scanned in results may not display below  Preventive Screenings:  Service Recommendations Previous Testing/Comments   Colorectal Cancer Screening  * Colonoscopy    * Fecal Occult Blood Test (FOBT)/Fecal Immunochemical Test (FIT)  * Fecal DNA/Cologuard Test  * Flexible Sigmoidoscopy Age: 39-70 years old   Colonoscopy: every 10 years (may be performed more frequently if at higher risk)  OR  FOBT/FIT: every 1 year  OR  Cologuard: every 3 years  OR  Sigmoidoscopy: every 5 years  Screening may be recommended earlier than age 39 if at higher risk for colorectal cancer  Also, an individualized decision between you and your healthcare provider will decide whether screening between the ages of 74-80 would be appropriate  Colonoscopy: 07/29/2021  FOBT/FIT: Not on file  Cologuard: Not on file  Sigmoidoscopy: Not on file          Breast Cancer Screening Age: 36 years old  Frequency: every 1-2 years  Not required if history of left and right mastectomy Mammogram: 05/31/2022    Screening Current   Cervical Cancer Screening Between the ages of 21-29, pap smear recommended once every 3 years  Between the ages of 33-67, can perform pap smear with HPV co-testing every 5 years     Recommendations may differ for women with a history of total hysterectomy, cervical cancer, or abnormal pap smears in past  Pap Smear: Not on file    Screening Not Indicated   Hepatitis C Screening Once for adults born between 1945 and 1965  More frequently in patients at high risk for Hepatitis C Hep C Antibody: 11/27/2018    Screening Current   Diabetes Screening 1-2 times per year if you're at risk for diabetes or have pre-diabetes Fasting glucose: 100 mg/dL (4/26/2023)  A1C: 4 6 % (4/26/2023)  Screening Current   Cholesterol Screening Once every 5 years if you don't have a lipid disorder  May order more often based on risk factors  Lipid panel: 04/26/2023    Screening Not Indicated  History Lipid Disorder     Other Preventive Screenings Covered by Medicare:  1  Abdominal Aortic Aneurysm (AAA) Screening: covered once if your at risk  You're considered to be at risk if you have a family history of AAA  2  Lung Cancer Screening: covers low dose CT scan once per year if you meet all of the following conditions: (1) Age 50-69; (2) No signs or symptoms of lung cancer; (3) Current smoker or have quit smoking within the last 15 years; (4) You have a tobacco smoking history of at least 20 pack years (packs per day multiplied by number of years you smoked); (5) You get a written order from a healthcare provider  3  Glaucoma Screening: covered annually if you're considered high risk: (1) You have diabetes OR (2) Family history of glaucoma OR (3)  aged 48 and older OR (3)  American aged 72 and older  3  Osteoporosis Screening: covered every 2 years if you meet one of the following conditions: (1) You're estrogen deficient and at risk for osteoporosis based off medical history and other findings; (2) Have a vertebral abnormality; (3) On glucocorticoid therapy for more than 3 months; (4) Have primary hyperparathyroidism; (5) On osteoporosis medications and need to assess response to drug therapy  · Last bone density test (DXA Scan): 03/19/2009  5  HIV Screening: covered annually if you're between the age of 12-76  Also covered annually if you are younger than 13 and older than 72 with risk factors for HIV infection   For pregnant patients, it is covered up to 3 times per pregnancy  Immunizations:  Immunization Recommendations   Influenza Vaccine Annual influenza vaccination during flu season is recommended for all persons aged >= 6 months who do not have contraindications   Pneumococcal Vaccine   * Pneumococcal conjugate vaccine = PCV13 (Prevnar 13), PCV15 (Vaxneuvance), PCV20 (Prevnar 20)  * Pneumococcal polysaccharide vaccine = PPSV23 (Pneumovax) Adults 25-60 years old: 1-3 doses may be recommended based on certain risk factors  Adults 72 years old: 1-2 doses may be recommended based off what pneumonia vaccine you previously received   Hepatitis B Vaccine 3 dose series if at intermediate or high risk (ex: diabetes, end stage renal disease, liver disease)   Tetanus (Td) Vaccine - COST NOT COVERED BY MEDICARE PART B Following completion of primary series, a booster dose should be given every 10 years to maintain immunity against tetanus  Td may also be given as tetanus wound prophylaxis  Tdap Vaccine - COST NOT COVERED BY MEDICARE PART B Recommended at least once for all adults  For pregnant patients, recommended with each pregnancy  Shingles Vaccine (Shingrix) - COST NOT COVERED BY MEDICARE PART B  2 shot series recommended in those aged 48 and above     Health Maintenance Due:      Topic Date Due    DXA SCAN  03/19/2011    Breast Cancer Screening: Mammogram  05/31/2023    Hepatitis C Screening  Completed    Colorectal Cancer Screening  Discontinued     Immunizations Due:      Topic Date Due    COVID-19 Vaccine (4 - Booster for Charisma Edis series) 02/21/2022     Advance Directives   What are advance directives? Advance directives are legal documents that state your wishes and plans for medical care  These plans are made ahead of time in case you lose your ability to make decisions for yourself  Advance directives can apply to any medical decision, such as the treatments you want, and if you want to donate organs  What are the types of advance directives?   There are many types of advance directives, and each state has rules about how to use them  You may choose a combination of any of the following:  · Living will: This is a written record of the treatment you want  You can also choose which treatments you do not want, which to limit, and which to stop at a certain time  This includes surgery, medicine, IV fluid, and tube feedings  · Durable power of  for healthcare [facility]): This is a written record that states who you want to make healthcare choices for you when you are unable to make them for yourself  This person, called a proxy, is usually a family member or a friend  You may choose more than 1 proxy  · Do not resuscitate (DNR) order:  A DNR order is used in case your heart stops beating or you stop breathing  It is a request not to have certain forms of treatment, such as CPR  A DNR order may be included in other types of advance directives  · Medical directive: This covers the care that you want if you are in a coma, near death, or unable to make decisions for yourself  You can list the treatments you want for each condition  Treatment may include pain medicine, surgery, blood transfusions, dialysis, IV or tube feedings, and a ventilator (breathing machine)  · Values history: This document has questions about your views, beliefs, and how you feel and think about life  This information can help others choose the care that you would choose  Why are advance directives important? An advance directive helps you control your care  Although spoken wishes may be used, it is better to have your wishes written down  Spoken wishes can be misunderstood, or not followed  Treatments may be given even if you do not want them  An advance directive may make it easier for your family to make difficult choices about your care  Urinary Incontinence   Urinary incontinence (UI)  is when you lose control of your bladder   UI develops because your bladder cannot store or empty urine properly  The 3 most common types of UI are stress incontinence, urge incontinence, or both  Medicines:   · May be given to help strengthen your bladder control  Report any side effects of medication to your healthcare provider  Do pelvic muscle exercises often:  Your pelvic muscles help you stop urinating  Squeeze these muscles tight for 5 seconds, then relax for 5 seconds  Gradually work up to squeezing for 10 seconds  Do 3 sets of 15 repetitions a day, or as directed  This will help strengthen your pelvic muscles and improve bladder control  Train your bladder:  Go to the bathroom at set times, such as every 2 hours, even if you do not feel the urge to go  You can also try to hold your urine when you feel the urge to go  For example, hold your urine for 5 minutes when you feel the urge to go  As that becomes easier, hold your urine for 10 minutes  Self-care:   · Keep a UI record  Write down how often you leak urine and how much you leak  Make a note of what you were doing when you leaked urine  · Drink liquids as directed  You may need to limit the amount of liquid you drink to help control your urine leakage  Do not drink any liquid right before you go to bed  Limit or do not have drinks that contain caffeine or alcohol  · Prevent constipation  Eat a variety of high-fiber foods  Good examples are high-fiber cereals, beans, vegetables, and whole-grain breads  Walking is the best way to trigger your intestines to have a bowel movement  · Exercise regularly and maintain a healthy weight  Weight loss and exercise will decrease pressure on your bladder and help you control your leakage  · Use a catheter as directed  to help empty your bladder  A catheter is a tiny, plastic tube that is put into your bladder to drain your urine  · Go to behavior therapy as directed  Behavior therapy may be used to help you learn to control your urge to urinate      Weight Management   Why it is important to manage your weight:  Being overweight increases your risk of health conditions such as heart disease, high blood pressure, type 2 diabetes, and certain types of cancer  It can also increase your risk for osteoarthritis, sleep apnea, and other respiratory problems  Aim for a slow, steady weight loss  Even a small amount of weight loss can lower your risk of health problems  How to lose weight safely:  A safe and healthy way to lose weight is to eat fewer calories and get regular exercise  You can lose up about 1 pound a week by decreasing the number of calories you eat by 500 calories each day  Healthy meal plan for weight management:  A healthy meal plan includes a variety of foods, contains fewer calories, and helps you stay healthy  A healthy meal plan includes the following:  · Eat whole-grain foods more often  A healthy meal plan should contain fiber  Fiber is the part of grains, fruits, and vegetables that is not broken down by your body  Whole-grain foods are healthy and provide extra fiber in your diet  Some examples of whole-grain foods are whole-wheat breads and pastas, oatmeal, brown rice, and bulgur  · Eat a variety of vegetables every day  Include dark, leafy greens such as spinach, kale, shruthi greens, and mustard greens  Eat yellow and orange vegetables such as carrots, sweet potatoes, and winter squash  · Eat a variety of fruits every day  Choose fresh or canned fruit (canned in its own juice or light syrup) instead of juice  Fruit juice has very little or no fiber  · Eat low-fat dairy foods  Drink fat-free (skim) milk or 1% milk  Eat fat-free yogurt and low-fat cottage cheese  Try low-fat cheeses such as mozzarella and other reduced-fat cheeses  · Choose meat and other protein foods that are low in fat  Choose beans or other legumes such as split peas or lentils  Choose fish, skinless poultry (chicken or turkey), or lean cuts of red meat (beef or pork)   Before you cook meat or poultry, cut off any visible fat  · Use less fat and oil  Try baking foods instead of frying them  Add less fat, such as margarine, sour cream, regular salad dressing and mayonnaise to foods  Eat fewer high-fat foods  Some examples of high-fat foods include french fries, doughnuts, ice cream, and cakes  · Eat fewer sweets  Limit foods and drinks that are high in sugar  This includes candy, cookies, regular soda, and sweetened drinks  Exercise:  Exercise at least 30 minutes per day on most days of the week  Some examples of exercise include walking, biking, dancing, and swimming  You can also fit in more physical activity by taking the stairs instead of the elevator or parking farther away from stores  Ask your healthcare provider about the best exercise plan for you  Narcotic (Opioid) Safety    Use narcotics safely:  · Take prescribed narcotics exactly as directed  · Do not give narcotics to others or take narcotics that belong to someone else  · Do not mix narcotics without medicines or alcohol  · Do not drive or operate heavy machinery after you take the narcotic  · Monitor for side effects and notify your healthcare provider if you experienced side effects such as nausea, sleepiness, itching, or trouble thinking clearly  Manage constipation:    Constipation is the most common side effect of narcotic medicine  Constipation is when you have hard, dry bowel movements, or you go longer than usual between bowel movements  Tell your healthcare provider about all changes in your bowel movements while you are taking narcotics  He or she may recommend laxative medicine to help you have a bowel movement  He or she may also change the kind of narcotic you are taking, or change when you take it  The following are more ways you can prevent or relieve constipation:    · Drink liquids as directed  You may need to drink extra liquids to help soften and move your bowels   Ask how much liquid to drink each day and which liquids are best for you  · Eat high-fiber foods  This may help decrease constipation by adding bulk to your bowel movements  High-fiber foods include fruits, vegetables, whole-grain breads and cereals, and beans  Your healthcare provider or dietitian can help you create a high-fiber meal plan  Your provider may also recommend a fiber supplement if you cannot get enough fiber from food  · Exercise regularly  Regular physical activity can help stimulate your intestines  Walking is a good exercise to prevent or relieve constipation  Ask which exercises are best for you  · Schedule a time each day to have a bowel movement  This may help train your body to have regular bowel movements  Bend forward while you are on the toilet to help move the bowel movement out  Sit on the toilet for at least 10 minutes, even if you do not have a bowel movement  Store narcotics safely:   · Store narcotics where others cannot easily get them  Keep them in a locked cabinet or secure area  Do not  keep them in a purse or other bag you carry with you  A person may be looking for something else and find the narcotics  · Make sure narcotics are stored out of the reach of children  A child can easily overdose on narcotics  Narcotics may look like candy to a small child  The best way to dispose of narcotics: The laws vary by country and area  In the [place], the best way is to return the narcotics through a take-back program  This program is offered by the [employer])  The following are options for using the program:  · Take the narcotics to a [employer] collection site  The site is often a law enforcement center  Call your local law enforcement center for scheduled take-back days in your area  You will be given information on where to go if the collection site is in a different location  · Take the narcotics to an approved pharmacy or hospital   A pharmacy or hospital may be set up as a collection site   You will need to ask if it is a [employer] collection site if you were not directed there  A pharmacy or doctor's office may not be able to take back narcotics unless it is a [employer] site  · Use a mail-back system  This means you are given containers to put the narcotics into  You will then mail them in the containers  · Use a take-back drop box  This is a place to leave the narcotics at any time  People and animals will not be able to get into the box  Your local law enforcement agency can tell you where to find a drop box in your area  Other ways to manage pain:   · Ask your healthcare provider about non-narcotic medicines to control pain  Nonprescription medicines include NSAIDs (such as ibuprofen) and acetaminophen  Prescription medicines include muscle relaxers, antidepressants, and steroids  · Pain may be managed without any medicines  Some ways to relieve pain include massage, aromatherapy, or meditation  Physical or occupational therapy may also help  For more information:   · Drug Enforcement Administration  [facility] [address]  Phone: 6- 061 - 902-4965  Web Address: [facility]/drug_disposal/    · [address] and Drug Administration  [place]  [place] [address] [facility]  Phone: 6- 661 - [phone]  Web Address: http://[employer]/     © Copyright [address] 2018 Information is for End User's use only and may not be sold, redistributed or otherwise used for commercial purposes  All illustrations and images included in CareNotes® are the copyrighted property of A D A M , Inc  or [address] [place] [address]:  Maximize your health and quality of life by:   · Increasing your level of function and activity  · Decreasing the negative effects of pain on your life  · Minimizing the risks and side effects of medications and ensuring safe use of opioid medication     Ways for you to help meet your goals:  Maintain a healthy lifestyle   This includes proper nutrition, regular physical activity as able, try for 8 hours of sleep per night, use stress reduction strategies, avoid triggers  Risks and side effects of opioid use:  Prescription opioids carry serious risks of addiction and  overdose, especially with prolonged use  An opioid overdose,  often marked by slowed breathing, can cause sudden death  The  use of prescription opioids can have a number of side effects as  well, even when taken as directed:  · Tolerance--meaning you might need to take more of a medication for the same pain relief  · Physical dependence--meaning you have symptoms of withdrawal when a medication is stopped  · Increased sensitivity to pain  · Constipation  · Nausea, vomiting, dry mouth  · Sleepiness and dizziness   · Confusion  · Depression  · Low levels of testosterone that can result in lower sex drive, energy, and strength  · Itching and sweating    If you are prescribed opioids for pain:  · Never take opioids in greater amounts or more often than prescribed  · Help prevent misuse and abuse  - Never sell or share prescription opioids         - Never use another persons prescription opioids  · Store prescription opioids in a secure place and out of reach of others (this may include visitors, children, friends, and family)  · Safely dispose of unused prescription opioids: Find your community drug take-back program or your pharmacy mail-back program, or flush them down the toilet, following guidance from the Food and Drug Administration (www fda gov/Drugs/ResourcesForYou)  · Visit www cdc gov/drugoverdose to learn about the risks of opioid abuse and overdose  · If you believe you may be struggling with addiction, tell your health care provider and ask for guidance or call [address] at 9-047-047-HELP  Kegel Exercises for Women   AMBULATORY CARE:   Kegel exercises  help strengthen your pelvic muscles   Pelvic muscles hold your pelvic organs, such as your bladder and uterus, in place  Kegel exercises help prevent or control certain conditions, such as urine incontinence (leakage) or uterine prolapse  Call your doctor or physical therapist if:   You cannot feel your muscles tighten or relax  You continue to leak urine  You have questions or concerns about your condition or care  Use the correct muscles:  Pelvic muscles are the muscles you use to control urine flow  To target these muscles, stop and start the flow of urine several times  This will help you become familiar with how it feels to tighten and relax these muscles  How to do Kegel exercises:   Get into a comfortable position  You may lie down, stand up, or sit down to do these exercises  When you first try to do these exercises, it may be easier if you lie down  Tighten or squeeze your pelvic muscles slowly  It may feel like you are trying to hold back urine or gas  Hold this position for 3 seconds  Relax for 3 seconds  Repeat this cycle 10 times  Do not hold your breath when you do Kegel exercises  Keep your stomach, back, and leg muscles relaxed  Do 10 sets of Kegel exercises, at least 3 times a day  When you know how to do Kegel exercises, use different positions  This will help to strengthen your pelvic muscles as much as possible  You can do these exercises while you lie on the floor, watch TV, or while you stand  Tighten your pelvic muscles before you sneeze, cough, or lift to prevent urine leakage  You may notice improved bladder control within about 6 weeks  Follow up with your doctor or physical therapist as directed:  Write down your questions so you remember to ask them during your visits  © Copyright Sushma Bruno 2022 Information is for End User's use only and may not be sold, redistributed or otherwise used for commercial purposes  The above information is an  only  It is not intended as medical advice for individual conditions or treatments   Talk to your doctor, nurse or pharmacist before following any medical regimen to see if it is safe and effective for you    ·

## 2023-05-02 NOTE — ASSESSMENT & PLAN NOTE
Lab Results   Component Value Date    EGFR 53 04/26/2023    EGFR 59 10/11/2022    EGFR 64 04/21/2022    CREATININE 1 02 04/26/2023    CREATININE 0 93 10/11/2022    CREATININE 0 88 04/21/2022     Diagnosis discussed  Advised to avoid NSAIDs as much as she can, will try sticking topical NSAIDs

## 2023-05-02 NOTE — PROGRESS NOTES
Assessment and Plan:     Problem List Items Addressed This Visit     Impaired fasting glucose     Stable          Mixed hyperlipidemia     Cholesterol is stable  Continue pravastatin 10 mg daily  Reviewed recent labs with patient         Primary generalized (osteo)arthritis    Relevant Medications    acetaminophen-codeine (TYLENOL #3) 300-30 mg per tablet    Stage 3a chronic kidney disease ([facility] [address] )     Lab Results   Component Value Date    EGFR 53 04/26/2023    EGFR 59 10/11/2022    EGFR 64 04/21/2022    CREATININE 1 02 04/26/2023    CREATININE 0 93 10/11/2022    CREATININE 0 88 04/21/2022     Diagnosis discussed  Advised to avoid NSAIDs as much as she can, will try sticking topical NSAIDs         Other Visit Diagnoses     Medicare annual wellness visit, subsequent    -  Primary    Pain management contract agreement        Relevant Orders    [facility] All Prescribed Meds and Special Instructions    Amphetamines, Methamphetamines    Butalbital    Phenobarbital    Secobarbital    Alprazolam    Clonazepam    Diazepam, Temazepam, Oxazepam    Lorazepam    Gabapentin    Pregabalin    Cocaine    Heroin    Buprenorphine    Levorphanol    Meperidine    Naltrexone    Fentanyl    Methadone    Oxycodone, Oxymorphone    Tapentadol    THC    Tramadol    Codeine, Hydrocodone, Hydropmorphone, Morphine    Bath Salts    Ethyl Glucuronide/Ethyl Sulfate    Kratom    Spice    Methylphenidate    Phentermine    Validity Oxidant    Validity Creatinine    Validity pH    Validity Specific    Urinary incontinence, unspecified type          Return in 3 months (on 8/2/2023)  BMI Counseling: Body mass index is 31 96 kg/m²  The BMI is above normal  Exercise recommendations include exercising 3-5 times per week  Rationale for BMI follow-up plan is due to patient being overweight or obese  Depression Screening and Follow-up Plan: Patient was screened for depression during today's encounter   They screened negative with a PHQ-2 score of "0       Preventive health issues were discussed with patient, and age appropriate screening tests were ordered as noted in patient's After Visit Summary  Personalized health advice and appropriate referrals for health education or preventive services given if needed, as noted in patient's After Visit Summary       History of Present Illness:     Patient presents for a Medicare Wellness Visit    HPI   Patient Care Team:  Herminia Kay DO as PCP - DO Herminia Sosa DO Charlie Danker, MD as Endoscopist     Review of Systems:     Review of Systems     Problem List:     Patient Active Problem List   Diagnosis    Edema    Asthma, mild persistent    Exertional shortness of breath    Fatigue    GERD without esophagitis    Mixed hyperlipidemia    Impaired fasting glucose    Left knee pain    Palpitations    Primary generalized (osteo)arthritis    Right hip pain    Sjogren's syndrome ([address] )    Diverticulitis of large intestine without perforation or abscess with bleeding    Chronic bilateral thoracic back pain    Rosacea    Cough    UTI symptoms    Primary osteoarthritis of left hip    Urge incontinence of urine    History of hysterectomy    Primary osteoarthritis of both hands    Recurrent UTI    Stage 3a chronic kidney disease ([address] )      Past Medical and Surgical History:     Past Medical History:   Diagnosis Date    Anesthesia complication     \"shivers\"    Arthritis     Asthma     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 see colors, with over-use goes darker- from untreated amblyopia    Breathing difficulty     with extubation pt developed laryngeal spasm    Cataract     both eyes- right eye needs surgery    Colon polyps 2016    Diverticulitis     had episode in April 2018    Edema     lower legs    Generalized osteoarthritis     GERD (gastroesophageal reflux disease)     Hearing problem     unspecified laterality-hearing aid right ear    High cholesterol  "  History of transfusion     autologus    Incontinence of urine     Polyarthritis     last assessed: 6/26/2015    Rheumatoid arthritis ([facility] [address] )     Sjogren's syndrome ([facility] [address] )     last assessed: 7/18/2016    SS-A antibody positive     last assessed: 8/20/2015    Urinary tract infection     UTI (urinary tract infection)      Past Surgical History:   Procedure Laterality Date    APPENDECTOMY      AUGMENTATION BREAST Bilateral     breast surgery enlargement procedure elective bilateral    BILATERAL OOPHORECTOMY Bilateral 2003    BLADDER SURGERY      bladder lift,cystocele    CATARACT EXTRACTION      Right 9/2018; Left 10/2018    COLONOSCOPY  04/18/2011    COLPORRHAPHY  2014    anterior, repair of cystocele    HYSTERECTOMY  [address]73    with cystocele     JOINT REPLACEMENT Left 2016    knee    KNEE ARTHROPLASTY Left     KNEE ARTHROSCOPY  2009    KNEE SURGERY Left 2009    torn meniscus    OOPHORECTOMY      DE COLONOSCOPY FLX DX W/COLLJ SPEC WHEN PFRMD N/A 5/31/2018    Procedure: COLONOSCOPY;  Surgeon: Nena Olivares MD;  Location: [place] [address] GI LAB;   Service: Gastroenterology    REDUCTION MAMMAPLASTY  2000    SACRAL NERVE STIMULATOR PLACEMENT N/A 1/31/2022    Procedure: IMPLANTATION NEUROSTIMULATOR ELECTRODE ARRAY SACRAL NERVE; INSERTION NEUROSTIMULATOR; FLUORO; ELECTONIC ANALYSIS;  Surgeon: Sushant Gonzales MD;  Location: [facility] OR;  Service: UroGynecology           TONSILLECTOMY AND ADENOIDECTOMY      URETHRA SURGERY        Family History:     Family History   Problem Relation Age of Onset    Coronary artery disease Mother     COPD Mother     Hypertension Mother     Hyperlipidemia Mother         high cholesterol    Atrial fibrillation Mother     Coronary artery disease Father     Arthritis Father     COPD Father         Black lung    Hypertension Father     Hyperlipidemia Father         high cholesterol    Lung cancer Brother     Coronary artery disease Brother     Hypertension Brother     Hyperlipidemia Brother         high cholesterol    Atrial fibrillation Brother         fib/flutter    Other Sister         pericarditis    Arthritis Sister     Diabetes Maternal Grandmother         mellitus    Breast cancer Maternal Grandmother 79    Breast cancer Paternal Grandmother 79    Arthritis Family     Asthma Family     Cancer Brother         lung    Heart disease Brother         CAD-stents-smoker    No Known Problems Maternal Aunt     No Known Problems Maternal Aunt     No Known Problems Maternal Aunt     No Known Problems Maternal Aunt     No Known Problems Maternal Aunt     Breast cancer Other 36      Social History:     Social History     Socioeconomic History    Marital status: /Civil Union     Spouse name: None    Number of children: None    Years of education: None    Highest education level: None   Occupational History    Occupation: retired   Tobacco Use    Smoking status: Never     Passive exposure: Never    Smokeless tobacco: Never   Vaping Use    Vaping Use: Never used   Substance and Sexual Activity    Alcohol use: Not Currently    Drug use: No    Sexual activity: Not Currently   Other Topics Concern    None   Social History Narrative    Lack of adequate sleep-4-6 hrs/night    Lack of exercise     Social Determinants of Health     Financial Resource Strain: Low Risk     Difficulty of Paying Living Expenses: Not very hard   Food Insecurity: Not on file   Transportation Needs: No Transportation Needs    Lack of Transportation (Medical): No    Lack of Transportation (Non-Medical):  No   Physical Activity: Not on file   Stress: Not on file   Social Connections: Not on file   Intimate Partner Violence: Not on file   Housing Stability: Not on file      Medications and Allergies:     Current Outpatient Medications   Medication Sig Dispense Refill    acetaminophen (TYLENOL) 500 mg tablet Take 500 mg by mouth 3 (three) times a day as needed        acetaminophen-codeine (TYLENOL #3) 300-30 mg per tablet Take 1 tablet by mouth every 4 (four) hours as needed for moderate pain 30 tablet 1    albuterol (PROVENTIL HFA,VENTOLIN HFA) 90 mcg/act inhaler Inhale 2 puffs every 6 (six) hours as needed for wheezing or shortness of breath 1 Inhaler 5    aspirin 325 mg tablet Take 325 mg by mouth daily Patient takes 2 daily for arthritis      Cranberry 450 MG CAPS daily      cyanocobalamin (VITAMIN B-12) 1000 MCG tablet Take 1,000 mcg by mouth daily      docusate sodium (COLACE) 100 mg capsule Take 100 mg by mouth 2 (two) times a day      estradiol (ESTRACE VAGINAL) 0 1 mg/g vaginal cream Insert 1 g into the vagina 3 (three) times a week 42 5 g 6    fluticasone (Flovent HFA) 220 mcg/act inhaler Inhale 2 puffs 2 (two) times a day Rinse mouth after use 36 g 3    furosemide (LASIX) 40 mg tablet TAKE 1 TABLET BY MOUTH EVERY DAY IN THE MORNING 90 tablet 1    Magnesium Oxide -Mg Supplement 400 MG CAPS Take 1 capsule by mouth every morning        methenamine hippurate (HIPREX) 1 g tablet TAKE 1 TABLET BY MOUTH 2 TIMES A DAY WITH MEALS  60 tablet 4    nitrofurantoin (MACROBID) 100 mg capsule Take 1 capsule (100 mg total) by mouth 2 (two) times a day for 5 days 10 capsule 3    omeprazole (PriLOSEC OTC) 20 MG tablet every 24 hours      potassium chloride (Klor-Con) 10 mEq tablet TAKE 1 TABLET (10 MEQ TOTAL) BY MOUTH IN THE MORNING 90 tablet 1    pravastatin (PRAVACHOL) 10 mg tablet TAKE 1 TABLET BY MOUTH EVERY DAY 90 tablet 3    saccharomyces boulardii (FLORASTOR) 250 mg capsule Take 1 capsule (250 mg total) by mouth in the morning 30 capsule 6    senna (SENOKOT) 8 6 MG tablet Take 1 tablet by mouth as needed for constipation       No current facility-administered medications for this visit  Allergies   Allergen Reactions    Celecoxib Other (See Comments)     Other reaction(s): Palpitations  Category:  Adverse Reaction;       Immunizations:     Immunization History Administered Date(s) Administered    COVID-19 MODERNA VACC 0 25 ML IM BOOSTER 12/27/2021    COVID-19 MODERNA VACC 0 5 ML IM 03/11/2021, 04/09/2021    DT (pediatric) 04/01/2013    INFLUENZA 10/19/2018, 10/20/2020, 10/06/2021    Influenza Split High Dose Preservative Free IM 09/27/2016, 09/01/2017, 10/29/2019    Influenza, high dose seasonal 0 7 mL 10/27/2022    Influenza, seasonal, injectable 11/01/2015    Pneumococcal Conjugate 13-Valent 05/29/2015    Pneumococcal Polysaccharide PPV23 11/01/2010    Zoster 04/01/2013      Health Maintenance:         Topic Date Due    DXA SCAN  03/19/2011    Breast Cancer Screening: Mammogram  05/31/2023    Hepatitis C Screening  Completed    Colorectal Cancer Screening  Discontinued         Topic Date Due    COVID-19 Vaccine (4 - Booster for [employer] series) 02/21/2022      Medicare Screening Tests and Risk Assessments:     Damaris Barr is here for her Subsequent Wellness visit  Health Risk Assessment:   Patient rates overall health as very good  Patient feels that their physical health rating is same  Patient is satisfied with their life  Eyesight was rated as same  Hearing was rated as same  Patient feels that their emotional and mental health rating is same  Patients states they are never, rarely angry  Patient states they are sometimes unusually tired/fatigued  Pain experienced in the last 7 days has been some  Patient's pain rating has been 5/10  Patient states that she has experienced no weight loss or gain in last 6 months  Depression Screening:   PHQ-2 Score: 0      Fall Risk Screening: In the past year, patient has experienced: no history of falling in past year      Urinary Incontinence Screening:   Patient has leaked urine accidently in the last six months  Home Safety:  Patient has trouble with stairs inside or outside of their home  Patient has working smoke alarms and has working carbon monoxide detector  Home safety hazards include: none  Nutrition:   Current diet is Regular, Low Saturated Fat and No Added Salt  Medications:   Patient is currently taking over-the-counter supplements  OTC medications include: B12, Magnesium, cranberry tabs  Patient is able to manage medications  Activities of Daily Living (ADLs)/Instrumental Activities of Daily Living (IADLs):   Walk and transfer into and out of bed and chair?: Yes  Dress and groom yourself?: Yes    Bathe or shower yourself?: Yes    Feed yourself? Yes  Do your laundry/housekeeping?: Yes  Manage your money, pay your bills and track your expenses?: Yes  Make your own meals?: Yes    Do your own shopping?: Yes    Previous Hospitalizations:   Any hospitalizations or ED visits within the last 12 months?: No      Advance Care Planning:   Living will: Yes    Durable POA for healthcare:  Yes    Advanced directive: Yes    Advanced directive counseling given: Yes    End of Life Decisions reviewed with patient: Yes    Provider agrees with end of life decisions: Yes      Cognitive Screening:   Provider or family/friend/caregiver concerned regarding cognition?: No    PREVENTIVE SCREENINGS      Cardiovascular Screening:    General: Screening Not Indicated and History Lipid Disorder      Diabetes Screening:     General: Screening Current      Colorectal Cancer Screening:     General: Screening Not Indicated      Breast Cancer Screening:     General: Screening Current      Cervical Cancer Screening:    General: Screening Not Indicated      Osteoporosis Screening:    General: Screening Current      Abdominal Aortic Aneurysm (AAA) Screening:        General: Screening Not Indicated      Lung Cancer Screening:     General: Screening Not Indicated      Hepatitis C Screening:    General: Screening Current    Screening, Brief Intervention, and Referral to Treatment (SBIRT)    Screening  Typical number of drinks in a day: 0  Typical number of drinks in a week: 0  Interpretation: Low risk drinking "behavior  AUDIT-C Screenin) How often did you have a drink containing alcohol in the past year? never  2) How many drinks did you have on a typical day when you were drinking in the past year? 0  3) How often did you have 6 or more drinks on one occasion in the past year? never    AUDIT-C Score: 0  Interpretation: Score 0-2 (female): Negative screen for alcohol misuse    Single Item Drug Screening:  How often have you used an illegal drug (including marijuana) or a prescription medication for non-medical reasons in the past year? never    Single Item Drug Screen Score: 0  Interpretation: Negative screen for possible drug use disorder    Brief Intervention  Alcohol & drug use screenings were reviewed  No concerns regarding substance use disorder identified  Review of Current Opioid Use    Opioid Risk Tool (ORT) Interpretation: Complete Opioid Risk Tool (ORT)    No results found  Physical Exam:     /60 (BP Location: Right arm, Patient Position: Sitting, Cuff Size: Large)   Pulse 79   Temp 97 5 °F (36 4 °C) (Temporal)   Resp 16   Ht 5' 6\" (1 676 m)   Wt 89 8 kg (198 lb)   SpO2 94%   BMI 31 96 kg/m²     Physical Exam  Vitals and nursing note reviewed  Constitutional:       Appearance: She is well-developed  HENT:      Head: Normocephalic and atraumatic  Right Ear: External ear normal       Left Ear: External ear normal       Nose: Nose normal    Cardiovascular:      Rate and Rhythm: Normal rate and regular rhythm  Heart sounds: Normal heart sounds  No murmur heard  No friction rub  Pulmonary:      Effort: No respiratory distress  Breath sounds: Normal breath sounds  No wheezing or rales  Musculoskeletal:      Right lower leg: No edema  Left lower leg: No edema  Neurological:      Mental Status: She is oriented to person, place, and time  Cranial Nerves: No cranial nerve deficit            Marco Antonio Chimera, DO  "

## 2023-05-02 NOTE — PROGRESS NOTES
Assessment/Plan     Problem List Items Addressed This Visit     Impaired fasting glucose     Stable          Mixed hyperlipidemia     Cholesterol is stable  Continue pravastatin 10 mg daily  Reviewed recent labs with patient         Primary generalized (osteo)arthritis    Relevant Medications    acetaminophen-codeine (TYLENOL #3) 300-30 mg per tablet    Stage 3a chronic kidney disease ([facility] [address] )     Lab Results   Component Value Date    EGFR 53 04/26/2023    EGFR 59 10/11/2022    EGFR 64 04/21/2022    CREATININE 1 02 04/26/2023    CREATININE 0 93 10/11/2022    CREATININE 0 88 04/21/2022     Diagnosis discussed  Advised to avoid NSAIDs as much as she can, will try sticking topical NSAIDs         Other Visit Diagnoses     Medicare annual wellness visit, subsequent    -  Primary    Pain management contract agreement        Relevant Orders    [facility] All Prescribed Meds and Special Instructions    Amphetamines, Methamphetamines    Butalbital    Phenobarbital    Secobarbital    Alprazolam    Clonazepam    Diazepam, Temazepam, Oxazepam    Lorazepam    Gabapentin    Pregabalin    Cocaine    Heroin    Buprenorphine    Levorphanol    Meperidine    Naltrexone    Fentanyl    Methadone    Oxycodone, Oxymorphone    Tapentadol    THC    Tramadol    Codeine, Hydrocodone, Hydropmorphone, Morphine    Bath Salts    Ethyl Glucuronide/Ethyl Sulfate    Kratom    Spice    Methylphenidate    Phentermine    Validity Oxidant    Validity Creatinine    Validity pH    Validity Specific    Urinary incontinence, unspecified type             Treatment Plan: Continue Tylenol 3 as needed    Treatment Goals: Goal is to mean function at home as well as possible and for her to be able to continue to help take care of her  who has had a stroke    Opiate risks  There are risks associated with opioid medications, including dependence, addiction and tolerance  The patient understands and agrees to use these medications only as prescribed   Potential side effects of the medications include, but are not limited to, constipation, drowsiness, addiction, impaired judgment and risk of fatal overdose if not taken as prescribed  The patient was warned against driving while taking sedation medications  Sharing medications is a felony  At this point in time, the patient is showing no signs of addiction, abuse, diversion or suicidal ideation  Drug screen  Drug screen collected during today's visit      PDMP review  [place] or [place]  reviewed  No red flags were identified; safe to proceed with prescription          Subjective     Opioid Management:   Type of visit: Follow-up    Interval history: She has been having worsening knee and hand pain  She has been using Voltaren gel and it has been helping her significantly  Screening Tools/Assessments:    PHQ-2/9:  PHQ-2 score: 0    Brief Pain Inventory (BPI):  1) Throughout our lives, most of us have had pain from time to time (such as minor headaches, sprains, and toothaches)  Have you had pain other than these everyday kinds of pain today? Yes  2) Where is your pain located? joint, feet, hands  3) Rate your pain at its worst in the last 24 hours: 7  4) Rate your pain at its least in the last 24 hours: 3  5) Rate your average level of pain: 5  6) Rate your pain right now: 3  7) What treatments or medications are you receiving for your pain? tylenol # 3  8) In the past 24 hours, how much relief have pain treatments or medication provided? 90%  9) During the past 24 hours, pain has interfered with your:     A) General activity: 7     B) Mood: 6     C) Walking ability: 9     D) Normal work (work outside the home & housework): 0     E) Relations with other people: 5     F) Sleep: 9     G) Enjoyment of life: 3    Opioid agreement:  Active Opioid agreement on file?: Yes    Opioid agreement signed date: 10/27/2022  Opioid agreement expiration date: 10/27/2023    Naloxone:  Currently prescribed Naloxone (Narcan):  No "    She has had some ongoing stress  Her  is disabled due to multiple strokes and may now have lung cancer  Pain Medications             acetaminophen (TYLENOL) 500 mg tablet Take 500 mg by mouth 3 (three) times a day as needed      acetaminophen-codeine (TYLENOL #3) 300-30 mg per tablet Take 1 tablet by mouth every 4 (four) hours as needed for moderate pain    aspirin 325 mg tablet Take 325 mg by mouth daily Patient takes 2 daily for arthritis         Outpatient Morphine Milligram Equivalents Per Day     5/2/23 and after 27 MME/Day    Order Name Dose Route Frequency Maximum MME/Day     acetaminophen-codeine (TYLENOL #3) 300-30 mg per tablet 1 tablet Oral Every 4 hours PRN 27 MME/Day    Total Potential Morphine Milligram Equivalents Per Day 27 MME/Day    Calculation Information        acetaminophen-codeine (TYLENOL #3) 300-30 mg per tablet    acetaminophen-codeine 300-30 mg Tabs: single dose of 30 mg of opioid in combo * 6 doses per day * morphine equivalence factor of 0 15 = 27 MME/Day                         PDMP Review       Value Time User    PDMP Reviewed  Yes 5/2/2023  9:39 AM Franklin Salazar DO         Review of Systems  Objective     /60 (BP Location: Right arm, Patient Position: Sitting, Cuff Size: Large)   Pulse 79   Temp 97 5 °F (36 4 °C) (Temporal)   Resp 16   Ht 5' 6\" (1 676 m)   Wt 89 8 kg (198 lb)   SpO2 94%   BMI 31 96 kg/m²     Physical Exam  Vitals and nursing note reviewed  Constitutional:       Appearance: She is well-developed  HENT:      Head: Normocephalic and atraumatic  Right Ear: External ear normal       Left Ear: External ear normal       Nose: Nose normal    Cardiovascular:      Rate and Rhythm: Normal rate and regular rhythm  Heart sounds: Normal heart sounds  No murmur heard  No friction rub  Pulmonary:      Effort: No respiratory distress  Breath sounds: Normal breath sounds  No wheezing or rales     Musculoskeletal:      Right lower leg: No " edema  Left lower leg: No edema  Neurological:      Mental Status: She is oriented to person, place, and time  Cranial Nerves: No cranial nerve deficit           Shannan Irene DO

## 2023-05-03 ENCOUNTER — TELEPHONE (OUTPATIENT)
Dept: NEPHROLOGY | Facility: CLINIC | Age: 78
End: 2023-05-03

## 2023-05-03 NOTE — TELEPHONE ENCOUNTER
New Patient Intake Form   Patient Details   Vale Peabody     1945     [id number]     Insurance Information   Name of 1 [facility]   Does the patient need an insurance referral? no   If patient has Pitedgar Jasmine, please ask if they will be using their Riri Murrayes  Appointment Information   Who is calling to schedule? If not patient, what is callers name? Patient   Referring Provider  Dr Virgil Gonzalez   Reason for Appt (Diagnosis) CKD3    Does Patient have labs/urine done at [place] [address]? If not, where do they go? List the date of last lab / urine yes   Has patient been hospitalized recently? If yes, list name and location of hospital they were in no   Has patient been seen by a Nephrologist before? If yes, list name, location and phone number no   Has the patient had renal imaging done? If so, list the most recent date and type of imaging yes 11/2022    Does patient have a history of Kidney Stones?  no   Appointment Details   Is there a referral on file? no    Appointment Date 9/21    [facility]

## 2023-05-04 LAB
6MAM UR QL CFM: NEGATIVE NG/ML
7AMINOCLONAZEPAM UR QL CFM: NEGATIVE NG/ML
A-OH ALPRAZ UR QL CFM: NEGATIVE NG/ML
ACCEPTABLE CREAT UR QL: NORMAL MG/DL
ACCEPTIBLE SP GR UR QL: NORMAL
AMPHET UR QL CFM: NEGATIVE NG/ML
BUPRENORPHINE UR QL CFM: NEGATIVE NG/ML
BUTALBITAL UR QL CFM: NEGATIVE NG/ML
BZE UR QL CFM: NEGATIVE NG/ML
CODEINE UR QL CFM: ABNORMAL NG/ML
EDDP UR QL CFM: NEGATIVE NG/ML
ETHYL GLUCURONIDE UR QL CFM: NEGATIVE NG/ML
ETHYL SULFATE UR QL SCN: NEGATIVE NG/ML
EUTYLONE UR QL: NEGATIVE NG/ML
FENTANYL UR QL CFM: NEGATIVE NG/ML
GLIADIN IGG SER IA-ACNC: NEGATIVE NG/ML
HYDROCODONE UR QL CFM: ABNORMAL NG/ML
HYDROMORPHONE UR QL CFM: ABNORMAL NG/ML
LORAZEPAM UR QL CFM: NEGATIVE NG/ML
ME-PHENIDATE UR QL CFM: NEGATIVE NG/ML
MEPERIDINE UR QL CFM: NEGATIVE NG/ML
METHADONE UR QL CFM: NEGATIVE NG/ML
METHAMPHET UR QL CFM: NEGATIVE NG/ML
MORPHINE UR QL CFM: ABNORMAL NG/ML
NALTREXONE UR QL CFM: NEGATIVE NG/ML
NITRITE UR QL: NORMAL UG/ML
NORBUPRENORPHINE UR QL CFM: NEGATIVE NG/ML
NORDIAZEPAM UR QL CFM: NEGATIVE NG/ML
NORFENTANYL UR QL CFM: NEGATIVE NG/ML
NORHYDROCODONE UR QL CFM: ABNORMAL NG/ML
NORMEPERIDINE UR QL CFM: NEGATIVE NG/ML
NOROXYCODONE UR QL CFM: NEGATIVE NG/ML
OXAZEPAM UR QL CFM: NEGATIVE NG/ML
OXYCODONE UR QL CFM: NEGATIVE NG/ML
OXYMORPHONE UR QL CFM: NEGATIVE NG/ML
PARA-FLUOROFENTANYL QUANTIFICATION: NORMAL NG/ML
PHENOBARB UR QL CFM: NEGATIVE NG/ML
RESULT ALL_PRESCRIBED MEDS AND SPECIAL INSTRUCTIONS: NORMAL
SECOBARBITAL UR QL CFM: NEGATIVE NG/ML
SL AMB 4-ANPP QUANTIFICATION: NORMAL NG/ML
SL AMB 5F-ADB-M7 METABOLITE QUANTIFICATION: NEGATIVE NG/ML
SL AMB 7-OH-MITRAGYNINE (KRATOM ALKALOID) QUANTIFICATION: NEGATIVE NG/ML
SL AMB AB-FUBINACA-M3 METABOLITE QUANTIFICATION: NEGATIVE NG/ML
SL AMB ACETYL FENTANYL QUANTIFICATION: NORMAL NG/ML
SL AMB ACETYL NORFENTANYL QUANTIFICATION: NORMAL NG/ML
SL AMB ACRYL FENTANYL QUANTIFICATION: NORMAL NG/ML
SL AMB CARFENTANIL QUANTIFICATION: NORMAL NG/ML
SL AMB CTHC (MARIJUANA METABOLITE) QUANTIFICATION: NEGATIVE NG/ML
SL AMB DEXTRORPHAN (DEXTROMETHORPHAN METABOLITE) QUANT: NEGATIVE NG/ML
SL AMB GABAPENTIN QUANTIFICATION: NEGATIVE
SL AMB JWH018 METABOLITE QUANTIFICATION: NEGATIVE NG/ML
SL AMB JWH073 METABOLITE QUANTIFICATION: NEGATIVE NG/ML
SL AMB MDMB-FUBINACA-M1 METABOLITE QUANTIFICATION: NEGATIVE NG/ML
SL AMB METHYLONE QUANTIFICATION: NEGATIVE NG/ML
SL AMB N-DESMETHYL-TRAMADOL QUANTIFICATION: NEGATIVE NG/ML
SL AMB PHENTERMINE QUANTIFICATION: NEGATIVE NG/ML
SL AMB PREGABALIN QUANTIFICATION: NEGATIVE
SL AMB RCS4 METABOLITE QUANTIFICATION: NEGATIVE NG/ML
SL AMB RITALINIC ACID QUANTIFICATION: NEGATIVE NG/ML
SMOOTH MUSCLE AB TITR SER IF: NEGATIVE NG/ML
SPECIMEN DRAWN SERPL: NEGATIVE NG/ML
SPECIMEN PH ACCEPTABLE UR: NORMAL
TAPENTADOL UR QL CFM: NEGATIVE NG/ML
TEMAZEPAM UR QL CFM: NEGATIVE NG/ML
TRAMADOL UR QL CFM: NEGATIVE NG/ML
URATE/CREAT 24H UR: ABNORMAL NG/ML

## 2023-05-22 ENCOUNTER — APPOINTMENT (OUTPATIENT)
Dept: RADIOLOGY | Facility: CLINIC | Age: 78
End: 2023-05-22

## 2023-05-22 ENCOUNTER — OFFICE VISIT (OUTPATIENT)
Dept: OBGYN CLINIC | Facility: CLINIC | Age: 78
End: 2023-05-22

## 2023-05-22 VITALS
BODY MASS INDEX: 31.5 KG/M2 | DIASTOLIC BLOOD PRESSURE: 76 MMHG | WEIGHT: 196 LBS | HEART RATE: 80 BPM | HEIGHT: 66 IN | SYSTOLIC BLOOD PRESSURE: 118 MMHG

## 2023-05-22 DIAGNOSIS — R22.41 MASS OF RIGHT KNEE: Primary | ICD-10-CM

## 2023-05-22 DIAGNOSIS — M25.562 CHRONIC PAIN OF BOTH KNEES: ICD-10-CM

## 2023-05-22 DIAGNOSIS — G89.29 CHRONIC PAIN OF BOTH KNEES: ICD-10-CM

## 2023-05-22 DIAGNOSIS — M25.561 CHRONIC PAIN OF BOTH KNEES: ICD-10-CM

## 2023-05-22 RX ORDER — LORAZEPAM 1 MG/1
1 TABLET ORAL ONCE
Qty: 1 TABLET | Refills: 0 | Status: SHIPPED | OUTPATIENT
Start: 2023-05-22 | End: 2023-05-22

## 2023-05-22 NOTE — PROGRESS NOTES
Ortho Sports Medicine New Patient Visit     Assesment:   68 y o  female with right knee DJD and medial proximal tibial lesion    Plan:    The patient has an exam and imaging consistent with mild knee DJD  I do believe this is causing some of her pain  However, she has significant pain over the proximal medial tibia as well as tenderness in this area  There is soft tissue swelling overlying the bone  There is also some cortical expansion and irregularities on the medial proximal tibial x-ray  For this reason I recommended an MRI with and without contrast to evaluate for the possibility of a lesion of bone in that area  I did provide crutches and recommended that she use crutches for protected weightbearing at this time  I recommend she go to full nonweightbearing if she has significant pain while using the crutches in that area  We discussed, that if there is a lesion in her proximal tibia, a pathologic fracture is possible and the reason why the crutches are important at this time  We did discuss the possibility of having worsening of her chronic kidney disease however I believe that the risk outweighs dependent possible benefit of information gained from the MRI with contrast   She expressed understand this fact and agreed to proceed with the MRI with contrast   I will see her back after MRI to review images and discuss further treatment plan  She does additionally have a left total knee arthroplasty in place  This has been painful for some time  I will set her up to see one of my total joint colleagues to evaluate this further in the future  Chief Complaint   Patient presents with   • Right Knee - Pain       History of Present Illness: The patient is a 68 y o  female presenting with pain in the right knee and right proximal tibia  She did have no significant injuries  The pain did start shortly after she was working on a ladder but she did not injure herself during this process    Pain is "worse with weightbearing  Is localized to the medial proximal tibia as well as in the knee itself  She does have a strong achy sensation at nighttime  She has gotten some relief with Voltaren gel  She is limited on how much and says she can take because of chronic kidney disease  She has never had of surgery on this knee  She did have a previous total knee arthroplasty on the contralateral side that has been painful for some time  She states that she was worked up by her previous surgeon but opted to live with the pain rather than consider any revision options  She denies any numbness tingling weakness fever chills        Knee Surgical History:  None    Past Medical, Social and Family History:  Past Medical History:   Diagnosis Date   • Anesthesia complication     \"shivers\"   • Arthritis    • Asthma    • 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 see colors, with over-use goes darker- from untreated amblyopia   • Breathing difficulty     with extubation pt developed laryngeal spasm   • Cataract     both eyes- right eye needs surgery   • Colon polyps 2016   • Diverticulitis     had episode in April 2018   • Edema     lower legs   • Generalized osteoarthritis    • GERD (gastroesophageal reflux disease)    • Hearing problem     unspecified laterality-hearing aid right ear   • High cholesterol    • History of transfusion     autologus   • Incontinence of urine    • Polyarthritis     last assessed: 6/26/2015   • Rheumatoid arthritis ([facility] [address] )    • Sjogren's syndrome ([facility] [address] )     last assessed: 7/18/2016   • SS-A antibody positive     last assessed: 8/20/2015   • Urinary tract infection    • UTI (urinary tract infection)      Past Surgical History:   Procedure Laterality Date   • APPENDECTOMY     • AUGMENTATION BREAST Bilateral     breast surgery enlargement procedure elective bilateral   • BILATERAL OOPHORECTOMY Bilateral 2003   • BLADDER SURGERY      bladder lift,cystocele   • CATARACT EXTRACTION      Right 9/2018; Left 10/2018   • " COLONOSCOPY  04/18/2011   • COLPORRHAPHY  2014    anterior, repair of cystocele   • HYSTERECTOMY  1673    with cystocele    • JOINT REPLACEMENT Left 2016    knee   • KNEE ARTHROPLASTY Left    • KNEE ARTHROSCOPY  2009   • KNEE SURGERY Left 2009    torn meniscus   • OOPHORECTOMY     • [place] COLONOSCOPY FLX DX W/COLLJ SPEC WHEN PFRMD N/A 5/31/2018    Procedure: COLONOSCOPY;  Surgeon: Morteza Rivas MD;  Location: [place] [address] GI LAB; Service: Gastroenterology   • REDUCTION MAMMAPLASTY  2000   • SACRAL NERVE STIMULATOR PLACEMENT N/A 1/31/2022    Procedure: IMPLANTATION NEUROSTIMULATOR ELECTRODE ARRAY SACRAL NERVE; INSERTION NEUROSTIMULATOR; FLUORO; ELECTONIC ANALYSIS;  Surgeon: Judith Gonzalez MD;  Location: [facility] OR;  Service: UroGynecology          • TONSILLECTOMY AND ADENOIDECTOMY     • URETHRA SURGERY       Allergies   Allergen Reactions   • Celecoxib Other (See Comments)     Other reaction(s): Palpitations  Category:  Adverse Reaction;      Current Outpatient Medications on File Prior to Visit   Medication Sig Dispense Refill   • acetaminophen (TYLENOL) 500 mg tablet Take 500 mg by mouth 3 (three) times a day as needed       • acetaminophen-codeine (TYLENOL #3) 300-30 mg per tablet Take 1 tablet by mouth every 4 (four) hours as needed for moderate pain 30 tablet 1   • albuterol (PROVENTIL HFA,VENTOLIN HFA) 90 mcg/act inhaler Inhale 2 puffs every 6 (six) hours as needed for wheezing or shortness of breath 1 Inhaler 5   • aspirin 325 mg tablet Take 325 mg by mouth daily Patient takes 2 daily for arthritis     • Cranberry 450 MG CAPS daily     • cyanocobalamin (VITAMIN B-12) 1000 MCG tablet Take 1,000 mcg by mouth daily     • docusate sodium (COLACE) 100 mg capsule Take 100 mg by mouth 2 (two) times a day     • estradiol (ESTRACE VAGINAL) 0 1 mg/g vaginal cream Insert 1 g into the vagina 3 (three) times a week 42 5 g 6   • fluticasone (Flovent HFA) 220 mcg/act inhaler Inhale 2 puffs 2 (two) times a day Rinse mouth after use 36 g 3   • furosemide (LASIX) 40 mg tablet TAKE 1 TABLET BY MOUTH EVERY DAY IN THE MORNING 90 tablet 1   • Magnesium Oxide -Mg Supplement 400 MG CAPS Take 1 capsule by mouth every morning       • methenamine hippurate (HIPREX) 1 g tablet TAKE 1 TABLET BY MOUTH 2 TIMES A DAY WITH MEALS  60 tablet 4   • potassium chloride (Klor-Con) 10 mEq tablet TAKE 1 TABLET (10 MEQ TOTAL) BY MOUTH IN THE MORNING 90 tablet 1   • pravastatin (PRAVACHOL) 10 mg tablet TAKE 1 TABLET BY MOUTH EVERY DAY 90 tablet 3   • senna (SENOKOT) 8 6 MG tablet Take 1 tablet by mouth as needed for constipation     • [DISCONTINUED] omeprazole (PriLOSEC OTC) 20 MG tablet every 24 hours     • [DISCONTINUED] saccharomyces boulardii (FLORASTOR) 250 mg capsule Take 1 capsule (250 mg total) by mouth in the morning 30 capsule 6     No current facility-administered medications on file prior to visit  Social History     Socioeconomic History   • Marital status: /Civil Union     Spouse name: Not on file   • Number of children: Not on file   • Years of education: Not on file   • Highest education level: Not on file   Occupational History   • Occupation: retired   Tobacco Use   • Smoking status: Never     Passive exposure: Never   • Smokeless tobacco: Never   Vaping Use   • Vaping Use: Never used   Substance and Sexual Activity   • Alcohol use: Not Currently   • Drug use: No   • Sexual activity: Not Currently   Other Topics Concern   • Not on file   Social History Narrative    Lack of adequate sleep-4-6 hrs/night    Lack of exercise     Social Determinants of Health     Financial Resource Strain: Low Risk    • Difficulty of Paying Living Expenses: Not very hard   Food Insecurity: Not on file   Transportation Needs: No Transportation Needs   • Lack of Transportation (Medical): No   • Lack of Transportation (Non-Medical):  No   Physical Activity: Not on file   Stress: Not on file   Social Connections: Not on file   Intimate Partner Violence: Not on "file   Housing Stability: Not on file         I have reviewed the past medical, surgical, social and family history, medications and allergies as documented in the EMR  Review of systems: ROS is negative other than that noted in the HPI  Constitutional: Negative for fatigue and fever  HENT: Negative for sore throat  Respiratory: Negative for shortness of breath  Cardiovascular: Negative for chest pain  Gastrointestinal: Negative for abdominal pain  Endocrine: Negative for cold intolerance and heat intolerance  Genitourinary: Negative for flank pain  Musculoskeletal: Negative for back pain  Skin: Negative for rash  Allergic/Immunologic: Negative for immunocompromised state  Neurological: Negative for dizziness  Psychiatric/Behavioral: Negative for agitation  Physical Exam:    Blood pressure 118/76, pulse 80, height 5' 6\" (1 676 m), weight 88 9 kg (196 lb)  General/Constitutional: NAD, well developed, well nourished  HENT: Normocephalic, atraumatic  CV: Intact distal pulses, regular rate  Resp: No respiratory distress or labored breathing  Abdomen: soft, nondistended   Lymphatic: No lymphadenopathy palpated  Neuro: Alert and Oriented x 3, no focal deficits  Psych: Normal mood, normal affect  Skin: Warm, dry, no rashes, no erythema      Knee Exam:   Large area of soft tissue swelling over the medial proximal tibia on the right  No overlying skin changes  Severe tenderness over the proximal medial tibia as well as medial joint line  Range of motion from 0 to 125  Knee is stable to varus stress, valgus stress, Lachman, and posterior drawer  Neurovascularly intact distally    Knee Imaging    X-rays of the right knee reviewed and interpreted today  These show mild degenerative changes    There is cortical expansion and irregularity over the medial proximal tibia without any visualized fracture    "

## 2023-05-23 PROBLEM — R22.41 MASS OF RIGHT KNEE: Status: ACTIVE | Noted: 2023-05-23

## 2023-05-26 DIAGNOSIS — R60.9 EDEMA, UNSPECIFIED TYPE: ICD-10-CM

## 2023-05-26 RX ORDER — POTASSIUM CHLORIDE 750 MG/1
10 TABLET, FILM COATED, EXTENDED RELEASE ORAL DAILY
Qty: 90 TABLET | Refills: 1 | Status: SHIPPED | OUTPATIENT
Start: 2023-05-26

## 2023-05-26 RX ORDER — FUROSEMIDE 40 MG/1
TABLET ORAL
Qty: 90 TABLET | Refills: 1 | Status: SHIPPED | OUTPATIENT
Start: 2023-05-26

## 2023-06-09 ENCOUNTER — HOSPITAL ENCOUNTER (OUTPATIENT)
Dept: RADIOLOGY | Facility: HOSPITAL | Age: 78
Discharge: HOME | End: 2023-06-09
Payer: MEDICARE

## 2023-06-09 DIAGNOSIS — Z96.82 SACRAL NERVE STIMULATOR PRESENT: ICD-10-CM

## 2023-06-09 DIAGNOSIS — R22.41 MASS OF RIGHT KNEE: ICD-10-CM

## 2023-06-09 PROCEDURE — A9585 GADOBUTROL INJECTION: HCPCS | Performed by: ORTHOPAEDIC SURGERY

## 2023-06-09 PROCEDURE — G1004 CDSM NDSC: HCPCS

## 2023-06-09 PROCEDURE — 73723 MRI JOINT LWR EXTR W/O&W/DYE: CPT

## 2023-06-09 RX ADMIN — GADOBUTROL 9 ML: 604.72 INJECTION INTRAVENOUS at 13:43

## 2023-06-23 ENCOUNTER — OFFICE VISIT (OUTPATIENT)
Dept: OBGYN CLINIC | Facility: CLINIC | Age: 78
End: 2023-06-23
Payer: MEDICARE

## 2023-06-23 VITALS
WEIGHT: 196 LBS | HEART RATE: 80 BPM | BODY MASS INDEX: 31.5 KG/M2 | HEIGHT: 66 IN | DIASTOLIC BLOOD PRESSURE: 75 MMHG | SYSTOLIC BLOOD PRESSURE: 118 MMHG

## 2023-06-23 DIAGNOSIS — M17.11 PRIMARY OSTEOARTHRITIS OF RIGHT KNEE: Primary | ICD-10-CM

## 2023-06-23 PROCEDURE — 99213 OFFICE O/P EST LOW 20 MIN: CPT | Performed by: ORTHOPAEDIC SURGERY

## 2023-06-23 NOTE — PROGRESS NOTES
Ortho Sports Medicine New Patient Visit     Assesment:   68 y o  female with right primary knee osteoarthritis    Plan:    Upon MRI review, there is no evidence of medial proximal tibia lesion or fracture, and MRI was significant only for degenerative changes in the knee  We discussed that meniscus tear as noted on MRI can occur as a result of having arthritis present in the knee, but are not treated in isolation in the presence of degenerative changes as persistent pain, flare of symptoms following surgery, and inevitable progression of arthritis following menisectomy can occur  We had a long discussion regarding right knee degenerative joint disease, including treatment options  We discussed non-operative treatment options, such as physical therapy,  brace, voltaren gel, Tylenol, steroid injections, and viscosupplementation injections  I believe a referral to PT will help with strength and mobility as well as improve symptoms  The  brace works to take pressure off the part of the knee where the arthritis is most prominent  Although the patient reports instability, there is no gapping with valgus stress on exam and I do not believe the brace's function would be beneficial for the patient at this time  Additionally, the patient has been wearing a brace at home that provides adequate support and stability  Voltaren gel can be applied to the painful area on the knee and Tylenol can be taken as needed  We discussed the options for injections, including the risks, benefits, and alternatives  Because the patient received a CSI with short-term, adequate relief, another steroid injection is not recommended today  Visco injections require prior authorization by the patient's insurance, so the patient can follow up once the injection is improved  She received great long-term relief from Visco in the past on the other knee   We can also discuss alternating Visco and steroid injections in the future as needed "based on her symptoms  Chief Complaint   Patient presents with   • Right Knee - Follow-up       History of Present Illness: The patient is a 68 y o  female presenting for follow up of right knee MRI review  The patient reports that her symptoms have improved since receiving a CSI at her last visit, stating she had significant relief from the injection for 3 weeks and continues to have some lingering effect such that her pain is more tolerable than before  She continues to have achy pain localized to the medial and lateral aspects of the knee, but pain is increased to an 8/10 after prolonged periods of activity and by the end of the day  However, Dea Mariee denies further swelling within the knee joint or around the medial proximal tibia as evidenced last visit  The patient does report instability with extended periods of walking, which would occur daily, but she has been using an OTC knee brace that provides adequate support and stability, preventing as frequent instability events  The patient denies weakness, locking episodes, or distal paresthesias  She has tried Tylenol and voltaren gel with moderate relief  The patient has not attended PT so far for this knee, but did like attending PT for her left knee OA in the past  The patient also obtained long-term relief from Visco injections for her left knee OA, but has not had Visco injections for the right knee so far        Knee Surgical History:  Left TKA    Past Medical, Social and Family History:  Past Medical History:   Diagnosis Date   • Anesthesia complication     \"shivers\"   • Arthritis    • Asthma    • 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 see colors, with over-use goes darker- from untreated amblyopia   • Breathing difficulty     with extubation pt developed laryngeal spasm   • Cataract     both eyes- right eye needs surgery   • Colon polyps 2016   • Diverticulitis     had episode in April 2018   • Edema     lower legs   • Generalized osteoarthritis    • GERD " (gastroesophageal reflux disease)    • Hearing problem     unspecified laterality-hearing aid right ear   • High cholesterol    • History of transfusion     autologus   • Incontinence of urine    • Polyarthritis     last assessed: 6/26/2015   • Rheumatoid arthritis ([facility] [address] )    • Sjogren's syndrome ([facility] [address] )     last assessed: 7/18/2016   • SS-A antibody positive     last assessed: 8/20/2015   • Urinary tract infection    • UTI (urinary tract infection)      Past Surgical History:   Procedure Laterality Date   • APPENDECTOMY     • AUGMENTATION BREAST Bilateral     breast surgery enlargement procedure elective bilateral   • BILATERAL OOPHORECTOMY Bilateral 2003   • BLADDER SURGERY      bladder lift,cystocele   • CATARACT EXTRACTION      Right 9/2018; Left 10/2018   • COLONOSCOPY  04/18/2011   • COLPORRHAPHY  2014    anterior, repair of cystocele   • HYSTERECTOMY  1673    with cystocele    • JOINT REPLACEMENT Left 2016    knee   • KNEE ARTHROPLASTY Left    • KNEE ARTHROSCOPY  2009   • KNEE SURGERY Left 2009    torn meniscus   • OOPHORECTOMY     • [place] COLONOSCOPY FLX DX W/COLLJ SPEC WHEN PFRMD N/A 5/31/2018    Procedure: COLONOSCOPY;  Surgeon: Marilee Ramos MD;  Location: [place] [address] GI LAB; Service: Gastroenterology   • REDUCTION MAMMAPLASTY  2000   • SACRAL NERVE STIMULATOR PLACEMENT N/A 1/31/2022    Procedure: IMPLANTATION NEUROSTIMULATOR ELECTRODE ARRAY SACRAL NERVE; INSERTION NEUROSTIMULATOR; FLUORO; ELECTONIC ANALYSIS;  Surgeon: Georgia Briceño MD;  Location: [facility] OR;  Service: UroGynecology          • TONSILLECTOMY AND ADENOIDECTOMY     • URETHRA SURGERY       Allergies   Allergen Reactions   • Celecoxib Other (See Comments)     Other reaction(s): Palpitations  Category:  Adverse Reaction;      Current Outpatient Medications on File Prior to Visit   Medication Sig Dispense Refill   • acetaminophen (TYLENOL) 500 mg tablet Take 500 mg by mouth 3 (three) times a day as needed       • acetaminophen-codeine (TYLENOL #3) 300-30 mg per tablet Take 1 tablet by mouth every 4 (four) hours as needed for moderate pain 30 tablet 1   • albuterol (PROVENTIL HFA,VENTOLIN HFA) 90 mcg/act inhaler Inhale 2 puffs every 6 (six) hours as needed for wheezing or shortness of breath 1 Inhaler 5   • aspirin 325 mg tablet Take 325 mg by mouth daily Patient takes 2 daily for arthritis     • Cranberry 450 MG CAPS daily     • cyanocobalamin (VITAMIN B-12) 1000 MCG tablet Take 1,000 mcg by mouth daily     • docusate sodium (COLACE) 100 mg capsule Take 100 mg by mouth 2 (two) times a day     • estradiol (ESTRACE VAGINAL) 0 1 mg/g vaginal cream Insert 1 g into the vagina 3 (three) times a week 42 5 g 6   • fluticasone (Flovent HFA) 220 mcg/act inhaler Inhale 2 puffs 2 (two) times a day Rinse mouth after use 36 g 3   • furosemide (LASIX) 40 mg tablet TAKE 1 TABLET BY MOUTH EVERY DAY IN THE MORNING 90 tablet 1   • Magnesium Oxide -Mg Supplement 400 MG CAPS Take 1 capsule by mouth every morning       • methenamine hippurate (HIPREX) 1 g tablet TAKE 1 TABLET BY MOUTH 2 TIMES A DAY WITH MEALS  60 tablet 4   • potassium chloride (Klor-Con) 10 mEq tablet TAKE 1 TABLET (10 MEQ TOTAL) BY MOUTH IN THE MORNING 90 tablet 1   • pravastatin (PRAVACHOL) 10 mg tablet TAKE 1 TABLET BY MOUTH EVERY DAY 90 tablet 3   • senna (SENOKOT) 8 6 MG tablet Take 1 tablet by mouth as needed for constipation     • LORazepam (ATIVAN) 1 mg tablet Take 1 tablet (1 mg total) by mouth 1 (one) time for 1 dose 1 tablet 0     No current facility-administered medications on file prior to visit       Social History     Socioeconomic History   • Marital status: /Civil Union     Spouse name: Not on file   • Number of children: Not on file   • Years of education: Not on file   • Highest education level: Not on file   Occupational History   • Occupation: retired   Tobacco Use   • Smoking status: Never     Passive exposure: Never   • Smokeless tobacco: Never   Vaping Use   • Vaping Use: Never used "  Substance and Sexual Activity   • Alcohol use: Not Currently   • Drug use: No   • Sexual activity: Not Currently   Other Topics Concern   • Not on file   Social History Narrative    Lack of adequate sleep-4-6 hrs/night    Lack of exercise     Social Determinants of Health     Financial Resource Strain: Low Risk  (4/26/2023)    Overall Financial Resource Strain (CARDIA)    • Difficulty of Paying Living Expenses: Not very hard   Food Insecurity: Not on file   Transportation Needs: No Transportation Needs (4/26/2023)    PRAPARE - Transportation    • Lack of Transportation (Medical): No    • Lack of Transportation (Non-Medical): No   Physical Activity: Not on file   Stress: Not on file   Social Connections: Not on file   Intimate Partner Violence: Not on file   Housing Stability: Not on file         I have reviewed the past medical, surgical, social and family history, medications and allergies as documented in the EMR  Review of systems: ROS is negative other than that noted in the HPI  Constitutional: Negative for fatigue and fever  HENT: Negative for sore throat  Respiratory: Negative for shortness of breath  Cardiovascular: Negative for chest pain  Gastrointestinal: Negative for abdominal pain  Endocrine: Negative for cold intolerance and heat intolerance  Genitourinary: Negative for flank pain  Musculoskeletal: Negative for back pain  Skin: Negative for rash  Allergic/Immunologic: Negative for immunocompromised state  Neurological: Negative for dizziness  Psychiatric/Behavioral: Negative for agitation  Physical Exam:    Blood pressure 118/75, pulse 80, height 5' 6\" (1 676 m), weight 88 9 kg (196 lb)      General/Constitutional: NAD, well developed, well nourished  HENT: Normocephalic, atraumatic  CV: Intact distal pulses, regular rate  Resp: No respiratory distress or labored breathing  Lymphatic: No lymphadenopathy palpated  Neuro: Alert and Oriented x 3, no focal deficits  Psych: " Normal mood, normal affect  Skin: Warm, dry, no rashes, no erythema      Knee Exam:   No significant skin lesions or deformity  No joint effusion  Range of motion from 0 to 135 without pain  Limited patellar mobility  Mild lateral > medial joint line tenderness Knee is stable to varus stress, valgus stress, Lachman, and posterior drawer  Neurovascularly intact distally      Knee Imaging    X-rays of the right knee reviewed from 5/22/23, which show mild degenerative changes  There is cortical expansion and irregularity over the medial proximal tibia without any visualized fracture  MRI of the right knee reviewed from 6/9/23, which demonstrates lateral meniscus tear as well as Grade 3 cartilage loss along the medial patella and full-thickness cartilage loss at the weight bearing aspect of the medial femoral condyle  No evidence of medial tibial plateau bone lesion

## 2023-07-02 DIAGNOSIS — R60.9 EDEMA, UNSPECIFIED TYPE: ICD-10-CM

## 2023-07-03 RX ORDER — FUROSEMIDE 40 MG/1
40 TABLET ORAL EVERY MORNING
Qty: 90 TABLET | Refills: 1 | Status: SHIPPED | OUTPATIENT
Start: 2023-07-03

## 2023-07-23 DIAGNOSIS — E78.2 MIXED HYPERLIPIDEMIA: ICD-10-CM

## 2023-07-24 RX ORDER — PRAVASTATIN SODIUM 10 MG
TABLET ORAL
Qty: 90 TABLET | Refills: 3 | Status: SHIPPED | OUTPATIENT
Start: 2023-07-24

## 2023-07-26 ENCOUNTER — RA CDI HCC (OUTPATIENT)
Dept: OTHER | Facility: HOSPITAL | Age: 78
End: 2023-07-26

## 2023-07-26 NOTE — PROGRESS NOTES
[facility] coding opportunities       Chart reviewed, no opportunity found: CHART REVIEWED, NO OPPORTUNITY FOUND        Patients Insurance     Medicare Insurance: Medicare

## 2023-07-27 ENCOUNTER — TELEPHONE (OUTPATIENT)
Dept: ADMINISTRATIVE | Facility: OTHER | Age: 78
End: 2023-07-27

## 2023-07-27 NOTE — TELEPHONE ENCOUNTER
07/27/23 1:22 PM    Patient contacted (left message) to bring ACP document to next scheduled pcp visit. Thank you.   Bri Almazan  [facility]

## 2023-07-31 ENCOUNTER — OFFICE VISIT (OUTPATIENT)
Dept: FAMILY MEDICINE CLINIC | Facility: CLINIC | Age: 78
End: 2023-07-31
Payer: MEDICARE

## 2023-07-31 VITALS
TEMPERATURE: 97.8 F | HEART RATE: 76 BPM | HEIGHT: 66 IN | BODY MASS INDEX: 30.86 KG/M2 | DIASTOLIC BLOOD PRESSURE: 70 MMHG | WEIGHT: 192 LBS | SYSTOLIC BLOOD PRESSURE: 110 MMHG | RESPIRATION RATE: 18 BRPM | OXYGEN SATURATION: 98 %

## 2023-07-31 DIAGNOSIS — R73.01 IMPAIRED FASTING GLUCOSE: Primary | ICD-10-CM

## 2023-07-31 DIAGNOSIS — R73.01 IFG (IMPAIRED FASTING GLUCOSE): ICD-10-CM

## 2023-07-31 DIAGNOSIS — J45.30 MILD PERSISTENT ASTHMA WITHOUT COMPLICATION: ICD-10-CM

## 2023-07-31 DIAGNOSIS — N18.31 STAGE 3A CHRONIC KIDNEY DISEASE (HCC): ICD-10-CM

## 2023-07-31 DIAGNOSIS — E78.5 HYPERLIPIDEMIA, UNSPECIFIED HYPERLIPIDEMIA TYPE: ICD-10-CM

## 2023-07-31 DIAGNOSIS — E78.2 MIXED HYPERLIPIDEMIA: ICD-10-CM

## 2023-07-31 PROCEDURE — 99214 OFFICE O/P EST MOD 30 MIN: CPT | Performed by: FAMILY MEDICINE

## 2023-07-31 RX ORDER — ALBUTEROL SULFATE 90 UG/1
2 AEROSOL, METERED RESPIRATORY (INHALATION) EVERY 6 HOURS PRN
Qty: 8.5 G | Refills: 3 | Status: SHIPPED | OUTPATIENT
Start: 2023-07-31

## 2023-07-31 NOTE — ASSESSMENT & PLAN NOTE
Has been needing her daily inhaler   Dealing with the stress of her  being home on hospice Patient here today for EKG test.

## 2023-07-31 NOTE — PROGRESS NOTES
Name: Uma Soliman      : 1945      MRN: [id number]  Encounter Provider: Tee Keita DO  Encounter Date: 2023   Encounter department: [facility]     1. Impaired fasting glucose  Assessment & Plan:  Stable  We will check labs before her next appointment    Orders:  -     Lipid Panel with Direct LDL reflex; Future; Expected date: 10/14/2023    2. Mixed hyperlipidemia  Assessment & Plan:  Stable on pravastatin 10 mg daily    Orders:  -     Comprehensive metabolic panel; Future; Expected date: 10/14/2023  -     Hemoglobin A1C; Future; Expected date: 10/14/2023    3. Stage 3a chronic kidney disease [facility])  Assessment & Plan:  Lab Results   Component Value Date    EGFR 53 2023    EGFR 59 10/11/2022    EGFR 64 2022    CREATININE 1.02 2023    CREATININE 0.93 10/11/2022    CREATININE 0.88 2022   Stable  We did discuss that Voltaren gel is an NSAID and goes to her kidneys, though she using the topical version  We will check labs before next appointment    Orders:  -     CBC; Future; Expected date: 10/14/2023  -     Comprehensive metabolic panel; Future; Expected date: 10/14/2023    4. Mild persistent asthma without complication  Assessment & Plan:  Has been needing her daily inhaler   Dealing with the stress of her  being home on hospice    Orders:  -     albuterol (PROVENTIL HFA,VENTOLIN HFA) 90 mcg/act inhaler; Inhale 2 puffs every 6 (six) hours as needed for wheezing or shortness of breath    5. Hyperlipidemia, unspecified hyperlipidemia type  -     Lipid Panel with Direct LDL reflex; Future; Expected date: 10/14/2023    6. IFG (impaired fasting glucose)  -     Hemoglobin A1C; Future; Expected date: 10/14/2023         Return in about 3 months (around 10/31/2023) for Next scheduled follow up.     Subjective      She stopped taking the tylenol #3  She is using voltaren gel  She saw an orthopedist and has a torn meniscus  She has not done PT yet because her gita is home on hospice. Voltaren is giving her better relief than the Tylenol with codeine did    Review of Systems    Current Outpatient Medications on File Prior to Visit   Medication Sig   • acetaminophen (TYLENOL) 500 mg tablet Take 500 mg by mouth 3 (three) times a day as needed     • aspirin 325 mg tablet Take 325 mg by mouth daily Patient takes 2 daily for arthritis   • Cranberry 450 MG CAPS daily   • cyanocobalamin (VITAMIN B-12) 1000 MCG tablet Take 1,000 mcg by mouth daily   • Diclofenac Sodium (VOLTAREN) 1 % Apply 2 g topically 4 (four) times a day   • docusate sodium (COLACE) 100 mg capsule Take 100 mg by mouth 2 (two) times a day   • estradiol (ESTRACE VAGINAL) 0.1 mg/g vaginal cream Insert 1 g into the vagina 3 (three) times a week   • fluticasone (Flovent HFA) 220 mcg/act inhaler Inhale 2 puffs 2 (two) times a day Rinse mouth after use   • furosemide (LASIX) 40 mg tablet Take 1 tablet (40 mg total) by mouth every morning   • Magnesium Oxide -Mg Supplement 400 MG CAPS Take 1 capsule by mouth every morning     • methenamine hippurate (HIPREX) 1 g tablet TAKE 1 TABLET BY MOUTH 2 TIMES A DAY WITH MEALS.    • potassium chloride (Klor-Con) 10 mEq tablet TAKE 1 TABLET (10 MEQ TOTAL) BY MOUTH IN THE MORNING   • pravastatin (PRAVACHOL) 10 mg tablet TAKE 1 TABLET BY MOUTH EVERY DAY   • senna (SENOKOT) 8.6 MG tablet Take 1 tablet by mouth as needed for constipation   • [DISCONTINUED] albuterol (PROVENTIL HFA,VENTOLIN HFA) 90 mcg/act inhaler Inhale 2 puffs every 6 (six) hours as needed for wheezing or shortness of breath   • [DISCONTINUED] acetaminophen-codeine (TYLENOL #3) 300-30 mg per tablet Take 1 tablet by mouth every 4 (four) hours as needed for moderate pain   • [DISCONTINUED] LORazepam (ATIVAN) 1 mg tablet Take 1 tablet (1 mg total) by mouth 1 (one) time for 1 dose       Objective     /70   Pulse 76   Temp 97.8 °F (36.6 °C)   Resp 18   Ht 5' 6" (1.676 m)   Wt 87.1 kg (192 lb) SpO2 98%   BMI 30.99 kg/m²     Physical Exam  Vitals and nursing note reviewed. Constitutional:       Appearance: She is well-developed. HENT:      Head: Normocephalic and atraumatic. Right Ear: Tympanic membrane and external ear normal.      Left Ear: Tympanic membrane and external ear normal.   Cardiovascular:      Rate and Rhythm: Normal rate and regular rhythm. Heart sounds: Normal heart sounds. No murmur heard. No friction rub. Pulmonary:      Effort: Pulmonary effort is normal. No respiratory distress. Breath sounds: Normal breath sounds. No wheezing or rales. Musculoskeletal:         General: Tenderness (right knee) present. Right lower leg: No edema. Left lower leg: No edema.        Volodymyr Murray DO

## 2023-07-31 NOTE — ASSESSMENT & PLAN NOTE
Lab Results   Component Value Date    EGFR 53 04/26/2023    EGFR 59 10/11/2022    EGFR 64 04/21/2022    CREATININE 1.02 04/26/2023    CREATININE 0.93 10/11/2022    CREATININE 0.88 04/21/2022   Stable  We did discuss that Voltaren gel is an NSAID and goes to her kidneys, though she using the topical version  We will check labs before next appointment

## 2023-08-04 ENCOUNTER — PROCEDURE VISIT (OUTPATIENT)
Dept: OBGYN CLINIC | Facility: CLINIC | Age: 78
End: 2023-08-04
Payer: MEDICARE

## 2023-08-04 DIAGNOSIS — M17.11 PRIMARY OSTEOARTHRITIS OF RIGHT KNEE: Primary | ICD-10-CM

## 2023-08-04 PROCEDURE — 20610 DRAIN/INJ JOINT/BURSA W/O US: CPT

## 2023-08-04 NOTE — PROGRESS NOTES
Large joint arthrocentesis: R knee  Universal Protocol:  Consent: Verbal consent obtained. Risks and benefits: risks, benefits and alternatives were discussed  Consent given by: patient  Timeout called at: 8/4/2023 1:55 PM.  Patient understanding: patient states understanding of the procedure being performed  Patient consent: the patient's understanding of the procedure matches consent given  Site marked: the operative site was marked  Radiology Images displayed and confirmed. If images not available, report reviewed: imaging studies available  Patient identity confirmed: verbally with patient    Supporting Documentation  Indications: pain   Procedure Details  Location: knee - R knee  Needle size: 22 G  Ultrasound guidance: no  Approach: lateral  Medications administered: 3 mL sodium hyaluronate 60 MG/3ML    Patient tolerance: patient tolerated the procedure well with no immediate complications      Patient presents for Durolane injection today. She states her pain has been tolerable until the past week or so as her activity has increased. Pain is currently a 7-8/10 today. The patient reports more cracking sensations in the knee as well. She is currently taking care of her  who is on hospice and is unable to attend formal PT now, but has intentions to within the next month or so.

## 2023-08-22 ENCOUNTER — APPOINTMENT (EMERGENCY)
Dept: RADIOLOGY | Facility: HOSPITAL | Age: 78
DRG: 522 | End: 2023-08-22
Payer: MEDICARE

## 2023-08-22 ENCOUNTER — APPOINTMENT (EMERGENCY)
Dept: CT IMAGING | Facility: HOSPITAL | Age: 78
DRG: 522 | End: 2023-08-22
Payer: MEDICARE

## 2023-08-22 ENCOUNTER — HOSPITAL ENCOUNTER (INPATIENT)
Facility: HOSPITAL | Age: 78
LOS: 3 days | Discharge: NURSING, REHAB OR LONG-TERM CARE | DRG: 522 | End: 2023-08-26
Attending: EMERGENCY MEDICINE | Admitting: SURGERY
Payer: MEDICARE

## 2023-08-22 DIAGNOSIS — S72.001A CLOSED FRACTURE OF RIGHT HIP, INITIAL ENCOUNTER (HCC): ICD-10-CM

## 2023-08-22 DIAGNOSIS — J45.30 ASTHMA, MILD PERSISTENT: Primary | ICD-10-CM

## 2023-08-22 DIAGNOSIS — M54.9 ACUTE BACK PAIN: ICD-10-CM

## 2023-08-22 DIAGNOSIS — M25.551 ACUTE RIGHT HIP PAIN: ICD-10-CM

## 2023-08-22 DIAGNOSIS — S72.001A CLOSED FRACTURE OF NECK OF RIGHT FEMUR, INITIAL ENCOUNTER (HCC): ICD-10-CM

## 2023-08-22 DIAGNOSIS — R03.0 ELEVATED BLOOD PRESSURE READING: ICD-10-CM
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
## 2023-08-22 LAB
ABO GROUP BLD: NORMAL
ALBUMIN SERPL BCP-MCNC: 4.4 G/DL (ref 3.5–5)
ALP SERPL-CCNC: 69 U/L (ref 34–104)
ALT SERPL W P-5'-P-CCNC: 24 U/L (ref 7–52)
ANION GAP SERPL CALCULATED.3IONS-SCNC: 11 MMOL/L
AST SERPL W P-5'-P-CCNC: 22 U/L (ref 13–39)
BASOPHILS # BLD AUTO: 0.02 THOUSANDS/ÂΜL (ref 0–0.1)
BASOPHILS NFR BLD AUTO: 0 % (ref 0–1)
BILIRUB SERPL-MCNC: 0.89 MG/DL (ref 0.2–1)
BLD GP AB SCN SERPL QL: NEGATIVE
BUN SERPL-MCNC: 33 MG/DL (ref 5–25)
CALCIUM SERPL-MCNC: 9.4 MG/DL (ref 8.4–10.2)
CHLORIDE SERPL-SCNC: 100 MMOL/L (ref 96–108)
CO2 SERPL-SCNC: 29 MMOL/L (ref 21–32)
CREAT SERPL-MCNC: 0.91 MG/DL (ref 0.6–1.3)
EOSINOPHIL # BLD AUTO: 0.02 THOUSAND/ÂΜL (ref 0–0.61)
EOSINOPHIL NFR BLD AUTO: 0 % (ref 0–6)
ERYTHROCYTE [DISTWIDTH] IN BLOOD BY AUTOMATED COUNT: 14.3 % (ref 11.6–15.1)
GFR SERPL CREATININE-BSD FRML MDRD: 60 ML/MIN/1.73SQ M
GLUCOSE SERPL-MCNC: 89 MG/DL (ref 65–140)
HCT VFR BLD AUTO: 41.1 % (ref 34.8–46.1)
HGB BLD-MCNC: 13.4 G/DL (ref 11.5–15.4)
IMM GRANULOCYTES # BLD AUTO: 0.11 THOUSAND/UL (ref 0–0.2)
IMM GRANULOCYTES NFR BLD AUTO: 1 % (ref 0–2)
LYMPHOCYTES # BLD AUTO: 1.64 THOUSANDS/ÂΜL (ref 0.6–4.47)
LYMPHOCYTES NFR BLD AUTO: 17 % (ref 14–44)
MCH RBC QN AUTO: 28.9 PG (ref 26.8–34.3)
MCHC RBC AUTO-ENTMCNC: 32.6 G/DL (ref 31.4–37.4)
MCV RBC AUTO: 89 FL (ref 82–98)
MONOCYTES # BLD AUTO: 0.66 THOUSAND/ÂΜL (ref 0.17–1.22)
MONOCYTES NFR BLD AUTO: 7 % (ref 4–12)
NEUTROPHILS # BLD AUTO: 7.14 THOUSANDS/ÂΜL (ref 1.85–7.62)
NEUTS SEG NFR BLD AUTO: 75 % (ref 43–75)
NRBC BLD AUTO-RTO: 0 /100 WBCS
PLATELET # BLD AUTO: 258 THOUSANDS/UL (ref 149–390)
PMV BLD AUTO: 9.8 FL (ref 8.9–12.7)
POTASSIUM SERPL-SCNC: 3.5 MMOL/L (ref 3.5–5.3)
PROT SERPL-MCNC: 7.1 G/DL (ref 6.4–8.4)
RBC # BLD AUTO: 4.63 MILLION/UL (ref 3.81–5.12)
RH BLD: POSITIVE
SODIUM SERPL-SCNC: 140 MMOL/L (ref 135–147)
SPECIMEN EXPIRATION DATE: NORMAL
WBC # BLD AUTO: 9.59 THOUSAND/UL (ref 4.31–10.16)

## 2023-08-22 PROCEDURE — 86900 BLOOD TYPING SEROLOGIC ABO: CPT | Performed by: EMERGENCY MEDICINE

## 2023-08-22 PROCEDURE — 36415 COLL VENOUS BLD VENIPUNCTURE: CPT | Performed by: EMERGENCY MEDICINE

## 2023-08-22 PROCEDURE — 74176 CT ABD & PELVIS W/O CONTRAST: CPT

## 2023-08-22 PROCEDURE — 96374 THER/PROPH/DIAG INJ IV PUSH: CPT

## 2023-08-22 PROCEDURE — 96375 TX/PRO/DX INJ NEW DRUG ADDON: CPT

## 2023-08-22 PROCEDURE — 80053 COMPREHEN METABOLIC PANEL: CPT | Performed by: EMERGENCY MEDICINE

## 2023-08-22 PROCEDURE — 99284 EMERGENCY DEPT VISIT MOD MDM: CPT

## 2023-08-22 PROCEDURE — G1004 CDSM NDSC: HCPCS

## 2023-08-22 PROCEDURE — 96376 TX/PRO/DX INJ SAME DRUG ADON: CPT

## 2023-08-22 PROCEDURE — 99284 EMERGENCY DEPT VISIT MOD MDM: CPT | Performed by: EMERGENCY MEDICINE

## 2023-08-22 PROCEDURE — 86901 BLOOD TYPING SEROLOGIC RH(D): CPT | Performed by: EMERGENCY MEDICINE

## 2023-08-22 PROCEDURE — 86850 RBC ANTIBODY SCREEN: CPT | Performed by: EMERGENCY MEDICINE

## 2023-08-22 PROCEDURE — 85025 COMPLETE CBC W/AUTO DIFF WBC: CPT | Performed by: EMERGENCY MEDICINE

## 2023-08-22 RX ORDER — ONDANSETRON 2 MG/ML
4 INJECTION INTRAMUSCULAR; INTRAVENOUS ONCE AS NEEDED
Status: COMPLETED | OUTPATIENT
Start: 2023-08-22 | End: 2023-08-22

## 2023-08-22 RX ORDER — HYDROMORPHONE HCL IN WATER/PF 6 MG/30 ML
0.2 PATIENT CONTROLLED ANALGESIA SYRINGE INTRAVENOUS ONCE
Status: COMPLETED | OUTPATIENT
Start: 2023-08-23 | End: 2023-08-22

## 2023-08-22 RX ORDER — ONDANSETRON 2 MG/ML
4 INJECTION INTRAMUSCULAR; INTRAVENOUS ONCE
Status: DISCONTINUED | OUTPATIENT
Start: 2023-08-22 | End: 2023-08-22

## 2023-08-22 RX ORDER — FENTANYL CITRATE 50 UG/ML
25 INJECTION, SOLUTION INTRAMUSCULAR; INTRAVENOUS ONCE
Status: COMPLETED | OUTPATIENT
Start: 2023-08-22 | End: 2023-08-22

## 2023-08-22 RX ORDER — FENTANYL CITRATE 50 UG/ML
25 INJECTION, SOLUTION INTRAMUSCULAR; INTRAVENOUS ONCE AS NEEDED
Status: COMPLETED | OUTPATIENT
Start: 2023-08-22 | End: 2023-08-22

## 2023-08-22 RX ADMIN — HYDROMORPHONE HYDROCHLORIDE 0.2 MG: 0.2 INJECTION, SOLUTION INTRAMUSCULAR; INTRAVENOUS; SUBCUTANEOUS at 23:48

## 2023-08-22 RX ADMIN — FENTANYL CITRATE 25 MCG: 50 INJECTION INTRAMUSCULAR; INTRAVENOUS at 22:24

## 2023-08-22 RX ADMIN — ONDANSETRON 4 MG: 2 INJECTION INTRAMUSCULAR; INTRAVENOUS at 23:48

## 2023-08-22 RX ADMIN — FENTANYL CITRATE 25 MCG: 50 INJECTION INTRAMUSCULAR; INTRAVENOUS at 21:52

## 2023-08-23 ENCOUNTER — ANESTHESIA EVENT (INPATIENT)
Dept: PERIOP | Facility: HOSPITAL | Age: 78
DRG: 522 | End: 2023-08-23
Payer: MEDICARE

## 2023-08-23 ENCOUNTER — ANESTHESIA (INPATIENT)
Dept: PERIOP | Facility: HOSPITAL | Age: 78
DRG: 522 | End: 2023-08-23
Payer: MEDICARE

## 2023-08-23 ENCOUNTER — APPOINTMENT (INPATIENT)
Dept: RADIOLOGY | Facility: HOSPITAL | Age: 78
DRG: 522 | End: 2023-08-23
Payer: MEDICARE
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
## 2023-08-23 PROBLEM — M05.20 RHEUMATOID ARTERITIS (HCC): Status: ACTIVE | Noted: 2023-08-23

## 2023-08-23 PROBLEM — S72.001A CLOSED FRACTURE OF NECK OF RIGHT FEMUR (HCC): Status: ACTIVE | Noted: 2023-08-23

## 2023-08-23 LAB
ANION GAP SERPL CALCULATED.3IONS-SCNC: 7 MMOL/L
BASOPHILS # BLD AUTO: 0.02 THOUSANDS/ÂΜL (ref 0–0.1)
BASOPHILS NFR BLD AUTO: 0 % (ref 0–1)
BUN SERPL-MCNC: 27 MG/DL (ref 5–25)
CALCIUM SERPL-MCNC: 8.7 MG/DL (ref 8.4–10.2)
CHLORIDE SERPL-SCNC: 106 MMOL/L (ref 96–108)
CO2 SERPL-SCNC: 29 MMOL/L (ref 21–32)
CREAT SERPL-MCNC: 0.82 MG/DL (ref 0.6–1.3)
EOSINOPHIL # BLD AUTO: 0.06 THOUSAND/ÂΜL (ref 0–0.61)
EOSINOPHIL NFR BLD AUTO: 1 % (ref 0–6)
ERYTHROCYTE [DISTWIDTH] IN BLOOD BY AUTOMATED COUNT: 14.4 % (ref 11.6–15.1)
GFR SERPL CREATININE-BSD FRML MDRD: 68 ML/MIN/1.73SQ M
GLUCOSE SERPL-MCNC: 106 MG/DL (ref 65–140)
HCT VFR BLD AUTO: 38.7 % (ref 34.8–46.1)
HGB BLD-MCNC: 12.6 G/DL (ref 11.5–15.4)
IMM GRANULOCYTES # BLD AUTO: 0.05 THOUSAND/UL (ref 0–0.2)
IMM GRANULOCYTES NFR BLD AUTO: 1 % (ref 0–2)
LYMPHOCYTES # BLD AUTO: 1.46 THOUSANDS/ÂΜL (ref 0.6–4.47)
LYMPHOCYTES NFR BLD AUTO: 15 % (ref 14–44)
MAGNESIUM SERPL-MCNC: 2.3 MG/DL (ref 1.9–2.7)
MCH RBC QN AUTO: 29.3 PG (ref 26.8–34.3)
MCHC RBC AUTO-ENTMCNC: 32.6 G/DL (ref 31.4–37.4)
MCV RBC AUTO: 90 FL (ref 82–98)
MONOCYTES # BLD AUTO: 0.81 THOUSAND/ÂΜL (ref 0.17–1.22)
MONOCYTES NFR BLD AUTO: 8 % (ref 4–12)
NEUTROPHILS # BLD AUTO: 7.54 THOUSANDS/ÂΜL (ref 1.85–7.62)
NEUTS SEG NFR BLD AUTO: 75 % (ref 43–75)
NRBC BLD AUTO-RTO: 0 /100 WBCS
PHOSPHATE SERPL-MCNC: 3.4 MG/DL (ref 2.3–4.1)
PLATELET # BLD AUTO: 222 THOUSANDS/UL (ref 149–390)
PMV BLD AUTO: 9.6 FL (ref 8.9–12.7)
POTASSIUM SERPL-SCNC: 3.6 MMOL/L (ref 3.5–5.3)
RBC # BLD AUTO: 4.3 MILLION/UL (ref 3.81–5.12)
SODIUM SERPL-SCNC: 142 MMOL/L (ref 135–147)
WBC # BLD AUTO: 9.94 THOUSAND/UL (ref 4.31–10.16)

## 2023-08-23 PROCEDURE — C1776 JOINT DEVICE (IMPLANTABLE): HCPCS | Performed by: STUDENT IN AN ORGANIZED HEALTH CARE EDUCATION/TRAINING PROGRAM

## 2023-08-23 PROCEDURE — 27236 TREAT THIGH FRACTURE: CPT | Performed by: STUDENT IN AN ORGANIZED HEALTH CARE EDUCATION/TRAINING PROGRAM

## 2023-08-23 PROCEDURE — 96376 TX/PRO/DX INJ SAME DRUG ADON: CPT

## 2023-08-23 PROCEDURE — 99223 1ST HOSP IP/OBS HIGH 75: CPT | Performed by: STUDENT IN AN ORGANIZED HEALTH CARE EDUCATION/TRAINING PROGRAM

## 2023-08-23 PROCEDURE — 83735 ASSAY OF MAGNESIUM: CPT

## 2023-08-23 PROCEDURE — 71045 X-RAY EXAM CHEST 1 VIEW: CPT

## 2023-08-23 PROCEDURE — 99222 1ST HOSP IP/OBS MODERATE 55: CPT | Performed by: SURGERY

## 2023-08-23 PROCEDURE — 85025 COMPLETE CBC W/AUTO DIFF WBC: CPT

## 2023-08-23 PROCEDURE — 84100 ASSAY OF PHOSPHORUS: CPT

## 2023-08-23 PROCEDURE — 0SRR0JZ REPLACEMENT OF RIGHT HIP JOINT, FEMORAL SURFACE WITH SYNTHETIC SUBSTITUTE, OPEN APPROACH: ICD-10-PCS | Performed by: STUDENT IN AN ORGANIZED HEALTH CARE EDUCATION/TRAINING PROGRAM

## 2023-08-23 PROCEDURE — 96375 TX/PRO/DX INJ NEW DRUG ADDON: CPT

## 2023-08-23 PROCEDURE — 73552 X-RAY EXAM OF FEMUR 2/>: CPT

## 2023-08-23 PROCEDURE — 80048 BASIC METABOLIC PNL TOTAL CA: CPT

## 2023-08-23 PROCEDURE — 72170 X-RAY EXAM OF PELVIS: CPT

## 2023-08-23 DEVICE — ARTICUL/EZE FEMORAL HEAD DIAMETER 28MM +1.5 12/14 TAPER
Type: IMPLANTABLE DEVICE | Site: HIP | Status: FUNCTIONAL
Brand: ARTICUL/EZE

## 2023-08-23 DEVICE — ACTIS DUOFIX HIP PROSTHESIS (FEMORAL STEM 12/14 TAPER CEMENTLESS SIZE 4 HIGH COLLAR)  CE
Type: IMPLANTABLE DEVICE | Site: HIP | Status: FUNCTIONAL
Brand: ACTIS

## 2023-08-23 DEVICE — SELF CENTERING BI-POLAR HEAD 28MM ID 49MM OD
Type: IMPLANTABLE DEVICE | Site: HIP | Status: FUNCTIONAL
Brand: SELF CENTERING

## 2023-08-23 RX ORDER — FUROSEMIDE 40 MG/1
40 TABLET ORAL EVERY MORNING
Status: DISCONTINUED | OUTPATIENT
Start: 2023-08-23 | End: 2023-08-26 | Stop reason: HOSPADM

## 2023-08-23 RX ORDER — CEFAZOLIN SODIUM 2 G/50ML
2000 SOLUTION INTRAVENOUS ONCE
Status: COMPLETED | OUTPATIENT
Start: 2023-08-23 | End: 2023-08-23

## 2023-08-23 RX ORDER — BUPIVACAINE HYDROCHLORIDE AND EPINEPHRINE 5; 5 MG/ML; UG/ML
INJECTION, SOLUTION EPIDURAL; INTRACAUDAL; PERINEURAL AS NEEDED
Status: DISCONTINUED | OUTPATIENT
Start: 2023-08-23 | End: 2023-08-23 | Stop reason: HOSPADM

## 2023-08-23 RX ORDER — PRAVASTATIN SODIUM 10 MG
10 TABLET ORAL DAILY
Status: DISCONTINUED | OUTPATIENT
Start: 2023-08-23 | End: 2023-08-26 | Stop reason: HOSPADM

## 2023-08-23 RX ORDER — TRANEXAMIC ACID 10 MG/ML
INJECTION, SOLUTION INTRAVENOUS AS NEEDED
Status: DISCONTINUED | OUTPATIENT
Start: 2023-08-23 | End: 2023-08-23

## 2023-08-23 RX ORDER — MAGNESIUM HYDROXIDE 1200 MG/15ML
LIQUID ORAL AS NEEDED
Status: DISCONTINUED | OUTPATIENT
Start: 2023-08-23 | End: 2023-08-23 | Stop reason: HOSPADM

## 2023-08-23 RX ORDER — METHOCARBAMOL 750 MG/1
750 TABLET, FILM COATED ORAL ONCE
Status: COMPLETED | OUTPATIENT
Start: 2023-08-23 | End: 2023-08-23

## 2023-08-23 RX ORDER — VANCOMYCIN HYDROCHLORIDE 1 G/20ML
INJECTION, POWDER, LYOPHILIZED, FOR SOLUTION INTRAVENOUS AS NEEDED
Status: DISCONTINUED | OUTPATIENT
Start: 2023-08-23 | End: 2023-08-23 | Stop reason: HOSPADM

## 2023-08-23 RX ORDER — FENTANYL CITRATE/PF 50 MCG/ML
25 SYRINGE (ML) INJECTION
Status: DISCONTINUED | OUTPATIENT
Start: 2023-08-23 | End: 2023-08-23 | Stop reason: HOSPADM

## 2023-08-23 RX ORDER — CEFAZOLIN SODIUM 2 G/50ML
2000 SOLUTION INTRAVENOUS EVERY 8 HOURS
Status: COMPLETED | OUTPATIENT
Start: 2023-08-23 | End: 2023-08-24

## 2023-08-23 RX ORDER — ENOXAPARIN SODIUM 100 MG/ML
30 INJECTION SUBCUTANEOUS EVERY 12 HOURS
Status: DISCONTINUED | OUTPATIENT
Start: 2023-08-23 | End: 2023-08-23

## 2023-08-23 RX ORDER — PROPOFOL 10 MG/ML
INJECTION, EMULSION INTRAVENOUS AS NEEDED
Status: DISCONTINUED | OUTPATIENT
Start: 2023-08-23 | End: 2023-08-23

## 2023-08-23 RX ORDER — DEXAMETHASONE SODIUM PHOSPHATE 10 MG/ML
INJECTION, SOLUTION INTRAMUSCULAR; INTRAVENOUS AS NEEDED
Status: DISCONTINUED | OUTPATIENT
Start: 2023-08-23 | End: 2023-08-23

## 2023-08-23 RX ORDER — DIPHENHYDRAMINE HYDROCHLORIDE 50 MG/ML
12.5 INJECTION INTRAMUSCULAR; INTRAVENOUS ONCE AS NEEDED
Status: DISCONTINUED | OUTPATIENT
Start: 2023-08-23 | End: 2023-08-23 | Stop reason: HOSPADM

## 2023-08-23 RX ORDER — ACETAMINOPHEN 325 MG/1
650 TABLET ORAL EVERY 4 HOURS PRN
Status: DISCONTINUED | OUTPATIENT
Start: 2023-08-23 | End: 2023-08-26 | Stop reason: HOSPADM

## 2023-08-23 RX ORDER — ONDANSETRON 2 MG/ML
INJECTION INTRAMUSCULAR; INTRAVENOUS AS NEEDED
Status: DISCONTINUED | OUTPATIENT
Start: 2023-08-23 | End: 2023-08-23

## 2023-08-23 RX ORDER — OXYCODONE HYDROCHLORIDE 5 MG/1
5 TABLET ORAL EVERY 4 HOURS PRN
Status: DISCONTINUED | OUTPATIENT
Start: 2023-08-23 | End: 2023-08-26 | Stop reason: HOSPADM

## 2023-08-23 RX ORDER — AMOXICILLIN 250 MG
1 CAPSULE ORAL
Status: DISCONTINUED | OUTPATIENT
Start: 2023-08-23 | End: 2023-08-26 | Stop reason: HOSPADM

## 2023-08-23 RX ORDER — LIDOCAINE HYDROCHLORIDE 20 MG/ML
INJECTION, SOLUTION EPIDURAL; INFILTRATION; INTRACAUDAL; PERINEURAL AS NEEDED
Status: DISCONTINUED | OUTPATIENT
Start: 2023-08-23 | End: 2023-08-23

## 2023-08-23 RX ORDER — ROCURONIUM BROMIDE 10 MG/ML
INJECTION, SOLUTION INTRAVENOUS AS NEEDED
Status: DISCONTINUED | OUTPATIENT
Start: 2023-08-23 | End: 2023-08-23

## 2023-08-23 RX ORDER — EPHEDRINE SULFATE 50 MG/ML
INJECTION INTRAVENOUS AS NEEDED
Status: DISCONTINUED | OUTPATIENT
Start: 2023-08-23 | End: 2023-08-23

## 2023-08-23 RX ORDER — HYDROMORPHONE HCL/PF 1 MG/ML
SYRINGE (ML) INJECTION AS NEEDED
Status: DISCONTINUED | OUTPATIENT
Start: 2023-08-23 | End: 2023-08-23

## 2023-08-23 RX ORDER — SODIUM CHLORIDE, SODIUM LACTATE, POTASSIUM CHLORIDE, CALCIUM CHLORIDE 600; 310; 30; 20 MG/100ML; MG/100ML; MG/100ML; MG/100ML
INJECTION, SOLUTION INTRAVENOUS CONTINUOUS PRN
Status: DISCONTINUED | OUTPATIENT
Start: 2023-08-23 | End: 2023-08-23

## 2023-08-23 RX ORDER — HYDROMORPHONE HCL IN WATER/PF 6 MG/30 ML
0.2 PATIENT CONTROLLED ANALGESIA SYRINGE INTRAVENOUS EVERY 4 HOURS PRN
Status: DISCONTINUED | OUTPATIENT
Start: 2023-08-23 | End: 2023-08-25

## 2023-08-23 RX ORDER — LIDOCAINE 50 MG/G
1 PATCH TOPICAL DAILY
Status: DISCONTINUED | OUTPATIENT
Start: 2023-08-23 | End: 2023-08-26 | Stop reason: HOSPADM

## 2023-08-23 RX ORDER — ONDANSETRON 2 MG/ML
4 INJECTION INTRAMUSCULAR; INTRAVENOUS ONCE AS NEEDED
Status: DISCONTINUED | OUTPATIENT
Start: 2023-08-23 | End: 2023-08-23 | Stop reason: HOSPADM

## 2023-08-23 RX ORDER — HYDROMORPHONE HCL/PF 1 MG/ML
0.5 SYRINGE (ML) INJECTION
Status: DISCONTINUED | OUTPATIENT
Start: 2023-08-23 | End: 2023-08-23 | Stop reason: HOSPADM

## 2023-08-23 RX ORDER — ONDANSETRON 2 MG/ML
4 INJECTION INTRAMUSCULAR; INTRAVENOUS EVERY 4 HOURS PRN
Status: DISCONTINUED | OUTPATIENT
Start: 2023-08-23 | End: 2023-08-26 | Stop reason: HOSPADM

## 2023-08-23 RX ORDER — FENTANYL CITRATE 50 UG/ML
INJECTION, SOLUTION INTRAMUSCULAR; INTRAVENOUS AS NEEDED
Status: DISCONTINUED | OUTPATIENT
Start: 2023-08-23 | End: 2023-08-23

## 2023-08-23 RX ADMIN — ONDANSETRON 4 MG: 2 INJECTION INTRAMUSCULAR; INTRAVENOUS at 02:09

## 2023-08-23 RX ADMIN — OXYCODONE HYDROCHLORIDE 5 MG: 5 TABLET ORAL at 18:14

## 2023-08-23 RX ADMIN — PRAVASTATIN SODIUM 10 MG: 10 TABLET ORAL at 13:00

## 2023-08-23 RX ADMIN — SUGAMMADEX 200 MG: 100 INJECTION, SOLUTION INTRAVENOUS at 11:30

## 2023-08-23 RX ADMIN — DEXAMETHASONE SODIUM PHOSPHATE 10 MG: 10 INJECTION, SOLUTION INTRAMUSCULAR; INTRAVENOUS at 09:47

## 2023-08-23 RX ADMIN — ONDANSETRON 4 MG: 2 INJECTION INTRAMUSCULAR; INTRAVENOUS at 18:14

## 2023-08-23 RX ADMIN — CEFAZOLIN SODIUM 2000 MG: 2 SOLUTION INTRAVENOUS at 17:30

## 2023-08-23 RX ADMIN — OXYCODONE HYDROCHLORIDE 5 MG: 5 TABLET ORAL at 13:00

## 2023-08-23 RX ADMIN — LIDOCAINE HYDROCHLORIDE 100 MG: 20 INJECTION, SOLUTION EPIDURAL; INFILTRATION; INTRACAUDAL; PERINEURAL at 09:44

## 2023-08-23 RX ADMIN — ROCURONIUM BROMIDE 40 MG: 10 INJECTION, SOLUTION INTRAVENOUS at 09:47

## 2023-08-23 RX ADMIN — PROPOFOL 120 MG: 10 INJECTION, EMULSION INTRAVENOUS at 09:47

## 2023-08-23 RX ADMIN — PROPOFOL 30 MG: 10 INJECTION, EMULSION INTRAVENOUS at 09:51

## 2023-08-23 RX ADMIN — EPHEDRINE SULFATE 10 MG: 50 INJECTION INTRAVENOUS at 11:29

## 2023-08-23 RX ADMIN — FENTANYL CITRATE 50 MCG: 50 INJECTION, SOLUTION INTRAMUSCULAR; INTRAVENOUS at 10:33

## 2023-08-23 RX ADMIN — TRANEXAMIC ACID 1000 MG: 10 INJECTION, SOLUTION INTRAVENOUS at 10:09

## 2023-08-23 RX ADMIN — TRANEXAMIC ACID 1000 MG: 100 INJECTION, SOLUTION INTRAVENOUS at 01:00

## 2023-08-23 RX ADMIN — FENTANYL CITRATE 50 MCG: 50 INJECTION, SOLUTION INTRAMUSCULAR; INTRAVENOUS at 09:44

## 2023-08-23 RX ADMIN — HYDROMORPHONE HYDROCHLORIDE 0.2 MG: 0.2 INJECTION, SOLUTION INTRAMUSCULAR; INTRAVENOUS; SUBCUTANEOUS at 02:28

## 2023-08-23 RX ADMIN — HYDROMORPHONE HYDROCHLORIDE 0.5 MG: 1 INJECTION, SOLUTION INTRAMUSCULAR; INTRAVENOUS; SUBCUTANEOUS at 10:57

## 2023-08-23 RX ADMIN — ROCURONIUM BROMIDE 30 MG: 10 INJECTION, SOLUTION INTRAVENOUS at 10:59

## 2023-08-23 RX ADMIN — METHOCARBAMOL 750 MG: 750 TABLET ORAL at 18:14

## 2023-08-23 RX ADMIN — TRANEXAMIC ACID 870 MG: 100 INJECTION, SOLUTION INTRAVENOUS at 00:50

## 2023-08-23 RX ADMIN — FUROSEMIDE 40 MG: 40 TABLET ORAL at 13:00

## 2023-08-23 RX ADMIN — SODIUM CHLORIDE, SODIUM LACTATE, POTASSIUM CHLORIDE, AND CALCIUM CHLORIDE: .6; .31; .03; .02 INJECTION, SOLUTION INTRAVENOUS at 09:37

## 2023-08-23 RX ADMIN — CEFAZOLIN SODIUM 2000 MG: 2 SOLUTION INTRAVENOUS at 09:51

## 2023-08-23 RX ADMIN — HYDROMORPHONE HYDROCHLORIDE 0.2 MG: 0.2 INJECTION, SOLUTION INTRAMUSCULAR; INTRAVENOUS; SUBCUTANEOUS at 06:03

## 2023-08-23 RX ADMIN — ROCURONIUM BROMIDE 30 MG: 10 INJECTION, SOLUTION INTRAVENOUS at 10:23

## 2023-08-23 RX ADMIN — ONDANSETRON 4 MG: 2 INJECTION INTRAMUSCULAR; INTRAVENOUS at 09:47

## 2023-08-23 NOTE — OP NOTE
OPERATIVE REPORT  PATIENT NAME: Temitope Bourgeois    :  1945  MRN: [id number]  Pt Location: AN OR ROOM 01    SURGERY DATE: 2023    Surgeon(s) and Role:     * Maricela Soulier, DO - Primary      * Cherry Lino MD - Assisting     * Abdiaziz Matos PA-C - Assisting    I was present for the entire procedure. and I was present for all critical portions of the procedure. Preop Diagnosis:  #1 right displaced femoral neck    Post-Op Diagnosis:  #1 right displaced femoral neck fracture    Procedures:  #1 surgical fixation of right displaced femoral neck fracture with a hemiarthroplasty      Specimen(s):  None    Estimated Blood Loss:   50 cc    Drains:  None    Anesthesia Type:   General endotracheal    Operative Indications:  Patient is a 28-year-old female that sustained a ground-level fall resulting in a right displaced femoral neck fracture. The patient was in her home when she had the fall. In order to restore ambulatory status and decrease complications associated with a bedridden status patient was consented for hemiarthroplasty      Implants:   DePuy Actis size 4 high offset stem  +1.5 mm, 28 mm / 49 mm bipolar hemiarthroplasty head    Tourniquet time:   None      Complications:   No acute complications were encountered. Patient was transferred to PACU in stable condition    Operative findings:  Patient had a displaced femoral neck fracture. On the preop proceed with T's we established that there is significant retroversion of the head at the impacted area. Upon dissection down to the hip this was noted with complete displacement of the posterior aspect of the pelvis. There is significant comminution at the level of the fracture. The bipolar hemiarthroplasty was implanted and leg lengths restored with stability anteriorly and posteriorly throughout the range of motion arc. Procedure and Technique:  Patient is a 28-year-old female that was seen and examined in the preoperative holding area. The operative extremities marked. All patient's questions were answered. Patient was then taken the operating room and general endotracheal anesthesia was administered by the part of anesthesia. The patient was then transferred over the operating table in CTL or spine precautions. All bony prominences were well-padded. Patient was placed in lateral decubitus position. Patient was then prepped and draped in a standard sterile orthopedic fashion. A timeout was performed to confirm correct side, correct patient, correct procedure. All in agreement procedure was started. A standard posterior lateral approach was utilized to gain access to the hip using a #10 blade through skin subcutaneous tissues. Electrocautery was used to obtain hemostasis. The fascia overlying the gluteus jakob as well as the iliotibial band were split in line with the incision. A Charnley retractor was inserted to retract these tissues. The sciatic nerve was identified and protected throughout the entirety of the case. The trochanteric bursa was reflected off of the posterior lateral aspect of the femur and reflected posteriorly to expose the short external rotators. The hip was internally rotated and the short external rotators were lifted off of the posterior aspect of the femur with the underlying hip capsule. Capsulotomy was made on the superior aspect of the neck and reflected back to the level of the labrum. This full-thickness flap was then tagged with #5 Ethibond for later repair. The fracture was visualized. Patient has a displaced femoral neck fracture. The inferior and superior aspects of the neck were identified and retracted using a Hohmann retractor. The neck cut was marked and using a sagittal saw the osteotomy for the neck cut was made. The femoral head was then removed using a corkscrew. The acetabulum was copiously irrigated using sterile normal saline and the bulk of the ligamentum teres was removed. Labrum was intact. The femoral head sizer was then utilized with 49 mm achieving the appropriate fit. The femur was then elevated using a femoral elevator to begin preparation of the femoral canal.  A box cutting osteotome was utilized to remove the lateral cortex. A canal finder was used to gain access to the medullary canal and confirmed implant alignment. Sequential broaching was then performed to a size 4 the neck and head trial was then assembled to the stem and the hip was reduced. The hip was brought through range of motion and found to be stable throughout all planes anteriorly and posteriorly. Leg lengths were reapproximated. The trial implants were then removed. The wound was copiously irrigated with sterile normal saline. Gloves were changed. The final implants were assembled on the back table. The femur was once again elevated to allow for implantation of the final stem implant. The head was then impacted onto the stem. 1 last check of the calcar for any fracture propagation was performed. No fracture was noted. The implant was then reduced into the acetabulum and once again leg lengths as well as stability anteriorly and posterior were reassessed and found to be appropriate. The wound was once again copiously irrigated with sterile normal saline. The surrounding tissues were infiltrated with quarter percent Marcaine for postop pain control. Using a 2.5 mm drill drill holes were made in the greater trochanter. A Rent[employer] suture passer was then used to pass the #5 Ethibond sutures from the posterior short external rotators through the greater trochanter. The leg was externally rotated and the sutures tied down over the trochanter. 1 g of vancomycin was then placed deep in the wound. The iliotibial band and gluteus fascia was repaired using a 0 PDS suture. The subcutaneous tissues were repaired using a 2-0 Monocryl suture. The skin was closed with staples.   The wound was then dressed and Mepilex dressings. Patient was then transferred off the operating table using CTL S spine precautions. Leg lengths were checked and found to be equal.  The patient was then extubated and transferred to PACU in stable condition          Postoperative plan:  Patient be weightbearing as tolerated to the right lower extremity. Patient will receive 24 hours of postoperative antibiotics for infection prophylaxis. Patient will be continued on Lovenox while in the hospital and will be discharged on 6 weeks of aspirin 81 mg twice daily for DVT prophylaxis. The patient will ambulate with physical therapy postop day #1. Posterior hip precautions.   Patient will follow-up in 2 weeks for removal of staples    SIGNATURE: Ketty Walsh,   DATE: August 23, 2023  TIME: 12:04 PM

## 2023-08-23 NOTE — ASSESSMENT & PLAN NOTE
Mechanical fall from tripping over cord. No preceding symptoms. No LOC or thinners.  - CT L spine- Mild irregularity of the anterior cortex of L1.  This could represent a minimally displaced compression deformity.  - No neurologic deficits present on exam.    Plan-  - See Femoral fracture

## 2023-08-23 NOTE — ANESTHESIA POSTPROCEDURE EVALUATION
Post-Op Assessment Note    CV Status:  Stable  Pain Score: 0    Pain management: adequate     Mental Status:  Awake   Hydration Status:  Euvolemic   PONV Controlled:  Controlled   Airway Patency:  Patent      Post Op Vitals Reviewed: Yes      Staff: CRNA         No notable events documented.     BP   161/71   Temp     Pulse  81   Resp      SpO2   91 RA

## 2023-08-23 NOTE — DISCHARGE INSTR - AVS FIRST PAGE
Discharge Instructions - Orthopedics  Krishan Bueno 66 y.o. female MRN: [id number]  Unit/Bed#: AN OR MAIN    Weight Bearing Status:                                           WBAT to RLE    DVT prophylaxis:  Lovenox in-house, aspirin 81 mg BID x 6 weeks upon discharge    Pain:  Continue analgesics as directed    Dressing Instructions:   Please keep clean, dry and intact until follow up     Appt Instructions: If you do not have your appointment, please call the clinic at [phone] to schedule with Dr. Robbie Verde  Otherwise follow up as scheduled. Contact the office sooner if you experience any increased numbness/tingling in the extremities.

## 2023-08-23 NOTE — PLAN OF CARE
Problem: SAFETY ADULT  Goal: Maintain or return to baseline ADL function  Description: INTERVENTIONS:  -  Assess patient's ability to carry out ADLs; assess patient's baseline for ADL function and identify physical deficits which impact ability to perform ADLs (bathing, care of mouth/teeth, toileting, grooming, dressing, etc.)  - Assess/evaluate cause of self-care deficits   - Assess range of motion  - Assess patient's mobility; develop plan if impaired  - Assess patient's need for assistive devices and provide as appropriate  - Encourage maximum independence but intervene and supervise when necessary  - Involve family in performance of ADLs  - Assess for home care needs following discharge   - Consider OT consult to assist with ADL evaluation and planning for discharge  - Provide patient education as appropriate  Outcome: Not Progressing  Goal: Patient will remain free of falls  Description: INTERVENTIONS:  - Educate patient/family on patient safety including physical limitations  - Instruct patient to call for assistance with activity   - Consult OT/PT to assist with strengthening/mobility   - Keep Call bell within reach  - Keep bed low and locked with side rails adjusted as appropriate  - Keep care items and personal belongings within reach  - Initiate and maintain comfort rounds  - Make Fall Risk Sign visible to staff  - Apply yellow socks and bracelet for high fall risk patients  - Consider moving patient to room near nurses station  Outcome: Progressing     Problem: PAIN - ADULT  Goal: Verbalizes/displays adequate comfort level or baseline comfort level  Description: Interventions:  - Encourage patient to monitor pain and request assistance  - Assess pain using appropriate pain scale  - Administer analgesics based on type and severity of pain and evaluate response  - Implement non-pharmacological measures as appropriate and evaluate response  - Consider cultural and social influences on pain and pain management  - Notify physician/advanced practitioner if interventions unsuccessful or patient reports new pain  Outcome: Not Progressing     Problem: Potential for Falls  Goal: Patient will remain free of falls  Description: INTERVENTIONS:  - Educate patient/family on patient safety including physical limitations  - Instruct patient to call for assistance with activity   - Consult OT/PT to assist with strengthening/mobility   - Keep Call bell within reach  - Keep bed low and locked with side rails adjusted as appropriate  - Keep care items and personal belongings within reach  - Initiate and maintain comfort rounds  - Make Fall Risk Sign visible to staff  - Apply yellow socks and bracelet for high fall risk patients  - Consider moving patient to room near nurses station  Outcome: Progressing     Problem: Prexisting or High Potential for Compromised Skin Integrity  Goal: Skin integrity is maintained or improved  Description: INTERVENTIONS:  - Identify patients at risk for skin breakdown  - Assess and monitor skin integrity  - Assess and monitor nutrition and hydration status  - Monitor labs   - Assess for incontinence   - Turn and reposition patient  - Assist with mobility/ambulation  - Relieve pressure over bony prominences  - Avoid friction and shearing  - Provide appropriate hygiene as needed including keeping skin clean and dry  - Evaluate need for skin moisturizer/barrier cream  - Collaborate with interdisciplinary team   - Patient/family teaching  - Consider wound care consult   Outcome: Progressing     Problem: MOBILITY - ADULT  Goal: Maintain or return to baseline ADL function  Description: INTERVENTIONS:  -  Assess patient's ability to carry out ADLs; assess patient's baseline for ADL function and identify physical deficits which impact ability to perform ADLs (bathing, care of mouth/teeth, toileting, grooming, dressing, etc.)  - Assess/evaluate cause of self-care deficits   - Assess range of motion  - Assess patient's mobility; develop plan if impaired  - Assess patient's need for assistive devices and provide as appropriate  - Encourage maximum independence but intervene and supervise when necessary  - Involve family in performance of ADLs  - Assess for home care needs following discharge   - Consider OT consult to assist with ADL evaluation and planning for discharge  - Provide patient education as appropriate  Outcome: Not Progressing

## 2023-08-23 NOTE — PHYSICAL THERAPY NOTE
Physical Therapy Cancellation Note       08/23/23 0744   Note Type   Note type Cancelled Session   Cancel Reasons Patient to operating room   Additional Comments PT consult received and chart reviewed. Pt admitted s/p fall resulting in L femoral neck fx. Per EMR, pt to OR today for bipolar brayan-arthroplasty. Will f/u post op with appropriate WBing and activity orders.        Shelby Sagastume, PT, DPT   Available via [employer]  NPI # [id number]  [place] License - [id number]  6/39/9467

## 2023-08-23 NOTE — PLAN OF CARE
Problem: MOBILITY - ADULT  Goal: Maintains/Returns to pre admission functional level  Description: INTERVENTIONS:  - Perform BMAT or MOVE assessment daily.   - Set and communicate daily mobility goal to care team and patient/family/caregiver. - Collaborate with rehabilitation services on mobility goals if consulted  - Perform Range of Motion  times a day. - Reposition patient every  hours.   - Dangle patient  times a day  - Stand patient  times a day  - Ambulate patient  times a day  - Out of bed to chair  times a day   - Out of bed for meals  times a day  - Out of bed for toileting  - Record patient progress and toleration of activity level   Outcome: Progressing     Problem: Prexisting or High Potential for Compromised Skin Integrity  Goal: Skin integrity is maintained or improved  Description: INTERVENTIONS:  - Identify patients at risk for skin breakdown  - Assess and monitor skin integrity  - Assess and monitor nutrition and hydration status  - Monitor labs   - Assess for incontinence   - Turn and reposition patient  - Assist with mobility/ambulation  - Relieve pressure over bony prominences  - Avoid friction and shearing  - Provide appropriate hygiene as needed including keeping skin clean and dry  - Evaluate need for skin moisturizer/barrier cream  - Collaborate with interdisciplinary team   - Patient/family teaching  - Consider wound care consult   Outcome: Progressing     Problem: Potential for Falls  Goal: Patient will remain free of falls  Description: INTERVENTIONS:  - Educate patient/family on patient safety including physical limitations  - Instruct patient to call for assistance with activity   - Consult OT/PT to assist with strengthening/mobility   - Keep Call bell within reach  - Keep bed low and locked with side rails adjusted as appropriate  - Keep care items and personal belongings within reach  - Initiate and maintain comfort rounds  - Make Fall Risk Sign visible to staff  - Offer Toileting every  Hours, in advance of need  - Initiate/Maintain alarm  - Obtain necessary fall risk management equipment:   - Apply yellow socks and bracelet for high fall risk patients  - Consider moving patient to room near nurses station  Outcome: Progressing     Problem: PAIN - ADULT  Goal: Verbalizes/displays adequate comfort level or baseline comfort level  Description: Interventions:  - Encourage patient to monitor pain and request assistance  - Assess pain using appropriate pain scale  - Administer analgesics based on type and severity of pain and evaluate response  - Implement non-pharmacological measures as appropriate and evaluate response  - Consider cultural and social influences on pain and pain management  - Notify physician/advanced practitioner if interventions unsuccessful or patient reports new pain  Outcome: Progressing     Problem: SAFETY ADULT  Goal: Maintains/Returns to pre admission functional level  Description: INTERVENTIONS:  - Perform BMAT or MOVE assessment daily.   - Set and communicate daily mobility goal to care team and patient/family/caregiver. - Collaborate with rehabilitation services on mobility goals if consulted  - Perform Range of Motion  times a day. - Reposition patient every  hours.   - Dangle patient  times a day  - Stand patient  times a day  - Ambulate patient  times a day  - Out of bed to chair  times a day   - Out of bed for meals  times a day  - Out of bed for toileting  - Record patient progress and toleration of activity level   Outcome: Progressing  Goal: Patient will remain free of falls  Description: INTERVENTIONS:  - Educate patient/family on patient safety including physical limitations  - Instruct patient to call for assistance with activity   - Consult OT/PT to assist with strengthening/mobility   - Keep Call bell within reach  - Keep bed low and locked with side rails adjusted as appropriate  - Keep care items and personal belongings within reach  - Initiate and maintain comfort rounds  - Make Fall Risk Sign visible to staff  - Offer Toileting every  Hours, in advance of need  - Initiate/Maintain alarm  - Obtain necessary fall risk management equipment:  Apply yellow socks and bracelet for high fall risk patients  - Consider moving patient to room near nurses station  Outcome: Progressing

## 2023-08-23 NOTE — QUICK NOTE
Postop check:    Subjective: "I am doing okay"  Patient describes doing overall well following surgery. She does note having some pain in her right hip though recently received oxycodone and is waiting for it to "kick in". She denies any numbness, tingling. She also notes a little heaviness with her breathing though denies "difficulty breathing". She states that she has been able to tolerate water without any abdominal pain, nausea, vomiting. No other complaints offered. Objective:  General: No acute distress  Neuro: GCS 15. Light touch sensation intact throughout. HEENT: Normocephalic, atraumatic. Neck supple. Cardiac: Regular rate and rhythm.  +2 radial and dorsalis pedis pulses, bilaterally. Lungs: Clear to auscultation, bilaterally. Chest wall is nontender. Abdomen: soft nontender tender. Pelvis: Stable  MSK: Limited range of motion of right lower extremity. Minimally tender on right hip. No significant effusion or hematoma noted on right hip. All other extremities display no deformities and full range of motion. Skin: Right hip surgical dressing is clean, dry, intact. Otherwise skin is warm and dry-capillary fill less than 2 seconds. Assessment: This is a 70-year-old female who suffered a right displaced femoral neck fracture, she is pod 0 following hemiarthroplasty. Patient doing well. No adverse effects noted at this time. Plan:  · Orthopedics continues to follow  · Weightbearing as tolerated RLE  · Continue 24 hours of postoperative antibiotics. · Continue on Lovenox for DVT prophylaxis. Patient may be transition to aspirin twice daily for 6 weeks upon discharge. · PT/OT 8/24  · Continue multimodal pain regimen  · Patient will require outpatient follow-up with orthopedics.

## 2023-08-23 NOTE — H&P
[facility]  H&P  Name: Cindy Guzmán 66 y.o. female I MRN: [id number]  Unit/Bed#: S -01 I Date of Admission: 8/22/2023   Date of Service: 8/23/2023 I Hospital Day: 0      Assessment/Plan   Closed fracture of neck of right femur [facility])  Assessment & Plan  Pt tripped over cord and fell on right side. CT- right femoral neck fx  TXA given in ED    Plan-  - Admit to trauma service  - Ortho consulted  - NPO after midnight  - Multimodal pain management  - PT/OT  - CM for dispo planning    Rheumatoid arteritis ([address])  Assessment & Plan  Pt states she has rheumatoid arthritis and Sjorgrens but is not on any chronic medications. Pt takes prednisone when she has flares    Fall  Assessment & Plan  Mechanical fall from tripping over cord. No preceding symptoms. No LOC or thinners.  - CT L spine- Mild irregularity of the anterior cortex of L1. This could represent a minimally displaced compression deformity.  - No neurologic deficits present on exam.    Plan-  - See Femoral fracture         History of Present Illness     HPI: Cindy Guzmán is a 66 y.o. who presents with right hip pain after fall. Patient was at home taking care of her  who is on hospice when she tripped over a power cord and fell onto her right side. Patient does not take any blood thinners and did not lose consciousness. After fall patient had severe right sided hip pain as well as midline lower back pain. Patient then went to the emergency department after which she was found to have a right femoral neck fracture. History obtained from chart review and the patient. Review of Systems   Constitutional: Negative for chills and fever. Eyes: Negative for visual disturbance. Respiratory: Negative for shortness of breath. Cardiovascular: Negative for chest pain and palpitations. Gastrointestinal: Negative for abdominal pain, nausea and vomiting. Genitourinary: Negative for dysuria.    Musculoskeletal: Positive for arthralgias and back pain. Negative for neck pain. Skin: Negative for wound. Neurological: Negative for dizziness, syncope, speech difficulty, weakness, light-headedness and headaches. 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      Comment:  can see colors, with over-use goes darker- from                untreated amblyopia  No date: Breathing difficulty      Comment:  with extubation pt developed laryngeal spasm  No date: Cataract      Comment:  both eyes- right eye needs surgery  2016: Colon polyps  No date: Diverticulitis      Comment:  had episode in April 2018  No date: Edema      Comment:  lower legs  No date: Generalized osteoarthritis  No date: GERD (gastroesophageal reflux disease)  No date: Hearing problem      Comment:  unspecified laterality-hearing aid right ear  No date: High cholesterol  No date: History of transfusion      Comment:  autologus  No date: Incontinence of urine  No date: Polyarthritis      Comment:  last assessed: 6/26/2015  No date: Rheumatoid arthritis ([address])  No date: Sjogren's syndrome ([address])      Comment:  last assessed: 7/18/2016  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      Comment:  last assessed: 8/20/2015  No date: Urinary tract infection  No date: UTI (urinary tract infection) Past Surgical History:  No date: APPENDECTOMY  No date: AUGMENTATION BREAST; Bilateral      Comment:  breast surgery enlargement procedure elective bilateral  2003: BILATERAL OOPHORECTOMY; Bilateral  No date: BLADDER SURGERY      Comment:  bladder lift,cystocele  No date: CATARACT EXTRACTION      Comment:  Right 9/2018; Left 10/2018  04/18/2011: COLONOSCOPY  2014: COLPORRHAPHY      Comment:  anterior, repair of cystocele  1673: HYSTERECTOMY      Comment:  with cystocele   2016: JOINT REPLACEMENT; Left      Comment:  knee  No date: KNEE ARTHROPLASTY;  Left  2009: KNEE ARTHROSCOPY  2009: KNEE SURGERY; Left      Comment:  torn meniscus  No date: OOPHORECTOMY  5/31/2018: MI COLONOSCOPY FLX DX W/COLLJ SPEC WHEN PFRMD; N/A      Comment:  Procedure: COLONOSCOPY;  Surgeon: Fay Snow MD;                Location: [address] [place] [address] GI LAB; Service: Gastroenterology  2000: REDUCTION MAMMAPLASTY  1/31/2022: SACRAL NERVE STIMULATOR PLACEMENT; N/A      Comment:  Procedure: IMPLANTATION NEUROSTIMULATOR ELECTRODE ARRAY                SACRAL NERVE; INSERTION NEUROSTIMULATOR; FLUORO;                ELECTONIC ANALYSIS;  Surgeon: Bro Beck MD;                 Location: [facility] OR;  Service: UroGynecology         No date: TONSILLECTOMY AND ADENOIDECTOMY  No date: URETHRA SURGERY   Current Outpatient Medications   Medication Instructions   • acetaminophen (TYLENOL) 500 mg, Oral, 3 times daily PRN   • albuterol (PROVENTIL HFA,VENTOLIN HFA) 90 mcg/act inhaler 2 puffs, Inhalation, Every 6 hours PRN   • aspirin 325 mg, Oral, Daily, Patient takes 2 daily for arthritis   • Cranberry 450 MG CAPS Daily   • cyanocobalamin (VITAMIN B-12) 1,000 mcg, Oral, Daily   • Diclofenac Sodium (VOLTAREN) 2 g, Topical, 4 times daily   • docusate sodium (COLACE) 100 mg, Oral, 2 times daily   • estradiol (ESTRACE VAGINAL) 1 g, Vaginal, 3 times weekly   • fluticasone (Flovent HFA) 220 mcg/act inhaler 2 puffs, Inhalation, 2 times daily, Rinse mouth after use   • furosemide (LASIX) 40 mg, Oral, Every morning   • Magnesium Oxide -Mg Supplement 400 MG CAPS 1 capsule, Oral, Every morning   • methenamine hippurate (HIPREX) 1 g tablet TAKE 1 TABLET BY MOUTH 2 TIMES A DAY WITH MEALS. • potassium chloride (Klor-Con) 10 mEq tablet 10 mEq, Oral, Daily   • pravastatin (PRAVACHOL) 10 mg tablet TAKE 1 TABLET BY MOUTH EVERY DAY   • senna (SENOKOT) 8.6 MG tablet 1 tablet, Oral, As needed    Allergies   Allergen Reactions   • Celecoxib Other (See Comments)     Other reaction(s): Palpitations  Category:  Adverse Reaction;       Social History     Tobacco Use   • Smoking status: Never     Passive exposure: Never   • Smokeless tobacco: Never   Vaping Use   • Vaping Use: Never used   Substance Use Topics   • Alcohol use: Not Currently   • Drug use: No    Family History   Problem Relation Age of Onset   • Coronary artery disease Mother    • COPD Mother    • Hypertension Mother    • Hyperlipidemia Mother         high cholesterol   • Atrial fibrillation Mother    • Coronary artery disease Father    • Arthritis Father    • COPD Father         Black lung   • Hypertension Father    • Hyperlipidemia Father         high cholesterol   • Lung cancer Brother    • Coronary artery disease Brother    • Hypertension Brother    • Hyperlipidemia Brother         high cholesterol   • Atrial fibrillation Brother         fib/flutter   • Other Sister         pericarditis   • Arthritis Sister    • Diabetes Maternal Grandmother         mellitus   • Breast cancer Maternal Grandmother 79   • Breast cancer Paternal Grandmother 79   • Arthritis Family    • Asthma Family    • Cancer Brother         lung   • Heart disease Brother         CAD-stents-smoker   • No Known Problems Maternal Aunt    • No Known Problems Maternal Aunt    • No Known Problems Maternal Aunt    • No Known Problems Maternal Aunt    • No Known Problems Maternal Aunt    • Breast cancer Other 40          Objective                            Vitals I/O      Most Recent Min/Max in 24hrs   Temp 98.7 °F (37.1 °C) Temp  Min: 98.7 °F (37.1 °C)  Max: 98.7 °F (37.1 °C)   Pulse 67 Pulse  Min: 67  Max: 75   Resp 18 Resp  Min: 16  Max: 20   /75 BP  Min: 134/75  Max: 174/88   O2 Sat 90 % SpO2  Min: 90 %  Max: 98 %      Intake/Output Summary (Last 24 hours) at 8/23/2023 0412  Last data filed at 8/23/2023 0100  Gross per 24 hour   Intake 100 ml   Output --   Net 100 ml         Diet NPO     Invasive Monitoring Physical exam    Physical Exam  Vitals and nursing note reviewed.    Constitutional:       General: She is in acute distress. Appearance: She is well-developed. HENT:      Head: Normocephalic and atraumatic. Nose: Nose normal. No congestion. Mouth/Throat:      Mouth: Mucous membranes are moist.      Pharynx: Oropharynx is clear. Eyes:      Extraocular Movements: Extraocular movements intact. Conjunctiva/sclera: Conjunctivae normal.      Pupils: Pupils are equal, round, and reactive to light. Cardiovascular:      Rate and Rhythm: Normal rate and regular rhythm. Pulses: Normal pulses. Heart sounds: Normal heart sounds. No murmur heard. Pulmonary:      Effort: Pulmonary effort is normal. No respiratory distress. Breath sounds: Normal breath sounds. Chest:      Chest wall: No tenderness. Abdominal:      General: Abdomen is flat. Bowel sounds are normal.      Palpations: Abdomen is soft. Tenderness: There is no abdominal tenderness. Musculoskeletal:         General: Tenderness and signs of injury present. Cervical back: Normal range of motion and neck supple. No rigidity or tenderness. Comments: R hip tenderness to palpation with severe pain with minimal manipulation of RLE. Distal pulses and sensation intact. Skin:     General: Skin is warm and dry. Findings: No bruising, lesion or rash. Neurological:      General: No focal deficit present. Mental Status: She is alert and oriented to person, place, and time. Cranial Nerves: No cranial nerve deficit. Sensory: No sensory deficit. Diagnostic Studies      EKG:   Imaging: CT AP- Right femoral neck fracture    CT L spine- Mild irregularity of the anterior cortex of L1. This could represent a minimally displaced compression deformity. I have personally reviewed pertinent reports.        Medications:  Scheduled PRN   lidocaine, 1 patch, Daily  senna-docusate sodium, 1 tablet, HS      acetaminophen, 650 mg, Q4H PRN  HYDROmorphone, 0.2 mg, Q4H PRN  naloxone, 0.04 mg, Q1MIN PRN  ondansetron, 4 mg, Q4H PRN  oxyCODONE, 5 mg, Q4H PRN  oxyCODONE, 2.5 mg, Q4H PRN       Continuous          Labs:    CBC    Recent Labs     08/22/23 2154   WBC 9.59   HGB 13.4   HCT 41.1        BMP    Recent Labs     08/22/23 2154   SODIUM 140   K 3.5      CO2 29   AGAP 11   BUN 33*   CREATININE 0.91   CALCIUM 9.4       Coags    No recent results     Additional Electrolytes  No recent results       Blood Gas    No recent results  No recent results LFTs  Recent Labs     08/22/23 2154   ALT 24   AST 22   ALKPHOS 69   ALB 4.4   TBILI 0.89       Infectious  No recent results  Glucose  Recent Labs     08/22/23 2154   GLUC 89               Anticipated Length of Stay is > 2 midnights  Lillian Colunga MD

## 2023-08-23 NOTE — ASSESSMENT & PLAN NOTE
Pt tripped over cord and fell on right side.   CT- right femoral neck fx  TXA given in ED    Plan-  - Admit to trauma service  - Ortho consulted  - NPO after midnight  - Multimodal pain management  - PT/OT  - CM for dispo planning

## 2023-08-23 NOTE — ED PROVIDER NOTES
History  Chief Complaint   Patient presents with   • Fall     Patient fell from standing after tripping over a cord on the floor. Landed on R hip +pain, unknown leg shortening due to patients position of comfort. -LOC/thinners/ASA. • Hip Pain     Patient is a 60-year-old female, with a history significant for rheumatoid arthritis and Sjogren's, who presents to the ED today for evaluation after fall. Patient states that she was in her usual state of health prior to tripping on a cord and landed on her right hip. Since the fall, patient has had severe right hip pain. Patient also describes mid and lower back pain. Patient denies weakness, numbness. Per patient's daughter, present in room, patient has been nonambulatory since the fall. Movement exacerbates her discomfort. There is no associated head strike, loss of consciousness, antithrombotic medication use, chest pain, abdominal pain, dyspnea, fever, urinary symptoms. Patient is without other concerns at this time. Prior to Admission Medications   Prescriptions Last Dose Informant Patient Reported? Taking?    Cranberry 450 MG CAPS  Self Yes No   Sig: daily   Diclofenac Sodium (VOLTAREN) 1 %   Yes No   Sig: Apply 2 g topically 4 (four) times a day   Magnesium Oxide -Mg Supplement 400 MG CAPS  Self Yes No   Sig: Take 1 capsule by mouth every morning     acetaminophen (TYLENOL) 500 mg tablet  Self Yes No   Sig: Take 500 mg by mouth 3 (three) times a day as needed     albuterol (PROVENTIL HFA,VENTOLIN HFA) 90 mcg/act inhaler   No No   Sig: Inhale 2 puffs every 6 (six) hours as needed for wheezing or shortness of breath   aspirin 325 mg tablet  Self Yes No   Sig: Take 325 mg by mouth daily Patient takes 2 daily for arthritis   cyanocobalamin (VITAMIN B-12) 1000 MCG tablet  Self Yes No   Sig: Take 1,000 mcg by mouth daily   docusate sodium (COLACE) 100 mg capsule  Self Yes No   Sig: Take 100 mg by mouth 2 (two) times a day   estradiol (ESTRACE VAGINAL) 0.1 mg/g vaginal cream  Self No No   Sig: Insert 1 g into the vagina 3 (three) times a week   fluticasone (Flovent HFA) 220 mcg/act inhaler  Self No No   Sig: Inhale 2 puffs 2 (two) times a day Rinse mouth after use   furosemide (LASIX) 40 mg tablet   No No   Sig: Take 1 tablet (40 mg total) by mouth every morning   methenamine hippurate (HIPREX) 1 g tablet  Self No No   Sig: TAKE 1 TABLET BY MOUTH 2 TIMES A DAY WITH MEALS.   potassium chloride (Klor-Con) 10 mEq tablet   No No   Sig: TAKE 1 TABLET (10 MEQ TOTAL) BY MOUTH IN THE MORNING   pravastatin (PRAVACHOL) 10 mg tablet   No No   Sig: TAKE 1 TABLET BY MOUTH EVERY DAY   senna (SENOKOT) 8.6 MG tablet  Self Yes No   Sig: Take 1 tablet by mouth as needed for constipation      Facility-Administered Medications: None       Past Medical History:   Diagnosis Date   • Anesthesia complication     "shivers"   • Arthritis    • Asthma    • 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 see colors, with over-use goes darker- from untreated amblyopia   • Breathing difficulty     with extubation pt developed laryngeal spasm   • Cataract     both eyes- right eye needs surgery   • Colon polyps 2016   • Diverticulitis     had episode in April 2018   • Edema     lower legs   • Generalized osteoarthritis    • GERD (gastroesophageal reflux disease)    • Hearing problem     unspecified laterality-hearing aid right ear   • High cholesterol    • History of transfusion     autologus   • Incontinence of urine    • Polyarthritis     last assessed: 6/26/2015   • Rheumatoid arthritis (HCC)    • Sjogren's syndrome ([address])     last assessed: 7/18/2016   • SS-A antibody positive     last assessed: 8/20/2015   • Urinary tract infection    • UTI (urinary tract infection)        Past Surgical History:   Procedure Laterality Date   • APPENDECTOMY     • AUGMENTATION BREAST Bilateral     breast surgery enlargement procedure elective bilateral   • BILATERAL OOPHORECTOMY Bilateral 2003   • BLADDER SURGERY      bladder lift,cystocele   • CATARACT EXTRACTION      Right 9/2018; Left 10/2018   • COLONOSCOPY  04/18/2011   • COLPORRHAPHY  2014    anterior, repair of cystocele   • HYSTERECTOMY  1673    with cystocele    • JOINT REPLACEMENT Left 2016    knee   • KNEE ARTHROPLASTY Left    • KNEE ARTHROSCOPY  2009   • KNEE SURGERY Left 2009    torn meniscus   • OOPHORECTOMY     • MA COLONOSCOPY FLX DX W/COLLJ SPEC WHEN PFRMD N/A 5/31/2018    Procedure: COLONOSCOPY;  Surgeon: Jazlyn Edwards MD;  Location: [address] [place] [address] [facility];   Service: Gastroenterology   • REDUCTION MAMMAPLASTY  2000   • SACRAL NERVE STIMULATOR PLACEMENT N/A 1/31/2022    Procedure: IMPLANTATION NEUROSTIMULATOR ELECTRODE ARRAY SACRAL NERVE; INSERTION NEUROSTIMULATOR; FLUORO; ELECTONIC ANALYSIS;  Surgeon: Sage Cotter MD;  Location: [facility];  Service: UroGynecology          • TONSILLECTOMY AND ADENOIDECTOMY     • URETHRA SURGERY         Family History   Problem Relation Age of Onset   • Coronary artery disease Mother    • COPD Mother    • Hypertension Mother    • Hyperlipidemia Mother         high cholesterol   • Atrial fibrillation Mother    • Coronary artery disease Father    • Arthritis Father    • COPD Father         Black lung   • Hypertension Father    • Hyperlipidemia Father         high cholesterol   • Lung cancer Brother    • Coronary artery disease Brother    • Hypertension Brother    • Hyperlipidemia Brother         high cholesterol   • Atrial fibrillation Brother         fib/flutter   • Other Sister         pericarditis   • Arthritis Sister    • Diabetes Maternal Grandmother         mellitus   • Breast cancer Maternal Grandmother 79   • Breast cancer Paternal Grandmother 79   • Arthritis Family    • Asthma Family    • Cancer Brother         lung   • Heart disease Brother         CAD-stents-smoker   • No Known Problems Maternal Aunt    • No Known Problems Maternal Aunt    • No Known Problems Maternal Aunt    • No Known Problems Maternal Aunt    • No Known Problems Maternal Aunt    • Breast cancer Other 36     I have reviewed and agree with the history as documented. E-Cigarette/Vaping   • E-Cigarette Use Never User      E-Cigarette/Vaping Substances   • Nicotine No    • THC No    • CBD No    • Flavoring No    • Other No    • Unknown No      Social History     Tobacco Use   • Smoking status: Never     Passive exposure: Never   • Smokeless tobacco: Never   Vaping Use   • Vaping Use: Never used   Substance Use Topics   • Alcohol use: Not Currently   • Drug use: No       Review of Systems   Constitutional: Negative for fever. HENT: Negative for trouble swallowing. Eyes: Negative for visual disturbance. Respiratory: Negative for shortness of breath. Cardiovascular: Negative for chest pain. Gastrointestinal: Negative for abdominal pain. Endocrine: Negative for polyuria. Genitourinary: Negative for dysuria. Musculoskeletal: Positive for back pain and gait problem. Skin: Negative for rash. Allergic/Immunologic: Negative for environmental allergies. Neurological: Negative for weakness and numbness. Hematological: Negative for adenopathy. Psychiatric/Behavioral: Negative for confusion. All other systems reviewed and are negative. Physical Exam  Physical Exam  Vitals and nursing note reviewed. Constitutional:       General: She is not in acute distress. Appearance: She is not toxic-appearing or diaphoretic. Comments: Patient appears uncomfortable during my evaluation    HENT:      Head: Normocephalic and atraumatic. Right Ear: External ear normal.      Left Ear: External ear normal.      Nose: Nose normal. No rhinorrhea. Mouth/Throat:      Mouth: Mucous membranes are moist.      Pharynx: Oropharynx is clear. No oropharyngeal exudate or posterior oropharyngeal erythema. Comments: Uvula midline. No oropharyngeal or submandibular mass/swelling  Eyes:      General: No scleral icterus. Right eye: No discharge. Left eye: No discharge. Conjunctiva/sclera: Conjunctivae normal.      Pupils: Pupils are equal, round, and reactive to light. Neck:      Comments: Patient is spontaneously rotating their neck to the left and right during the history and physical exam interaction without difficulty or apparent discomfort    Cardiovascular:      Rate and Rhythm: Normal rate and regular rhythm. Pulses: Normal pulses. Heart sounds: Normal heart sounds. No murmur heard. No friction rub. No gallop. Comments: 2+ Radial and DP bilaterally  Pulmonary:      Effort: Pulmonary effort is normal. No respiratory distress. Breath sounds: Normal breath sounds. No stridor. No wheezing, rhonchi or rales. Abdominal:      General: Abdomen is flat. There is no distension. Palpations: Abdomen is soft. Tenderness: There is no abdominal tenderness. There is no right CVA tenderness, left CVA tenderness, guarding or rebound. Musculoskeletal:         General: Tenderness present. No deformity. Cervical back: Neck supple. No tenderness. No muscular tenderness. Comments: Tenderness to palpation of the midline L-spine as well as the right hip    Externally rotated and shortened right lower extremity    No tenderness to palpation of the bilateral shoulders, elbows, arms, knees, legs. No chest wall tenderness to palpation   No midline C, T spine tenderness to palpation     No open wound over the areas of pain   Lymphadenopathy:      Cervical: No cervical adenopathy. Skin:     General: Skin is warm and dry. Capillary Refill: Capillary refill takes less than 2 seconds. Neurological:      Mental Status: She is alert. Comments: Patient is speaking clearly in complete sentences. Patient is answering appropriately and able follow commands. Patient is moving all four extremities spontaneously. No facial droop. Tongue midline. No saddle anesthesia.   Intact L5 and S1 motor and sensory function bilaterally.    Psychiatric:         Mood and Affect: Mood normal.         Behavior: Behavior normal.         Vital Signs  ED Triage Vitals [08/22/23 1957]   Temp Pulse Respirations Blood Pressure SpO2   -- 73 20 (!) 174/88 98 %      Temp src Heart Rate Source Patient Position - Orthostatic VS BP Location FiO2 (%)   -- Monitor Lying Right arm --      Pain Score       --           Vitals:    08/22/23 1957 08/22/23 2000 08/22/23 2351   BP: (!) 174/88 170/85 158/76   Pulse: 73 75 72   Patient Position - Orthostatic VS: Lying Lying Lying         Visual Acuity      ED Medications  Medications   tranexamic Acid 870 mg in sodium chloride 0.9 % 100 mL IVPB (has no administration in time range)     Followed by   tranexamic Acid 1,000 mg in sodium chloride 0.9 % 500 mL IVPB (has no administration in time range)   fentanyl citrate (PF) 100 MCG/2ML 25 mcg (25 mcg Intravenous Given 8/22/23 2152)   fentanyl citrate (PF) 100 MCG/2ML 25 mcg (25 mcg Intravenous Given 8/22/23 2224)   ondansetron (ZOFRAN) injection 4 mg (4 mg Intravenous Given 8/22/23 2348)   HYDROmorphone HCl (DILAUDID) injection 0.2 mg (0.2 mg Intravenous Given 8/22/23 2348)       Diagnostic Studies  Results Reviewed     Procedure Component Value Units Date/Time    Comprehensive metabolic panel [[id number]]  (Abnormal) Collected: 08/22/23 2154    Lab Status: Final result Specimen: Blood from Arm, Right Updated: 08/22/23 2229     Sodium 140 mmol/L      Potassium 3.5 mmol/L      Chloride 100 mmol/L      CO2 29 mmol/L      ANION GAP 11 mmol/L      BUN 33 mg/dL      Creatinine 0.91 mg/dL      Glucose 89 mg/dL      Calcium 9.4 mg/dL      AST 22 U/L      ALT 24 U/L      Alkaline Phosphatase 69 U/L      Total Protein 7.1 g/dL      Albumin 4.4 g/dL      Total Bilirubin 0.89 mg/dL      eGFR 60 ml/min/1.73sq m     Narrative:      [place] guidelines for Chronic Kidney Disease (CKD):   •  Stage 1 with normal or high GFR (GFR > 90 mL/min/1.73 square meters)  •  Stage 2 Mild CKD (GFR = 60-89 mL/min/1.73 square meters)  •  Stage 3A Moderate CKD (GFR = 45-59 mL/min/1.73 square meters)  •  Stage 3B Moderate CKD (GFR = 30-44 mL/min/1.73 square meters)  •  Stage 4 Severe CKD (GFR = 15-29 mL/min/1.73 square meters)  •  Stage 5 End Stage CKD (GFR <15 mL/min/1.73 square meters)  Note: GFR calculation is accurate only with a steady state creatinine    CBC and differential [[id number]] Collected: 08/22/23 2154    Lab Status: Final result Specimen: Blood from Arm, Right Updated: 08/22/23 2212     WBC 9.59 Thousand/uL      RBC 4.63 Million/uL      Hemoglobin 13.4 g/dL      Hematocrit 41.1 %      MCV 89 fL      MCH 28.9 pg      MCHC 32.6 g/dL      RDW 14.3 %      MPV 9.8 fL      Platelets 360 Thousands/uL      nRBC 0 /100 WBCs      Neutrophils Relative 75 %      Immat GRANS % 1 %      Lymphocytes Relative 17 %      Monocytes Relative 7 %      Eosinophils Relative 0 %      Basophils Relative 0 %      Neutrophils Absolute 7.14 Thousands/µL      Immature Grans Absolute 0.11 Thousand/uL      Lymphocytes Absolute 1.64 Thousands/µL      Monocytes Absolute 0.66 Thousand/µL      Eosinophils Absolute 0.02 Thousand/µL      Basophils Absolute 0.02 Thousands/µL                  CT abdomen pelvis wo contrast    (Results Pending)   CT recon only lumbar spine    (Results Pending)   XR femur 2 views RIGHT    (Results Pending)   XR pelvis ap only 1 or 2 vw    (Results Pending)              Procedures  Procedures         ED Course  ED Course as of 08/23/23 0005   Wed Aug 23, 2023   0005 Results reviewed with patient. Discussed with trauma surgery. 0005 Hemoglobin: 13.4  WNL                               SBIRT 22yo+    Flowsheet Row Most Recent Value   Initial Alcohol Screen: US AUDIT-C     1. How often do you have a drink containing alcohol? 0 Filed at: 08/22/2023 1959   2. How many drinks containing alcohol do you have on a typical day you are drinking? 0 Filed at: 08/22/2023 1959   3a. Male UNDER 65: How often do you have five or more drinks on one occasion? 0 Filed at: 08/22/2023 1959   3b. FEMALE Any Age, or MALE 65+: How often do you have 4 or more drinks on one occassion? 0 Filed at: 08/22/2023 1959   Audit-C Score 0 Filed at: 08/22/2023 8900   KIKI: How many times in the past year have you. .. Used an illegal drug or used a prescription medication for non-medical reasons? Never Filed at: 08/22/2023 1959                    Medical Decision Making  Patient is a 63-year-old female, with a history significant for rheumatoid arthritis and Sjogren's, who presents to the ED today for evaluation after fall. Patient states that she was in her usual state of health prior to tripping on a cord and landed on her right hip. Since the fall, patient has had severe right hip pain. Patient also describes mid and lower back pain. Patient denies weakness, numbness. Per patient's daughter, present in room, patient has been nonambulatory since the fall. Movement exacerbates her discomfort. There is no associated head strike, loss of consciousness, antithrombotic medication use, chest pain, abdominal pain, dyspnea, fever, urinary symptoms. Patient is currently hemodynamically stable. Her physical exam is notable for externally rotated and shortened right lower extremity, tenderness palpation of the right hip, midline lumbar tenderness to palpation. Abdomen soft nontender, no skin defect over the tender areas, heart lungs clear to auscultation. This presentation is concerning for: Hip fracture, hip dislocation, lumbar compression fracture. I also considered anemia. No reason suspect neurovascular injury at this time based on history and physical exam.  Will investigate with CT abdomen pelvis with lumbar recon, CBC, CMP, type and screen. Will manage with fentanyl, Zofran, further based upon work-up. Amount and/or Complexity of Data Reviewed  Labs: ordered.   Radiology: ordered. Risk  Prescription drug management. Disposition  Final diagnoses:   Fall, initial encounter   Acute back pain   Elevated blood pressure reading   Acute right hip pain   Closed fracture of right hip, initial encounter [facility])     Time reflects when diagnosis was documented in both MDM as applicable and the Disposition within this note     Time User Action Codes Description Comment    8/22/2023 10:03 PM Darlen Latia Add [N35. DZJN] Fall, initial encounter     8/22/2023 10:03 PM Darlen Latia A Add [M54.9] Acute back pain     8/22/2023 10:03 PM Darlen Latia A Add [R03.0] Elevated blood pressure reading     8/22/2023 10:03 PM Darlen Latia A Add [M25.551] Acute right hip pain     8/22/2023 11:54 PM Darlen Latia A Add [S72.001A] Closed fracture of right hip, initial encounter [facility])       ED Disposition     ED Disposition   Admit    Condition   Stable    Date/Time   Tue Aug 22, 2023 11:54 PM    Comment              Follow-up Information    None         Patient's Medications   Discharge Prescriptions    No medications on file       No discharge procedures on file.     PDMP Review       Value Time User    PDMP Reviewed  Yes 5/22/2023  9:42 AM Riri Winter MD          ED Provider  Electronically Signed by           Parris Jeffery MD  08/22/23 7494       Parris Jeffery MD  08/23/23 0005

## 2023-08-23 NOTE — CASE MANAGEMENT
Case Management Progress Note    Patient name Vincent Mansfield  Location S /S -01 MRN [id number]  : 1945 Date 2023       LOS (days): 0  Geometric Mean LOS (GMLOS) (days): 2.70  Days to GMLOS:2.1        OBJECTIVE:        Current admission status: Inpatient  Preferred Pharmacy:   [facility] [place] [address]  Phone: [phone] Fax: [phone]    Primary Care Provider: Linnea Tijerina DO    Primary Insurance: MEDICARE  Secondary Insurance: BLUE CROSS    PROGRESS NOTE:  CM notified in trauma rounds this AM that patient is her  who is on home hospice full time caregiver. Call made to daughter Aby Matthews who confirmed she is caring for  and hospice is assisting. Aby Matthews aware to notify CM if she is in need of further assistance with resources.

## 2023-08-23 NOTE — CASE MANAGEMENT
Case Management Assessment    Patient name Verónica Gallo  Location S /S -01 MRN [id number]  : 1945 Date 2023       Current Admission Date: 2023  Current Admission Diagnosis:Fall   Patient Active Problem List    Diagnosis Date Noted   • Fall 2023   • Closed fracture of neck of right femur ([address]) 2023   • Rheumatoid arteritis ([address]) 2023   • Mass of right knee 2023   • Stage 3a chronic kidney disease ([address]) 2023   • Primary osteoarthritis of both hands 2023   • Recurrent UTI 2023   • History of hysterectomy 2022   • Urge incontinence of urine 2022   • Primary osteoarthritis of left hip 2021   • UTI symptoms 2021   • Cough    • Rosacea 2019   • Chronic bilateral thoracic back pain 2018   • Diverticulitis of large intestine without perforation or abscess with bleeding 2018   • Mixed hyperlipidemia 2017   • Right hip pain 2016   • Left knee pain 2016   • Exertional shortness of breath 2016   • Palpitations 2016   • GERD without esophagitis 2015   • Primary generalized (osteo)arthritis 2015   • Sjogren's syndrome ([address]) 2015   • Impaired fasting glucose 2015   • Fatigue 2015   • Edema 2015   • Asthma, mild persistent 2015      LOS (days): 0  Geometric Mean LOS (GMLOS) (days): 2.70  Days to GMLOS:2     OBJECTIVE:    Risk of Unplanned Readmission Score: 9.91         Current admission status: Inpatient       Preferred Pharmacy:   [facility] Sarwat Dobbs  [address] [place] [address]  Phone: [phone] Fax: [phone]    Primary Care Provider: Dai Pressley DO    Primary Insurance: MEDICARE  Secondary Insurance: BLUE CROSS    ASSESSMENT:  [place] Proxies    There are no active Health Care Proxies on file.                  Readmission Root Cause  30 Day Readmission: No    Patient Information  Admitted from[de-identified] Home  Mental Status: Alert  During Assessment patient was accompanied by: Not accompanied during assessment  Assessment information provided by[de-identified] Patient  Primary Caregiver: Self  Support Systems: Spouse/significant other  [place] of Residence: [address] [place] [address] do you live in?: [facility] entry access options.  Select all that apply.: No steps to enter home  [facility]  In the last 12 months, was there a time when you were not able to pay the mortgage or rent on time?: Yes  In the last 12 months, how many places have you lived?: 1  In the last 12 months, was there a time when you did not have a steady place to sleep or slept in a shelter (including now)?: No  Homeless/housing insecurity resource given?: N/A  Living Arrangements: Lives w/ Spouse/significant other  Is patient a ?: No    Activities of Daily Living Prior to Admission  Functional Status: Independent  Completes ADLs independently?: Yes  Ambulates independently?: Yes  Does patient use assisted devices?: No  Does patient currently own DME?: Yes  What DME does the patient currently own?: [employer]  Does patient have a history of Outpatient Therapy (PT/OT)?: No  Does the patient have a history of Short-Term Rehab?: Yes ([facility])  Does patient have a history of HHC?: No  Does patient currently have [facility]?: No         Patient Information Continued  Income Source: Pension/assisted  Does patient have prescription coverage?: Yes  Within the past 12 months, you worried that your food would run out before you got the money to buy more.: Never true  Within the past 12 months, the food you bought just didn't last and you didn't have money to get more.: Never true  Food insecurity resource given?: N/A  Does patient receive dialysis treatments?: No  Does patient have a history of substance abuse?: No  Does patient have a history of Mental Health Diagnosis?: No Means of Transportation  Means of Transport to Appts[de-identified] Drives Self  In the past 12 months, has lack of transportation kept you from medical appointments or from getting medications?: No  In the past 12 months, has lack of transportation kept you from meetings, work, or from getting things needed for daily living?: No  Was application for public transport provided?: N/A    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 Therapy evals are pending and patient aware CM will follow up tomorrow after therapy evals are completed to discuss discharge planning.

## 2023-08-23 NOTE — ANESTHESIA PREPROCEDURE EVALUATION
HPI  "66 y. o.female community ambulator status post fall complaining of right hip pain and inability to bear weight. She has past medical history inclusive of Sjogren's disease and peripheral edema. She is on no thinners at baseline. 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 She notes that prior to the fall, she was having intermittent right hip pain. She notes a history of a meniscus tear in the right knee which also offered intermittent right knee pain. She has a history of left total knee replacement, done roughly 5 years ago. At present, her only concern is right hip pain, with radiations into the anterior right thigh, as well as low back pain. She has no upper extremity pain, or left lower extremity pain. "    Procedure:  HEMIARTHROPLASTY HIP (BIPOLAR) (Right: Hip)    Relevant Problems   CARDIO   (+) Chronic bilateral thoracic back pain   (+) Exertional shortness of breath   (+) Mixed hyperlipidemia   (+) Rheumatoid arteritis (HCC)      GI/HEPATIC   (+) GERD without esophagitis      /RENAL   (+) Stage 3a chronic kidney disease ([address])      GYN   (+) History of hysterectomy      MUSCULOSKELETAL   (+) Chronic bilateral thoracic back pain   (+) Primary generalized (osteo)arthritis   (+) Primary osteoarthritis of both hands   (+) Primary osteoarthritis of left hip   (+) Rheumatoid arteritis (HCC)      NEURO/PSYCH   (+) Chronic bilateral thoracic back pain      PULMONARY   (+) Asthma, mild persistent   (+) Exertional shortness of breath        Left Ventricle: Left ventricular cavity size is normal. Wall thickness is normal. The left ventricular ejection fraction is 65% by visual estimation. Systolic function is normal. Diastolic function is normal for age.   •  Tricuspid Valve: Poor TR signal. There is no indirect evidence of moderate or severe pulmonary hypertension. Physical Exam    Airway    Mallampati score: II  TM Distance: >3 FB  Neck ROM: full     Dental   No notable dental hx     Cardiovascular  Rhythm: regular, No friction rub,     Pulmonary  Breath sounds normal,     Other Findings        Anesthesia Plan  ASA Score- 3     Anesthesia Type- general with ASA Monitors. Additional Monitors:   Airway Plan: ETT. Plan Factors-    Chart reviewed. EKG reviewed. Existing labs reviewed. Patient summary reviewed. Patient is not a current smoker. Induction- intravenous. Postoperative Plan- Plan for postoperative opioid use. Planned trial extubation    Informed Consent- Anesthetic plan and risks discussed with patient. I personally reviewed this patient with the CRNA. Discussed and agreed on the Anesthesia Plan with the CRNA. .      Regional anesthetic prn for post op pain

## 2023-08-23 NOTE — ASSESSMENT & PLAN NOTE
Pt states she has rheumatoid arthritis and Sjorgrens but is not on any chronic medications.   Pt takes prednisone when she has flares

## 2023-08-23 NOTE — OCCUPATIONAL THERAPY NOTE
Occupational Therapy Cancelled Session    Patient Name: Efrain Cuevas  DMYDY'Q Date: 8/23/2023 08/23/23 0715   Note Type   Note type Cancelled Session   Cancel Reasons Patient to operating room   Additional Comments OT orders received and chart review performed. Pt admitted s/p fall at home resulting in L femoral neck fx. Pt to OR for HEMIARTHROPLASTY HIP (BIPOLAR). Will follow up post-op, appreciate activity orders and WBS.      Kala Carrera, OTD, OTR/L  [place] License [id number]  [address] [place], [place] [id number]

## 2023-08-23 NOTE — CONSULTS
Orthopedics   Temitope Bourgeois 66 y.o. female MRN: [id number]  Unit/Bed#: AN XRAY      Chief Complaint:   right hip pain    HPI:   66 y. o.female community ambulator status post fall complaining of right hip pain and inability to bear weight. She has past medical history inclusive of Sjogren's disease and peripheral edema. She is on no thinners at baseline. 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 She notes that prior to the fall, she was having intermittent right hip pain. She notes a history of a meniscus tear in the right knee which also offered intermittent right knee pain. She has a history of left total knee replacement, done roughly 5 years ago. At present, her only concern is right hip pain, with radiations into the anterior right thigh, as well as low back pain. She has no upper extremity pain, or left lower extremity pain.      Review Of Systems:   · Skin: Normal  · Neuro: See HPI  · Musculoskeletal: See HPI  · 14 point review of systems negative except as stated above     Past Medical History:   Past Medical History:   Diagnosis Date   • Anesthesia complication     "shivers"   • Arthritis    • Asthma    • 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 see colors, with over-use goes darker- from untreated amblyopia   • Breathing difficulty     with extubation pt developed laryngeal spasm   • Cataract     both eyes- right eye needs surgery   • Colon polyps 2016   • Diverticulitis     had episode in April 2018   • Edema     lower legs   • Generalized osteoarthritis    • GERD (gastroesophageal reflux disease)    • Hearing problem     unspecified laterality-hearing aid right ear   • High cholesterol    • History of transfusion     autologus   • Incontinence of urine    • Polyarthritis     last assessed: 6/26/2015   • Rheumatoid arthritis (HCC)    • Sjogren's syndrome ([address] [facility])     last assessed: 7/18/2016   • SS-A antibody positive     last assessed: 8/20/2015   • Urinary tract infection    • UTI (urinary tract infection)        Past Surgical History:   Past Surgical History:   Procedure Laterality Date   • APPENDECTOMY     • AUGMENTATION BREAST Bilateral     breast surgery enlargement procedure elective bilateral   • BILATERAL OOPHORECTOMY Bilateral 2003   • BLADDER SURGERY      bladder lift,cystocele   • CATARACT EXTRACTION      Right 9/2018; Left 10/2018   • COLONOSCOPY  04/18/2011   • COLPORRHAPHY  2014    anterior, repair of cystocele   • HYSTERECTOMY  1673    with cystocele    • JOINT REPLACEMENT Left 2016    knee   • KNEE ARTHROPLASTY Left    • KNEE ARTHROSCOPY  2009   • KNEE SURGERY Left 2009    torn meniscus   • OOPHORECTOMY     • ND COLONOSCOPY FLX DX W/COLLJ SPEC WHEN PFRMD N/A 5/31/2018    Procedure: COLONOSCOPY;  Surgeon: Nadeem Galvin MD;  Location: [address] [place] [address] [address] [facility] LAB;   Service: Gastroenterology   • REDUCTION MAMMAPLASTY  2000   • SACRAL NERVE STIMULATOR PLACEMENT N/A 1/31/2022    Procedure: IMPLANTATION NEUROSTIMULATOR ELECTRODE ARRAY SACRAL NERVE; INSERTION NEUROSTIMULATOR; FLUORO; ELECTONIC ANALYSIS;  Surgeon: Maida Treoj MD;  Location: [facility];  Service: UroGynecology          • TONSILLECTOMY AND ADENOIDECTOMY     • URETHRA SURGERY         Family History:  Family history reviewed and non-contributory  Family History   Problem Relation Age of Onset   • Coronary artery disease Mother    • COPD Mother    • Hypertension Mother    • Hyperlipidemia Mother         high cholesterol   • Atrial fibrillation Mother    • Coronary artery disease Father    • Arthritis Father    • COPD Father         Black lung   • Hypertension Father    • Hyperlipidemia Father         high cholesterol   • Lung cancer Brother    • Coronary artery disease Brother    • Hypertension Brother    • Hyperlipidemia Brother         high cholesterol   • Atrial fibrillation Brother fib/flutter   • Other Sister         pericarditis   • Arthritis Sister    • Diabetes Maternal Grandmother         mellitus   • Breast cancer Maternal Grandmother 79   • Breast cancer Paternal Grandmother 79   • Arthritis Family    • Asthma Family    • Cancer Brother         lung   • Heart disease Brother         CAD-stents-smoker   • No Known Problems Maternal Aunt    • No Known Problems Maternal Aunt    • No Known Problems Maternal Aunt    • No Known Problems Maternal Aunt    • No Known Problems Maternal Aunt    • Breast cancer Other 36       Social History:  Social History     Socioeconomic History   • Marital status: /Civil Union     Spouse name: None   • Number of children: None   • Years of education: None   • Highest education level: None   Occupational History   • Occupation: retired   Tobacco Use   • Smoking status: Never     Passive exposure: Never   • Smokeless tobacco: Never   Vaping Use   • Vaping Use: Never used   Substance and Sexual Activity   • Alcohol use: Not Currently   • Drug use: No   • Sexual activity: Not Currently   Other Topics Concern   • None   Social History Narrative    Lack of adequate sleep-4-6 hrs/night    Lack of exercise     Social Determinants of Health     Financial Resource Strain: Low Risk  (4/26/2023)    Overall Financial Resource Strain (CARDIA)    • Difficulty of Paying Living Expenses: Not very hard   Food Insecurity: Not on file   Transportation Needs: No Transportation Needs (4/26/2023)    PRAPARE - Transportation    • Lack of Transportation (Medical): No    • Lack of Transportation (Non-Medical): No   Physical Activity: Not on file   Stress: Not on file   Social Connections: Not on file   Intimate Partner Violence: Not on file   Housing Stability: Not on file       Allergies: Allergies   Allergen Reactions   • Celecoxib Other (See Comments)     Other reaction(s): Palpitations  Category:  Adverse Reaction;            Labs:  0   Lab Value Date/Time    HCT 38.7 08/23/2023 0657    HCT 41.1 08/22/2023 2154    HCT 41.1 04/26/2023 0831    HCT 42.3 04/10/2014 1325    HGB 12.6 08/23/2023 0657    HGB 13.4 08/22/2023 2154    HGB 13.7 04/26/2023 0831    HGB 14.4 04/10/2014 1325    INR 1.02 04/15/2018 1030    WBC 9.94 08/23/2023 0657    WBC 9.59 08/22/2023 2154    WBC 5.06 04/26/2023 0831    WBC 5.79 04/10/2014 1325    ESR 3 07/27/2020 0814    CRP <3.0 07/27/2020 0814       Meds:    Current Facility-Administered Medications:   •  acetaminophen (TYLENOL) tablet 650 mg, 650 mg, Oral, Q4H PRN, Vinicio Hopper MD  •  furosemide (LASIX) tablet 40 mg, 40 mg, Oral, QAM, Vinicio Hopper MD  •  HYDROmorphone HCl (DILAUDID) injection 0.2 mg, 0.2 mg, Intravenous, Q4H PRN, Vinicio Hopper MD, 0.2 mg at 08/23/23 8096  •  lidocaine (LIDODERM) 5 % patch 1 patch, 1 patch, Topical, Daily, Vinicio Hopper MD  •  naloxone (NARCAN) 0.04 mg/mL syringe 0.04 mg, 0.04 mg, Intravenous, Q1MIN PRN, Vinicio Hopper MD  •  [facility]etron [facility]) injection 4 mg, 4 mg, Intravenous, Q4H PRN, Vinicio Hopper MD, 4 mg at 08/23/23 0209  •  oxyCODONE (ROXICODONE) IR tablet 5 mg, 5 mg, Oral, Q4H PRN, Vinicio Hopper MD  •  oxyCODONE (ROXICODONE) split tablet 2.5 mg, 2.5 mg, Oral, Q4H PRN, Vinicio Hopper MD  •  pravastatin (PRAVACHOL) tablet 10 mg, 10 mg, Oral, Daily, Vinicio Hopper MD  •  senna-docusate sodium (SENOKOT S) 8.6-50 mg per tablet 1 tablet, 1 tablet, Oral, HS, Vinicio Hopper MD    Blood Culture:   No results found for: "BLOODCX"    Wound Culture:   No results found for: "WOUNDCULT"    Ins and Outs:  I/O last 24 hours:   In: 100 [IV Piggyback:100]  Out: -           Physical Exam:   /63   Pulse 62   Temp 98.5 °F (36.9 °C)   Resp 18   Ht 5' 6" (1.676 m)   Wt 86.2 kg (190 lb)   SpO2 95%   BMI 30.67 kg/m²   Gen: No acute distress, resting comfortably in bed  HEENT: Eyes clear, moist mucus membranes, hearing intact  Respiratory: No audible wheezing or stridor  Cardiovascular: Well Perfused peripherally, 2+ distal pulse  Abdomen: nondistended, no peritoneal signs  Musculoskeletal: right lower extremity  · Skin intact  · Tender to palpation over hip  · ROM not assessed 2/2 known fracture  · Sensation intact L3-S1  · Positive ankle dorsi/plantar flexion, EHL/FHL  · 2+ DP/ PT pulse  · Musculature is soft and compressible, no pain with passive stretch  · Right leg is shortened and slightly externally rotated. General MSK  · No tenderness over bilateral clavicles, shoulders, upper arms, elbows, forearms or wrists/hands. ROM full. Sensation intact. · No tenderness over the left hip, femur, knee, lower leg, ankle, foot. ROM full. Sensation intact. Radiology:   I personally reviewed the films. Imaging reviewed and discussed with Dr. Marrianne Lanes. X-rays right femur, CT c/a/p: right femoral neck fracture.     _*_*_*_*_*_*_*_*_*_*_*_*_*_*_*_*_*_*_*_*_*_*_*_*_*_*_*_*_*_*_*_*_*_*_*_*_*_*_*_*_*    Assessment:  66 y. o.female status post fall with right femoral neck fracture    Plan:   · Non weight bearing right lower extremity  · Will plan for operative intervention today. · NPO for procedure  · Consent obtained at bedside. · Cleared for OR per trauma team.   · Abx ordered pre operatively. · TXA ordered for intra op administration. · Analgesics for pain per primary team.   · Medical management per primary team.   · DVT ppx per primary team.  ·  Body mass index is 30.67 kg/m². mildly obese. Recommend behavior modifications and nutrition. · Dispo: Ortho will follow  · Case reviewed and discussed with Dr. Marrianne Lanes.      Zeny Nixon PA-C

## 2023-08-23 NOTE — ED CARE HANDOFF
Emergency Department Sign Out Note        Sign out and transfer of care from Dr. Soha Chicas. See Separate Emergency Department note. The patient, Farzaneh Mcgovern, was evaluated by the previous provider for fall. Workup Completed:  Hpi/exam/imaging     ED Course / Workup Pending (followup): Likely femoral neck fracture, trauma aware. Likely admission to their service. Admitted to trauma. HD stable. ED Course as of 08/23/23 0518   Wed Aug 23, 2023   0101 Pt seen by trauma team.  Admitted to their service. Procedures  MDM        Disposition  Final diagnoses:   Fall, initial encounter   Acute back pain   Elevated blood pressure reading   Acute right hip pain   Closed fracture of right hip, initial encounter [facility])     Time reflects when diagnosis was documented in both MDM as applicable and the Disposition within this note     Time User Action Codes Description Comment    8/22/2023 10:03 PM Tk  Add [A70. ODWW] Fall, initial encounter     8/22/2023 10:03 PM Tk  A Add [M54.9] Acute back pain     8/22/2023 10:03 PM Tk  A Add [R03.0] Elevated blood pressure reading     8/22/2023 10:03 PM Tk  A Add [M25.551] Acute right hip pain     8/22/2023 11:54 PM Tk  A Add [S72.001A] Closed fracture of right hip, initial encounter [facility])       ED Disposition     ED Disposition   Admit    Condition   Stable    Date/Time   Tue Aug 22, 2023 11:54 PM    Comment              Follow-up Information    None       Patient's Medications   Discharge Prescriptions    No medications on file     No discharge procedures on file.        ED Provider  Electronically Signed by     Joaquim Hassan MD  08/23/23 0204

## 2023-08-24 LAB
ANION GAP SERPL CALCULATED.3IONS-SCNC: 8 MMOL/L
BUN SERPL-MCNC: 14 MG/DL (ref 5–25)
CALCIUM SERPL-MCNC: 8.1 MG/DL (ref 8.4–10.2)
CHLORIDE SERPL-SCNC: 104 MMOL/L (ref 96–108)
CO2 SERPL-SCNC: 26 MMOL/L (ref 21–32)
CREAT SERPL-MCNC: 0.74 MG/DL (ref 0.6–1.3)
ERYTHROCYTE [DISTWIDTH] IN BLOOD BY AUTOMATED COUNT: 14.5 % (ref 11.6–15.1)
GFR SERPL CREATININE-BSD FRML MDRD: 77 ML/MIN/1.73SQ M
GLUCOSE SERPL-MCNC: 122 MG/DL (ref 65–140)
HCT VFR BLD AUTO: 35.7 % (ref 34.8–46.1)
HGB BLD-MCNC: 11.5 G/DL (ref 11.5–15.4)
MCH RBC QN AUTO: 29.6 PG (ref 26.8–34.3)
MCHC RBC AUTO-ENTMCNC: 32.2 G/DL (ref 31.4–37.4)
MCV RBC AUTO: 92 FL (ref 82–98)
PLATELET # BLD AUTO: 187 THOUSANDS/UL (ref 149–390)
PMV BLD AUTO: 9.5 FL (ref 8.9–12.7)
POTASSIUM SERPL-SCNC: 3.5 MMOL/L (ref 3.5–5.3)
RBC # BLD AUTO: 3.88 MILLION/UL (ref 3.81–5.12)
SARS-COV-2 RNA RESP QL NAA+PROBE: NEGATIVE
SODIUM SERPL-SCNC: 138 MMOL/L (ref 135–147)
WBC # BLD AUTO: 10.66 THOUSAND/UL (ref 4.31–10.16)

## 2023-08-24 PROCEDURE — 97116 GAIT TRAINING THERAPY: CPT

## 2023-08-24 PROCEDURE — 85027 COMPLETE CBC AUTOMATED: CPT

## 2023-08-24 PROCEDURE — 99024 POSTOP FOLLOW-UP VISIT: CPT | Performed by: PHYSICIAN ASSISTANT

## 2023-08-24 PROCEDURE — 99232 SBSQ HOSP IP/OBS MODERATE 35: CPT | Performed by: SURGERY

## 2023-08-24 PROCEDURE — 97163 PT EVAL HIGH COMPLEX 45 MIN: CPT

## 2023-08-24 PROCEDURE — 80048 BASIC METABOLIC PNL TOTAL CA: CPT

## 2023-08-24 PROCEDURE — 97167 OT EVAL HIGH COMPLEX 60 MIN: CPT

## 2023-08-24 PROCEDURE — 87635 SARS-COV-2 COVID-19 AMP PRB: CPT | Performed by: NURSE PRACTITIONER

## 2023-08-24 RX ORDER — METHOCARBAMOL 500 MG/1
500 TABLET, FILM COATED ORAL EVERY 6 HOURS PRN
Status: DISCONTINUED | OUTPATIENT
Start: 2023-08-24 | End: 2023-08-26 | Stop reason: HOSPADM

## 2023-08-24 RX ORDER — ENOXAPARIN SODIUM 100 MG/ML
30 INJECTION SUBCUTANEOUS EVERY 12 HOURS SCHEDULED
Status: DISCONTINUED | OUTPATIENT
Start: 2023-08-24 | End: 2023-08-26 | Stop reason: HOSPADM

## 2023-08-24 RX ORDER — POTASSIUM CHLORIDE 20 MEQ/1
40 TABLET, EXTENDED RELEASE ORAL ONCE
Status: DISCONTINUED | OUTPATIENT
Start: 2023-08-24 | End: 2023-08-24

## 2023-08-24 RX ADMIN — ACETAMINOPHEN 650 MG: 325 TABLET, FILM COATED ORAL at 07:54

## 2023-08-24 RX ADMIN — OXYCODONE HYDROCHLORIDE 5 MG: 5 TABLET ORAL at 07:54

## 2023-08-24 RX ADMIN — METHOCARBAMOL 500 MG: 500 TABLET ORAL at 11:46

## 2023-08-24 RX ADMIN — OXYCODONE HYDROCHLORIDE 5 MG: 5 TABLET ORAL at 11:46

## 2023-08-24 RX ADMIN — FUROSEMIDE 40 MG: 40 TABLET ORAL at 07:54

## 2023-08-24 RX ADMIN — LIDOCAINE 1 PATCH: 700 PATCH TOPICAL at 07:55

## 2023-08-24 RX ADMIN — OXYCODONE HYDROCHLORIDE 5 MG: 5 TABLET ORAL at 02:30

## 2023-08-24 RX ADMIN — CEFAZOLIN SODIUM 2000 MG: 2 SOLUTION INTRAVENOUS at 02:30

## 2023-08-24 RX ADMIN — SENNOSIDES AND DOCUSATE SODIUM 1 TABLET: 50; 8.6 TABLET ORAL at 21:06

## 2023-08-24 RX ADMIN — METHOCARBAMOL 500 MG: 500 TABLET ORAL at 17:15

## 2023-08-24 RX ADMIN — PRAVASTATIN SODIUM 10 MG: 10 TABLET ORAL at 07:55

## 2023-08-24 RX ADMIN — ENOXAPARIN SODIUM 30 MG: 30 INJECTION SUBCUTANEOUS at 07:55

## 2023-08-24 RX ADMIN — ENOXAPARIN SODIUM 30 MG: 30 INJECTION SUBCUTANEOUS at 21:06

## 2023-08-24 RX ADMIN — ONDANSETRON 4 MG: 2 INJECTION INTRAMUSCULAR; INTRAVENOUS at 17:40

## 2023-08-24 RX ADMIN — OXYCODONE HYDROCHLORIDE 5 MG: 5 TABLET ORAL at 17:15

## 2023-08-24 RX ADMIN — ACETAMINOPHEN 650 MG: 325 TABLET, FILM COATED ORAL at 17:15

## 2023-08-24 NOTE — PROGRESS NOTES
[facility]  Progress Note  Name: Tha Lerma  MRN: [id number]  Unit/Bed#: S -01 I Date of Admission: 8/22/2023   Date of Service: 8/24/2023 I Hospital Day: 1    Assessment/Plan   Rheumatoid arteritis [facility])  Assessment & Plan  Pt states she has rheumatoid arthritis and Sjorgrens but is not on any chronic medications. Pt takes prednisone when she has flares    Closed fracture of neck of right femur ([address])  Assessment & Plan  Pt tripped over cord and fell on right side. CT- right femoral neck fx  TXA given in ED    Plan-  - Admit to trauma service  - Ortho consulted  - NPO after midnight  - Multimodal pain management  - PT/OT  - CM for dispo planning    Fall  Assessment & Plan  Mechanical fall from tripping over cord. No preceding symptoms. No LOC or thinners.  - CT L spine- Mild irregularity of the anterior cortex of L1. This could represent a minimally displaced compression deformity.  - No neurologic deficits present on exam.    Plan-  - See Femoral fracture         Disposition: Patient is status post femur fixation with orthopedics on 8/23. Patient has been doing well and disposition is pending further recommendations from orthopedic consultation as well as working with PT/OT and disposition planning with case management. SUBJECTIVE:  Chief Complaint: "My leg is feeling better"    Subjective: Patient is status post surgical fixation of right hip discomfort/right femoral neck fracture. Upon my assessment and evaluation of the patient, patient was attempting to reposition herself stating that she was feeling a fair amount of discomfort as well as feeling "sore". Patient was provided with assistance as well as placement of a pillow underneath lower back which helped/improved her discomfort. Bandages were clean dry and intact. Patient states that there is mild tenderness over the site of the incision but generally the leg is feeling "okay".   She does state that it has been sometime since her last dosage of pain medication upon my discussion with the patient and patient was requesting if she would be able to make use of her ordered as needed medications. She states that she has been able to get a little bit of sleep and has no other acute complaints aside from the pain. Meds/Allergies   Medications Prior to Admission   Medication Sig Dispense Refill Last Dose   • acetaminophen (TYLENOL) 500 mg tablet Take 500 mg by mouth 3 (three) times a day as needed        • albuterol (PROVENTIL HFA,VENTOLIN HFA) 90 mcg/act inhaler Inhale 2 puffs every 6 (six) hours as needed for wheezing or shortness of breath 8.5 g 3    • aspirin 325 mg tablet Take 325 mg by mouth daily Patient takes 2 daily for arthritis      • Cranberry 450 MG CAPS daily      • cyanocobalamin (VITAMIN B-12) 1000 MCG tablet Take 1,000 mcg by mouth daily      • Diclofenac Sodium (VOLTAREN) 1 % Apply 2 g topically 4 (four) times a day      • docusate sodium (COLACE) 100 mg capsule Take 100 mg by mouth 2 (two) times a day      • estradiol (ESTRACE VAGINAL) 0.1 mg/g vaginal cream Insert 1 g into the vagina 3 (three) times a week 42.5 g 6    • fluticasone (Flovent HFA) 220 mcg/act inhaler Inhale 2 puffs 2 (two) times a day Rinse mouth after use 36 g 3    • furosemide (LASIX) 40 mg tablet Take 1 tablet (40 mg total) by mouth every morning 90 tablet 1    • Magnesium Oxide -Mg Supplement 400 MG CAPS Take 1 capsule by mouth every morning        • methenamine hippurate (HIPREX) 1 g tablet TAKE 1 TABLET BY MOUTH 2 TIMES A DAY WITH MEALS.  60 tablet 4    • potassium chloride (Klor-Con) 10 mEq tablet TAKE 1 TABLET (10 MEQ TOTAL) BY MOUTH IN THE MORNING 90 tablet 1    • pravastatin (PRAVACHOL) 10 mg tablet TAKE 1 TABLET BY MOUTH EVERY DAY 90 tablet 3    • senna (SENOKOT) 8.6 MG tablet Take 1 tablet by mouth as needed for constipation          Current Facility-Administered Medications:   •  acetaminophen (TYLENOL) tablet 650 mg, 650 mg, Oral, Q4H PRN, Jorge A Brown PA-C  •  enoxaparin (LOVENOX) subcutaneous injection 30 mg, 30 mg, Subcutaneous, Q12H [facility], Juanjo Morgan MD  •  furosemide (LASIX) tablet 40 mg, 40 mg, Oral, QAM, Zeny Brown PA-C, 40 mg at 23 1300  •  HYDROmorphone HCl (DILAUDID) injection 0.2 mg, 0.2 mg, Intravenous, Q4H PRN, Juice Dow PA-C, 0.2 mg at 23 0603  •  lidocaine (LIDODERM) 5 % patch 1 patch, 1 patch, Topical, Daily, Zeny Brown PA-C  •  naloxone (NARCAN) 0.04 mg/mL syringe 0.04 mg, 0.04 mg, Intravenous, Q1MIN PRN, Juice Dow PA-C  •  ondansetron (ZOFRAN) injection 4 mg, 4 mg, Intravenous, Q4H PRN, Juice Dow PA-C, 4 mg at 23 1814  •  oxyCODONE (ROXICODONE) IR tablet 5 mg, 5 mg, Oral, Q4H PRN, Juice Dow PA-C, 5 mg at 23 0230  •  oxyCODONE (ROXICODONE) split tablet 2.5 mg, 2.5 mg, Oral, Q4H PRN, Juice Dow PA-C  •  pravastatin (PRAVACHOL) tablet 10 mg, 10 mg, Oral, Daily, Zeny Brown PA-C, 10 mg at 23 1300  •  senna-docusate sodium (SENOKOT S) 8.6-50 mg per tablet 1 tablet, 1 tablet, Oral, HS, Zeny Brown PA-C    OBJECTIVE:     Vitals: Blood pressure 139/69, pulse 87, temperature 100.3 °F (37.9 °C), resp. rate 18, height 5' 6" (1.676 m), weight 86.2 kg (190 lb), SpO2 95 %. Body mass index is 30.67 kg/m².  SpO2: SpO2: 95 %, SpO2 Activity: SpO2 Activity: At Rest, SpO2 Device: O2 Device: Nasal cannula, Capnography:    Temp (24hrs), Av.5 °F (36.9 °C), Min:97.1 °F (36.2 °C), Max:100.3 °F (37.9 °C)  Current: Temperature: 100.3 °F (37.9 °C)    ABG: No results found for: "PHART", "MUL3FUU", "PO2ART", "FKJ4DCQ", "T2PIABCO", "BEART", "SOURCE"      Intake/Output Summary (Last 24 hours) at 2023 0305  Last data filed at 2023 1801  Gross per 24 hour   Intake 750 ml   Output 200 ml   Net 550 ml       Invasive Devices Peripheral Intravenous Line  Duration           Peripheral IV 08/22/23 Right Antecubital 1 day    Peripheral IV 08/23/23 Left Hand <1 day                          Nutrition/GI Proph/Bowel Reg: Regular house diet/Senokot    Physical Exam:     GENERAL APPEARANCE: Patient in no acute distress. HEENT: NCAT; PERRL, EOMs intact; Mucous membranes moist  NECK / BACK: No acute tenderness on palpation  CV: Regular rate and rhythm; + S1, S2; no murmur/gallops/rubs appreciated. CHEST / LUNGS: Clear to auscultation; no wheezes/rales/rhonci. ABD: NABS; soft; non-distended; non-tender. EXT: +2 pulses bilaterally upper & lower extremities; no clubbing/cyanosis/edema. Incision site with regards to hip fixation is clean dry and intact  NEURO: GCS 15; no focal neurologic deficits; neurovascularly intact. SKIN: Warm, dry and well perfused; no rash; no jaundice.     Lab Results:   Results: I have personally reviewed all pertinent laboratory/tests results, BMP/CMP:   Lab Results   Component Value Date    SODIUM 142 08/23/2023    K 3.6 08/23/2023     08/23/2023    CO2 29 08/23/2023    BUN 27 (H) 08/23/2023    CREATININE 0.82 08/23/2023    CALCIUM 8.7 08/23/2023    EGFR 68 08/23/2023   , CBC:   Lab Results   Component Value Date    WBC 9.94 08/23/2023    HGB 12.6 08/23/2023    HCT 38.7 08/23/2023    MCV 90 08/23/2023     08/23/2023    RBC 4.30 08/23/2023    MCH 29.3 08/23/2023    MCHC 32.6 08/23/2023    RDW 14.4 08/23/2023    MPV 9.6 08/23/2023    NRBC 0 08/23/2023   , Coagulation: No results found for: "PT", "INR", "APTT", Lactate: No results found for: "LACTATE", Amylase: No results found for: "AMYLASE", Lipase: No results found for: "LIPASE", AST: No results found for: "AST", ALT: No results found for: "ALT", Urinalysis: No results found for: "COLORU", "CLARITYU", "SPECGRAV", "PHUR", "LEUKOCYTESUR", "NITRITE", "PROTEINUA", "GLUCOSEU", "Tonia Sacha", "BILIRUBINUR", "BLOODU", CK: No results found for: "CKTOTAL", Troponin: No results found for: "TROPONINI", EtOH: No results found for: "ETOH", UDS: No components found for: "RAPIDDRUGSCREEN", Pregnancy: No results found for: "PREGTESTUR", ABG: No results found for: "PHART", "EXV1TMU", "PO2ART", "FMG7YAR", "Y5RTMPPU", "BEART", "SOURCE" and ISTAT: No components found for: "VBG"  Imaging/EKG Studies: N/A  Other Studies: No new studies to review at this time  VTE Prophylaxis: Lovenox    Ike Rajput MD  8/24/2023

## 2023-08-24 NOTE — PHYSICAL THERAPY NOTE
PHYSICAL THERAPY EVALUATION & TREATMENT  DATE: 08/24/23  TIME: 8628-9781    NAME:  Apple Woo  AGE:   66 y.o.   Mrn:   [id number]  Length Of Stay: 1    ADMIT DX:  Hip injury [S79.919A]  Elevated blood pressure reading [R03.0]  Acute back pain [M54.9]  Closed fracture of neck of right femur, initial encounter ([address]) [S72.001A]  Closed fracture of right hip, initial encounter ([address]) [S72.001A]  Acute right hip pain [M25.551]    Past Medical History:   Diagnosis Date    Anesthesia complication     "shivers"    Arthritis     Asthma     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 see colors, with over-use goes darker- from untreated amblyopia    Breathing difficulty     with extubation pt developed laryngeal spasm    Cataract     both eyes- right eye needs surgery    Colon polyps 2016    Diverticulitis     had episode in April 2018    Edema     lower legs    Generalized osteoarthritis     GERD (gastroesophageal reflux disease)     Hearing problem     unspecified laterality-hearing aid right ear    High cholesterol     History of transfusion     autologus    Incontinence of urine     Polyarthritis     last assessed: 6/26/2015    Rheumatoid arthritis ([address])     Sjogren's syndrome ([address])     last assessed: 7/18/2016    SS-A antibody positive     last assessed: 8/20/2015    Urinary tract infection     UTI (urinary tract infection)      Past Surgical History:   Procedure Laterality Date    APPENDECTOMY      AUGMENTATION BREAST Bilateral     breast surgery enlargement procedure elective bilateral    BILATERAL OOPHORECTOMY Bilateral 2003    BLADDER SURGERY      bladder lift,cystocele    CATARACT EXTRACTION      Right 9/2018; Left 10/2018    COLONOSCOPY  04/18/2011    COLPORRHAPHY  2014    anterior, repair of cystocele    HYSTERECTOMY  1673    with cystocele     JOINT REPLACEMENT Left 2016    knee    KNEE ARTHROPLASTY Left     KNEE ARTHROSCOPY  2009    KNEE SURGERY Left 2009    torn meniscus    OOPHORECTOMY      OR COLONOSCOPY FLX DX W/COLLJ SPEC WHEN PFRMD N/A 5/31/2018    Procedure: COLONOSCOPY;  Surgeon: Lupe Lai MD;  Location: [address] [place] [address] GI LAB; Service: Gastroenterology    REDUCTION MAMMAPLASTY  2000    SACRAL NERVE STIMULATOR PLACEMENT N/A 1/31/2022    Procedure: IMPLANTATION NEUROSTIMULATOR ELECTRODE ARRAY SACRAL NERVE; INSERTION NEUROSTIMULATOR; FLUORO; ELECTONIC ANALYSIS;  Surgeon: Fermín Mcnulty MD;  Location: [facility];  Service: UroGynecology           TONSILLECTOMY AND ADENOIDECTOMY      URETHRA SURGERY         Performed at least 2 patient identifiers during session: Name, ID bracelet, and Epic photo     08/24/23 0849   PT Last Visit   PT Visit Date 08/24/23   Note Type   Note type Evaluation  (& treatment)   Pain Assessment   Pain Assessment Tool 0-10   Pain Score 5   Pain Location/Orientation Orientation: Right;Location: Hip   Pain Radiating Towards posterior R hip and low back   Pain Onset/Description Onset: Ongoing; Descriptor: Aching;Descriptor: Discomfort   Effect of Pain on Daily Activities limits comfort and positioning, limits tolerance of mobility, limits WBing to RLE   Patient's Stated Pain Goal No pain   Hospital Pain Intervention(s) Cold applied;Repositioned; Ambulation/increased activity; Emotional support   Multiple Pain Sites No   Restrictions/Precautions   Weight Bearing Precautions Per Order Yes   RLE Weight Bearing Per Order WBAT  (s/p bipolar brayan-arthroplasty 8/23/23)   Other Precautions Chair Alarm; Bed Alarm;WBS;Fall Risk;Pain  (posterior hip precautions; 2L O2 via NC)   Home Living   Type of [address] [place] [address] One level;Ramped entrance  (1 MARILIN through front porch, but ramp to enter through back door)   Bathroom Shower/Tub Walk-in shower   Bathroom Toilet Standard  (has a commode to go overtop if needed)   [employer] bars in shower; Shower chair;Commode; Toilet raiser;Grab bars around toilet   Bathroom Accessibility Accessible via walker   Home Equipment Walker;Reacher;Sock aid;Long-handled shoehorn Prior Function   Level of [place] Independent with ADLs; Independent with functional mobility; Independent with IADLS   Lives With Spouse  (spouse is currently on hospice at home; daughter also currently/temporarily living with pt to assist in care for spouse)   Sherlyn Jara Help From Family   IADLs Independent with driving; Independent with meal prep; Independent with medication management   Falls in the last 6 months 1 to 4  (1 fall, leading to current hospitalization)   Vocational Retired   Comments At baseline, pt ambulates household and community distances with no AD. Pt is primary caregiver for spouse who is on hospice at home, does proived physical assistance to him for bed mobility/transfers/amb. Pt's daughter currently moved in with pt to assist in care for spouse. General   Additional Pertinent History Pt admitted 8/22/23 s/p trip/fall at home resulting in R hip fx. Underwent surgical fixation of right displaced femoral neck fracture with hemiarthroplasty with Dr. Lisbeth Milan 8/23/2023. WBAT, R posterior THPs.    Family/Caregiver Present No   Cognition   Overall Cognitive Status WFL   Arousal/Participation Cooperative   Orientation Level Oriented X4   Memory Within functional limits   Following Commands Follows multistep commands with increased time or repetition   Subjective   Subjective "I never thought I'd be here in this position."   RUE Assessment   RUE Assessment WFL   LUE Assessment   LUE Assessment WFL   RLE Assessment   RLE Assessment X  (grossly 2/5 to 3/5 MMT throughout, limited due to pain in R hip and low back)   LLE Assessment   LLE Assessment WFL   Vision-Basic Assessment   Current Vision Wears glasses only for reading   Coordination   Movements are Fluid and Coordinated 1   Sensation X   Light Touch   RLE Light Touch Impaired   RLE Light Touch Comments intermittent numbness from top of hip to toes, new onset since surgery   LLE Light Touch Grossly intact   Proprioception   RLE Proprioception Grossly intact   LLE Proprioception Grossly Intact   Bed Mobility   Supine to Sit 4  Minimal assistance   Additional items Assist x 1;HOB elevated; Bedrails; Increased time required;LE management   Sit to Supine   (NT as pt was left seated OOB in recliner chair at end of session)   Additional Comments Pt with overall good static and dynamic sitting balance at EOB. Education on posterior THPS completed. Transfers   Sit to Stand 3  Moderate assistance   Additional items Assist x 1; Increased time required;Verbal cues  (cues for hand placement and maintenance of posterior THPs)   Stand to Sit 4  Minimal assistance  (CGA)   Additional items Assist x 1; Armrests; Increased time required;Verbal cues  (cues for hand and R LE placement)   Stand pivot 5  Supervision   Additional items Assist x 1; Increased time required;Verbal cues  (RW)   Ambulation/Elevation   Gait pattern Excessively slow; Step through pattern; Antalgic   Gait Assistance 5  Supervision  [facility] for first 5ft then close S for remainder of ambulation trials)   Additional items Assist x 1;Verbal cues   Assistive Device Rolling walker   Distance 22ft  (see additional gait trials within treatment section)   Stair Management Assistance Not tested  (pt does not have to negotiate stairs at home)   Balance   Static Sitting Good   Dynamic Sitting Fair   Static Standing Fair +  (w/ RW support)   Dynamic Standing Fair  (w/ RW support)   Ambulatory Fair +  (w/ RW support)   Endurance Deficit   Endurance Deficit Yes   Activity Tolerance   Activity Tolerance Patient limited by fatigue;Patient limited by pain   Medical Staff Made Aware Spoke with CM, OT, ortho AP, trauma team   Nurse Made Aware Spoke with ZOYA Bishop   Assessment   Prognosis Good   Problem List Decreased strength;Decreased range of motion;Decreased endurance; Impaired balance;Decreased mobility; Decreased skin integrity;Orthopedic restrictions;Pain   Assessment Pt seen for PT evaluation for mobility assessment & discharge needs. Activity orders: WBAT R LE, + posterior THPs. Pt admitted 8/22/2023 s/p fall resulting in R hip fx, now s/p surgical repair on 8/23/23. Comorbidities affecting pt's fnxl performance include: asthma, RA, sjogrens syndrome, CKD, recurrent UTI. During PT IE, pt requires AKHIL for bed mobility, MODA for initial STS (but progresses to [facility] during addt'l treatment), then ambulates 22ft with RW and S (and an additional 40ft with RW and S during treatment). The AM-PAC & Barthel Index outcome tools were used to assist in determining pt safety w/ mobility/self care & appropriate d/c recommendations, see above for scores. Pt is at risk of falls d/t multiple comorbidities, h/o falls, impaired balance, use of ambulatory aid, varying levels of pain , acuity of medical illness, ongoing medical treatment of primary dx and polypharmacy. Pt's clinical presentation is currently unstable/unpredictable as seen in pt's presentation of vital sign response, changing level of pain, increased fall risk, new onset of impairment of functional mobility, decreased endurance and new onset of weakness. Pt will benefit from continued PT services in order to address impairments, decrease risk of falls, maximize independence w/ fnxl mobility, & ensure safety w/ mobility for transition to next level of care. Based on pt presentation & impairments, pt would most appropriately benefit from return to home with home health PT services upon d/c.   Barriers to Discharge None   Goals   Patient Goals "to get back home to my "   [facility] Expiration Date 09/03/23   Short Term Goal #1 Patient PT goals established in order to address patient self reported goal of "to get home to my ".  Pt will: complete all bed mobility independently in order to promote increased OOB functional mobility to improve overall activity tolerance; complete all transfers with RW at [facility] in order to increase safety with functional mobility; ambulate >100ft with RW at [facility] level in order to increase safety with household and in facility functional mobility; improve R LE strength to >/= 4-/5 MMT t/o in order to increase safety with functional mobility and decrease risk of falls; demonstrate understanding and independence with LE strengthening HEP; improve ambulatory balance to >/= good grade in order to promote safety and increased independence with mobility; tolerate >3hrs OOB in upright position, in order to improve muscular endurance and respiratory status; improve AM-PAC score to >/= 21/24 in order to increase independence with mobility and decrease burden of care; improve Barthel Index score to >/= 70/100 in order to increase independence and decrease risk of falls. PT Treatment Day 0   Plan   Treatment/Interventions Functional transfer training;LE strengthening/ROM; Therapeutic exercise; Endurance training;Patient/family training;Equipment eval/education; Bed mobility;Gait training; Compensatory technique education;Spoke to MD;Spoke to nursing;Spoke to case management;Spoke to advanced practitioner   PT Frequency 4-6x/wk   Recommendation   PT Discharge Recommendation Home with home health rehabilitation   Equipment Recommended [address] walker   Change/add to [employer]?  No   AM-PAC Basic Mobility Inpatient   Turning in Flat Bed Without Bedrails 3   Lying on Back to Sitting on Edge of Flat Bed Without Bedrails 2   Moving Bed to Chair 3   Standing Up From Chair Using Arms 3   Walk in Room 3   Climb 3-5 Stairs With Railing 2   Basic Mobility Inpatient Raw Score 16   Basic Mobility Standardized Score 38.32   Highest Level Of Mobility   JH-HLM Goal 5: Stand one or more mins   JH-HLM Achieved 7: Walk 25 feet or more   Modified Juab Scale   Modified Juab Scale 3   Barthel Index   Feeding 10   Bathing 0   Grooming Score 5   Dressing Score 5   Bladder Score 10   Bowels Score 10   Toilet Use Score 5   Transfers (Bed/Chair) Score 10 Mobility (Level Surface) Score 0   Stairs Score 5   Barthel Index Score 60   Additional Treatment Session   Start Time 0925   End Time 0397   Treatment Assessment Pt is agreeable to participate in additional gait training post IE. Pt progresses to complete transfers with [facility], good application of hand and foot placement, cues to maintain posterior THPs. Pt then ambulates an additional 40ft with RW and S. Increased time spent for education re: posterior THPs, paper handout provided, pt with fair recall at end of session. Therapist also educated pt on incentive spirometry and importance of use, pt demonstrates good application. At end of session pt was left seated OOB in recliner chair with all needs in reach, care returned to RN. Regarding functional mobility, pt demonstrates functional capacity for return to home with family support/assist prn and HHPT services. At this time, requires RW for all functional mobility. Will continue skilled PT POC as able and appropriate to address functional impairments and progress towards therapy goals. Equipment Use Recommend pt use of RW for all mobility at this time. Additional Treatment Day 1   End of Consult   Patient Position at End of Consult Bedside chair;Bed/Chair alarm activated; All needs within reach   End of Consult Comments Based on patient's [facility] Level of Mobility scores today, patient currently has a goal of [facility] Levels: 7: WALK 25 FEET OR MORE, to be completed with RN staffing each shift, in order to improve overall activity tolerance and mobility, combat hospital related deconditioning, and maximize outcomes for d/c from the acute care setting. The patient's AM-PAC Basic Mobility Inpatient Short Form Raw Score is 16. A Raw score of less than or equal to 16 suggests the patient may benefit from discharge to post-acute rehabilitation services.  Please also refer to the recommendation of the Physical Therapist for safe discharge planning.       Livier Machado PT, DPT   Available via [facility] # [id number]  [place] License - [id number]  3/68/1639

## 2023-08-24 NOTE — PLAN OF CARE
Problem: MOBILITY - ADULT  Goal: Maintain or return to baseline ADL function  Description: INTERVENTIONS:  -  Assess patient's ability to carry out ADLs; assess patient's baseline for ADL function and identify physical deficits which impact ability to perform ADLs (bathing, care of mouth/teeth, toileting, grooming, dressing, etc.)  - Assess/evaluate cause of self-care deficits   - Assess range of motion  - Assess patient's mobility; develop plan if impaired  - Assess patient's need for assistive devices and provide as appropriate  - Encourage maximum independence but intervene and supervise when necessary  - Involve family in performance of ADLs  - Assess for home care needs following discharge   - Consider OT consult to assist with ADL evaluation and planning for discharge  - Provide patient education as appropriate  Outcome: Progressing     Problem: Prexisting or High Potential for Compromised Skin Integrity  Goal: Skin integrity is maintained or improved  Description: INTERVENTIONS:  - Identify patients at risk for skin breakdown  - Assess and monitor skin integrity  - Assess and monitor nutrition and hydration status  - Monitor labs   - Assess for incontinence   - Turn and reposition patient  - Assist with mobility/ambulation  - Relieve pressure over bony prominences  - Avoid friction and shearing  - Provide appropriate hygiene as needed including keeping skin clean and dry  - Evaluate need for skin moisturizer/barrier cream  - Collaborate with interdisciplinary team   - Patient/family teaching  - Consider wound care consult   Outcome: Progressing     Problem: Potential for Falls  Goal: Patient will remain free of falls  Description: INTERVENTIONS:  - Educate patient/family on patient safety including physical limitations  - Instruct patient to call for assistance with activity   - Consult OT/PT to assist with strengthening/mobility   - Keep Call bell within reach  - Keep bed low and locked with side rails adjusted as appropriate  - Keep care items and personal belongings within reach  - Initiate and maintain comfort rounds  - Make Fall Risk Sign visible to staff  - Apply yellow socks and bracelet for high fall risk patients  - Consider moving patient to room near nurses station  Outcome: Progressing     Problem: SAFETY ADULT  Goal: Maintain or return to baseline ADL function  Description: INTERVENTIONS:  -  Assess patient's ability to carry out ADLs; assess patient's baseline for ADL function and identify physical deficits which impact ability to perform ADLs (bathing, care of mouth/teeth, toileting, grooming, dressing, etc.)  - Assess/evaluate cause of self-care deficits   - Assess range of motion  - Assess patient's mobility; develop plan if impaired  - Assess patient's need for assistive devices and provide as appropriate  - Encourage maximum independence but intervene and supervise when necessary  - Involve family in performance of ADLs  - Assess for home care needs following discharge   - Consider OT consult to assist with ADL evaluation and planning for discharge  - Provide patient education as appropriate  Outcome: Progressing  Goal: Patient will remain free of falls  Description: INTERVENTIONS:  - Educate patient/family on patient safety including physical limitations  - Instruct patient to call for assistance with activity   - Consult OT/PT to assist with strengthening/mobility   - Keep Call bell within reach  - Keep bed low and locked with side rails adjusted as appropriate  - Keep care items and personal belongings within reach  - Initiate and maintain comfort rounds  - Make Fall Risk Sign visible to staff  - Apply yellow socks and bracelet for high fall risk patients  - Consider moving patient to room near nurses station  Outcome: Progressing

## 2023-08-24 NOTE — OCCUPATIONAL THERAPY NOTE
Occupational Therapy Evaluation     Patient Name: Tressa Gauthier  Today's Date: 8/24/2023  Problem List  Active Problems:    Fall    Closed fracture of neck of right femur ([address])    Rheumatoid arteritis ([address])    Past Medical History  Past Medical History:   Diagnosis Date    Anesthesia complication     "shivers"    Arthritis     Asthma     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 see colors, with over-use goes darker- from untreated amblyopia    Breathing difficulty     with extubation pt developed laryngeal spasm    Cataract     both eyes- right eye needs surgery    Colon polyps 2016    Diverticulitis     had episode in April 2018    Edema     lower legs    Generalized osteoarthritis     GERD (gastroesophageal reflux disease)     Hearing problem     unspecified laterality-hearing aid right ear    High cholesterol     History of transfusion     autologus    Incontinence of urine     Polyarthritis     last assessed: 6/26/2015    Rheumatoid arthritis ([address])     Sjogren's syndrome ([address])     last assessed: 7/18/2016    SS-A antibody positive     last assessed: 8/20/2015    Urinary tract infection     UTI (urinary tract infection)      Past Surgical History  Past Surgical History:   Procedure Laterality Date    APPENDECTOMY      AUGMENTATION BREAST Bilateral     breast surgery enlargement procedure elective bilateral    BILATERAL OOPHORECTOMY Bilateral 2003    BLADDER SURGERY      bladder lift,cystocele    CATARACT EXTRACTION      Right 9/2018; Left 10/2018    COLONOSCOPY  04/18/2011    COLPORRHAPHY  2014    anterior, repair of cystocele    HYSTERECTOMY  1673    with cystocele     JOINT REPLACEMENT Left 2016    knee    KNEE ARTHROPLASTY Left     KNEE ARTHROSCOPY  2009    KNEE SURGERY Left 2009    torn meniscus    OOPHORECTOMY      NC COLONOSCOPY FLX DX W/COLLJ SPEC WHEN PFRMD N/A 5/31/2018    Procedure: COLONOSCOPY;  Surgeon: Arlene Serrato MD;  Location: [address] [place] [address] GI LAB;   Service: Gastroenterology    REDUCTION MAMMAPLASTY  2000 SACRAL NERVE STIMULATOR PLACEMENT N/A 1/31/2022    Procedure: IMPLANTATION NEUROSTIMULATOR ELECTRODE ARRAY SACRAL NERVE; INSERTION NEUROSTIMULATOR; FLUORO; ELECTONIC ANALYSIS;  Surgeon: Andrew Torrez MD;  Location: [facility];  Service: UroGynecology           TONSILLECTOMY AND ADENOIDECTOMY      URETHRA SURGERY           08/24/23 0928   OT Last Visit   OT Visit Date 08/24/23   Note Type   Note type Evaluation   Pain Assessment   Pain Assessment Tool 0-10   Pain Score 5   Pain Location/Orientation Orientation: Right;Location: Hip   Pain Onset/Description Onset: Ongoing; Descriptor: Aching   Effect of Pain on Daily Activities limits activity tolerance, functional reach, speed of ADLs/mobility   Patient's Stated Pain Goal No pain   Hospital Pain Intervention(s) Repositioned; Ambulation/increased activity; Emotional support   Restrictions/Precautions   Weight Bearing Precautions Per Order Yes   RLE Weight Bearing Per Order WBAT  (s/p bipolar brayan-arthroplasty 8/23/23)   Other Precautions Contact/isolation; Chair Alarm; Bed Alarm;WBS;Multiple lines;O2;Fall Risk;Pain  (posterior hip precautions)   Home Living   Type of [address] [place] [address] Dr One level;Performs ADLs on one level; Able to live on main level with bedroom/bathroom  (0 MARILIN)   Bathroom Shower/Tub Walk-in shower   Bathroom Toilet Standard  (has raised in master bath)   [address] bars in shower; Shower chair;Commode   Bathroom Accessibility Accessible   Home Equipment Walker;Cane;Reacher;Sock aid  (rollator)   Prior Function   Level of [place] Independent with ADLs; Independent with functional mobility; Independent with IADLS   Lives With Spouse  (who is home on hospice)   Receives Help From Family  (daughter moved in after fall to help care for father)   IADLs Independent with driving; Independent with meal prep; Independent with medication management   Falls in the last 6 months 1 to 4  (1 fall, reason for admission)   Vocational Retired Comments Pt fully (I) PTA, no AD. Pt is primary caregiver for spouse and has to physically assist spouse who "just started moving out of bed". Lifestyle   Autonomy Pt lives with spouse in a 1 level house and is fully (I) PTA, no AD, (+) falls, (+)    Reciprocal Relationships Supportive spouse and daughter who has moved in to assist with caring for pt spouse   Service to Others Retired   Intrinsic Gratification Pt enjoys caring for her spouse nad is motivated to get home to be with him   General   Additional Pertinent History Pt admitted s/p fall at home over hospital bed cord resulting in R femur fx. POD # 1 R hemiarthroplasty hip bipolar. Per Ortho, pt is WBAT with posterior hip precautions   Family/Caregiver Present No   Subjective   Subjective "I twisted my back when I fell"   ADL   Eating Assistance 7  Independent   Grooming Assistance 5  Supervision/Setup   Grooming Deficit Setup;Supervision/safety; Increased time to complete;Standing with assistive device; Wash/dry hands  (standing at sink in bathroom)   UB Bathing Assistance 6  Modified Independent   LB [address] 6  Modified independent   [address] Deficit Setup;Steadying;Verbal cueing;Supervison/safety; Increased time to complete; Bedside commode;Grab bar use;Perineal hygiene  (unable to complete jet care in sitting, min A for steadying in stance for jet care)   Bed Mobility   Supine to Sit 4  Minimal assistance   Additional items Assist x 1;HOB elevated; Bedrails; Increased time required;Verbal cues;LE management   Additional Comments OOB to recliner at end of session   Transfers   Sit to Stand 3  Moderate assistance   Additional items Assist x 1; Increased time required;Verbal cues  (cues for hand placement and body positioning for hip precautions)   Stand to Sit 4  Minimal assistance   Additional items Assist x 1; Increased time required;Verbal cues  (VC for hand placement)   Toilet transfer 4  Minimal assistance   Additional items Assist x 1; Armrests; Increased time required;Verbal cues; Commode  (BSC over toilet; VC for safe hand placement)   Functional Mobility   Functional Mobility 5  Supervision   Additional Comments Initially required min A x 1, progressing to (S) with RW to bathroom and back. Good application and carryover of technique   Additional items Rolling walker   Balance   Static Sitting Good   Dynamic Sitting Fair   Static Standing Fair +   Dynamic Standing Fair   Activity Tolerance   Activity Tolerance Patient limited by fatigue;Patient limited by pain   Medical Staff [id number] coordinated with PT Fiona Moura   Nurse Made Aware yes, RN Dallas Jimenez ok to see pt and updated on outcomes   RUE Assessment   RUE Assessment WFL   LUE Assessment   LUE Assessment WFL   Hand Function   Gross Motor Coordination Functional   Fine Motor Coordination Functional   Sensation   Light Touch No apparent deficits   Vision-Basic Assessment   Current Vision Wears glasses only for reading   Cognition   Overall Cognitive Status [facility]   Arousal/Participation Alert; Cooperative   Attention Within functional limits   Orientation Level Oriented X4   Memory Within functional limits   Following Commands Follows multistep commands with increased time or repetition   Comments Extremely pleasant, motivated, good safety and command follow   Assessment   Limitation Decreased ADL status; Decreased endurance;Decreased self-care trans;Decreased high-level ADLs  (pain, balance, fxnl mobility, act otf, fxnl reach, standing otf and strength, insight and problem solving, response time)   Prognosis Good   Assessment Pt is a 66 y.o. female seen for OT evaluation s/p admit to [address] [place] [address] on 8/22/2023 w/ fall resulting in R femur fx. POD # 1 R bipolar hemiarthroplasty WBAT with posterior hip precautions.  Prior to admission, pt was living with spouse in a 1 level house, (I) with ADLs, (I) with IADLs, (+) falls, (+) . Personal and environmental factors affecting patient at time of evaluation include current role of caregiver for spouse who is on hospice, difficulty completing ADLs and difficulty completing IADLs. Personal factors supporting patient at time of evaluation include age, (I) PLOF, supportive family, attitude towards recovery, accessible home environment and no MARILIN. Based upon this evaluation, pt is functioning below baseline. Pt will benefit from continued skilled inpatient OT to maximize safety, level of independence overall performance in ADLs, functional transfers, functional mobility to return to functional baseline/highest level of function. Goals   Patient Goals "to get back home to my "   [facility] Time Frame 10-14   Long Term Goal #1 see goals below   Plan   Treatment Interventions ADL retraining;Functional transfer training; Endurance training;Patient/family training;Equipment evaluation/education; Compensatory technique education;Continued evaluation; Energy conservation   Goal Expiration Date 09/03/23   OT Treatment Day 0   OT Frequency 3-5x/wk   Recommendation   OT Discharge Recommendation Home with home health rehabilitation  (increased social support from family for IADL completion and caring for spouse)   Additional Comments  Pt seen as a co-eval with PT due to the patient's co-morbidities and clinically unstable presentation indicated by chart review. During session, pt benefited from two skilled therapists due to extensive physical assistance to achieve transitional movements and pt's decreased activity tolerance. Additional Comments 2 The patient's raw score on the -PAC Daily Activity inpatient short form is 21, standardized score is 44.27, greater than 39.4. From an OT standpoint, patients at this level may benefit from d/c to Home with home health rehabilitation.    -[facility] Daily Activity Inpatient Lower Body Dressing 3   Bathing 3   Toileting 3   Upper Body Dressing 4   Grooming 4   Eating 4   Daily Activity Raw Score 21   Daily Activity Standardized Score (Calc for Raw Score >=11) 44.27   AM-PAC Applied Cognition Inpatient   Following a Speech/Presentation 4   Understanding Ordinary Conversation 4   Taking Medications 4   Remembering Where Things Are Placed or Put Away 4   Remembering List of 4-5 Errands 4   Taking Care of Complicated Tasks 4   Applied Cognition Raw Score 24   Applied Cognition Standardized Score 62.21   End of Consult   Patient Position at End of Consult Other (comment)  (walking with PT Arvil Going)   Nurse Communication Nurse aware of consult     GOALS:    *Goals established to promote patient goal of "to get home to my ":      *Patient will perform grooming tasks standing at sink with (I) in order to increase overall independence    *LB ADL with mod (I) using AE prn for inc'd independence with self care and decreased caregiver burden    *Toileting with mod (I) for clothing management and hygiene to increase hygiene/thoroughness in order to reduce caregiver burden    *Pt will demonstrate use of long handled AE during 100% of tx sessions for increased ADL safety and independence following D/C     *Patient will verbalize 3 safety awareness/ principles to prevent falls in the home setting. *Patient will verbalize and demonstrate use of energy conservation/deep breathing techniques and work simplification skills during functional activities with no verbal cues.     *ADL transfers with mod (I) for inc'd independence with ADLs/purposeful tasks    *Bed mobility- (I) for inc'd independence to manage own comfort and initiate EOB & OOB purposeful tasks    *Patient will increase functional mobility to and from bathroom with rolling walker independently to increase independence with toileting    *Increase stand tolerance x 10  m for inc'd tolerance with standing purposeful tasks including IADL management    *Pt will verbalize and demonstrate understanding of post-op movement precautions 100% in tx sessions for increased safety and functional mobility.       Paz Garcia, OTD, OTR/L    [address] License [id number]  [address] [id number]

## 2023-08-24 NOTE — PROGRESS NOTES
Progress Note - Orthopedics   Luis Altagraciaro 66 y.o. female MRN: [id number]  Unit/Bed#: S -01      Subjective:    66 y. o.female seen and examined at bedside. She says she feels a bit better today. Having some right hip pain. About to work with physical therapy. No other complaints today.      Labs:  0   Lab Value Date/Time    HCT 35.7 08/24/2023 0456    HCT 38.7 08/23/2023 0657    HCT 41.1 08/22/2023 2154    HCT 42.3 04/10/2014 1325    HGB 11.5 08/24/2023 0456    HGB 12.6 08/23/2023 0657    HGB 13.4 08/22/2023 2154    HGB 14.4 04/10/2014 1325    INR 1.02 04/15/2018 1030    WBC 10.66 (H) 08/24/2023 0456    WBC 9.94 08/23/2023 0657    WBC 9.59 08/22/2023 2154    WBC 5.79 04/10/2014 1325    ESR 3 07/27/2020 0814    CRP <3.0 07/27/2020 0814       Meds:    Current Facility-Administered Medications:   •  acetaminophen (TYLENOL) tablet 650 mg, 650 mg, Oral, Q4H PRN, Zeny Brown PA-C, 650 mg at 08/24/23 0754  •  enoxaparin (LOVENOX) subcutaneous injection 30 mg, 30 mg, Subcutaneous, Q12H [address], Michael Turk MD, 30 mg at 08/24/23 4249  •  furosemide (LASIX) tablet 40 mg, 40 mg, Oral, QAM, Zeny Brown PA-C, 40 mg at 08/24/23 3686  •  HYDROmorphone HCl (DILAUDID) injection 0.2 mg, 0.2 mg, Intravenous, Q4H PRN, Kvng Rodgers PA-C, 0.2 mg at 08/23/23 0603  •  lidocaine (LIDODERM) 5 % patch 1 patch, 1 patch, Topical, Daily, Zeny Hickman PA-C, 1 patch at 08/24/23 0755  •  naloxone (NARCAN) 0.04 mg/mL syringe 0.04 mg, 0.04 mg, Intravenous, Q1MIN PRN, Petey Brown PA-C  •  ondansetron (ZOFRAN) injection 4 mg, 4 mg, Intravenous, Q4H PRN, Zeny Brown PA-C, 4 mg at 08/23/23 1814  •  oxyCODONE (ROXICODONE) IR tablet 5 mg, 5 mg, Oral, Q4H PRN, Petey Brown PA-C, 5 mg at 08/24/23 3691  •  oxyCODONE (ROXICODONE) split tablet 2.5 mg, 2.5 mg, Oral, Q4H PRN, Kvng Rodgers PA-C  •  pravastatin (PRAVACHOL) tablet 10 mg, 10 mg, Oral, Daily, Zeny Keys JUDITH Brown, 10 mg at 08/24/23 9033  •  senna-docusate sodium (SENOKOT S) 8.6-50 mg per tablet 1 tablet, 1 tablet, Oral, HS, Zeny Keys JUDITH Brown    Blood Culture:   No results found for: "BLOODCX"    Wound Culture:   No results found for: "WOUNDCULT"    Ins and Outs:  I/O last 24 hours: In: 18 [P.O.:240; I.V.:600; IV Piggyback:150]  Out: 750 [Urine:750]          Physical:  Vitals:    08/24/23 0911   BP: 125/67   Pulse: 94   Resp:    Temp:    SpO2: (!) 89%     Musculoskeletal: right Lower Extremity  · Surgical dressings c/d/i without strike through  · Mild surrounding ecchymosis/bruising. · Mild surrounding ttp. · Sensation intact to saphenous, sural, tibial, superficial peroneal nerve, and deep peroneal  · Motor intact to +FHL/EHL, +ankle dorsi/plantar flexion  · 2+ DP pulse  · Digits warm and well perfused  · Capillary refill < 2 seconds    Assessment:    66 y. o.female s/p surgical fixation of right displaced femoral neck fracture with hemiarthroplasty with Dr. Sekou Mendoza 8/23/2023. Patient doing well. Plan:  · WBAT to the right lower extremity. · Posterior hip precautions. · Hgb 11.5. Will monitor for ABLA and administer IVF/prbc as indicated for Greater than 2 gram drop or Hgb < 7  · PT/OT  · Pain control per primary team.   · DVT ppx: lovenox while admitted, recommend ASA 81mg BID x 6 weeks. · Medical management per primary team.   · Dispo: Ortho will follow   · Will need outpatient follow up with Dr. Skeou Mendoza.     Zeny Jameson PA-C

## 2023-08-24 NOTE — PLAN OF CARE
Problem: OCCUPATIONAL THERAPY ADULT  Goal: Performs self-care activities at highest level of function for planned discharge setting. See evaluation for individualized goals. Description: Treatment Interventions: ADL retraining, Functional transfer training, Endurance training, Patient/family training, Equipment evaluation/education, Compensatory technique education, Continued evaluation, Energy conservation          See flowsheet documentation for full assessment, interventions and recommendations. Note: Limitation: Decreased ADL status, Decreased endurance, Decreased self-care trans, Decreased high-level ADLs (pain, balance, fxnl mobility, act otf, fxnl reach, standing otf and strength, insight and problem solving, response time)  Prognosis: Good  Assessment: Pt is a 66 y.o. female seen for OT evaluation s/p admit to [facility] on 8/22/2023 w/ fall resulting in R femur fx. POD # 1 R bipolar hemiarthroplasty WBAT with posterior hip precautions. Prior to admission, pt was living with spouse in a 1 level house, (I) with ADLs, (I) with IADLs, (+) falls, (+) . Personal and environmental factors affecting patient at time of evaluation include current role of caregiver for spouse who is on hospice, difficulty completing ADLs and difficulty completing IADLs. Personal factors supporting patient at time of evaluation include age, (I) PLOF, supportive family, attitude towards recovery, accessible home environment and no MARILIN. Based upon this evaluation, pt is functioning below baseline. Pt will benefit from continued skilled inpatient OT to maximize safety, level of independence overall performance in ADLs, functional transfers, functional mobility to return to functional baseline/highest level of function.      OT Discharge Recommendation: Home with home health rehabilitation (increased social support from family for IADL completion and caring for spouse)     Arnoldo Childers, OTR/L  PA License [id number]  [address] [facility] [id number]

## 2023-08-24 NOTE — PLAN OF CARE
Problem: PHYSICAL THERAPY ADULT  Goal: Performs mobility at highest level of function for planned discharge setting. See evaluation for individualized goals. Description: Treatment/Interventions: Functional transfer training, LE strengthening/ROM, Therapeutic exercise, Endurance training, Patient/family training, Equipment eval/education, Bed mobility, Gait training, Compensatory technique education, Spoke to MD, Spoke to nursing, Spoke to case management, Spoke to advanced practitioner    Equipment Recommended: Glenis Salazar    See flowsheet documentation for full assessment, interventions and recommendations. Outcome: Progressing  Note: Prognosis: Good  Problem List: Decreased strength, Decreased range of motion, Decreased endurance, Impaired balance, Decreased mobility, Decreased skin integrity, Orthopedic restrictions, Pain  Assessment: Pt seen for PT evaluation for mobility assessment & discharge needs. Activity orders: WBAT R LE, + posterior THPs. Pt admitted 8/22/2023 s/p fall resulting in R hip fx, now s/p surgical repair on 8/23/23. Comorbidities affecting pt's fnxl performance include: asthma, RA, sjogrens syndrome, CKD, recurrent UTI. During PT IE, pt requires AKHIL for bed mobility, MODA for initial STS (but progresses to [facility] during addt'l treatment), then ambulates 22ft with RW and S (and an additional 40ft with RW and S during treatment). The AM-PAC & Barthel Index outcome tools were used to assist in determining pt safety w/ mobility/self care & appropriate d/c recommendations, see above for scores. Pt is at risk of falls d/t multiple comorbidities, h/o falls, impaired balance, use of ambulatory aid, varying levels of pain , acuity of medical illness, ongoing medical treatment of primary dx and polypharmacy.  Pt's clinical presentation is currently unstable/unpredictable as seen in pt's presentation of vital sign response, changing level of pain, increased fall risk, new onset of impairment of functional mobility, decreased endurance and new onset of weakness. Pt will benefit from continued PT services in order to address impairments, decrease risk of falls, maximize independence w/ fnxl mobility, & ensure safety w/ mobility for transition to next level of care. Based on pt presentation & impairments, pt would most appropriately benefit from return to home with home health PT services upon d/c.    Barriers to Discharge: None     PT Discharge Recommendation: Home with home health rehabilitation    See flowsheet documentation for full assessment.

## 2023-08-24 NOTE — CASE MANAGEMENT
Case Management Discharge Planning Note    Patient name Bharti Montenegro S /S -01 MRN [id number]  : 1945 Date 2023       Current Admission Date: 2023  Current Admission Diagnosis:Fall   Patient Active Problem List    Diagnosis Date Noted   • Fall 2023   • Closed fracture of neck of right femur ([address]) 2023   • Rheumatoid arteritis ([address]) 2023   • Mass of right knee 2023   • Stage 3a chronic kidney disease ([address]) 2023   • Primary osteoarthritis of both hands 2023   • Recurrent UTI 2023   • History of hysterectomy 2022   • Urge incontinence of urine 2022   • Primary osteoarthritis of left hip 2021   • UTI symptoms 2021   • Cough    • Rosacea 2019   • Chronic bilateral thoracic back pain 2018   • Diverticulitis of large intestine without perforation or abscess with bleeding 2018   • Mixed hyperlipidemia 2017   • Right hip pain 2016   • Left knee pain 2016   • Exertional shortness of breath 2016   • Palpitations 2016   • GERD without esophagitis 2015   • Primary generalized (osteo)arthritis 2015   • Sjogren's syndrome ([address]) 2015   • Impaired fasting glucose 2015   • Fatigue 2015   • Edema 2015   • Asthma, mild persistent 2015      LOS (days): 1  Geometric Mean LOS (GMLOS) (days): 4.00  Days to GMLOS:2.4     OBJECTIVE:  Risk of Unplanned Readmission Score: 10.67         Current admission status: Inpatient   Preferred Pharmacy:   [facility], [address] [place] [address]  Phone: [phone] Fax: [phone]    Primary Care Provider: Surendra Alcantara DO    Primary Insurance: MEDICARE  Secondary Insurance: BLUE CROSS    DISCHARGE DETAILS:    Discharge planning discussed with[de-identified] Patient  Freedom of Choice: Yes  Comments - Freedom of Choice: CM discussed dischage plans per care team recommendations. [facility] requesting [facility]PT/OT - referral made.   CM contacted family/caregiver?: Yes  Were Treatment Team discharge recommendations reviewed with patient/caregiver?: Yes  Did patient/caregiver verbalize understanding of patient care needs?: Yes  Were patient/caregiver advised of the risks associated with not following Treatment Team discharge recommendations?: Yes    Contacts  Patient Contacts: Aby Matthews Mackenzie YOUNG  Relationship to Patient[de-identified] Family  Contact Method: Phone  Phone Number: See face sheet  Reason/Outcome: Discharge [address]         Is the patient interested in [address] [facility] at discharge?: Yes  [facility] requested[de-identified] Nursing, Occupational Therapy, Physical [address] Name[de-identified] [facility]  [facility] External Referral Reason (only applicable if external H[facility] name selected): Services not provided in network or near patient location  [facility] Provider[de-identified] PCP  Home Health Services Needed[de-identified] Post-Op Care and Assessment, Gait/ADL Training, Evaluate Functional Status and Safety, Strengthening/Theraputic Exercises to Improve Function  Homebound Criteria Met[de-identified] Requires the Assistance of Another Person for Safe Ambulation or to Leave the Home, Uses an Assist Device (i.e. cane, walker, etc)  Supporting Clincal Findings[de-identified] Limited Endurance    DME Referral Provided  Referral made for DME?: No    Other Referral/Resources/Interventions Provided:  Interventions: [facility]  Referral Comments: [facility]    Would you like to participate in our [address] service program?  : No - Declined    Treatment Team Recommendation: Home with [address] [facility]  Discharge Destination Plan[de-identified] Home with [address]

## 2023-08-25 ENCOUNTER — APPOINTMENT (INPATIENT)
Dept: RADIOLOGY | Facility: HOSPITAL | Age: 78
DRG: 522 | End: 2023-08-25
Payer: MEDICARE

## 2023-08-25 LAB
ANION GAP SERPL CALCULATED.3IONS-SCNC: 4 MMOL/L
BASOPHILS # BLD AUTO: 0.03 THOUSANDS/ÂΜL (ref 0–0.1)
BASOPHILS NFR BLD AUTO: 0 % (ref 0–1)
BUN SERPL-MCNC: 11 MG/DL (ref 5–25)
CALCIUM SERPL-MCNC: 8.4 MG/DL (ref 8.4–10.2)
CHLORIDE SERPL-SCNC: 102 MMOL/L (ref 96–108)
CO2 SERPL-SCNC: 31 MMOL/L (ref 21–32)
CREAT SERPL-MCNC: 0.69 MG/DL (ref 0.6–1.3)
EOSINOPHIL # BLD AUTO: 0.13 THOUSAND/ÂΜL (ref 0–0.61)
EOSINOPHIL NFR BLD AUTO: 2 % (ref 0–6)
ERYTHROCYTE [DISTWIDTH] IN BLOOD BY AUTOMATED COUNT: 14.3 % (ref 11.6–15.1)
GFR SERPL CREATININE-BSD FRML MDRD: 83 ML/MIN/1.73SQ M
GLUCOSE SERPL-MCNC: 96 MG/DL (ref 65–140)
HCT VFR BLD AUTO: 35.1 % (ref 34.8–46.1)
HGB BLD-MCNC: 11.4 G/DL (ref 11.5–15.4)
IMM GRANULOCYTES # BLD AUTO: 0.04 THOUSAND/UL (ref 0–0.2)
IMM GRANULOCYTES NFR BLD AUTO: 1 % (ref 0–2)
LYMPHOCYTES # BLD AUTO: 1.1 THOUSANDS/ÂΜL (ref 0.6–4.47)
LYMPHOCYTES NFR BLD AUTO: 13 % (ref 14–44)
MCH RBC QN AUTO: 29.8 PG (ref 26.8–34.3)
MCHC RBC AUTO-ENTMCNC: 32.5 G/DL (ref 31.4–37.4)
MCV RBC AUTO: 92 FL (ref 82–98)
MONOCYTES # BLD AUTO: 0.92 THOUSAND/ÂΜL (ref 0.17–1.22)
MONOCYTES NFR BLD AUTO: 11 % (ref 4–12)
NEUTROPHILS # BLD AUTO: 6.4 THOUSANDS/ÂΜL (ref 1.85–7.62)
NEUTS SEG NFR BLD AUTO: 73 % (ref 43–75)
NRBC BLD AUTO-RTO: 0 /100 WBCS
PLATELET # BLD AUTO: 173 THOUSANDS/UL (ref 149–390)
PMV BLD AUTO: 9.9 FL (ref 8.9–12.7)
POTASSIUM SERPL-SCNC: 3.5 MMOL/L (ref 3.5–5.3)
RBC # BLD AUTO: 3.82 MILLION/UL (ref 3.81–5.12)
SODIUM SERPL-SCNC: 137 MMOL/L (ref 135–147)
WBC # BLD AUTO: 8.62 THOUSAND/UL (ref 4.31–10.16)

## 2023-08-25 PROCEDURE — 85025 COMPLETE CBC W/AUTO DIFF WBC: CPT | Performed by: NURSE PRACTITIONER

## 2023-08-25 PROCEDURE — 99024 POSTOP FOLLOW-UP VISIT: CPT | Performed by: PHYSICIAN ASSISTANT

## 2023-08-25 PROCEDURE — 97530 THERAPEUTIC ACTIVITIES: CPT

## 2023-08-25 PROCEDURE — 80048 BASIC METABOLIC PNL TOTAL CA: CPT | Performed by: NURSE PRACTITIONER

## 2023-08-25 PROCEDURE — 99232 SBSQ HOSP IP/OBS MODERATE 35: CPT | Performed by: SURGERY

## 2023-08-25 PROCEDURE — 97116 GAIT TRAINING THERAPY: CPT

## 2023-08-25 PROCEDURE — 73560 X-RAY EXAM OF KNEE 1 OR 2: CPT

## 2023-08-25 PROCEDURE — 99223 1ST HOSP IP/OBS HIGH 75: CPT | Performed by: STUDENT IN AN ORGANIZED HEALTH CARE EDUCATION/TRAINING PROGRAM

## 2023-08-25 PROCEDURE — 71045 X-RAY EXAM CHEST 1 VIEW: CPT

## 2023-08-25 RX ORDER — ALBUTEROL SULFATE 90 UG/1
2 AEROSOL, METERED RESPIRATORY (INHALATION) ONCE
Status: DISCONTINUED | OUTPATIENT
Start: 2023-08-25 | End: 2023-08-25

## 2023-08-25 RX ORDER — ALBUTEROL SULFATE 90 UG/1
2 AEROSOL, METERED RESPIRATORY (INHALATION) EVERY 6 HOURS PRN
Status: DISCONTINUED | OUTPATIENT
Start: 2023-08-25 | End: 2023-08-26 | Stop reason: HOSPADM

## 2023-08-25 RX ADMIN — ALBUTEROL SULFATE 2 PUFF: 90 AEROSOL, METERED RESPIRATORY (INHALATION) at 10:49

## 2023-08-25 RX ADMIN — LIDOCAINE 1 PATCH: 700 PATCH TOPICAL at 08:08

## 2023-08-25 RX ADMIN — OXYCODONE HYDROCHLORIDE 5 MG: 5 TABLET ORAL at 21:15

## 2023-08-25 RX ADMIN — METHOCARBAMOL 500 MG: 500 TABLET ORAL at 21:15

## 2023-08-25 RX ADMIN — FUROSEMIDE 40 MG: 40 TABLET ORAL at 08:08

## 2023-08-25 RX ADMIN — FLUTICASONE FUROATE 1 PUFF: 200 POWDER RESPIRATORY (INHALATION) at 07:52

## 2023-08-25 RX ADMIN — ENOXAPARIN SODIUM 30 MG: 30 INJECTION SUBCUTANEOUS at 08:08

## 2023-08-25 RX ADMIN — OXYCODONE HYDROCHLORIDE 5 MG: 5 TABLET ORAL at 02:09

## 2023-08-25 RX ADMIN — OXYCODONE HYDROCHLORIDE 5 MG: 5 TABLET ORAL at 08:08

## 2023-08-25 RX ADMIN — METHOCARBAMOL 500 MG: 500 TABLET ORAL at 14:50

## 2023-08-25 RX ADMIN — PRAVASTATIN SODIUM 10 MG: 10 TABLET ORAL at 08:08

## 2023-08-25 RX ADMIN — METHOCARBAMOL 500 MG: 500 TABLET ORAL at 02:09

## 2023-08-25 RX ADMIN — METHOCARBAMOL 500 MG: 500 TABLET ORAL at 08:10

## 2023-08-25 RX ADMIN — ENOXAPARIN SODIUM 30 MG: 30 INJECTION SUBCUTANEOUS at 21:16

## 2023-08-25 RX ADMIN — ONDANSETRON 4 MG: 2 INJECTION INTRAMUSCULAR; INTRAVENOUS at 02:28

## 2023-08-25 RX ADMIN — OXYCODONE HYDROCHLORIDE 5 MG: 5 TABLET ORAL at 12:40

## 2023-08-25 RX ADMIN — SENNOSIDES AND DOCUSATE SODIUM 1 TABLET: 50; 8.6 TABLET ORAL at 21:16

## 2023-08-25 NOTE — CASE MANAGEMENT
Case Management Discharge Planning Note    Patient name Vincent KUMARI /S -01 MRN [id number]  : 1945 Date 2023       Current Admission Date: 2023  Current Admission Diagnosis:Fall   Patient Active Problem List    Diagnosis Date Noted   • Fall 2023   • Closed fracture of neck of right femur ([address]) 2023   • Rheumatoid arteritis ([address]) 2023   • Mass of right knee 2023   • Stage 3a chronic kidney disease ([address]) 2023   • Primary osteoarthritis of both hands 2023   • Recurrent UTI 2023   • History of hysterectomy 2022   • Urge incontinence of urine 2022   • Primary osteoarthritis of left hip 2021   • UTI symptoms 2021   • Cough    • Rosacea 2019   • Chronic bilateral thoracic back pain 2018   • Diverticulitis of large intestine without perforation or abscess with bleeding 2018   • Mixed hyperlipidemia 2017   • Right hip pain 2016   • Left knee pain 2016   • Exertional shortness of breath 2016   • Palpitations 2016   • GERD without esophagitis 2015   • Primary generalized (osteo)arthritis 2015   • Sjogren's syndrome ([address]) 2015   • Impaired fasting glucose 2015   • Fatigue 2015   • Edema 2015   • Asthma, mild persistent 2015      LOS (days): 2  Geometric Mean LOS (GMLOS) (days): 4.00  Days to GMLOS:1.5     OBJECTIVE:  Risk of Unplanned Readmission Score: 10.15         Current admission status: Inpatient   Preferred Pharmacy:   [place] [address]  Phone: [phone] Fax: [phone]    Primary Care Provider: Linnea Tijerina DO    Primary Insurance: MEDICARE  Secondary Insurance: BLUE CROSS    DISCHARGE DETAILS:    Discharge planning discussed with[de-identified] Patient and daughter, Zuri Saldana of Choice: Yes  Comments - Freedom of Choice: CM provided SNF choice list and patient/daughter requested [facility] - facility will have a bed 8/26. CM contacted family/caregiver?: Yes  Were Treatment Team discharge recommendations reviewed with patient/caregiver?: Yes  Did patient/caregiver verbalize understanding of patient care needs?: N/A- going to facility  Were patient/caregiver advised of the risks associated with not following Treatment Team discharge recommendations?: Yes    Contacts  Patient Contacts: Joslyn Mann DTR  Relationship to Patient[de-identified] Family  Contact Method: Phone  Phone Number: See face sheet  Reason/Outcome: Discharge [facility]         Is the patient interested in [facility] at discharge?: No         Other Referral/Resources/Interventions Provided:  Interventions: Short Term Rehab  Referral Comments: [facility]    Would you like to participate in our [facility] service program?  : No - Declined    Treatment Team Recommendation: Short Term Rehab  Discharge Destination Plan[de-identified] Short Term Rehab  Transport at Discharge : [facility]  Dispatcher Contacted: Yes  Number/Name of Dispatcher: [employer]     ETA of Transport (Date): 08/26/23  ETA of Transport (Time): 1300     Transfer Mode: Stretcher  Accompanied by: EMS personnel     IMM Given (Date):: 08/25/23  IMM Given to[de-identified] Patient   . Fauzia Morales IMM reviewed with patient, patient agrees with discharge determination.          Accepting Facility Name, [facility] : [facility]  Receiving Facility/Agency Phone Number: [phone]  Facility/Agency Fax Number: [phone]

## 2023-08-25 NOTE — PHYSICAL THERAPY NOTE
PHYSICAL THERAPY NOTE    Patient Name: Yefri Almeida  KWQRK'V Date: 23 1407   PT Last Visit   PT Visit Date 23   Note Type   Note Type Treatment   Pain Assessment   Pain Assessment Tool 0-10   Pain Score 5   Pain Location/Orientation Orientation: Right;Location: Hip;Location: Back   Restrictions/Precautions   Weight Bearing Precautions Per Order Yes   RLE Weight Bearing Per Order WBAT   Other Precautions Contact/isolation; Fall Risk;Pain; Chair Alarm; Bed Alarm;WBS;THR  (posterior hip precautions, 2L O2 via NC)   General   Family/Caregiver Present No   Subjective   Subjective Patient seated OOB in recliner and is agreeable to participate in therapy session. Pt identifers obtained from name & . Bed Mobility   Supine to Sit Unable to assess   Sit to Supine Unable to assess   Additional Comments Patient seated OOB in recliner pre and post session with chair alarm engaged, call bell and belongings in reach. Transfers   Sit to Stand 4  Minimal assistance   Additional items Assist x 1; Armrests; Increased time required;Verbal cues   Stand to Sit 4  Minimal assistance   Additional items Assist x 1; Armrests; Increased time required;Verbal cues   Toilet transfer 4  Minimal assistance   Additional items Assist x 1; Armrests; Increased time required;Verbal cues; Commode  (BSC over toilet)   Ambulation/Elevation   Gait pattern Excessively slow; Step through pattern; Antalgic   Gait Assistance   (min ax1 to S at time)   Additional items Assist x 1;Verbal cues   Assistive Device Rolling walker   Distance 15' x1, 65' x1   Balance   Static Sitting Good   Dynamic Sitting Fair   Static Standing Fair +   Dynamic Standing Fair   Ambulatory Fair +   Endurance Deficit   Endurance Deficit Yes   Endurance Deficit Description limited activity tolerance and ambulation distance   Activity Tolerance   Activity Tolerance Patient limited by fatigue;Patient limited by pain   Nurse Made Aware Spoke to ZOYA GANN   Assessment   Prognosis Good   Problem List Decreased strength;Decreased range of motion;Decreased endurance; Impaired balance;Decreased mobility; Decreased safety awareness;Orthopedic restrictions;Pain   Assessment Patient agreeable to participate in therapy session. Patient demonstrated progression with functional mobility however requires assistance throughout. Multiple sit<>stand transfers with min ax1, increased time and instruction for technique and positioning. Patient able to ambulate increased gait distance with roller walker requiring fluctuations of min to close supervision for steadying balance and path navigation. Provided verbal instruction for stepping sequence, walker advancement and adherence to hip precautions with directional changes. Pt able to verbalize 3/3 THP with increased time. Pt requires increased time for mobility tasks with frequent rest breaks and slo wilfred. Continue to focus on OOB mobility with progression of transfers and ambulation as appropriate and able. Goals   Patient Goals none stated   STG Expiration Date 09/03/23   PT Treatment Day 1   Plan   Treatment/Interventions Functional transfer training;LE strengthening/ROM; Therapeutic exercise; Endurance training;Patient/family training;Equipment eval/education; Bed mobility;Gait training;Spoke to nursing   PT Frequency 4-6x/wk   Recommendation   PT Discharge Recommendation Home with home health rehabilitation   Equipment Recommended [address]   AM-PAC Basic Mobility Inpatient   Turning in Flat Bed Without Bedrails 3   Lying on Back to Sitting on Edge of Flat Bed Without Bedrails 2   Moving Bed to Chair 3   Standing Up From Chair Using Arms 3   Walk in Room 3   Climb 3-5 Stairs With Railing 2   Basic Mobility Inpatient Raw Score 16   Basic Mobility Standardized Score 38.32   Highest Level Of Mobility   [facility] Goal 5: Stand one or more mins   -HLM Achieved 7: Walk 25 feet or more   End of Consult   Patient Position at End of Consult Bedside chair;Bed/Chair alarm activated; All needs within reach     The patient's AM-PAC Basic Mobility Inpatient Short Form Raw Score is 16. A Raw score of less than or equal to 16 suggests the patient may benefit from discharge to post-acute rehabilitation services. Please also refer to the recommendation of the Physical Therapist for safe discharge planning.       Ciara Dumont, PTA

## 2023-08-25 NOTE — CASE MANAGEMENT
Case Management Discharge Planning Note    Patient name Krishan Bueno  Location S /S -01 MRN [id number]  : 1945 Date 2023       Current Admission Date: 2023  Current Admission Diagnosis:Fall   Patient Active Problem List    Diagnosis Date Noted   • Fall 2023   • Closed fracture of neck of right femur ([address]) 2023   • Rheumatoid arteritis ([address]) 2023   • Mass of right knee 2023   • Stage 3a chronic kidney disease ([address]) 2023   • Primary osteoarthritis of both hands 2023   • Recurrent UTI 2023   • History of hysterectomy 2022   • Urge incontinence of urine 2022   • Primary osteoarthritis of left hip 2021   • UTI symptoms 2021   • Cough    • Rosacea 2019   • Chronic bilateral thoracic back pain 2018   • Diverticulitis of large intestine without perforation or abscess with bleeding 2018   • Mixed hyperlipidemia 2017   • Right hip pain 2016   • Left knee pain 2016   • Exertional shortness of breath 2016   • Palpitations 2016   • GERD without esophagitis 2015   • Primary generalized (osteo)arthritis 2015   • Sjogren's syndrome ([address]) 2015   • Impaired fasting glucose 2015   • Fatigue 2015   • Edema 2015   • Asthma, mild persistent 2015      LOS (days): 2  Geometric Mean LOS (GMLOS) (days): 4.00  Days to GMLOS:1.6     OBJECTIVE:  Risk of Unplanned Readmission Score: 10.01         Current admission status: Inpatient   Preferred Pharmacy:   [facility], [address] [place] [address]  Phone: [phone] Fax: [phone]    Primary Care Provider: Marsha Barrera DO    Primary Insurance: MEDICARE  Secondary Insurance: BLUE CROSS    DISCHARGE DETAILS:    Discharge planning discussed with[de-identified] Patient  Freedom of Choice: Yes  Comments - Freedom of Choice: CM discussed discharge plans per care team recommendations. Patient has changed her mind from her previous request of [address] and prefers to go to rehab prior to returning home. She's requesting blanket SNF referrals in [facility] and [facility] [place] [place] - referrals sent. CM will follow up with SNF choice list.  CM contacted family/caregiver?: Yes  Were Treatment Team discharge recommendations reviewed with patient/caregiver?: Yes  Did patient/caregiver verbalize understanding of patient care needs?: N/A- going to facility  Were patient/caregiver advised of the risks associated with not following Treatment Team discharge recommendations?: Yes    Contacts  Patient Contacts: Darrius David - HANNAH  Relationship to Patient[de-identified] Family  Contact Method: Phone  Phone Number: See face sheet  Reason/Outcome: Discharge [facility]         Is the patient interested in [address] at discharge?: No    DME Referral Provided  Referral made for DME?: No    Other Referral/Resources/Interventions Provided:  Interventions: Short Term Rehab  Referral Comments: [place] [facility] referrals made.     Would you like to participate in our [facility] service program?  : No - Declined    Treatment Team Recommendation: Short Term Rehab  Discharge Destination Plan[de-identified] Short Term Rehab

## 2023-08-25 NOTE — ASSESSMENT & PLAN NOTE
Pt tripped over cord and fell on right side. CT- right femoral neck fx  TXA given in ED    Plan-  - 8/24 ORIF R hip  - Orthopedics consulted and note appreciated.   - WBAT RLE  -Multimodal pain regimen  -DVT prophylaxis: Lovenox  -PT/OT

## 2023-08-25 NOTE — PLAN OF CARE
Problem: SAFETY ADULT  Goal: Maintain or return to baseline ADL function  Description: INTERVENTIONS:  -  Assess patient's ability to carry out ADLs; assess patient's baseline for ADL function and identify physical deficits which impact ability to perform ADLs (bathing, care of mouth/teeth, toileting, grooming, dressing, etc.)  - Assess/evaluate cause of self-care deficits   - Assess range of motion  - Assess patient's mobility; develop plan if impaired  - Assess patient's need for assistive devices and provide as appropriate  - Encourage maximum independence but intervene and supervise when necessary  - Involve family in performance of ADLs  - Assess for home care needs following discharge   - Consider OT consult to assist with ADL evaluation and planning for discharge  - Provide patient education as appropriate  Outcome: Progressing  Goal: Maintains/Returns to pre admission functional level  Description: INTERVENTIONS:  - Perform BMAT or MOVE assessment daily.   - Set and communicate daily mobility goal to care team and patient/family/caregiver.    - Collaborate with rehabilitation services on mobility goals if consulted  - Out of bed for toileting  - Record patient progress and toleration of activity level   Outcome: Progressing  Goal: Patient will remain free of falls  Description: INTERVENTIONS:  - Educate patient/family on patient safety including physical limitations  - Instruct patient to call for assistance with activity   - Consult OT/PT to assist with strengthening/mobility   - Keep Call bell within reach  - Keep bed low and locked with side rails adjusted as appropriate  - Keep care items and personal belongings within reach  - Initiate and maintain comfort rounds  - Make Fall Risk Sign visible to staff  - Apply yellow socks and bracelet for high fall risk patients  - Consider moving patient to room near nurses station  Outcome: Progressing     Problem: PAIN - ADULT  Goal: Verbalizes/displays adequate comfort level or baseline comfort level  Description: Interventions:  - Encourage patient to monitor pain and request assistance  - Assess pain using appropriate pain scale  - Administer analgesics based on type and severity of pain and evaluate response  - Implement non-pharmacological measures as appropriate and evaluate response  - Consider cultural and social influences on pain and pain management  - Notify physician/advanced practitioner if interventions unsuccessful or patient reports new pain  Outcome: Progressing     Problem: Potential for Falls  Goal: Patient will remain free of falls  Description: INTERVENTIONS:  - Educate patient/family on patient safety including physical limitations  - Instruct patient to call for assistance with activity   - Consult OT/PT to assist with strengthening/mobility   - Keep Call bell within reach  - Keep bed low and locked with side rails adjusted as appropriate  - Keep care items and personal belongings within reach  - Initiate and maintain comfort rounds  - Make Fall Risk Sign visible to staff  - Apply yellow socks and bracelet for high fall risk patients  - Consider moving patient to room near nurses station  Outcome: Progressing     Problem: Prexisting or High Potential for Compromised Skin Integrity  Goal: Skin integrity is maintained or improved  Description: INTERVENTIONS:  - Identify patients at risk for skin breakdown  - Assess and monitor skin integrity  - Assess and monitor nutrition and hydration status  - Monitor labs   - Assess for incontinence   - Turn and reposition patient  - Assist with mobility/ambulation  - Relieve pressure over bony prominences  - Avoid friction and shearing  - Provide appropriate hygiene as needed including keeping skin clean and dry  - Evaluate need for skin moisturizer/barrier cream  - Collaborate with interdisciplinary team   - Patient/family teaching  - Consider wound care consult   Outcome: Progressing     Problem: MOBILITY - ADULT  Goal: Maintain or return to baseline ADL function  Description: INTERVENTIONS:  -  Assess patient's ability to carry out ADLs; assess patient's baseline for ADL function and identify physical deficits which impact ability to perform ADLs (bathing, care of mouth/teeth, toileting, grooming, dressing, etc.)  - Assess/evaluate cause of self-care deficits   - Assess range of motion  - Assess patient's mobility; develop plan if impaired  - Assess patient's need for assistive devices and provide as appropriate  - Encourage maximum independence but intervene and supervise when necessary  - Involve family in performance of ADLs  - Assess for home care needs following discharge   - Consider OT consult to assist with ADL evaluation and planning for discharge  - Provide patient education as appropriate  Outcome: Progressing  Goal: Maintains/Returns to pre admission functional level  Description: INTERVENTIONS:  - Perform BMAT or MOVE assessment daily.   - Set and communicate daily mobility goal to care team and patient/family/caregiver.    - Collaborate with rehabilitation services on mobility goals if consulted  - Out of bed for toileting  - Record patient progress and toleration of activity level   Outcome: Progressing

## 2023-08-25 NOTE — CONSULTS
Pulmonary Consultation   Efrain Cuevas 66 y.o. female MRN: [id number]  Unit/Bed#: S -01 Encounter: [id number]      Reason for consultation: Asthma mild persistent    Requesting physician: Dr. Bernal Diver:     1. History of well-controlled mild asthma, stable on ICS Flovent and as needed albuterol. No history of mechanical ventilation or requiring prednisone recently. Overall she is well controlled with her asthma management. We offered referral to pulmonary which she politely declined. We also offered to change her inhalers to something with ICS and LABA such as Advair or Symbicort or Breo. She says she has plenty of Flovent at home and would rather stick with Flovent because it is expensive and does not want the medications to go to waste  2. Acute approximate respite failure in the setting of postop atelectasis. She uses her incentive spirometry occasionally. She was instructed to use this more often to help wean off oxygen  3. Postop pain from right hip fracture  4. Rheumatoid arthritis stable on no Biologics    Recommendation:    1. She prefers to continue on Flovent and as needed albuterol. We did offer inhaler optimization but she politely declined  2. We offered pulmonary referral which she ultimately declined  3. Continue incentive parameter he at least 5 times a day  4. Wean oxygen to keep saturation above 90%  5. She requests further pain control. 6. Call as needed    History of Present Illness   HPI:      60-year-old female admitted to the trauma service due to status post fall resulting in right femoral neck fracture, she also has a history of rheumatoid arthritis and Sjogren disease but not on any chronic suppression. Orthopedics evaluated her and took her to the operating room for her right displaced femoral neck fracture and performed surgical fixation with a hemiarthroplasty.     Currently she is afebrile, on 2 L nasal cannula, being treated with Arnuity Ellipta, Lasix, and has stable weight. Of note she does not follow with the pulmonary. She says he was diagnosed with asthma many years ago and had only been on inhaled corticosteroids such as Flovent along with occasional as needed albuterol. She says her triggers are mainly perfume, and weather change. Has not had to be on a ventilator before and has not required prednisone. Review of systems:  12 point review of systems was completed and was otherwise negative except as listed in HPI.       Historical Information   Past Medical History:   Diagnosis Date   • Anesthesia complication     "shivers"   • Arthritis    • Asthma    • 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 see colors, with over-use goes darker- from untreated amblyopia   • Breathing difficulty     with extubation pt developed laryngeal spasm   • Cataract     both eyes- right eye needs surgery   • Colon polyps 2016   • Diverticulitis     had episode in April 2018   • Edema     lower legs   • Generalized osteoarthritis    • GERD (gastroesophageal reflux disease)    • Hearing problem     unspecified laterality-hearing aid right ear   • High cholesterol    • History of transfusion     autologus   • Incontinence of urine    • Polyarthritis     last assessed: 6/26/2015   • Rheumatoid arthritis ([address])    • Sjogren's syndrome ([address])     last assessed: 7/18/2016   • SS-A antibody positive     last assessed: 8/20/2015   • Urinary tract infection    • UTI (urinary tract infection)      Past Surgical History:   Procedure Laterality Date   • APPENDECTOMY     • AUGMENTATION BREAST Bilateral     breast surgery enlargement procedure elective bilateral   • BILATERAL OOPHORECTOMY Bilateral 2003   • BLADDER SURGERY      bladder lift,cystocele   • CATARACT EXTRACTION      Right 9/2018; Left 10/2018   • COLONOSCOPY  04/18/2011   • COLPORRHAPHY  2014    anterior, repair of cystocele   • HYSTERECTOMY  1673    with cystocele    • JOINT REPLACEMENT Left 2016    knee   • KNEE ARTHROPLASTY Left    • KNEE ARTHROSCOPY 2009   • KNEE SURGERY Left 2009    torn meniscus   • OOPHORECTOMY     • AZ COLONOSCOPY FLX DX W/COLLJ SPEC WHEN PFRMD N/A 5/31/2018    Procedure: COLONOSCOPY;  Surgeon: John Mcconnell MD;  Location: [address] [place] [address] GI LAB; Service: Gastroenterology   • AZ HEMIARTHROPLASTY HIP PARTIAL Right 8/23/2023    Procedure: HEMIARTHROPLASTY HIP (BIPOLAR);   Surgeon: Valencia Torres DO;  Location: [facility];  Service: Orthopedics   • REDUCTION MAMMAPLASTY  2000   • SACRAL NERVE STIMULATOR PLACEMENT N/A 1/31/2022    Procedure: IMPLANTATION NEUROSTIMULATOR ELECTRODE ARRAY SACRAL NERVE; INSERTION NEUROSTIMULATOR; FLUORO; ELECTONIC ANALYSIS;  Surgeon: Cherelle Nixon MD;  Location: [facility];  Service: UroGynecology          • TONSILLECTOMY AND ADENOIDECTOMY     • URETHRA SURGERY       Family History   Problem Relation Age of Onset   • Coronary artery disease Mother    • COPD Mother    • Hypertension Mother    • Hyperlipidemia Mother         high cholesterol   • Atrial fibrillation Mother    • Coronary artery disease Father    • Arthritis Father    • COPD Father         Black lung   • Hypertension Father    • Hyperlipidemia Father         high cholesterol   • Lung cancer Brother    • Coronary artery disease Brother    • Hypertension Brother    • Hyperlipidemia Brother         high cholesterol   • Atrial fibrillation Brother         fib/flutter   • Other Sister         pericarditis   • Arthritis Sister    • Diabetes Maternal Grandmother         mellitus   • Breast cancer Maternal Grandmother 79   • Breast cancer Paternal Grandmother 79   • Arthritis Family    • Asthma Family    • Cancer Brother         lung   • Heart disease Brother         CAD-stents-smoker   • No Known Problems Maternal Aunt    • No Known Problems Maternal Aunt    • No Known Problems Maternal Aunt    • No Known Problems Maternal Aunt    • No Known Problems Maternal Aunt    • Breast cancer Other 36       Occupational History: Retired nurse  Pets: Denies  Travel: Denies  Exposures: No recent exposures  Social History: Lifelong non-smoker    Meds/Allergies   Current Facility-Administered Medications   Medication Dose Route Frequency   • acetaminophen (TYLENOL) tablet 650 mg  650 mg Oral Q4H PRN   • albuterol (PROVENTIL HFA,VENTOLIN HFA) inhaler 2 puff  2 puff Inhalation Q6H PRN   • enoxaparin (LOVENOX) subcutaneous injection 30 mg  30 mg Subcutaneous Q12H [facility]   • fluticasone (ARNUITY ELLIPTA) 200 MCG/ACT inhaler 1 puff  1 puff Inhalation Daily   • furosemide (LASIX) tablet 40 mg  40 mg Oral QAM   • lidocaine (LIDODERM) 5 % patch 1 patch  1 patch Topical Daily   • methocarbamol (ROBAXIN) tablet 500 mg  500 mg Oral Q6H PRN   • naloxone (NARCAN) 0.04 mg/mL syringe 0.04 mg  0.04 mg Intravenous Q1MIN PRN   • ondansetron (ZOFRAN) injection 4 mg  4 mg Intravenous Q4H PRN   • oxyCODONE (ROXICODONE) IR tablet 5 mg  5 mg Oral Q4H PRN   • oxyCODONE (ROXICODONE) split tablet 2.5 mg  2.5 mg Oral Q4H PRN   • pravastatin (PRAVACHOL) tablet 10 mg  10 mg Oral Daily   • senna-docusate sodium (SENOKOT S) 8.6-50 mg per tablet 1 tablet  1 tablet Oral HS     Medications Prior to Admission   Medication   • acetaminophen (TYLENOL) 500 mg tablet   • albuterol (PROVENTIL HFA,VENTOLIN HFA) 90 mcg/act inhaler   • aspirin 325 mg tablet   • Cranberry 450 MG CAPS   • cyanocobalamin (VITAMIN B-12) 1000 MCG tablet   • Diclofenac Sodium (VOLTAREN) 1 %   • docusate sodium (COLACE) 100 mg capsule   • estradiol (ESTRACE VAGINAL) 0.1 mg/g vaginal cream   • fluticasone (Flovent HFA) 220 mcg/act inhaler   • furosemide (LASIX) 40 mg tablet   • Magnesium Oxide -Mg Supplement 400 MG CAPS   • methenamine hippurate (HIPREX) 1 g tablet   • potassium chloride (Klor-Con) 10 mEq tablet   • pravastatin (PRAVACHOL) 10 mg tablet   • senna (SENOKOT) 8.6 MG tablet     Allergies   Allergen Reactions   • Celecoxib Other (See Comments)     Other reaction(s): Palpitations  Category:  Adverse Reaction;        Vitals: Blood pressure 122/67, pulse (!) 107, temperature 99.5 °F (37.5 °C), resp. rate 18, height 5' 6" (1.676 m), weight 86.3 kg (190 lb 4.1 oz), SpO2 93 %., , Body mass index is 30.71 kg/m². Intake/Output Summary (Last 24 hours) at 8/25/2023 1143  Last data filed at 8/25/2023 0801  Gross per 24 hour   Intake 480 ml   Output 400 ml   Net 80 ml       Physical Exam  General:  Awake alert and oriented x 3, conversant without conversational dyspnea, NAD  HEENT:  PERRL, no nasal drainage, Mucous membranes, moist  NECK: Trachea midline, no accessory muscle use, no cervical or supraclavicular adenopathy, JVP not elevated  CARDIAC: Reg, s1/S2  PULM: Clear bilaterally, no wheezing  CHEST: No gross deformities, equal chest expansion on inspiration bilaterally  ABD: Normoactive bowel sounds, soft nontender, nondistended, no rebound  EXT: No cyanosis, no clubbing, normal capillary refill, no edema   SKIN:  No rashes, no lesions  NEURO: no focal neurologic deficits, AAOx3    Labs: I have personally reviewed pertinent lab results. Peripheral eosinophilia 0.13    Imaging and other studies: I have personally reviewed pertinent films in PACS  No dedicated CT chest  CT abdomen shows normal lower lung parenchyma    Pulmonary function testing:I have personally reviewed pertinent reports. PFT April 2022 no obstruction, no air trapping, normal TLC, DLCO normal.  Essentially normal spirometry with no bronchodilator spots. EKG, Pathology, and Other Studies: I have personally reviewed pertinent reports. Echocardiogram May 2022 ejection fraction 90%, diastolic dysfunction normal, TRV unable to be visualized.       Lakshmi ePterson MD  Pulmonary & Critical Care Medicine

## 2023-08-25 NOTE — ASSESSMENT & PLAN NOTE
· In the setting of surgery 8/24  · Mild wheezing at the bilateral bases on exam.  · Continue home regimen  · Supplemental oxygen as needed to maintain oxygen saturation greater than 92%. · CXR in the setting of mild hypoxia-patient requiring 2 L nasal cannula.   · Continue to encourage incentive spirometry and activity as tolerated  · Pulmonary consulted

## 2023-08-25 NOTE — PLAN OF CARE
Problem: PHYSICAL THERAPY ADULT  Goal: Performs mobility at highest level of function for planned discharge setting. See evaluation for individualized goals. Description: Treatment/Interventions: Functional transfer training, LE strengthening/ROM, Therapeutic exercise, Endurance training, Patient/family training, Equipment eval/education, Bed mobility, Gait training, Compensatory technique education, Spoke to MD, Spoke to nursing, Spoke to case management, Spoke to advanced practitioner    Equipment Recommended: Yahir Arriaza    See flowsheet documentation for full assessment, interventions and recommendations. Outcome: Progressing  Note: Prognosis: Good  Problem List: Decreased strength, Decreased range of motion, Decreased endurance, Impaired balance, Decreased mobility, Decreased safety awareness, Orthopedic restrictions, Pain  Assessment: Patient agreeable to participate in therapy session. Patient demonstrated progression with functional mobility however requires assistance throughout. Multiple sit<>stand transfers with min ax1, increased time and instruction for technique and positioning. Patient able to ambulate increased gait distance with roller walker requiring fluctuations of min to close supervision for steadying balance and path navigation. Provided verbal instruction for stepping sequence, walker advancement and adherence to hip precautions with directional changes. Pt able to verbalize 3/3 THP with increased time. Pt requires increased time for mobility tasks with frequent rest breaks and slo wilfred. Continue to focus on OOB mobility with progression of transfers and ambulation as appropriate and able. Barriers to Discharge: None     PT Discharge Recommendation: Home with home health rehabilitation    See flowsheet documentation for full assessment.

## 2023-08-26 VITALS
BODY MASS INDEX: 30.58 KG/M2 | HEART RATE: 82 BPM | SYSTOLIC BLOOD PRESSURE: 124 MMHG | HEIGHT: 66 IN | TEMPERATURE: 99.2 F | RESPIRATION RATE: 18 BRPM | DIASTOLIC BLOOD PRESSURE: 67 MMHG | OXYGEN SATURATION: 94 % | WEIGHT: 190.26 LBS

## 2023-08-26 PROCEDURE — 99238 HOSP IP/OBS DSCHRG MGMT 30/<: CPT | Performed by: PHYSICIAN ASSISTANT

## 2023-08-26 PROCEDURE — 99024 POSTOP FOLLOW-UP VISIT: CPT | Performed by: PHYSICIAN ASSISTANT

## 2023-08-26 PROCEDURE — NC001 PR NO CHARGE: Performed by: PHYSICIAN ASSISTANT

## 2023-08-26 RX ORDER — METHOCARBAMOL 500 MG/1
500 TABLET, FILM COATED ORAL EVERY 6 HOURS PRN
Refills: 0
Start: 2023-08-26

## 2023-08-26 RX ORDER — OXYCODONE HYDROCHLORIDE 5 MG/1
2.5 TABLET ORAL EVERY 4 HOURS PRN
Qty: 15 TABLET | Refills: 0 | Status: SHIPPED | OUTPATIENT
Start: 2023-08-26 | End: 2023-09-05

## 2023-08-26 RX ORDER — ASPIRIN 81 MG/1
81 TABLET ORAL 2 TIMES DAILY
Qty: 84 TABLET | Refills: 0
Start: 2023-08-26 | End: 2023-10-07

## 2023-08-26 RX ADMIN — LIDOCAINE 1 PATCH: 700 PATCH TOPICAL at 08:16

## 2023-08-26 RX ADMIN — METHOCARBAMOL 500 MG: 500 TABLET ORAL at 11:18

## 2023-08-26 RX ADMIN — OXYCODONE HYDROCHLORIDE 5 MG: 5 TABLET ORAL at 11:17

## 2023-08-26 RX ADMIN — METHOCARBAMOL 500 MG: 500 TABLET ORAL at 04:24

## 2023-08-26 RX ADMIN — FUROSEMIDE 40 MG: 40 TABLET ORAL at 08:16

## 2023-08-26 RX ADMIN — FLUTICASONE FUROATE 1 PUFF: 200 POWDER RESPIRATORY (INHALATION) at 08:16

## 2023-08-26 RX ADMIN — ENOXAPARIN SODIUM 30 MG: 30 INJECTION SUBCUTANEOUS at 08:16

## 2023-08-26 RX ADMIN — Medication 2.5 MG: at 04:24

## 2023-08-26 RX ADMIN — PRAVASTATIN SODIUM 10 MG: 10 TABLET ORAL at 08:16

## 2023-08-26 NOTE — PROGRESS NOTES
[address] [facility] [address]  Progress Note  Name: Shellie Flor  MRN: [id number]  Unit/Bed#: S -01 I Date of Admission: 8/22/2023   Date of Service: 8/26/2023 I Hospital Day: 3    Assessment/Plan   Rheumatoid arteritis ([address])  Assessment & Plan  - Pt states she has rheumatoid arthritis and Sjorgrens but is not on any chronic medications. - Pt takes prednisone when she has flares    Closed fracture of neck of right femur (HCC)  Assessment & Plan  Pt tripped over cord and fell on right side. - 8/24 ORIF R hip  - Orthopedics consulted and note appreciated. - WBAT RLE  - Multimodal pain regimen  - DVT prophylaxis: Lovenox  - PT/OT    Fall  Assessment & Plan  - Status post fall with the below noted injuries. - Fall precautions. - Geriatric Medicine consultation for evaluation, medication review and recommendations.  - PT and OT evaluation and treatment as indicated. - Case Management consultation for disposition planning. Mixed hyperlipidemia  Assessment & Plan  · Continue home pravastatin    Asthma, mild persistent  Assessment & Plan  · In the setting of surgery 8/24  · Mild wheezing at the bilateral bases on exam.  · Continue home regimen  · Supplemental oxygen as needed to maintain oxygen saturation greater than 92%. · CXR negative on 8/25  · Continue to encourage incentive spirometry and activity as tolerated  · Pulmonary consulted       DVT Prophylaxis: SCDs and Lovenox  PT and OT: eval and treat    Disposition: DC today to rehab. Follow-up with case management. Dissipate discharge at 1 PM today. Subjective   Chief Complaint: No new complaints. Subjective: Patient offering no new complaints and on presentation. Currently resting in bed. Objective   Vitals:   Temp:  [98.6 °F (37 °C)-99.8 °F (37.7 °C)] 99.5 °F (37.5 °C)  HR:  [] 88  Resp:  [16-18] 18  BP: (121-135)/(61-67) 130/61    I/O       08/24 0701 08/25 0700 08/25 0701 08/26 0700 08/26 0701 08/27 0700    P. O. 240 1140     I.V. (mL/kg)       IV Piggyback       Total Intake(mL/kg) 240 (2.8) 1140 (13.2)     Urine (mL/kg/hr) 550 (0.3) 400 (0.2)     Stool       Total Output 550 400     Net -310 +740            Unmeasured Urine Occurrence 1 x 3 x            Physical Exam:   GENERAL APPEARANCE: No acute distress  NEURO: GCS 15  HEENT: Normocephalic  CV: Regular rate and rhythm  LUNGS: CTA bilaterally  GI: Nontender, nondistended  : No Vu  MSK: +2 pulses on extremities  SKIN: Warm, dry, intact    Invasive Devices     Peripheral Intravenous Line  Duration           Peripheral IV 08/22/23 Right Antecubital 3 days    Peripheral IV 08/23/23 Left Hand 2 days                      Lab Results: Results: I have personally reviewed all pertinent laboratory/tests results, BMP/CMP: No results found for: "SODIUM", "K", "CL", "CO2", "ANIONGAP", "BUN", "CREATININE", "GLUCOSE", "CALCIUM", "AST", "ALT", "ALKPHOS", "PROT", "BILITOT", "EGFR" and CBC: No results found for: "WBC", "HGB", "HCT", "MCV", "PLT", "ADJUSTEDWBC", "RBC", "MCH", "MCHC", "RDW", "MPV", "NRBC"  Imaging: I have personally reviewed pertinent reports.      Other Studies: no other studies

## 2023-08-26 NOTE — DISCHARGE SUMMARY
Discharge Summary - Bailey Obando 66 y.o. female MRN: [id number]    Unit/Bed#: S -01 Encounter: [id number]    Admission Date:   Admission Orders (From admission, onward)     Ordered        08/23/23 0101  INPATIENT ADMISSION  Once                        Admitting Diagnosis: Hip injury [S79.919A]  Elevated blood pressure reading [R03.0]  Acute back pain [M54.9]  Closed fracture of neck of right femur, initial encounter ([address]) [S72.001A]  Closed fracture of right hip, initial encounter ([address]) [S72.001A]  Acute right hip pain [M25.551]    HPI: Per Donato Bello, "Bailey Obando is a 66 y.o. who presents with right hip pain after fall. Patient was at home taking care of her  who is on hospice when she tripped over a power cord and fell onto her right side. Patient does not take any blood thinners and did not lose consciousness. After fall patient had severe right sided hip pain as well as midline lower back pain. Patient then went to the emergency department after which she was found to have a right femoral neck fracture."    Procedures Performed: No orders of the defined types were placed in this encounter. Summary of Hospital Course: Patient is a 70-year-old female comes in for evaluation status post fall. She was noted to have femoral fracture. Required operative fixation with the orthopedic team.  The patient did well in the postoperative setting however she was noted to have episodes of desaturation. She was consulted to pulmonology of which they recommended outpatient follow-up and advancement of medications. At that time the patient declined and she confirmed that plan on day of discharge. She would follow-up outpatient with her PCP. Incidental findings were discussed with both patient and patient daughter over the phone. They were in agreement plan for discharge on 8/26/2023.   Patient was discharged with plan for outpatient follow-up with orthopedics with continuation of 81 mg of aspirin twice daily for 6 weeks for DVT prophylaxis. Significant Findings, Care, Treatment and Services Provided:   XR knee 1 or 2 vw right    Result Date: 8/25/2023  Impression: No acute osseous abnormality. Degenerative changes as described. Workstation performed: [id number]     XR chest portable    Result Date: 8/25/2023  Impression: No acute cardiopulmonary disease. Workstation performed: [id number]     XR pelvis ap only 1 or 2 vw    Result Date: 8/23/2023  Impression: Expected postoperative changes status post right hip replacement. Workstation performed: [id number]     XR femur 2 vw right    Result Date: 8/23/2023  Impression: Right femoral neck fracture. Otherwise intact femur. Workstation performed: [id number]     XR chest 1 view    Result Date: 8/23/2023  Impression: No acute cardiopulmonary disease. Workstation performed: [id number]     CT abdomen pelvis wo contrast    Result Date: 8/23/2023  Impression: Right femoral neck fracture. The study was marked in [facility] for immediate notification. Workstation performed: [id number]     CT recon only lumbar spine    Result Date: 8/23/2023  Impression: Mild irregularity of the anterior cortex of L1. This could represent a minimally displaced compression deformity. If further evaluation is warranted, MRI may be helpful to assess for acute fracture. The study was marked in [facility] for immediate notification.  Workstation performed: [id number]       Complications: no complications    Discharge Diagnosis:   Patient Active Problem List   Diagnosis   • Edema   • Asthma, mild persistent   • Exertional shortness of breath   • Fatigue   • GERD without esophagitis   • Mixed hyperlipidemia   • Impaired fasting glucose   • Left knee pain   • Palpitations   • Primary generalized (osteo)arthritis   • Right hip pain   • Sjogren's syndrome (HCC)   • Diverticulitis of large intestine without perforation or abscess with bleeding   • Chronic bilateral thoracic back pain   • Rosacea   • Cough   • UTI symptoms   • Primary osteoarthritis of left hip   • Urge incontinence of urine   • History of hysterectomy   • Primary osteoarthritis of both hands   • Recurrent UTI   • Stage 3a chronic kidney disease (HCC)   • Mass of right knee   • Fall   • Closed fracture of neck of right femur ([address])   • Rheumatoid arteritis [facility])         Medical Problems     Resolved Problems  Date Reviewed: 8/26/2023   None         Condition at Discharge: good         Discharge instructions/Information to patient and family:   See after visit summary for information provided to patient and family. Provisions for Follow-Up Care:  See after visit summary for information related to follow-up care and any pertinent home health orders. PCP: Dai Pressley DO    Disposition: rehab    Planned Readmission: No      Discharge Statement   I spent 23 minutes discharging the patient. This time was spent on the day of discharge. I had direct contact with the patient on the day of discharge. Additional documentation is required if more than 30 minutes were spent on discharge. Discharge Medications:  See after visit summary for reconciled discharge medications provided to patient and family.

## 2023-08-26 NOTE — CASE MANAGEMENT
Case Management Discharge Planning Note    Patient name Maricruz KUMARI /S -01 MRN [id number]  : 1945 Date 2023       Current Admission Date: 2023  Current Admission Diagnosis:Closed fracture of neck of right femur [facility])   Patient Active Problem List    Diagnosis Date Noted   • Fall 2023   • Closed fracture of neck of right femur ([address]) 2023   • Rheumatoid arteritis ([address]) 2023   • Mass of right knee 2023   • Stage 3a chronic kidney disease ([address]) 2023   • Primary osteoarthritis of both hands 2023   • Recurrent UTI 2023   • History of hysterectomy 2022   • Urge incontinence of urine 2022   • Primary osteoarthritis of left hip 2021   • UTI symptoms 2021   • Cough    • Rosacea 2019   • Chronic bilateral thoracic back pain 2018   • Diverticulitis of large intestine without perforation or abscess with bleeding 2018   • Mixed hyperlipidemia 2017   • Right hip pain 2016   • Left knee pain 2016   • Exertional shortness of breath 2016   • Palpitations 2016   • GERD without esophagitis 2015   • Primary generalized (osteo)arthritis 2015   • Sjogren's syndrome ([address]) 2015   • Impaired fasting glucose 2015   • Fatigue 2015   • Edema 2015   • Asthma, mild persistent 2015      LOS (days): 3  Geometric Mean LOS (GMLOS) (days): 4.00  Days to GMLOS:0.6     OBJECTIVE:  Risk of Unplanned Readmission Score: 9.33         Current admission status: Inpatient   Preferred Pharmacy:   [place], [place]   [address] [address]  Phone: [phone] Fax: [phone]    Primary Care Provider: Kristy Hartley DO    Primary Insurance: MEDICARE  Secondary Insurance: BLUE CROSS    DISCHARGE DETAILS:    Discharge planning discussed with[de-identified] Patient, Ledy Morales RN and Briana     Comments - Freedom of Choice: CM notified all the above mentioned individiuals of the Pt's d/c to [address] [place] [address] today. Roundtrip referral made for a 1pm bls transport.        Contacts  Patient Contacts: Grazyna Grant  Relationship to Patient[de-identified] Family  Contact Method: Phone  Phone Number: [phone]  Reason/Outcome: Discharge Planning

## 2023-08-26 NOTE — ASSESSMENT & PLAN NOTE
· In the setting of surgery 8/24  · Mild wheezing at the bilateral bases on exam.  · Continue home regimen  · Supplemental oxygen as needed to maintain oxygen saturation greater than 92%.   · CXR negative on 8/25  · Continue to encourage incentive spirometry and activity as tolerated  · Pulmonary consulted

## 2023-08-26 NOTE — INCIDENTAL FINDINGS
The following findings require follow up:  Radiographic finding   Findin. Small hiatal hernia    2. KIDNEYS/URETERS:  One or more simple renal cyst(s) is noted. Otherwise unremarkable kidneys. No hydronephrosis. Follow up required: Yes   Follow up should be done within 2-4 week(s)    Please notify the following clinician to assist with the follow up:   Primary care provider. Discussed with patient at bedside. Also discussed on the phone with patient family. Will follow-up outpatient with PCP. Expressed verbal understanding of the above findings.

## 2023-08-26 NOTE — ASSESSMENT & PLAN NOTE
Pt tripped over cord and fell on right side. - 8/24 ORIF R hip  - Orthopedics consulted and note appreciated.   - WBAT RLE  - Multimodal pain regimen  - DVT prophylaxis: Lovenox  - PT/OT

## 2023-08-26 NOTE — ASSESSMENT & PLAN NOTE
- Pt states she has rheumatoid arthritis and Sjorgrens but is not on any chronic medications.   - Pt takes prednisone when she has flares

## 2023-08-26 NOTE — PROGRESS NOTES
Progress Note - Orthopedics   Chata Baptiste 66 y.o. female MRN: [id number]  Unit/Bed#: S -01      Subjective:    66 y. o.female seen and examined at bedside. Has questions about the findings on her spine imaging today. No other complaints. Has been out of bed with physical therapy. Some mild right sided hip pain.      Labs:  0   Lab Value Date/Time    HCT 35.1 08/25/2023 0603    HCT 35.7 08/24/2023 0456    HCT 38.7 08/23/2023 0657    HCT 42.3 04/10/2014 1325    HGB 11.4 (L) 08/25/2023 0603    HGB 11.5 08/24/2023 0456    HGB 12.6 08/23/2023 0657    HGB 14.4 04/10/2014 1325    INR 1.02 04/15/2018 1030    WBC 8.62 08/25/2023 0603    WBC 10.66 (H) 08/24/2023 0456    WBC 9.94 08/23/2023 0657    WBC 5.79 04/10/2014 1325    ESR 3 07/27/2020 0814    CRP <3.0 07/27/2020 0814       Meds:    Current Facility-Administered Medications:   •  acetaminophen (TYLENOL) tablet 650 mg, 650 mg, Oral, Q4H PRN, Zeny Brown PA-C, 650 mg at 08/24/23 1715  •  albuterol (PROVENTIL HFA,VENTOLIN HFA) inhaler 2 puff, 2 puff, Inhalation, Q6H PRN, MATA Ramires, 2 puff at 08/25/23 1049  •  enoxaparin (LOVENOX) subcutaneous injection 30 mg, 30 mg, Subcutaneous, Q12H [facility], Anusha Burns MD, 30 mg at 08/26/23 0816  •  fluticasone (ARNUITY ELLIPTA) 200 MCG/ACT inhaler 1 puff, 1 puff, Inhalation, Daily, Jeyson Zhou MD, 1 puff at 08/26/23 0816  •  furosemide (LASIX) tablet 40 mg, 40 mg, Oral, QAM, Zeny Brown PA-C, 40 mg at 08/26/23 0816  •  lidocaine (LIDODERM) 5 % patch 1 patch, 1 patch, Topical, Daily, Zeny Brown PA-C, 1 patch at 08/26/23 0533  •  methocarbamol (ROBAXIN) tablet 500 mg, 500 mg, Oral, Q6H PRN, MATA Ramires, 500 mg at 08/26/23 0424  •  naloxone (NARCAN) 0.04 mg/mL syringe 0.04 mg, 0.04 mg, Intravenous, Q1MIN PRN, Daiana Brown PA-C  •  ondansetron (ZOFRAN) injection 4 mg, 4 mg, Intravenous, Q4H PRN, Zeny Brown PA-C, 4 mg at 08/25/23 0228  •  oxyCODONE (ROXICODONE) IR tablet 5 mg, 5 mg, Oral, Q4H PRN, Kvng Rodgers PA-C, 5 mg at 08/25/23 2115  •  oxyCODONE (ROXICODONE) split tablet 2.5 mg, 2.5 mg, Oral, Q4H PRN, Kvng Rodgers PA-C, 2.5 mg at 08/26/23 0424  •  pravastatin (PRAVACHOL) tablet 10 mg, 10 mg, Oral, Daily, Zeny Brown PA-C, 10 mg at 08/26/23 0816  •  senna-docusate sodium (SENOKOT S) 8.6-50 mg per tablet 1 tablet, 1 tablet, Oral, HS, Zeny Hickman PA-C, 1 tablet at 08/25/23 2116    Blood Culture:   No results found for: "BLOODCX"    Wound Culture:   No results found for: "WOUNDCULT"    Ins and Outs:  I/O last 24 hours: In: 1140 [P.O.:1140]  Out: 400 [Urine:400]          Physical:  Vitals:    08/26/23 0732   BP: 124/67   Pulse: 82   Resp:    Temp: 99.2 °F (37.3 °C)   SpO2: 94%     Musculoskeletal: right Lower Extremity  · Surgical dressings c/d/i without strike through  · Mild surrounding ecchymosis/bruising. · Mild surrounding ttp. · Sensation intact to saphenous, sural, tibial, superficial peroneal nerve, and deep peroneal  · Motor intact to +FHL/EHL, +ankle dorsi/plantar flexion  · 2+ DP pulse  · Digits warm and well perfused  · Capillary refill < 2 seconds    Assessment:    66 y. o.female s/p surgical fixation of right displaced femoral neck fracture with hemiarthroplasty with Dr. Gregory Joaquin 8/23/2023. Patient doing well. Plan:  · WBAT to the right lower extremity. · Posterior hip precautions. · Hgb 11.4. Will monitor for ABLA and administer IVF/prbc as indicated for Greater than 2 gram drop or Hgb < 7  · PT/OT  · Pain control per primary team.   · DVT ppx: lovenox while admitted, recommend ASA 81mg BID x 6 weeks. · Medical management per primary team.   · Dispo: Ortho stable. · Will need outpatient follow up with Dr. Gregory Joqauin.     Zeny Hickman PA-C

## 2023-08-28 ENCOUNTER — PATIENT OUTREACH (OUTPATIENT)
Dept: CASE MANAGEMENT | Facility: OTHER | Age: 78
End: 2023-08-28

## 2023-08-28 NOTE — PROGRESS NOTES
Outpatient Care Management [facility]SNF Path[facility]. Discharged 8/26/23 to [facility]. Email sent to facility to inform them the patient is on the [facility] Pathway and I will be following them during their skilled stay. This Admin Coordinator will continue to monitor via chart review.

## 2023-08-29 ENCOUNTER — PATIENT OUTREACH (OUTPATIENT)
Dept: CASE MANAGEMENT | Facility: OTHER | Age: 78
End: 2023-08-29

## 2023-08-29 NOTE — PROGRESS NOTES
Teleconference today with SNF team to get a update on the patient. The patient has a TDD of 9/6/23 to Home. This Admin Coordinator will continue to monitor via chart review.

## 2023-08-31 ENCOUNTER — PATIENT OUTREACH (OUTPATIENT)
Dept: CASE MANAGEMENT | Facility: OTHER | Age: 78
End: 2023-08-31

## 2023-08-31 NOTE — PROGRESS NOTES
Chart review completed. Email sent to [facility]) SNF Coordinator to obtain an update on patient. TDD 9/6/23 to Home. This Admin Coordinator will continue to monitor via chart review throughout episode.

## 2023-09-05 ENCOUNTER — PATIENT OUTREACH (OUTPATIENT)
Dept: FAMILY MEDICINE CLINIC | Facility: CLINIC | Age: 78
End: 2023-09-05

## 2023-09-05 ENCOUNTER — PATIENT OUTREACH (OUTPATIENT)
Dept: CASE MANAGEMENT | Facility: OTHER | Age: 78
End: 2023-09-05

## 2023-09-05 NOTE — PROGRESS NOTES
Update received from [address] [place] [address] SNF Coordinator the patient discharged 8/30/23 to home. A email was sent to the facility requesting discharge instructions. When CM assistant has received the Discharge paperwork CM assistant will attach to this encounter. I have removed myself off of the care team, added the CM to the care team who will follow the patient through the episode, sent the care manager an inbasket notifying them of the [facility]/SNF Pathway. Ambulatory referral placed for complex care management.

## 2023-09-06 ENCOUNTER — PATIENT OUTREACH (OUTPATIENT)
Dept: FAMILY MEDICINE CLINIC | Facility: CLINIC | Age: 78
End: 2023-09-06

## 2023-09-08 ENCOUNTER — PATIENT OUTREACH (OUTPATIENT)
Dept: FAMILY MEDICINE CLINIC | Facility: CLINIC | Age: 78
End: 2023-09-08

## 2023-09-08 DIAGNOSIS — N17.9 AKI (ACUTE KIDNEY INJURY) (HCC): Primary | ICD-10-CM

## 2023-09-08 NOTE — PROGRESS NOTES
[facility]/SNF Pathway/CCM     Chart was reviewed. I called patient and introduced myself and the role of the [facility] and [facility] services. Patient states she is feeling much better than she did when she first got home. She says she was having a lot of hip pain from surgery and it was an 8/10 with Tylenol and ice. She states she declined Oxycodone at [place]. She says she is frustrated because her PCP couldn't see her until 9/20. She says she has Tylenol with Codeine that was prescribed to her and she started taking as needed per bottle directions which is helping with the pain. Pain is a 3/10 with Tylenol with Codeine and 8/10 without. Patient will continue with this medication and ice. Patients Medications were reviewed. Patient is familiar with all medications and takes them as directed. She says she does not need any refills and has no questions regarding. PCP follow up: 9/20  Nephrology follow up: 9/21  Medications Reviewed: Yes  Monitor BP (goal for SBP not to drop below 130): Patient will start   Weight daily (first weight at home will be baseline): 188 lbs today  Monitor temperature: patient will start   Appetite/hydration: much better/hydrating  Monitor for edema/swelling: none  Patient is avoiding NSAIDS    BMP ordered. Meds and Adult assessment completed. IB message sent to [facility] to get appointment moved up. Patient states it's okay if she sees a provider other then Dr Jean Buchanan. Patient receives services through [email or web address] (RN and PT)-may be discharging off next week per patient. Patient was taking care of her  who is on hospice prior to her hospital stay. She says her daughter Yari Morel and her  and patients son have all been pitching in and helping out. She says she is able to care for herself but not him at this time. She says she is independent of all ADL's and is ambulating with a walker without any trouble.      Patients daughter Yari Morel will be driving patient to her apt's. (patient drove herself prior to this)    Patient was very appreciative of the call. I provided her with my contact information and encouraged her to call with any questions or concerns. Patient is agreeable to CCM services and a follow up call next week. RNCM to follow up 9/13/23.

## 2023-09-11 ENCOUNTER — APPOINTMENT (OUTPATIENT)
Dept: RADIOLOGY | Facility: AMBULARY SURGERY CENTER | Age: 78
End: 2023-09-11
Attending: STUDENT IN AN ORGANIZED HEALTH CARE EDUCATION/TRAINING PROGRAM
Payer: MEDICARE

## 2023-09-11 ENCOUNTER — OFFICE VISIT (OUTPATIENT)
Dept: OBGYN CLINIC | Facility: CLINIC | Age: 78
End: 2023-09-11

## 2023-09-11 DIAGNOSIS — Z98.890 STATUS POST SURGERY: Primary | ICD-10-CM

## 2023-09-11 DIAGNOSIS — Z98.890 STATUS POST SURGERY: ICD-10-CM

## 2023-09-11 PROCEDURE — 73502 X-RAY EXAM HIP UNI 2-3 VIEWS: CPT

## 2023-09-11 PROCEDURE — 99024 POSTOP FOLLOW-UP VISIT: CPT | Performed by: STUDENT IN AN ORGANIZED HEALTH CARE EDUCATION/TRAINING PROGRAM

## 2023-09-11 NOTE — PROGRESS NOTES
Orthopaedic Surgery - Office Note  Silvana Butcher (48 y.o. female)   : 1945   MRN: [id number]  Encounter Date: 2023    No chief complaint on file. Past Surgical History:   Procedure Laterality Date   • APPENDECTOMY     • AUGMENTATION BREAST Bilateral     breast surgery enlargement procedure elective bilateral   • BILATERAL OOPHORECTOMY Bilateral    • BLADDER SURGERY      bladder lift,cystocele   • CATARACT EXTRACTION      Right 2018; Left 10/2018   • COLONOSCOPY  2011   • COLPORRHAPHY  2014    anterior, repair of cystocele   • HYSTERECTOMY      with cystocele    • JOINT REPLACEMENT Left     knee   • KNEE ARTHROPLASTY Left    • KNEE ARTHROSCOPY  2009   • KNEE SURGERY Left     torn meniscus   • OOPHORECTOMY     • CT COLONOSCOPY FLX DX W/COLLJ SPEC WHEN PFRMD N/A 2018    Procedure: COLONOSCOPY;  Surgeon: Kadie Akhtar MD;  Location: [address] [place] [address] [address] GI LAB; Service: Gastroenterology   • CT HEMIARTHROPLASTY HIP PARTIAL Right 2023    Procedure: HEMIARTHROPLASTY HIP (BIPOLAR); Surgeon: Siena Sun DO;  Location: [facility];  Service: Orthopedics   • REDUCTION MAMMAPLASTY     • SACRAL NERVE STIMULATOR PLACEMENT N/A 2022    Procedure: IMPLANTATION NEUROSTIMULATOR ELECTRODE ARRAY SACRAL NERVE; INSERTION NEUROSTIMULATOR; FLUORO; ELECTONIC ANALYSIS;  Surgeon: Demian Becerra MD;  Location: [facility];  Service: UroGynecology          • TONSILLECTOMY AND ADENOIDECTOMY     • URETHRA SURGERY       Assessment / Plan  1. S/p right hemiarthroplasty, DOS 23 for nondisplaced femoral neck fracture     · X-ray right hip was reviewed in the office today demonstrated well maintained alignment of the patient's right hip hemiarthroplasty with no signs of loosening or failure. No periprosthetic fractures  · Continue weightbearing as tolerated.   Continue with physical therapy  · Patient should continue to follow posterior hip precautions to the right lower extremity  · Patient has 3 more weeks remaining of her DVT prophylaxis with aspirin 81 mg twice daily  · Patient continue pain control with Tylenol orally as needed for pain control  · Patient will follow-up in 5 weeks for reevaluation of her right hip to ensure that clinical progression is occurring    History of Present Illness  Silvana Butcher is a 66 y.o. female who presents today 3 weeks s/p right hemiarthroplasty, DOS 8/23/23. She was in a inpatient rehab center after surgery and was discharged to go home on 8/30/2023. She states she is doing well. She has been working with home physical therapy. She has been weightbearing as tolerated right lower extremity with the use of a cane or walker inside the home. Today she is present in a wheelchair. She is on aspirin 81 mg twice daily for DVT prophylaxis. She is taking Tylenol 3 which was a old prescription she found at home for pain relief. This she denies any chills or fevers. .     Review of Systems  Pertinent items are noted in HPI. All other systems were reviewed and are negative. Physical Exam  There were no vitals taken for this visit. Cons: Appears well. No apparent distress. Psych: Alert. Oriented x3. Mood and affect normal.  Eyes: PERRLA, EOMI  Resp: Normal effort. No audible wheezing or stridor. CV: Palpable pulse. No discernable arrhythmia. Lymph:  No palpable cervical, axillary, or inguinal lymphadenopathy. Skin: Warm. No palpable masses. No visible lesions. Neuro: Normal muscle tone. Normal and symmetric DTR's. The right hip was exposed inspected. The patient's previous surgical incision is healing well without any surrounding erythema or induration. No dehiscence no purulence. The patient has minimal discomfort with gentle range of motion of the right hip. Mostly localized over the previous surgical incision site.   The patient's sensation is intact to light touch in the superficial peroneal, deep peroneal, sural, saphenous, plantar nerve distributions. Tibialis anterior, extensor hallux longus, gastrocnemius, extensor mechanism intact. Limb is well-perfused    Studies Reviewed  X-rays of the pelvis and right hip demonstrate well-maintained right hip hemiarthroplasty with no surrounding lucency or loosening. Procedures      Medical, Surgical, Family, and Social History  The patient's medical history, family history, and social history, were reviewed and updated as appropriate.     Past Medical History:   Diagnosis Date   • Anesthesia complication     "shivers"   • Arthritis    • Asthma    • 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 see colors, with over-use goes darker- from untreated amblyopia   • Breathing difficulty     with extubation pt developed laryngeal spasm   • Cataract     both eyes- right eye needs surgery   • Colon polyps 2016   • Diverticulitis     had episode in April 2018   • Edema     lower legs   • Generalized osteoarthritis    • GERD (gastroesophageal reflux disease)    • Hearing problem     unspecified laterality-hearing aid right ear   • High cholesterol    • History of transfusion     autologus   • Incontinence of urine    • Polyarthritis     last assessed: 6/26/2015   • Rheumatoid arthritis (HCC)    • Sjogren's syndrome ([address])     last assessed: 7/18/2016   • SS-A antibody positive     last assessed: 8/20/2015   • Urinary tract infection    • UTI (urinary tract infection)            Family History   Problem Relation Age of Onset   • Coronary artery disease Mother    • COPD Mother    • Hypertension Mother    • Hyperlipidemia Mother         high cholesterol   • Atrial fibrillation Mother    • Coronary artery disease Father    • Arthritis Father    • COPD Father         Black lung   • Hypertension Father    • Hyperlipidemia Father         high cholesterol   • Lung cancer Brother    • Coronary artery disease Brother    • Hypertension Brother    • Hyperlipidemia Brother         high cholesterol   • Atrial fibrillation Brother         fib/flutter • Other Sister         pericarditis   • Arthritis Sister    • Diabetes Maternal Grandmother         mellitus   • Breast cancer Maternal Grandmother 79   • Breast cancer Paternal Grandmother 79   • Arthritis Family    • Asthma Family    • Cancer Brother         lung   • Heart disease Brother         CAD-stents-smoker   • No Known Problems Maternal Aunt    • No Known Problems Maternal Aunt    • No Known Problems Maternal Aunt    • No Known Problems Maternal Aunt    • No Known Problems Maternal Aunt    • Breast cancer Other 36       Social History     Occupational History   • Occupation: retired   Tobacco Use   • Smoking status: Never     Passive exposure: Never   • Smokeless tobacco: Never   Vaping Use   • Vaping Use: Never used   Substance and Sexual Activity   • Alcohol use: Not Currently   • Drug use: No   • Sexual activity: Not Currently       Allergies   Allergen Reactions   • Celecoxib Other (See Comments)     Other reaction(s): Palpitations  Category:  Adverse Reaction;          Current Outpatient Medications:   •  acetaminophen (TYLENOL) 500 mg tablet, Take 500 mg by mouth 3 (three) times a day as needed  , Disp: , Rfl:   •  albuterol (PROVENTIL HFA,VENTOLIN HFA) 90 mcg/act inhaler, Inhale 2 puffs every 6 (six) hours as needed for wheezing or shortness of breath, Disp: 8.5 g, Rfl: 3  •  aspirin (ECOTRIN LOW STRENGTH) 81 mg EC tablet, Take 1 tablet (81 mg total) by mouth 2 (two) times a day, Disp: 84 tablet, Rfl: 0  •  Cranberry 450 MG CAPS, daily, Disp: , Rfl:   •  cyanocobalamin (VITAMIN B-12) 1000 MCG tablet, Take 1,000 mcg by mouth daily, Disp: , Rfl:   •  Diclofenac Sodium (VOLTAREN) 1 %, Apply 2 g topically 4 (four) times a day, Disp: , Rfl:   •  docusate sodium (COLACE) 100 mg capsule, Take 100 mg by mouth 2 (two) times a day, Disp: , Rfl:   •  estradiol (ESTRACE VAGINAL) 0.1 mg/g vaginal cream, Insert 1 g into the vagina 3 (three) times a week, Disp: 42.5 g, Rfl: 6  •  fluticasone (Flovent HFA) 220 mcg/act inhaler, Inhale 2 puffs 2 (two) times a day Rinse mouth after use, Disp: 36 g, Rfl: 3  •  furosemide (LASIX) 40 mg tablet, Take 1 tablet (40 mg total) by mouth every morning, Disp: 90 tablet, Rfl: 1  •  Magnesium Oxide -Mg Supplement 400 MG CAPS, Take 1 capsule by mouth every morning  , Disp: , Rfl:   •  methenamine hippurate (HIPREX) 1 g tablet, TAKE 1 TABLET BY MOUTH 2 TIMES A DAY WITH MEALS., Disp: 60 tablet, Rfl: 4  •  methocarbamol (ROBAXIN) 500 mg tablet, Take 1 tablet (500 mg total) by mouth every 6 (six) hours as needed for muscle spasms, Disp: , Rfl: 0  •  potassium chloride (Klor-Con) 10 mEq tablet, TAKE 1 TABLET (10 MEQ TOTAL) BY MOUTH IN THE MORNING, Disp: 90 tablet, Rfl: 1  •  pravastatin (PRAVACHOL) 10 mg tablet, TAKE 1 TABLET BY MOUTH EVERY DAY, Disp: 90 tablet, Rfl: 3  •  senna (SENOKOT) 8.6 MG tablet, Take 1 tablet by mouth as needed for constipation, Disp: , Rfl:       Scribe Attestation    I,:  Emerita Santizo am acting as a scribe while in the presence of the attending physician.:       I,:  Ronny Curtis, DO personally performed the services described in this documentation    as scribed in my presence.:

## 2023-09-13 ENCOUNTER — PATIENT OUTREACH (OUTPATIENT)
Dept: FAMILY MEDICINE CLINIC | Facility: CLINIC | Age: 78
End: 2023-09-13

## 2023-09-13 DIAGNOSIS — Z59.9 INADEQUATE COMMUNITY RESOURCES: Primary | ICD-10-CM

## 2023-09-13 SDOH — ECONOMIC STABILITY - INCOME SECURITY: PROBLEM RELATED TO HOUSING AND ECONOMIC CIRCUMSTANCES, UNSPECIFIED: Z59.9

## 2023-09-13 NOTE — PROGRESS NOTES
[facility]/SNF Pathway/CCM    Chart was reviewed and this RNCM called patient for follow up. Patient states she is doing very well. She is getting better and stronger each day. Patient had her f/u apt yesterday with Orthopedic surgery and they told her she is progressing well. They are aware she has been taking Tylenol with Codeine which was previously prescribed prior to surgery and they told her it is okay to use but may cause constipation. She states she is aware and uses Senakot-its not a big deal to her. Patients pain is also being managed with Tylenol, Voltaren cream and ice. Patient was not prescribed any new medications at this visit and has not started any new medications since our last conversation. She states she has her next apt with Ortho on 10/16. She states the Ortho doctor told her it would probably be another 6-8 weeks before she will be able to drive again. Patient is receiving services through [facility] [facility]. She states OT discharged her office service. She was seen by the RN yesterday and has an apt with PT on Thursday. She states therapy is going well. She states that she is not sure how much longer she will be on their service. They told her she would transition to OP therapy once discharged from them. She states she is unsure if this will happen because of transportation issues. She says her daughter works [employer]. She says she has no other family or friends available to help with transport. I told her there are other options and I could make a referral to our [facility]. To discuss. She states she would very much appreciate that. A referral was placed for [facility].     PCP follow up: 9/20  Nephrology follow up: 9/21  Medications Reviewed: Jose Elias Hillman visit-no new meds ordered since then  Monitor BP: 140/78  Weight daily : 187 lbs today  Monitor temperature: Afebrile  Appetite/hydration: decreased but still eating 3 meals a day just smaller portions/hydrating well  Monitor for edema/swelling: none  Patient is avoiding NSAIDS: Yes    BMP ordered: Patient is aware she must go for blood work this week prior to next weeks apt. She states she will. She is aware she must fast.    Patient states overall everything is going well. She has all of her medications and is taking them as directed. Patient was very appreciative of the follow up and is agreeable to a follow up call next week. Next outreach scheduled for 9/22.

## 2023-09-18 ENCOUNTER — APPOINTMENT (OUTPATIENT)
Dept: LAB | Facility: CLINIC | Age: 78
End: 2023-09-18
Payer: MEDICARE

## 2023-09-18 DIAGNOSIS — R73.01 IFG (IMPAIRED FASTING GLUCOSE): ICD-10-CM

## 2023-09-18 DIAGNOSIS — N18.31 STAGE 3A CHRONIC KIDNEY DISEASE (HCC): ICD-10-CM

## 2023-09-18 DIAGNOSIS — N17.9 AKI (ACUTE KIDNEY INJURY) (HCC): ICD-10-CM

## 2023-09-18 DIAGNOSIS — E78.5 HYPERLIPIDEMIA, UNSPECIFIED HYPERLIPIDEMIA TYPE: ICD-10-CM

## 2023-09-18 DIAGNOSIS — R73.01 IMPAIRED FASTING GLUCOSE: ICD-10-CM

## 2023-09-18 DIAGNOSIS — E78.2 MIXED HYPERLIPIDEMIA: ICD-10-CM

## 2023-09-18 LAB
ALBUMIN SERPL BCP-MCNC: 4 G/DL (ref 3.5–5)
ALP SERPL-CCNC: 104 U/L (ref 34–104)
ALT SERPL W P-5'-P-CCNC: 12 U/L (ref 7–52)
ANION GAP SERPL CALCULATED.3IONS-SCNC: 8 MMOL/L
AST SERPL W P-5'-P-CCNC: 14 U/L (ref 13–39)
BILIRUB SERPL-MCNC: 1.02 MG/DL (ref 0.2–1)
BUN SERPL-MCNC: 13 MG/DL (ref 5–25)
CALCIUM SERPL-MCNC: 9.7 MG/DL (ref 8.4–10.2)
CHLORIDE SERPL-SCNC: 103 MMOL/L (ref 96–108)
CHOLEST SERPL-MCNC: 202 MG/DL
CO2 SERPL-SCNC: 30 MMOL/L (ref 21–32)
CREAT SERPL-MCNC: 0.86 MG/DL (ref 0.6–1.3)
ERYTHROCYTE [DISTWIDTH] IN BLOOD BY AUTOMATED COUNT: 13.8 % (ref 11.6–15.1)
EST. AVERAGE GLUCOSE BLD GHB EST-MCNC: 103 MG/DL
GFR SERPL CREATININE-BSD FRML MDRD: 64 ML/MIN/1.73SQ M
GLUCOSE P FAST SERPL-MCNC: 88 MG/DL (ref 65–99)
HBA1C MFR BLD: 5.2 %
HCT VFR BLD AUTO: 37.2 % (ref 34.8–46.1)
HDLC SERPL-MCNC: 37 MG/DL
HGB BLD-MCNC: 12.3 G/DL (ref 11.5–15.4)
LDLC SERPL CALC-MCNC: 141 MG/DL (ref 0–100)
MCH RBC QN AUTO: 29.6 PG (ref 26.8–34.3)
MCHC RBC AUTO-ENTMCNC: 33.1 G/DL (ref 31.4–37.4)
MCV RBC AUTO: 89 FL (ref 82–98)
PLATELET # BLD AUTO: 246 THOUSANDS/UL (ref 149–390)
PMV BLD AUTO: 10.6 FL (ref 8.9–12.7)
POTASSIUM SERPL-SCNC: 3.8 MMOL/L (ref 3.5–5.3)
PROT SERPL-MCNC: 6.8 G/DL (ref 6.4–8.4)
RBC # BLD AUTO: 4.16 MILLION/UL (ref 3.81–5.12)
SODIUM SERPL-SCNC: 141 MMOL/L (ref 135–147)
TRIGL SERPL-MCNC: 120 MG/DL
WBC # BLD AUTO: 4.24 THOUSAND/UL (ref 4.31–10.16)

## 2023-09-18 PROCEDURE — 80061 LIPID PANEL: CPT

## 2023-09-18 PROCEDURE — 83036 HEMOGLOBIN GLYCOSYLATED A1C: CPT

## 2023-09-18 PROCEDURE — 36415 COLL VENOUS BLD VENIPUNCTURE: CPT

## 2023-09-18 PROCEDURE — 80053 COMPREHEN METABOLIC PANEL: CPT

## 2023-09-18 PROCEDURE — 85027 COMPLETE CBC AUTOMATED: CPT

## 2023-09-19 ENCOUNTER — PATIENT OUTREACH (OUTPATIENT)
Dept: CASE MANAGEMENT | Facility: OTHER | Age: 78
End: 2023-09-19

## 2023-09-19 NOTE — PROGRESS NOTES
Chart Review and Outgoing Call:  09/19/2023    [facility] received patient referral from [address]. [facility] completed a chart review prior to calling patient. Per chart review, patient is in need of transportation. [facility] called patient, introduced myself and explained my role. We discussed the referral that was received and patient accepts [facility] services. [facility] explained how [facility] demand response service works. Patient agreed to using service and said it would be wonderful. [facility] asked if patient wanted to call together, but patient said that they can do it on their own. [facility] provided patient with contact information and address. [facility] told patient what program patient should ask to be signed up for when calling. Patient thanked [facility] for the information, and they will call the number. [facility] told patient that I can be called if needed. [facility] provided patient with my contact information. If no call from patient, [facility] will outreach in about two weeks to check if patient called [facility] and how call went. Next scheduled outreach is 10/03/2023.

## 2023-09-20 ENCOUNTER — OFFICE VISIT (OUTPATIENT)
Dept: FAMILY MEDICINE CLINIC | Facility: CLINIC | Age: 78
End: 2023-09-20
Payer: MEDICARE

## 2023-09-20 ENCOUNTER — TELEPHONE (OUTPATIENT)
Dept: NEPHROLOGY | Facility: CLINIC | Age: 78
End: 2023-09-20

## 2023-09-20 VITALS
BODY MASS INDEX: 30.05 KG/M2 | HEIGHT: 66 IN | RESPIRATION RATE: 20 BRPM | HEART RATE: 72 BPM | DIASTOLIC BLOOD PRESSURE: 70 MMHG | TEMPERATURE: 97.9 F | SYSTOLIC BLOOD PRESSURE: 128 MMHG | WEIGHT: 187 LBS

## 2023-09-20 DIAGNOSIS — R73.01 IMPAIRED FASTING GLUCOSE: Primary | ICD-10-CM

## 2023-09-20 DIAGNOSIS — Z23 NEED FOR VACCINATION: ICD-10-CM

## 2023-09-20 DIAGNOSIS — R79.89 ABNORMAL CBC: ICD-10-CM

## 2023-09-20 DIAGNOSIS — E78.2 MIXED HYPERLIPIDEMIA: ICD-10-CM

## 2023-09-20 DIAGNOSIS — Z78.0 POSTMENOPAUSAL: ICD-10-CM

## 2023-09-20 DIAGNOSIS — S72.001S CLOSED FRACTURE OF NECK OF RIGHT FEMUR, SEQUELA: ICD-10-CM

## 2023-09-20 PROBLEM — N18.31 STAGE 3A CHRONIC KIDNEY DISEASE (HCC): Status: RESOLVED | Noted: 2023-05-02 | Resolved: 2023-09-20

## 2023-09-20 PROCEDURE — 99214 OFFICE O/P EST MOD 30 MIN: CPT | Performed by: FAMILY MEDICINE

## 2023-09-20 PROCEDURE — G0008 ADMIN INFLUENZA VIRUS VAC: HCPCS

## 2023-09-20 PROCEDURE — 90662 IIV NO PRSV INCREASED AG IM: CPT

## 2023-09-20 RX ORDER — ACETAMINOPHEN AND CODEINE PHOSPHATE 300; 30 MG/1; MG/1
1 TABLET ORAL EVERY 4 HOURS PRN
Qty: 20 TABLET | Refills: 0 | Status: SHIPPED | OUTPATIENT
Start: 2023-09-20

## 2023-09-20 NOTE — ASSESSMENT & PLAN NOTE
Improving with PT and hemiarthroplasty   Short term prescription for tylenol #3 given for pain control and aid with her home exercise program

## 2023-09-20 NOTE — TELEPHONE ENCOUNTER
PCP said patient is stable and she will follow up with  Dr. Alek George. She cancelled her appt. With Dr. Mike Kiser in the  and will call us  if she needs to. At this time did not want to reschedule.

## 2023-09-20 NOTE — PROGRESS NOTES
cccAssessment/Plan:       1. Impaired fasting glucose  Assessment & Plan:  Stable     Orders:  -     Hemoglobin A1C; Future; Expected date: 03/03/2024    2. Mixed hyperlipidemia  Assessment & Plan:  Well controlled  Continue pravastatin 10 mg a day     Orders:  -     Comprehensive metabolic panel; Future; Expected date: 03/03/2024  -     Lipid Panel with Direct LDL reflex; Future; Expected date: 03/03/2024    3. Closed fracture of neck of right femur, sequela  Assessment & Plan:  Improving with PT and hemiarthroplasty   Short term prescription for tylenol #3 given for pain control and aid with her home exercise program     Orders:  -     acetaminophen-codeine (TYLENOL/CODEINE #3) 300-30 MG per tablet; Take 1 tablet by mouth every 4 (four) hours as needed for moderate pain    4. Need for vaccination  -     influenza vaccine, high-dose, PF 0.7 mL (FLUZONE HIGH-DOSE)    5. Postmenopausal  -     DXA bone density spine hip and pelvis; Future; Expected date: 09/20/2023    6. Abnormal CBC  -     CBC; Future; Expected date: 03/03/2024        Post-op broken hips  Provided a refill for Tylenol with codeine for 20 tablets and sent it to [facility] in [employer]. Advised to continue the home-physical therapy exercise and provided an order request slip for bone density test whenever she is ready. Wellness  Informed the patient that we will monitor her kidney function. Provided a blood work order request slip to be conducted before the next visit and rescheduled her regular follow-up in 6 months. [place] prescription monitoring program report reviewed and is appropriate. Return for cancel October 31st appt, follow up in 6 months. Subjective:      Patient ID: Faustino Valencia is a 66 y.o. female. Ms. Faustino Valencia is a 79-year-old female who presents today for follow-up of her lab work and hyperlipidemia. She consents to the use of [facility] services today.     The patient reports improvement in her hip condition, which is recovering from a fracture sustained four weeks ago. She is currently ambulating with the aid of a cane and has been actively engaging in home-based physical therapy, utilizing the necessary equipment. Her most recent session took place on Monday, 09/18/2023, and she was advised to maintain her current regimen. She had a rough time while she was rehab and only stayed a few days. They wanted her to sit in bed for 2 days before evaluating her. She snuck out of bed to keep up her strength. They even asked her to wear a diaper which she found to be very depressing. The patient mentioned that after being discharged from the rehab center, she was prescribed OxyContin and Robaxin due to a concurrent back fracture. However, she declined the Robaxin and wishes to have it removed from her medication list. She suspected that the combination of OxyContin and Robaxin was causing her to experience twitching, prompting her to cease taking the Robaxin. As a result, the twitching gradually subsided over the course of days. She states that she currently does not possess any pain medication and expresses a reluctance to receive further prescriptions. She reports having an entire prescription that she never took. However, she has been taking half a tablet of Tylenol with codeine throughout the month and took her last dose this morning. As of now, she still has four tablets remaining. The patient intended to schedule a visit to physical therapy but encountered a transportation issue that prevented her from doing so. She sought advice from someone and was advised to arrange for a . However, she mentioned that she is already diligently performing her prescribed home exercises. She does not want to have another prescription of medicine and is hesitant to undergo bone density test.    The patient shares that she sleeps with a pillow under her knees which helps her sleep and has been sleeping in the same bed.     The patient shared that during the first week following her discharge, she only took Tylenol and Robaxin. During therapy sessions, she experienced some pain, which she attributed to the therapy. However, later in the evening, the pain became severe. She expressed a desire to regain her ability to drive but noted that her muscles are still weak. The patient will cancel her regular appointment with me on 10/31/2023. The patient mentioned that she had initially scheduled an appointment with the nephrologist to discuss her chronic kidney problem. However, as she has been feeling relatively well lately, she decided to cancel the appointment. The patient will be having her flu vaccine today. She shares that she has been experiencing positive results by combining Voltaren gel with half a tablet of Tylenol with codeine for her pain management. She also noted that when she has not moved all night and then starts to move, she experiences some pain. Objective:  /70   Pulse 72   Temp 97.9 °F (36.6 °C)   Resp 20   Ht 5' 6" (1.676 m)   Wt 84.8 kg (187 lb)   BMI 30.18 kg/m²          Physical Exam  Constitutional:       General: She is not in acute distress. Appearance: She is well-developed. HENT:      Head: Normocephalic and atraumatic. Eyes:      Conjunctiva/sclera: Conjunctivae normal.   Cardiovascular:      Rate and Rhythm: Normal rate and regular rhythm. Heart sounds: No murmur heard. Pulmonary:      Effort: Pulmonary effort is normal. No respiratory distress. Breath sounds: Normal breath sounds. Musculoskeletal:         General: No swelling. Cervical back: Neck supple. Skin:     General: Skin is warm and dry. Capillary Refill: Capillary refill takes less than 2 seconds. Neurological:      Mental Status: She is alert.    Psychiatric:         Mood and Affect: Mood normal.  Gait: Positive for abnormal gait  Assistive device: Cane    I have personally reviewed results with the patient. Hemoglobin A1c: 5.2%  White Blood Cell Count: 4.2 x 10^9/L  Hemoglobin Level: 12.3 g/dL  Platelet Count: Normal  Fasting Blood Sugar: Normal  Kidney Function: Normal  Bilirubin Levels: Slightly elevated (Two 100s off.)  Glomerular Filtration Rate (GFR): 66 mL/min/1.73m²        Transcribed for Bill Olvera DO by Mirella on 09/20/23 at 1:23 PM. Powered by [employer].

## 2023-09-22 ENCOUNTER — PATIENT OUTREACH (OUTPATIENT)
Dept: FAMILY MEDICINE CLINIC | Facility: CLINIC | Age: 78
End: 2023-09-22

## 2023-09-22 NOTE — PROGRESS NOTES
IB message received regarding follow up. Chart reviewed and this RNCM called patient for follow up. Patient states she is doing well. She had her f/u with her PCP on 9/20. Dr Bernie Cota prescribed additional Tylenol with Codeine if patient were to need. Patient cancelled her Nephrology apt yesterday because she did not feel she needed. Patient says she has all of her medications and is taking them as directed. She says she spoke with a very nice young lady over the phone and she provided her with the information she needs on [facility]. She says she has not called yet. She says between her daughter and Son-N-Law she has had transportation covered and her son is coming out to visit next week. She states she has the contact information. Patient denies any CCM services or needs at this time. Next outreach scheduled for 10/4/23. Note routed to [facility].

## 2023-09-29 ENCOUNTER — APPOINTMENT (EMERGENCY)
Dept: RADIOLOGY | Facility: HOSPITAL | Age: 78
End: 2023-09-29
Payer: MEDICARE

## 2023-09-29 ENCOUNTER — HOSPITAL ENCOUNTER (EMERGENCY)
Facility: HOSPITAL | Age: 78
Discharge: HOME | End: 2023-09-29
Attending: EMERGENCY MEDICINE
Payer: MEDICARE

## 2023-09-29 VITALS
SYSTOLIC BLOOD PRESSURE: 156 MMHG | TEMPERATURE: 98.5 F | RESPIRATION RATE: 16 BRPM | DIASTOLIC BLOOD PRESSURE: 86 MMHG | HEART RATE: 80 BPM | OXYGEN SATURATION: 95 %

## 2023-09-29 DIAGNOSIS — K57.92 DIVERTICULITIS: Primary | ICD-10-CM

## 2023-09-29 DIAGNOSIS — S76.219A GROIN STRAIN: ICD-10-CM

## 2023-09-29 LAB
ALBUMIN SERPL BCP-MCNC: 3.9 G/DL (ref 3.5–5)
ALP SERPL-CCNC: 91 U/L (ref 34–104)
ALT SERPL W P-5'-P-CCNC: 13 U/L (ref 7–52)
ANION GAP SERPL CALCULATED.3IONS-SCNC: 6 MMOL/L
AST SERPL W P-5'-P-CCNC: 14 U/L (ref 13–39)
BASOPHILS # BLD AUTO: 0.03 THOUSANDS/ÂΜL (ref 0–0.1)
BASOPHILS NFR BLD AUTO: 0 % (ref 0–1)
BILIRUB DIRECT SERPL-MCNC: 0.23 MG/DL (ref 0–0.2)
BILIRUB SERPL-MCNC: 1.47 MG/DL (ref 0.2–1)
BUN SERPL-MCNC: 14 MG/DL (ref 5–25)
CALCIUM SERPL-MCNC: 9.3 MG/DL (ref 8.4–10.2)
CHLORIDE SERPL-SCNC: 103 MMOL/L (ref 96–108)
CO2 SERPL-SCNC: 28 MMOL/L (ref 21–32)
CREAT SERPL-MCNC: 0.76 MG/DL (ref 0.6–1.3)
EOSINOPHIL # BLD AUTO: 0.06 THOUSAND/ÂΜL (ref 0–0.61)
EOSINOPHIL NFR BLD AUTO: 1 % (ref 0–6)
ERYTHROCYTE [DISTWIDTH] IN BLOOD BY AUTOMATED COUNT: 13.2 % (ref 11.6–15.1)
GFR SERPL CREATININE-BSD FRML MDRD: 75 ML/MIN/1.73SQ M
GLUCOSE SERPL-MCNC: 111 MG/DL (ref 65–140)
HCT VFR BLD AUTO: 38.5 % (ref 34.8–46.1)
HGB BLD-MCNC: 13.2 G/DL (ref 11.5–15.4)
IMM GRANULOCYTES # BLD AUTO: 0.04 THOUSAND/UL (ref 0–0.2)
IMM GRANULOCYTES NFR BLD AUTO: 0 % (ref 0–2)
LIPASE SERPL-CCNC: <6 U/L (ref 11–82)
LYMPHOCYTES # BLD AUTO: 1.33 THOUSANDS/ÂΜL (ref 0.6–4.47)
LYMPHOCYTES NFR BLD AUTO: 13 % (ref 14–44)
MCH RBC QN AUTO: 30.3 PG (ref 26.8–34.3)
MCHC RBC AUTO-ENTMCNC: 34.3 G/DL (ref 31.4–37.4)
MCV RBC AUTO: 88 FL (ref 82–98)
MONOCYTES # BLD AUTO: 1 THOUSAND/ÂΜL (ref 0.17–1.22)
MONOCYTES NFR BLD AUTO: 10 % (ref 4–12)
NEUTROPHILS # BLD AUTO: 7.77 THOUSANDS/ÂΜL (ref 1.85–7.62)
NEUTS SEG NFR BLD AUTO: 76 % (ref 43–75)
NRBC BLD AUTO-RTO: 0 /100 WBCS
PLATELET # BLD AUTO: 232 THOUSANDS/UL (ref 149–390)
PMV BLD AUTO: 10.6 FL (ref 8.9–12.7)
POTASSIUM SERPL-SCNC: 3.9 MMOL/L (ref 3.5–5.3)
PROT SERPL-MCNC: 6.5 G/DL (ref 6.4–8.4)
RBC # BLD AUTO: 4.36 MILLION/UL (ref 3.81–5.12)
SODIUM SERPL-SCNC: 137 MMOL/L (ref 135–147)
WBC # BLD AUTO: 10.23 THOUSAND/UL (ref 4.31–10.16)

## 2023-09-29 PROCEDURE — 96375 TX/PRO/DX INJ NEW DRUG ADDON: CPT

## 2023-09-29 PROCEDURE — 96374 THER/PROPH/DIAG INJ IV PUSH: CPT

## 2023-09-29 PROCEDURE — 96361 HYDRATE IV INFUSION ADD-ON: CPT

## 2023-09-29 PROCEDURE — 36415 COLL VENOUS BLD VENIPUNCTURE: CPT | Performed by: EMERGENCY MEDICINE

## 2023-09-29 PROCEDURE — 83690 ASSAY OF LIPASE: CPT | Performed by: EMERGENCY MEDICINE

## 2023-09-29 PROCEDURE — 99285 EMERGENCY DEPT VISIT HI MDM: CPT

## 2023-09-29 PROCEDURE — 80076 HEPATIC FUNCTION PANEL: CPT | Performed by: EMERGENCY MEDICINE

## 2023-09-29 PROCEDURE — 85025 COMPLETE CBC W/AUTO DIFF WBC: CPT | Performed by: EMERGENCY MEDICINE

## 2023-09-29 PROCEDURE — 99285 EMERGENCY DEPT VISIT HI MDM: CPT | Performed by: EMERGENCY MEDICINE

## 2023-09-29 PROCEDURE — 74177 CT ABD & PELVIS W/CONTRAST: CPT

## 2023-09-29 PROCEDURE — 80048 BASIC METABOLIC PNL TOTAL CA: CPT | Performed by: EMERGENCY MEDICINE

## 2023-09-29 RX ORDER — AMOXICILLIN AND CLAVULANATE POTASSIUM 875; 125 MG/1; MG/1
1 TABLET, FILM COATED ORAL ONCE
Status: COMPLETED | OUTPATIENT
Start: 2023-09-29 | End: 2023-09-29

## 2023-09-29 RX ORDER — AMOXICILLIN AND CLAVULANATE POTASSIUM 875; 125 MG/1; MG/1
1 TABLET, FILM COATED ORAL EVERY 12 HOURS
Qty: 20 TABLET | Refills: 0 | Status: SHIPPED | OUTPATIENT
Start: 2023-09-29 | End: 2023-10-09

## 2023-09-29 RX ORDER — ONDANSETRON 4 MG/1
4 TABLET, ORALLY DISINTEGRATING ORAL EVERY 8 HOURS PRN
Qty: 20 TABLET | Refills: 0 | Status: SHIPPED | OUTPATIENT
Start: 2023-09-29

## 2023-09-29 RX ORDER — CYCLOBENZAPRINE HCL 10 MG
10 TABLET ORAL 2 TIMES DAILY PRN
Qty: 20 TABLET | Refills: 0 | Status: SHIPPED | OUTPATIENT
Start: 2023-09-29

## 2023-09-29 RX ORDER — MORPHINE SULFATE 4 MG/ML
4 INJECTION, SOLUTION INTRAMUSCULAR; INTRAVENOUS ONCE
Status: COMPLETED | OUTPATIENT
Start: 2023-09-29 | End: 2023-09-29

## 2023-09-29 RX ORDER — ONDANSETRON 2 MG/ML
4 INJECTION INTRAMUSCULAR; INTRAVENOUS ONCE
Status: COMPLETED | OUTPATIENT
Start: 2023-09-29 | End: 2023-09-29

## 2023-09-29 RX ADMIN — MORPHINE SULFATE 4 MG: 4 INJECTION INTRAVENOUS at 05:32

## 2023-09-29 RX ADMIN — ONDANSETRON 4 MG: 2 INJECTION INTRAMUSCULAR; INTRAVENOUS at 05:32

## 2023-09-29 RX ADMIN — SODIUM CHLORIDE 1000 ML: 0.9 INJECTION, SOLUTION INTRAVENOUS at 04:55

## 2023-09-29 RX ADMIN — IOHEXOL 100 ML: 350 INJECTION, SOLUTION INTRAVENOUS at 06:27

## 2023-09-29 RX ADMIN — AMOXICILLIN AND CLAVULANATE POTASSIUM 1 TABLET: 875; 125 TABLET ORAL at 07:03

## 2023-09-29 NOTE — ED NOTES
Pt could not give urine at this time. Will attempt another try when pt finishes fluids.      Jarad Lua RN  09/29/23 [id number]

## 2023-09-29 NOTE — ED PROVIDER NOTES
Final Diagnosis:  1. Diverticulitis    2. Groin strain        Chief Complaint   Patient presents with   • Weakness - Generalized     Pt comes into ER with feeling of chills and fever yesterday @ home  with a temp of 101.0. Pt had a hip replacement 5-6 weeks ago. pt denies any SOB     • Abdominal Pain     Pt states that the past two days complains of abd pain that goes across her belly. Pt states she has had a loss of appetite feeling nauseous no vomiting         HPI  ***    EMS reports if applicable: N/A ***    - Previous charting underwent limited review with attention to last ED visits, labs, ekgs, and prior imaging. Chart review reveals : ***    Appointment on 09/18/2023   Component Date Value Ref Range Status   • WBC 09/18/2023 4.24 (L)  4.31 - 10.16 Thousand/uL Final   • RBC 09/18/2023 4.16  3.81 - 5.12 Million/uL Final   • Hemoglobin 09/18/2023 12.3  11.5 - 15.4 g/dL Final   • Hematocrit 09/18/2023 37.2  34.8 - 46.1 % Final   • MCV 09/18/2023 89  82 - 98 fL Final   • MCH 09/18/2023 29.6  26.8 - 34.3 pg Final   • MCHC 09/18/2023 33.1  31.4 - 37.4 g/dL Final   • RDW 09/18/2023 13.8  11.6 - 15.1 % Final   • Platelets 29/84/7746 246  149 - 390 Thousands/uL Final   • MPV 09/18/2023 10.6  8.9 - 12.7 fL Final   • Sodium 09/18/2023 141  135 - 147 mmol/L Final   • Potassium 09/18/2023 3.8  3.5 - 5.3 mmol/L Final   • Chloride 09/18/2023 103  96 - 108 mmol/L Final   • CO2 09/18/2023 30  21 - 32 mmol/L Final   • ANION GAP 09/18/2023 8  mmol/L Final   • BUN 09/18/2023 13  5 - 25 mg/dL Final   • Creatinine 09/18/2023 0.86  0.60 - 1.30 mg/dL Final    Standardized to IDMS reference method   • Glucose, Fasting 09/18/2023 88  65 - 99 mg/dL Final   • Calcium 09/18/2023 9.7  8.4 - 10.2 mg/dL Final   • AST 09/18/2023 14  13 - 39 U/L Final   • ALT 09/18/2023 12  7 - 52 U/L Final    Specimen collection should occur prior to Sulfasalazine administration due to the potential for falsely depressed results.     • Alkaline Phosphatase 09/18/2023 104  34 - 104 U/L Final   • Total Protein 09/18/2023 6.8  6.4 - 8.4 g/dL Final   • Albumin 09/18/2023 4.0  3.5 - 5.0 g/dL Final   • Total Bilirubin 09/18/2023 1.02 (H)  0.20 - 1.00 mg/dL Final    Use of this assay is not recommended for patients undergoing treatment with eltrombopag due to the potential for falsely elevated results. N-acetyl-p-benzoquinone imine (metabolite of Acetaminophen) will generate erroneously low results in samples for patients that have taken an overdose of Acetaminophen. • eGFR 09/18/2023 64  ml/min/1.73sq m Final   • Cholesterol 09/18/2023 202 (H)  See Comment mg/dL Final    Cholesterol:         Pediatric <18 Years        Desirable          <170 mg/dL      Borderline High    170-199 mg/dL      High               >=200 mg/dL        Adult >=18 Years            Desirable         <200 mg/dL      Borderline High   200-239 mg/dL      High              >239 mg/dL     • Triglycerides 09/18/2023 120  See Comment mg/dL Final    Triglyceride:     0-9Y            <75mg/dL     10Y-17Y         <90 mg/dL       >=18Y     Normal          <150 mg/dL     Borderline High 150-199 mg/dL     High            200-499 mg/dL        Very High       >499 mg/dL    Specimen collection should occur prior to Metamizole administration due to the potential for falsely depressed results. • HDL, Direct 09/18/2023 37 (L)  >=50 mg/dL Final   • LDL Calculated 09/18/2023 141 (H)  0 - 100 mg/dL Final    LDL Cholesterol:     Optimal           <100 mg/dl     Near Optimal      100-129 mg/dl     Above Optimal       Borderline High 130-159 mg/dl       High            160-189 mg/dl       Very High       >189 mg/dl         This screening LDL is a calculated result. It does not have the accuracy of the Direct Measured LDL in the monitoring of patients with hyperlipidemia and/or statin therapy. Direct Measure LDL (CEY591) must be ordered separately in these patients.    • Hemoglobin A1C 09/18/2023 5.2  Normal 4.0-5.6%; PreDiabetic 5.7-6.4%; Diabetic >=6.5%; Glycemic control for adults with diabetes <7.0% % Final   • EAG 09/18/2023 103  mg/dl Final       - No*** language barrier.   - History obtained from patient ***.   - There are no*** limitations to the history obtained. - Discuss patient's care, with patient permission or by chart review, with ***     PMH:   has a past medical history of Anesthesia complication, Arthritis, Asthma, Blind left eye, Breathing difficulty, Cataract, Colon polyps (2016), Diverticulitis, Edema, Generalized osteoarthritis, GERD (gastroesophageal reflux disease), Hearing problem, High cholesterol, History of transfusion, Incontinence of urine, Polyarthritis, Rheumatoid arthritis ([address]), Sjogren's syndrome ([address]), SS-A antibody positive, Urinary tract infection, and UTI (urinary tract infection). PSH:   has a past surgical history that includes Appendectomy; Hysterectomy (1673); Bladder surgery; Colporrhaphy (2014); AUGMENTATION BREAST (Bilateral); Colonoscopy (04/18/2011); Knee Arthroplasty (Left); Knee surgery (Left, 2009); Bilateral oophorectomy (Bilateral, 2003); Tonsillectomy and adenoidectomy; Urethra surgery; Knee arthroscopy (2009); Joint replacement (Left, 2016); pr colonoscopy flx dx w/collj spec when pfrmd (N/A, 5/31/2018); Oophorectomy; Cataract extraction; Reduction mammaplasty (2000); Sacral nerve stimulator placement (N/A, 1/31/2022); and pr hemiarthroplasty hip partial (Right, 8/23/2023). Social History:  Tobacco Use: Low Risk  (9/29/2023)    Patient History    • Smoking Tobacco Use: Never    • Smokeless Tobacco Use: Never    • Passive Exposure: Never     Alcohol Use: Not At Risk (4/26/2022)    AUDIT-C    • Frequency of Alcohol Consumption: Never    • Average Number of Drinks: Not on file    • Frequency of Binge Drinking: Not on file     No illicit use ***      ROS:  Pertinent positives/negatives: ***.      Some ROS may be present in the HPI and would take precedent over these standard questions asked below. Review of Systems     CONSTITUTIONAL:  No lethargy. No weakness. No unexpected weight loss. No appetite change. EYES:  No pain, redness, or discharge. No loss of vision. No orbital trauma or pain. ENT:  No tinnitus or decreased hearing. No epistaxis/purulent rhinorrhea. No voice change, airway closing, trismus. CARDIOVASCULAR:  No chest pain. No skin mottling or pallor. No change in exertional capacity  RESPIRATORY:  No hemoptysis. No paroxysmal nocturnal dyspnea. No stridor. No audible wheezing. No production with cough. GASTROINTESTINAL:  Normal appetite. No vomiting, diarrhea. No pain. No bloating. No melena. No hematochezia. GENITOURINARY:  No frequency, urgency, nocturia. No hematuria or dysuria. No discharge. No sores/adenopathy. MUSCULOSKELETAL:  No arthralgias or myalgias that are new. No new deformity. INTEGUMENTARY:  No swelling. No unexpected contusions. No abrasions. No lymphangitis. NEUROLOGIC:  No meningismus. No new numbness of the extremities. No new focal weakness. No postural instability  PSYCHIATRIC:  No SI HI AVH  HEMATOLOGICAL:  No bleeding. No petechiae. No bruising. ALLERGIES:  No urticaria. No sudden abd cramping. No stridor. PE:     Physical exam highlights: ***  Physical Exam       Vitals:    09/29/23 0424 09/29/23 0533 09/29/23 0545 09/29/23 0630   BP: 141/73 137/82 137/82 156/86   BP Location: Left arm Left arm Left arm    Pulse: 101 83 78 80   Resp: 18 16 16    Temp: 98.5 °F (36.9 °C)      TempSrc: Oral      SpO2: 95% 95% 93% 95%     Vitals reviewed by me. Nursing note reviewed  Chaperone present for all sensitive exam.  Const: No acute distress. Alert. Nontoxic. Not diaphoretic. HEENT: External ears normal. No protrusion drainage swelling. Nose normal. No drainage/traumatic deformity. MMM. Mouth with baseline/symmetric movement. No trismus. Eyes: No squinting. No icterus. No tearing/swelling/drainage.  Tracks through the room with normal EOM. Neck: ROM normal. No rigidity. No meningismus. Cards: Rate as per vitals Compared to monitor sinus unless documented. Regular Well perfused. Pulm: able to verbalize without additional effort. Effort and excursion normal. No distress. No audible wheezing/ stridor. Normal resp rate without retraction or change in work of breathing. Abd: No distension beyond baseline. No fluctuant wave. Patient without peritoneal pain with shifting/bumping the bed. MSK: ROM normal baseline. No deformity. No contractures from baseline. Skin: No new rashes visible. Well perfused. No wounds visualized on exposed skin  Neuro: Nonfocal. Baseline. CN grossly intact. Moving all four with coordination. Psych: Normal behavior and affect. A:  - Nursing note reviewed. Ddx and MDM  Considered diagnoses  ***          My conversation with consultant reveals: NA ***      Decision rules:                           My read of the XR/CT scan reveals:  NA ***  CT abdomen pelvis with contrast   Final Result      Mild wall thickening and pericolonic inflammatory stranding of the mid sigmoid colon with multiple associated diverticula, most compatible with an acute diverticulitis. No free air or free fluid to suggest perforation. No large or small bowel obstruction. Mild to moderate chronic appearing compression deformity of the L1 vertebral body. The degree of compression deformity has increased from the prior exam. No new fractures.       Workstation performed: [id number]             Orders Placed This Encounter   Procedures   • CT abdomen pelvis with contrast   • CBC and differential   • Basic metabolic panel   • Hepatic function panel   • Lipase   • UA (URINE) with reflex to Scope     Labs Reviewed   CBC AND DIFFERENTIAL - Abnormal       Result Value Ref Range Status    WBC 10.23 (*) 4.31 - 10.16 Thousand/uL Final    RBC 4.36  3.81 - 5.12 Million/uL Final    Hemoglobin 13.2  11.5 - 15.4 g/dL Final Hematocrit 38.5  34.8 - 46.1 % Final    MCV 88  82 - 98 fL Final    MCH 30.3  26.8 - 34.3 pg Final    MCHC 34.3  31.4 - 37.4 g/dL Final    RDW 13.2  11.6 - 15.1 % Final    MPV 10.6  8.9 - 12.7 fL Final    Platelets 526  740 - 390 Thousands/uL Final    nRBC 0  /100 WBCs Final    Neutrophils Relative 76 (*) 43 - 75 % Final    Immat GRANS % 0  0 - 2 % Final    Lymphocytes Relative 13 (*) 14 - 44 % Final    Monocytes Relative 10  4 - 12 % Final    Eosinophils Relative 1  0 - 6 % Final    Basophils Relative 0  0 - 1 % Final    Neutrophils Absolute 7.77 (*) 1.85 - 7.62 Thousands/µL Final    Immature Grans Absolute 0.04  0.00 - 0.20 Thousand/uL Final    Lymphocytes Absolute 1.33  0.60 - 4.47 Thousands/µL Final    Monocytes Absolute 1.00  0.17 - 1.22 Thousand/µL Final    Eosinophils Absolute 0.06  0.00 - 0.61 Thousand/µL Final    Basophils Absolute 0.03  0.00 - 0.10 Thousands/µL Final   HEPATIC FUNCTION PANEL - Abnormal    Total Bilirubin 1.47 (*) 0.20 - 1.00 mg/dL Final    Comment: Use of this assay is not recommended for patients undergoing treatment with eltrombopag due to the potential for falsely elevated results. N-acetyl-p-benzoquinone imine (metabolite of Acetaminophen) will generate erroneously low results in samples for patients that have taken an overdose of Acetaminophen. Bilirubin, Direct 0.23 (*) 0.00 - 0.20 mg/dL Final    Alkaline Phosphatase 91  34 - 104 U/L Final    AST 14  13 - 39 U/L Final    ALT 13  7 - 52 U/L Final    Comment: Specimen collection should occur prior to Sulfasalazine administration due to the potential for falsely depressed results.      Total Protein 6.5  6.4 - 8.4 g/dL Final    Albumin 3.9  3.5 - 5.0 g/dL Final   LIPASE - Abnormal    Lipase <6 (*) 11 - 82 u/L Final   BASIC METABOLIC PANEL    Sodium 674  135 - 147 mmol/L Final    Potassium 3.9  3.5 - 5.3 mmol/L Final    Chloride 103  96 - 108 mmol/L Final    CO2 28  21 - 32 mmol/L Final    ANION GAP 6  mmol/L Final    BUN 14  5 - 25 mg/dL Final    Creatinine 0.76  0.60 - 1.30 mg/dL Final    Comment: Standardized to IDMS reference method    Glucose 111  65 - 140 mg/dL Final    Comment: If the patient is fasting, the ADA then defines impaired fasting glucose as > 100 mg/dL and diabetes as > or equal to 123 mg/dL. Calcium 9.3  8.4 - 10.2 mg/dL Final    eGFR 75  ml/min/1.73sq m Final    Narrative:     [place] guidelines for Chronic Kidney Disease (CKD):   •  Stage 1 with normal or high GFR (GFR > 90 mL/min/1.73 square meters)  •  Stage 2 Mild CKD (GFR = 60-89 mL/min/1.73 square meters)  •  Stage 3A Moderate CKD (GFR = 45-59 mL/min/1.73 square meters)  •  Stage 3B Moderate CKD (GFR = 30-44 mL/min/1.73 square meters)  •  Stage 4 Severe CKD (GFR = 15-29 mL/min/1.73 square meters)  •  Stage 5 End Stage CKD (GFR <15 mL/min/1.73 square meters)  Note: GFR calculation is accurate only with a steady state creatinine   URINALYSIS WITH REFLEX TO SCOPE       *Each of these labs was reviewed. Particular standout labs will be noted in the ED Course above     Final Diagnosis:  1. Diverticulitis    2.  Groin strain          P:  - hospital tx includes ***  Medications   amoxicillin-clavulanate (AUGMENTIN) 875-125 mg per tablet 1 tablet (has no administration in time range)   sodium chloride 0.9 % bolus 1,000 mL (1,000 mL Intravenous New Bag 9/29/23 0455)   ondansetron (ZOFRAN) injection 4 mg (4 mg Intravenous Given 9/29/23 0532)   morphine injection 4 mg (4 mg Intravenous Given 9/29/23 0532)   iohexol (OMNIPAQUE) 350 MG/ML injection (MULTI-DOSE) 100 mL (100 mL Intravenous Given 9/29/23 0519)         - disposition***  Time reflects when diagnosis was documented in both MDM as applicable and the Disposition within this note     Time User Action Codes Description Comment    9/29/2023  6:55 AM Georgi Hammond [K57.92] Diverticulitis     9/29/2023  6:55 AM [facility] [C78.716K] Groin strain       ED Disposition     ED Disposition   Discharge    Condition   Stable    Date/Time   Fri Sep 29, 2023  6:55 AM    Comment   Efrain Cuevas discharge to home/self care. Follow-up Information     Follow up With Specialties Details Why Rocky1 Christopher Peraza DO Family Medicine   [address]  [phone]            - patient will call their PCP to let them know they were in the emergency department. We discuss return precautions and patient is agreeable with plan and aformentioned disposition. - additional treatment intended, if consistent with primary provider:  - patient to follow with :      Patient's Medications   Discharge Prescriptions    AMOXICILLIN-CLAVULANATE (AUGMENTIN) 875-125 MG PER TABLET    Take 1 tablet by mouth every 12 (twelve) hours for 10 days       Start Date: 9/29/2023 End Date: 10/9/2023       Order Dose: 1 tablet       Quantity: 20 tablet    Refills: 0    CYCLOBENZAPRINE (FLEXERIL) 10 MG TABLET    Take 1 tablet (10 mg total) by mouth 2 (two) times a day as needed for muscle spasms       Start Date: 9/29/2023 End Date: --       Order Dose: 10 mg       Quantity: 20 tablet    Refills: 0    ONDANSETRON (ZOFRAN-ODT) 4 MG DISINTEGRATING TABLET    Take 1 tablet (4 mg total) by mouth every 8 (eight) hours as needed for nausea or vomiting       Start Date: 9/29/2023 End Date: --       Order Dose: 4 mg       Quantity: 20 tablet    Refills: 0     No discharge procedures on file. Prior to Admission Medications   Prescriptions Last Dose Informant Patient Reported? Taking?    Cranberry 450 MG CAPS  Self Yes No   Sig: daily   Diclofenac Sodium (VOLTAREN) 1 % 9/28/2023  Yes Yes   Sig: Apply 2 g topically 4 (four) times a day   Magnesium Oxide -Mg Supplement 400 MG CAPS Past Week Self Yes Yes   Sig: Take 1 capsule by mouth every morning     acetaminophen (TYLENOL) 500 mg tablet  Self Yes No   Sig: Take 500 mg by mouth 3 (three) times a day as needed     acetaminophen-codeine (TYLENOL/CODEINE #3) 300-30 MG per tablet   No No   Sig: Take 1 tablet by mouth every 4 (four) hours as needed for moderate pain   albuterol (PROVENTIL HFA,VENTOLIN HFA) 90 mcg/act inhaler Past Month  No Yes   Sig: Inhale 2 puffs every 6 (six) hours as needed for wheezing or shortness of breath   aspirin (ECOTRIN LOW STRENGTH) 81 mg EC tablet 9/28/2023  No Yes   Sig: Take 1 tablet (81 mg total) by mouth 2 (two) times a day   cyanocobalamin (VITAMIN B-12) 1000 MCG tablet  Self Yes No   Sig: Take 1,000 mcg by mouth daily   docusate sodium (COLACE) 100 mg capsule 9/28/2023 Self Yes Yes   Sig: Take 100 mg by mouth 2 (two) times a day   estradiol (ESTRACE VAGINAL) 0.1 mg/g vaginal cream Past Month Self No Yes   Sig: Insert 1 g into the vagina 3 (three) times a week   fluticasone (Flovent HFA) 220 mcg/act inhaler  Self No No   Sig: Inhale 2 puffs 2 (two) times a day Rinse mouth after use   furosemide (LASIX) 40 mg tablet Past Week  No Yes   Sig: Take 1 tablet (40 mg total) by mouth every morning   methenamine hippurate (HIPREX) 1 g tablet Past Week Self No Yes   Sig: TAKE 1 TABLET BY MOUTH 2 TIMES A DAY WITH MEALS.   potassium chloride (Klor-Con) 10 mEq tablet Past Week  No Yes   Sig: TAKE 1 TABLET (10 MEQ TOTAL) BY MOUTH IN THE MORNING   pravastatin (PRAVACHOL) 10 mg tablet Past Week  No Yes   Sig: TAKE 1 TABLET BY MOUTH EVERY DAY   senna (SENOKOT) 8.6 MG tablet Past Week Self Yes Yes   Sig: Take 1 tablet by mouth as needed for constipation      Facility-Administered Medications: None       Portions of the record may have been created with voice recognition software. Occasional wrong word or "sound a like" substitutions may have occurred due to the inherent limitations of voice recognition software. Read the chart carefully and recognize, using context, where substitutions have occurred.     Electronically signed by:  Gregg Castillo MD file.  Prior to Admission Medications   Prescriptions Last Dose Informant Patient Reported? Taking?    Cranberry 450 MG CAPS  Self Yes No   Sig: daily   Diclofenac Sodium (VOLTAREN) 1 % 9/28/2023  Yes Yes   Sig: Apply 2 g topically 4 (four) times a day   Magnesium Oxide -Mg Supplement 400 MG CAPS Past Week Self Yes Yes   Sig: Take 1 capsule by mouth every morning     acetaminophen (TYLENOL) 500 mg tablet  Self Yes No   Sig: Take 500 mg by mouth 3 (three) times a day as needed     acetaminophen-codeine (TYLENOL/CODEINE #3) 300-30 MG per tablet   No No   Sig: Take 1 tablet by mouth every 4 (four) hours as needed for moderate pain   albuterol (PROVENTIL HFA,VENTOLIN HFA) 90 mcg/act inhaler Past Month  No Yes   Sig: Inhale 2 puffs every 6 (six) hours as needed for wheezing or shortness of breath   aspirin (ECOTRIN LOW STRENGTH) 81 mg EC tablet 9/28/2023  No Yes   Sig: Take 1 tablet (81 mg total) by mouth 2 (two) times a day   cyanocobalamin (VITAMIN B-12) 1000 MCG tablet  Self Yes No   Sig: Take 1,000 mcg by mouth daily   docusate sodium (COLACE) 100 mg capsule 9/28/2023 Self Yes Yes   Sig: Take 100 mg by mouth 2 (two) times a day   estradiol (ESTRACE VAGINAL) 0.1 mg/g vaginal cream Past Month Self No Yes   Sig: Insert 1 g into the vagina 3 (three) times a week   fluticasone (Flovent HFA) 220 mcg/act inhaler  Self No No   Sig: Inhale 2 puffs 2 (two) times a day Rinse mouth after use   furosemide (LASIX) 40 mg tablet Past Week  No Yes   Sig: Take 1 tablet (40 mg total) by mouth every morning   methenamine hippurate (HIPREX) 1 g tablet Past Week Self No Yes   Sig: TAKE 1 TABLET BY MOUTH 2 TIMES A DAY WITH MEALS.   potassium chloride (Klor-Con) 10 mEq tablet Past Week  No Yes   Sig: TAKE 1 TABLET (10 MEQ TOTAL) BY MOUTH IN THE MORNING   pravastatin (PRAVACHOL) 10 mg tablet Past Week  No Yes   Sig: TAKE 1 TABLET BY MOUTH EVERY DAY   senna (SENOKOT) 8.6 MG tablet Past Week Self Yes Yes   Sig: Take 1 tablet by mouth as needed for constipation      Facility-Administered Medications: None       Portions of the record may have been created with voice recognition software. Occasional wrong word or "sound a like" substitutions may have occurred due to the inherent limitations of voice recognition software. Read the chart carefully and recognize, using context, where substitutions have occurred.     Electronically signed by:  MD Ilana Luque MD  10/02/23 [id number]

## 2023-09-29 NOTE — ED NOTES
Pt resting in bed, dressed in own clothing, awaiting ride home from son. Pt reports son is on his way. No distress noted. Resp easy and unlabored.        Brady Tripp RN  09/29/23 [id number]

## 2023-10-02 ENCOUNTER — PATIENT OUTREACH (OUTPATIENT)
Dept: FAMILY MEDICINE CLINIC | Facility: CLINIC | Age: 78
End: 2023-10-02

## 2023-10-02 NOTE — PROGRESS NOTES
Outgoing Call:  10/02/2023    [facility] called patient for scheduled outreach. Patient answered CMOC's call. CMOC explained reason of call. [facility] asked patient if she needed anymore assistance with [facility]. Patient stated that everything was all set up, and she does not need any help at this time. Patient thanked [facility] for the follow up. CMOC completed referral. [facility] will close CHW episode and take name off care team after checking with [facility] RN Ariella Omalley. No further outreach needed at this time.

## 2023-10-03 ENCOUNTER — PATIENT OUTREACH (OUTPATIENT)
Dept: FAMILY MEDICINE CLINIC | Facility: CLINIC | Age: 78
End: 2023-10-03

## 2023-10-05 ENCOUNTER — PATIENT OUTREACH (OUTPATIENT)
Dept: FAMILY MEDICINE CLINIC | Facility: CLINIC | Age: 78
End: 2023-10-05

## 2023-10-05 NOTE — PROGRESS NOTES
IB message received for follow up from last outreach. Chart reviewed. Patient recently presented to ER with abdominal pain-diverticulitis. Pt discharged home on abx. This RNCM called patient and she says she is doing well and feeling much better. She says she is taking her abx as directed and is almost half way through. She says she has all of her medications. She says her weight is about the same and her BP has been good. She declines the need for further CCM services or follow up. She has my contact information and I encouraged her to call with any questions or concerns. She was very thankful for all the follow up.      CCM episode was closed and this RN CM removed self from care team.

## 2023-10-16 ENCOUNTER — APPOINTMENT (OUTPATIENT)
Dept: RADIOLOGY | Facility: AMBULARY SURGERY CENTER | Age: 78
End: 2023-10-16
Attending: STUDENT IN AN ORGANIZED HEALTH CARE EDUCATION/TRAINING PROGRAM
Payer: MEDICARE

## 2023-10-16 ENCOUNTER — OFFICE VISIT (OUTPATIENT)
Dept: OBGYN CLINIC | Facility: CLINIC | Age: 78
End: 2023-10-16

## 2023-10-16 VITALS — WEIGHT: 187 LBS | BODY MASS INDEX: 30.05 KG/M2 | HEIGHT: 66 IN

## 2023-10-16 DIAGNOSIS — Z98.890 STATUS POST SURGERY: ICD-10-CM

## 2023-10-16 DIAGNOSIS — N39.0 RECURRENT UTI: ICD-10-CM

## 2023-10-16 DIAGNOSIS — Z98.890 STATUS POST SURGERY: Primary | ICD-10-CM

## 2023-10-16 PROCEDURE — 99024 POSTOP FOLLOW-UP VISIT: CPT | Performed by: STUDENT IN AN ORGANIZED HEALTH CARE EDUCATION/TRAINING PROGRAM

## 2023-10-16 PROCEDURE — 73502 X-RAY EXAM HIP UNI 2-3 VIEWS: CPT

## 2023-10-16 RX ORDER — METHENAMINE HIPPURATE 1000 MG/1
1 TABLET ORAL 2 TIMES DAILY WITH MEALS
Qty: 60 TABLET | Refills: 0 | Status: SHIPPED | OUTPATIENT
Start: 2023-10-16

## 2023-10-16 NOTE — PROGRESS NOTES
Orthopaedic Surgery - Office Note  Maricruz Caldwell (50 y.o. female)   : 1945   MRN: [id number]  Encounter Date: 10/16/2023    Chief Complaint   Patient presents with    Right Hip - Post-op       Past Surgical History:   Procedure Laterality Date    APPENDECTOMY      AUGMENTATION BREAST Bilateral     breast surgery enlargement procedure elective bilateral    BILATERAL OOPHORECTOMY Bilateral     BLADDER SURGERY      bladder lift,cystocele    CATARACT EXTRACTION      Right 2018; Left 10/2018    COLONOSCOPY  2011    COLPORRHAPHY      anterior, repair of cystocele    HYSTERECTOMY  1673    with cystocele     JOINT REPLACEMENT Left 2016    knee    KNEE ARTHROPLASTY Left     KNEE ARTHROSCOPY  2009    KNEE SURGERY Left 2009    torn meniscus    OOPHORECTOMY      WA COLONOSCOPY FLX DX W/COLLJ SPEC WHEN PFRMD N/A 2018    Procedure: COLONOSCOPY;  Surgeon: Daron Schaumann, MD;  Location: [facility]; Service: Gastroenterology    WA HEMIARTHROPLASTY HIP PARTIAL Right 2023    Procedure: HEMIARTHROPLASTY HIP (BIPOLAR); Surgeon: Sameer Trinidad DO;  Location: [facility];  Service: Orthopedics    REDUCTION MAMMAPLASTY      SACRAL NERVE STIMULATOR PLACEMENT N/A 2022    Procedure: IMPLANTATION NEUROSTIMULATOR ELECTRODE ARRAY SACRAL NERVE; INSERTION NEUROSTIMULATOR; FLUORO; ELECTONIC ANALYSIS;  Surgeon: Carmencita Guerin MD;  Location: [facility];  Service: UroGynecology           TONSILLECTOMY AND ADENOIDECTOMY      URETHRA SURGERY       Assessment / Plan  1. S/p right hemiarthroplasty, DOS 23 for nondisplaced femoral neck fracture   2. Hip psoas tendinitis     X-ray right hip was reviewed in the office today demonstrated well maintained alignment of the patient's right hip hemiarthroplasty with no signs of loosening or failure. No periprosthetic fractures  Continue weightbearing as tolerated.     Continue with home exercise program from physical therapy  She may start to ease off posterior hip precautions    Patient completed DVT prophylaxis  Recommended patient to take Vit D and calcium for bone health  Recommend continued stretching and muscle strengthening asked  Patient continue pain control with Tylenol and icing  as needed for pain control  Patient will follow-up in 6 weeks for reevaluation and repeat right hip x-ray      History of Present Illness  Aida Schaffer is a 66 y.o. female who presents today 3 weeks s/p right hemiarthroplasty, DOS 8/23/23. She was in a inpatient rehab center after surgery and was discharged to go home on 8/30/2023. She states she is doing well. She has been working with home physical therapy. She has been weightbearing as tolerated right lower extremity with the use of a cane or walker inside the home. Today she is present in a wheelchair. She is on aspirin 81 mg twice daily for DVT prophylaxis. She is taking Tylenol 3 which was a old prescription she found at home for pain relief. This she denies any chills or fevers. .     Interval History 10/16/23   Patient presents today in the office weeks  8 weeks s/p right hemiarthroplasty, DOS 8/23/23. Patient states she has finished with physical therapy and complains of having soreness over the anterior aspect of the right hip into the groin and thigh. She does have pain occasionally over the mid anterior thigh. She states stretching exercises to help with the pain. She does have trouble with doing straight leg exercises due to weakness. She is also complaining of right sided low back pain. Denies any numbness or tingling. She is taking Tylenol as needed for pain relief. She also has been icing which has been helping. She has finished with the aspirin 81 mg for DVT prophylaxis    Review of Systems  Pertinent items are noted in HPI. All other systems were reviewed and are negative. Physical Exam  Ht 5' 6" (1.676 m)   Wt 84.8 kg (187 lb)   BMI 30.18 kg/m²   Cons: Appears well.   No apparent distress. Psych: Alert. Oriented x3. Mood and affect normal.  Eyes: PERRLA, EOMI  Resp: Normal effort. No audible wheezing or stridor. CV: Palpable pulse. No discernable arrhythmia. Lymph:  No palpable cervical, axillary, or inguinal lymphadenopathy. Skin: Warm. No palpable masses. No visible lesions. Neuro: Normal muscle tone. Normal and symmetric DTR's. The right hip was exposed inspected. The patient's previous surgical incision is healing well without any surrounding erythema or induration. No dehiscence no purulence. The patient has some stiffness and pain with hip flexion. This is located over the psoas tendon. Radiating into the anterior aspect of the thigh. The patient's sensation is intact to light touch in the superficial peroneal, deep peroneal, sural, saphenous, plantar nerve distributions. Tibialis anterior, extensor hallux longus, gastrocnemius, extensor mechanism intact. Limb is well-perfused    Studies Reviewed  X-rays of the pelvis and right hip demonstrate well-maintained right hip hemiarthroplasty with no surrounding lucency or loosening. Procedures      Medical, Surgical, Family, and Social History  The patient's medical history, family history, and social history, were reviewed and updated as appropriate.     Past Medical History:   Diagnosis Date    Anesthesia complication     "shivers"    Arthritis     Asthma     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 see colors, with over-use goes darker- from untreated amblyopia    Breathing difficulty     with extubation pt developed laryngeal spasm    Cataract     both eyes- right eye needs surgery    Colon polyps 2016    Diverticulitis     had episode in April 2018    Edema     lower legs    Generalized osteoarthritis     GERD (gastroesophageal reflux disease)     Hearing problem     unspecified laterality-hearing aid right ear    High cholesterol     History of transfusion     autologus    Incontinence of urine     Polyarthritis     last assessed: 6/26/2015    Rheumatoid arthritis ([facility])     Sjogren's syndrome ([facility])     last assessed: 7/18/2016    SS-A antibody positive     last assessed: 8/20/2015    Urinary tract infection     UTI (urinary tract infection)            Family History   Problem Relation Age of Onset    Coronary artery disease Mother     COPD Mother     Hypertension Mother     Hyperlipidemia Mother         high cholesterol    Atrial fibrillation Mother     Coronary artery disease Father     Arthritis Father     COPD Father         Black lung    Hypertension Father     Hyperlipidemia Father         high cholesterol    Lung cancer Brother     Coronary artery disease Brother     Hypertension Brother     Hyperlipidemia Brother         high cholesterol    Atrial fibrillation Brother         fib/flutter    Other Sister         pericarditis    Arthritis Sister     Diabetes Maternal Grandmother         mellitus    Breast cancer Maternal Grandmother 79    Breast cancer Paternal Grandmother 79    Arthritis Family     Asthma Family     Cancer Brother         lung    Heart disease Brother         CAD-stents-smoker    No Known Problems Maternal Aunt     No Known Problems Maternal Aunt     No Known Problems Maternal Aunt     No Known Problems Maternal Aunt     No Known Problems Maternal Aunt     Breast cancer Other 36       Social History     Occupational History    Occupation: retired   Tobacco Use    Smoking status: Never     Passive exposure: Never    Smokeless tobacco: Never   Vaping Use    Vaping Use: Never used   Substance and Sexual Activity    Alcohol use: Not Currently    Drug use: No    Sexual activity: Not Currently       Allergies   Allergen Reactions    Celecoxib Other (See Comments)     Other reaction(s): Palpitations  Category:  Adverse Reaction;          Current Outpatient Medications:     acetaminophen (TYLENOL) 500 mg tablet, Take 500 mg by mouth 3 (three) times a day as needed  , Disp: , Rfl:     acetaminophen-codeine (TYLENOL/CODEINE #3) 300-30 MG per tablet, Take 1 tablet by mouth every 4 (four) hours as needed for moderate pain, Disp: 20 tablet, Rfl: 0    albuterol (PROVENTIL HFA,VENTOLIN HFA) 90 mcg/act inhaler, Inhale 2 puffs every 6 (six) hours as needed for wheezing or shortness of breath, Disp: 8.5 g, Rfl: 3    Cranberry 450 MG CAPS, daily, Disp: , Rfl:     cyanocobalamin (VITAMIN B-12) 1000 MCG tablet, Take 1,000 mcg by mouth daily, Disp: , Rfl:     cyclobenzaprine (FLEXERIL) 10 mg tablet, Take 1 tablet (10 mg total) by mouth 2 (two) times a day as needed for muscle spasms, Disp: 20 tablet, Rfl: 0    Diclofenac Sodium (VOLTAREN) 1 %, Apply 2 g topically 4 (four) times a day, Disp: , Rfl:     docusate sodium (COLACE) 100 mg capsule, Take 100 mg by mouth 2 (two) times a day, Disp: , Rfl:     estradiol (ESTRACE VAGINAL) 0.1 mg/g vaginal cream, Insert 1 g into the vagina 3 (three) times a week, Disp: 42.5 g, Rfl: 6    fluticasone (Flovent HFA) 220 mcg/act inhaler, Inhale 2 puffs 2 (two) times a day Rinse mouth after use, Disp: 36 g, Rfl: 3    furosemide (LASIX) 40 mg tablet, Take 1 tablet (40 mg total) by mouth every morning, Disp: 90 tablet, Rfl: 1    Magnesium Oxide -Mg Supplement 400 MG CAPS, Take 1 capsule by mouth every morning  , Disp: , Rfl:     methenamine hippurate (HIPREX) 1 g tablet, Take 1 tablet (1 g total) by mouth 2 (two) times a day with meals, Disp: 60 tablet, Rfl: 0    potassium chloride (Klor-Con) 10 mEq tablet, TAKE 1 TABLET (10 MEQ TOTAL) BY MOUTH IN THE MORNING, Disp: 90 tablet, Rfl: 1    pravastatin (PRAVACHOL) 10 mg tablet, TAKE 1 TABLET BY MOUTH EVERY DAY, Disp: 90 tablet, Rfl: 3    senna (SENOKOT) 8.6 MG tablet, Take 1 tablet by mouth as needed for constipation, Disp: , Rfl:     aspirin (ECOTRIN LOW STRENGTH) 81 mg EC tablet, Take 1 tablet (81 mg total) by mouth 2 (two) times a day, Disp: 84 tablet, Rfl: 0    ondansetron (ZOFRAN-ODT) 4 mg disintegrating tablet, Take 1 tablet (4 mg total) by mouth every 8 (eight) hours as needed for nausea or vomiting, Disp: 20 tablet, Rfl: 0      Scribe Attestation      I,:  Abilio Santizo am acting as a scribe while in the presence of the attending physician.:       I,:  Lyndia Goldberg, DO personally performed the services described in this documentation    as scribed in my presence.:

## 2023-11-06 ENCOUNTER — OFFICE VISIT (OUTPATIENT)
Dept: UROLOGY | Facility: CLINIC | Age: 78
End: 2023-11-06
Payer: MEDICARE

## 2023-11-06 VITALS
BODY MASS INDEX: 29.89 KG/M2 | HEIGHT: 66 IN | OXYGEN SATURATION: 98 % | SYSTOLIC BLOOD PRESSURE: 120 MMHG | HEART RATE: 93 BPM | DIASTOLIC BLOOD PRESSURE: 80 MMHG | WEIGHT: 186 LBS

## 2023-11-06 DIAGNOSIS — N39.0 RECURRENT UTI: Primary | ICD-10-CM

## 2023-11-06 LAB
POST-VOID RESIDUAL VOLUME, ML POC: 155 ML
SL AMB  POCT GLUCOSE, UA: NORMAL
SL AMB LEUKOCYTE ESTERASE,UA: NORMAL
SL AMB POCT BILIRUBIN,UA: NORMAL
SL AMB POCT BLOOD,UA: NORMAL
SL AMB POCT CLARITY,UA: CLEAR
SL AMB POCT COLOR,UA: YELLOW
SL AMB POCT KETONES,UA: NORMAL
SL AMB POCT NITRITE,UA: NORMAL
SL AMB POCT PH,UA: 6
SL AMB POCT SPECIFIC GRAVITY,UA: 1020
SL AMB POCT URINE PROTEIN: NORMAL
SL AMB POCT UROBILINOGEN: NORMAL

## 2023-11-06 PROCEDURE — 81002 URINALYSIS NONAUTO W/O SCOPE: CPT | Performed by: PHYSICIAN ASSISTANT

## 2023-11-06 PROCEDURE — 51798 US URINE CAPACITY MEASURE: CPT | Performed by: PHYSICIAN ASSISTANT

## 2023-11-06 PROCEDURE — 99213 OFFICE O/P EST LOW 20 MIN: CPT | Performed by: PHYSICIAN ASSISTANT

## 2023-11-06 RX ORDER — NITROFURANTOIN 25; 75 MG/1; MG/1
100 CAPSULE ORAL 2 TIMES DAILY
Qty: 14 CAPSULE | Refills: 1 | Status: SHIPPED | OUTPATIENT
Start: 2023-11-06 | End: 2023-11-20

## 2023-11-06 NOTE — PROGRESS NOTES
1. Recurrent UTI  POCT urine dip    POCT Measure PVR    nitrofurantoin (MACROBID) 100 mg capsule    Urine culture    Urine culture        Assessment and plan:     1. Recurrent UTIs  2. Incomplete bladder emptying  3. Atrophic vaginitis  - standing urine culture + self start nitrofurantoin  - premarin  - hiprex + vitamin C  - renal US normal  - annual surveillance    [address] [facility]      Chief Complaint     Recurrent UTI    History of Present Illness     Madyson Ward is a 66 y.o. female presenting today for follow up of recurrent urinary tract infections. Urine cultures:  E coli (6/5/2022)  E coli (6/30/2022)  E coli (8/29/2022  E coli (09/21/2022)  E coli (11/14/22)    Has had 1 infection over the past 6 months. Responded well to self start nitrofurantoin. History of anterior colporrhaphy. Follows with Dr. Sonya Walsh with urogynecology -underwent recent interstim placement in early 2022. Previously had performed CIC. Has not performed CIC in the past year. US kidney and bladder (11/1/22) with moderate postvoid residual of 168mL. No upper tract obstruction. CT of the abdomen pelvis (8/22/2023) negative for  abnormalities. Medical comorbidities include rheumatoid arthritis, GERD, diverticulosis, rosacea, Sjogren's, asthma, cataract. Urine dip trace leukocytes, nitrite and blood negative. PVR 155mL    Laboratory     Lab Results   Component Value Date    CREATININE 0.76 09/29/2023       Review of Systems     Review of Systems   Constitutional:  Negative for activity change, appetite change, chills, diaphoresis, fatigue, fever and unexpected weight change. Respiratory:  Negative for chest tightness and shortness of breath. Cardiovascular:  Negative for chest pain, palpitations and leg swelling. Gastrointestinal:  Negative for abdominal distention, abdominal pain, constipation, diarrhea, nausea and vomiting.    Genitourinary:  Negative for decreased urine volume, dysuria, enuresis, flank pain, frequency, genital sores, hematuria and urgency. Weakened urinary stream   Musculoskeletal:  Negative for back pain, gait problem and myalgias. Skin:  Negative for color change, pallor, rash and wound. Psychiatric/Behavioral:  Negative for behavioral problems. The patient is not nervous/anxious. Allergies     Allergies   Allergen Reactions    Celecoxib Other (See Comments)     Other reaction(s): Palpitations  Category: Adverse Reaction;        Physical Exam     Physical Exam  Constitutional:       General: She is not in acute distress. Appearance: Normal appearance. She is normal weight. She is not ill-appearing, toxic-appearing or diaphoretic. HENT:      Head: Normocephalic and atraumatic. Eyes:      General:         Right eye: No discharge. Left eye: No discharge. Conjunctiva/sclera: Conjunctivae normal.   Pulmonary:      Effort: Pulmonary effort is normal. No respiratory distress. Genitourinary:     Comments: Vaginal atrophy  Musculoskeletal:         General: No swelling or tenderness. Normal range of motion. Skin:     General: Skin is warm and dry. Coloration: Skin is not jaundiced or pale. Neurological:      General: No focal deficit present. Mental Status: She is alert and oriented to person, place, and time. Psychiatric:         Mood and Affect: Mood normal.         Behavior: Behavior normal.         Thought Content:  Thought content normal.           Vital Signs     Vitals:    11/06/23 0903   BP: 120/80   BP Location: Left arm   Patient Position: Sitting   Cuff Size: Standard   Pulse: 93   SpO2: 98%   Weight: 84.4 kg (186 lb)   Height: 5' 6" (1.676 m)         Current Medications       Current Outpatient Medications:     nitrofurantoin (MACROBID) 100 mg capsule, Take 1 capsule (100 mg total) by mouth 2 (two) times a day for 14 days, Disp: 14 capsule, Rfl: 1    acetaminophen (TYLENOL) 500 mg tablet, Take 500 mg by mouth 3 (three) times a day as needed  , Disp: , Rfl:     acetaminophen-codeine (TYLENOL/CODEINE #3) 300-30 MG per tablet, Take 1 tablet by mouth every 4 (four) hours as needed for moderate pain, Disp: 20 tablet, Rfl: 0    albuterol (PROVENTIL HFA,VENTOLIN HFA) 90 mcg/act inhaler, Inhale 2 puffs every 6 (six) hours as needed for wheezing or shortness of breath, Disp: 8.5 g, Rfl: 3    aspirin (ECOTRIN LOW STRENGTH) 81 mg EC tablet, Take 1 tablet (81 mg total) by mouth 2 (two) times a day, Disp: 84 tablet, Rfl: 0    Cranberry 450 MG CAPS, daily, Disp: , Rfl:     cyanocobalamin (VITAMIN B-12) 1000 MCG tablet, Take 1,000 mcg by mouth daily, Disp: , Rfl:     cyclobenzaprine (FLEXERIL) 10 mg tablet, Take 1 tablet (10 mg total) by mouth 2 (two) times a day as needed for muscle spasms, Disp: 20 tablet, Rfl: 0    Diclofenac Sodium (VOLTAREN) 1 %, Apply 2 g topically 4 (four) times a day, Disp: , Rfl:     docusate sodium (COLACE) 100 mg capsule, Take 100 mg by mouth 2 (two) times a day, Disp: , Rfl:     estradiol (ESTRACE VAGINAL) 0.1 mg/g vaginal cream, Insert 1 g into the vagina 3 (three) times a week, Disp: 42.5 g, Rfl: 6    fluticasone (Flovent HFA) 220 mcg/act inhaler, Inhale 2 puffs 2 (two) times a day Rinse mouth after use, Disp: 36 g, Rfl: 3    furosemide (LASIX) 40 mg tablet, Take 1 tablet (40 mg total) by mouth every morning, Disp: 90 tablet, Rfl: 1    Magnesium Oxide -Mg Supplement 400 MG CAPS, Take 1 capsule by mouth every morning  , Disp: , Rfl:     methenamine hippurate (HIPREX) 1 g tablet, Take 1 tablet (1 g total) by mouth 2 (two) times a day with meals, Disp: 60 tablet, Rfl: 0    ondansetron (ZOFRAN-ODT) 4 mg disintegrating tablet, Take 1 tablet (4 mg total) by mouth every 8 (eight) hours as needed for nausea or vomiting, Disp: 20 tablet, Rfl: 0    potassium chloride (Klor-Con) 10 mEq tablet, TAKE 1 TABLET (10 MEQ TOTAL) BY MOUTH IN THE MORNING, Disp: 90 tablet, Rfl: 1    pravastatin (PRAVACHOL) 10 mg tablet, TAKE 1 TABLET BY MOUTH EVERY DAY, Disp: 90 tablet, Rfl: 3    senna (SENOKOT) 8.6 MG tablet, Take 1 tablet by mouth as needed for constipation, Disp: , Rfl:       Active Problems     Patient Active Problem List   Diagnosis    Edema    Asthma, mild persistent    Exertional shortness of breath    Fatigue    GERD without esophagitis    Mixed hyperlipidemia    Impaired fasting glucose    Left knee pain    Palpitations    Primary generalized (osteo)arthritis    Right hip pain    Sjogren's syndrome (HCC)    Diverticulitis of large intestine without perforation or abscess with bleeding    Chronic bilateral thoracic back pain    Rosacea    Cough    UTI symptoms    Primary osteoarthritis of left hip    Urge incontinence of urine    History of hysterectomy    Primary osteoarthritis of both hands    Recurrent UTI    Mass of right knee    Fall    Closed fracture of neck of right femur ([address])    Rheumatoid arteritis ([address])         Past Medical History     Past Medical History:   Diagnosis Date    Anesthesia complication     "shivers"    Arthritis     Asthma     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 see colors, with over-use goes darker- from untreated amblyopia    Breathing difficulty     with extubation pt developed laryngeal spasm    Cataract     both eyes- right eye needs surgery    Colon polyps 2016    Diverticulitis     had episode in April 2018    Edema     lower legs    Generalized osteoarthritis     GERD (gastroesophageal reflux disease)     Hearing problem     unspecified laterality-hearing aid right ear    High cholesterol     History of transfusion     autologus    Incontinence of urine     Polyarthritis     last assessed: 6/26/2015    Rheumatoid arthritis ([address])     Sjogren's syndrome ([address])     last assessed: 7/18/2016    SS-A antibody positive     last assessed: 8/20/2015    Urinary tract infection     UTI (urinary tract infection)          Surgical History     Past Surgical History:   Procedure Laterality Date    APPENDECTOMY      AUGMENTATION BREAST Bilateral breast surgery enlargement procedure elective bilateral    BILATERAL OOPHORECTOMY Bilateral 2003    BLADDER SURGERY      bladder lift,cystocele    CATARACT EXTRACTION      Right 9/2018; Left 10/2018    COLONOSCOPY  04/18/2011    COLPORRHAPHY  2014    anterior, repair of cystocele    HYSTERECTOMY  1673    with cystocele     JOINT REPLACEMENT Left 2016    knee    KNEE ARTHROPLASTY Left     KNEE ARTHROSCOPY  2009    KNEE SURGERY Left 2009    torn meniscus    OOPHORECTOMY      MI COLONOSCOPY FLX DX W/COLLJ SPEC WHEN PFRMD N/A 5/31/2018    Procedure: COLONOSCOPY;  Surgeon: Faith Ahumada, MD;  Location: [address] [place] [address] [facility]; Service: Gastroenterology    MI HEMIARTHROPLASTY HIP PARTIAL Right 8/23/2023    Procedure: HEMIARTHROPLASTY HIP (BIPOLAR);   Surgeon: Sabina Avendano DO;  Location: [facility];  Service: Orthopedics    REDUCTION MAMMAPLASTY  2000    SACRAL NERVE STIMULATOR PLACEMENT N/A 1/31/2022    Procedure: IMPLANTATION NEUROSTIMULATOR ELECTRODE ARRAY SACRAL NERVE; INSERTION NEUROSTIMULATOR; FLUORO; ELECTONIC ANALYSIS;  Surgeon: Bethany Hicks MD;  Location: [facility];  Service: UroGynecology           TONSILLECTOMY AND ADENOIDECTOMY      URETHRA SURGERY           Family History     Family History   Problem Relation Age of Onset    Coronary artery disease Mother     COPD Mother     Hypertension Mother     Hyperlipidemia Mother         high cholesterol    Atrial fibrillation Mother     Coronary artery disease Father     Arthritis Father     COPD Father         Black lung    Hypertension Father     Hyperlipidemia Father         high cholesterol    Lung cancer Brother     Coronary artery disease Brother     Hypertension Brother     Hyperlipidemia Brother         high cholesterol    Atrial fibrillation Brother         fib/flutter    Other Sister         pericarditis    Arthritis Sister     Diabetes Maternal Grandmother         mellitus    Breast cancer Maternal Grandmother 79    Breast cancer Paternal Grandmother 79 Arthritis Family     Asthma Family     Cancer Brother         lung    Heart disease Brother         CAD-stents-smoker    No Known Problems Maternal Aunt     No Known Problems Maternal Aunt     No Known Problems Maternal Aunt     No Known Problems Maternal Aunt     No Known Problems Maternal Aunt     Breast cancer Other 36         Social History     Social History       Radiology

## 2023-11-16 NOTE — PROGRESS NOTES
Detail Level: Detailed Progress Note - Orthopedics   Cindy Rounds 66 y.o. female MRN: [id number]  Unit/Bed#: S -01      Subjective:    66 y. o.female seen and examined at bedside. She says she feels a bit better today. Having some right hip pain. Still with right knee pain as well.      Labs:  0   Lab Value Date/Time    HCT 35.1 08/25/2023 0603    HCT 35.7 08/24/2023 0456    HCT 38.7 08/23/2023 0657    HCT 42.3 04/10/2014 1325    HGB 11.4 (L) 08/25/2023 0603    HGB 11.5 08/24/2023 0456    HGB 12.6 08/23/2023 0657    HGB 14.4 04/10/2014 1325    INR 1.02 04/15/2018 1030    WBC 8.62 08/25/2023 0603    WBC 10.66 (H) 08/24/2023 0456    WBC 9.94 08/23/2023 0657    WBC 5.79 04/10/2014 1325    ESR 3 07/27/2020 0814    CRP <3.0 07/27/2020 0814       Meds:    Current Facility-Administered Medications:   •  acetaminophen (TYLENOL) tablet 650 mg, 650 mg, Oral, Q4H PRN, Zeny Brown PA-C, 650 mg at 08/24/23 1715  •  albuterol (PROVENTIL HFA,VENTOLIN HFA) inhaler 2 puff, 2 puff, Inhalation, Q6H PRN, MATA Moreno  •  enoxaparin (LOVENOX) subcutaneous injection 30 mg, 30 mg, Subcutaneous, Q12H [facility], Janet Pereira MD, 30 mg at 08/25/23 4761  •  fluticasone (ARNUITY ELLIPTA) 200 MCG/ACT inhaler 1 puff, 1 puff, Inhalation, Daily, Louise López MD, 1 puff at 08/25/23 0615  •  furosemide (LASIX) tablet 40 mg, 40 mg, Oral, QAM, Zeny Brown PA-C, 40 mg at 08/25/23 0079  •  lidocaine (LIDODERM) 5 % patch 1 patch, 1 patch, Topical, Daily, Zeny Brown PA-C, 1 patch at 08/25/23 8398  •  methocarbamol (ROBAXIN) tablet 500 mg, 500 mg, Oral, Q6H PRN, BIBI MorenoNP, 500 mg at 08/25/23 0810  •  naloxone (NARCAN) 0.04 mg/mL syringe 0.04 mg, 0.04 mg, Intravenous, Q1MIN PRN, Angelica Humphrey PA-C  •  ondansetron (ZOFRAN) injection 4 mg, 4 mg, Intravenous, Q4H PRN, Angelica Humphrey PA-C, 4 mg at 08/25/23 0228  •  oxyCODONE (ROXICODONE) IR tablet 5 mg, 5 mg, Oral, Q4H PRN, Add 1585x Cpt? (Do Not Bill If You Billed For The Procedure Placing The Sutures. This Is An Add-On Code That Must Be Billed With An E/M Visit Code): No Zeny Brown PA-C, 5 mg at 08/25/23 8214  •  oxyCODONE (ROXICODONE) split tablet 2.5 mg, 2.5 mg, Oral, Q4H PRN, Dave Bourgeois PA-C  •  pravastatin (PRAVACHOL) tablet 10 mg, 10 mg, Oral, Daily, Zeny Ludmila JUDITH Brown, 10 mg at 08/25/23 1052  •  senna-docusate sodium (SENOKOT S) 8.6-50 mg per tablet 1 tablet, 1 tablet, Oral, HS, Zeny Darden PA-C, 1 tablet at 08/24/23 2106    Blood Culture:   No results found for: "BLOODCX"    Wound Culture:   No results found for: "WOUNDCULT"    Ins and Outs:  I/O last 24 hours: In: 720 [P.O.:720]  Out: 950 [Urine:950]          Physical:  Vitals:    08/25/23 0844   BP:    Pulse:    Resp:    Temp:    SpO2: 92%     Musculoskeletal: right Lower Extremity  · Surgical dressings c/d/i without strike through  · Mild surrounding ecchymosis/bruising. · Mild surrounding ttp. · Sensation intact to saphenous, sural, tibial, superficial peroneal nerve, and deep peroneal  · Motor intact to +FHL/EHL, +ankle dorsi/plantar flexion  · 2+ DP pulse  · Digits warm and well perfused  · Capillary refill < 2 seconds    Assessment:    66 y. o.female s/p surgical fixation of right displaced femoral neck fracture with hemiarthroplasty with Dr. Nae Carrasquillo 8/23/2023. Patient doing well. Plan:  · WBAT to the right lower extremity. · Posterior hip precautions. · Hgb 11.4. Will monitor for ABLA and administer IVF/prbc as indicated for Greater than 2 gram drop or Hgb < 7  · PT/OT  · Pain control per primary team.   · DVT ppx: lovenox while admitted, recommend ASA 81mg BID x 6 weeks. · Medical management per primary team.   · Dispo: Ortho will follow   · Will need outpatient follow up with Dr. Nae Carrasquillo.     Zeny Darden PA-C

## 2023-11-19 DIAGNOSIS — N39.0 RECURRENT UTI: ICD-10-CM

## 2023-11-20 RX ORDER — METHENAMINE HIPPURATE 1000 MG/1
1 TABLET ORAL 2 TIMES DAILY WITH MEALS
Qty: 60 TABLET | Refills: 5 | Status: SHIPPED | OUTPATIENT
Start: 2023-11-20

## 2023-12-11 ENCOUNTER — APPOINTMENT (OUTPATIENT)
Dept: RADIOLOGY | Facility: AMBULARY SURGERY CENTER | Age: 78
End: 2023-12-11
Attending: STUDENT IN AN ORGANIZED HEALTH CARE EDUCATION/TRAINING PROGRAM
Payer: MEDICARE

## 2023-12-11 ENCOUNTER — OFFICE VISIT (OUTPATIENT)
Dept: OBGYN CLINIC | Facility: CLINIC | Age: 78
End: 2023-12-11
Payer: MEDICARE

## 2023-12-11 VITALS
SYSTOLIC BLOOD PRESSURE: 118 MMHG | HEART RATE: 82 BPM | WEIGHT: 186 LBS | BODY MASS INDEX: 29.89 KG/M2 | HEIGHT: 66 IN | DIASTOLIC BLOOD PRESSURE: 81 MMHG

## 2023-12-11 DIAGNOSIS — Z98.890 STATUS POST SURGERY: Primary | ICD-10-CM

## 2023-12-11 DIAGNOSIS — Z98.890 STATUS POST SURGERY: ICD-10-CM

## 2023-12-11 PROCEDURE — 73502 X-RAY EXAM HIP UNI 2-3 VIEWS: CPT

## 2023-12-11 PROCEDURE — 99213 OFFICE O/P EST LOW 20 MIN: CPT | Performed by: STUDENT IN AN ORGANIZED HEALTH CARE EDUCATION/TRAINING PROGRAM

## 2023-12-11 NOTE — PROGRESS NOTES
Orthopaedic Surgery - Office Note  Cuco Bae (19 y.o. female)   : 1945   MRN: [id number]  Encounter Date: 2023    No chief complaint on file. Past Surgical History:   Procedure Laterality Date    APPENDECTOMY      AUGMENTATION BREAST Bilateral     breast surgery enlargement procedure elective bilateral    BILATERAL OOPHORECTOMY Bilateral     BLADDER SURGERY      bladder lift,cystocele    CATARACT EXTRACTION      Right 2018; Left 10/2018    COLONOSCOPY  2011    COLPORRHAPHY      anterior, repair of cystocele    HYSTERECTOMY  1673    with cystocele     JOINT REPLACEMENT Left 2016    knee    KNEE ARTHROPLASTY Left     KNEE ARTHROSCOPY  2009    KNEE SURGERY Left 2009    torn meniscus    OOPHORECTOMY      MI COLONOSCOPY FLX DX W/COLLJ SPEC WHEN PFRMD N/A 2018    Procedure: COLONOSCOPY;  Surgeon: Ana Renteria MD;  Location: [address] [place] [address] GI LAB; Service: Gastroenterology    MI HEMIARTHROPLASTY HIP PARTIAL Right 2023    Procedure: HEMIARTHROPLASTY HIP (BIPOLAR); Surgeon: Starr Roberts DO;  Location: [facility];  Service: Orthopedics    REDUCTION MAMMAPLASTY      SACRAL NERVE STIMULATOR PLACEMENT N/A 2022    Procedure: IMPLANTATION NEUROSTIMULATOR ELECTRODE ARRAY SACRAL NERVE; INSERTION NEUROSTIMULATOR; FLUORO; ELECTONIC ANALYSIS;  Surgeon: Ben Avila MD;  Location: [facility];  Service: UroGynecology           TONSILLECTOMY AND ADENOIDECTOMY      URETHRA SURGERY       Assessment / Plan  1. S/p right hemiarthroplasty, DOS 23 for nondisplaced femoral neck fracture   2. Hip psoas tendinitis     X-ray right hip was reviewed in the office today demonstrated well maintained alignment of the patient's right hip hemiarthroplasty with no signs of loosening or failure. No periprosthetic fractures  Continue weightbearing as tolerated.     The patient is still experiencing groin pain when coming from a seated to a standing position located over the anterior medial aspect of her groin. We discussed the pathophysiology of this with hemiarthroplasties. Patient is only 3 months out so she still has time to regain and heal that discomfort that she is experiencing. We did discuss that if the groin pain continues and is to a point where it is affecting her daily living the solution would be to convert her to a total hip arthroplasty. The patient currently does not wish to pursue that route and will allow for time to try to decrease her discomfort in the groin. Otherwise patient doing well. Ambulating without assistive devices  Continue with home exercise program and stretches   Recommended patient to continue to take Vit D and calcium for bone health  Recommend continued stretching and muscle strengthening exercises  Patient continue pain control with Tylenol and icing  as needed for pain control  Patient will follow-up in 12 weeks for reevaluation and repeat right hip x-ray      History of Present Illness  Alison Ludwig is a 66 y.o. female who presents today 3 weeks s/p right hemiarthroplasty, DOS 8/23/23. She was in a inpatient rehab center after surgery and was discharged to go home on 8/30/2023. She states she is doing well. She has been working with home physical therapy. She has been weightbearing as tolerated right lower extremity with the use of a cane or walker inside the home. Today she is present in a wheelchair. She is on aspirin 81 mg twice daily for DVT prophylaxis. She is taking Tylenol 3 which was a old prescription she found at home for pain relief. This she denies any chills or fevers. .     Interval History 10/16/23   Patient presents today in the office weeks  8 weeks s/p right hemiarthroplasty, DOS 8/23/23. Patient states she has finished with physical therapy and complains of having soreness over the anterior aspect of the right hip into the groin and thigh. She does have pain occasionally over the mid anterior thigh.   She states stretching exercises to help with the pain. She does have trouble with doing straight leg exercises due to weakness. She is also complaining of right sided low back pain. Denies any numbness or tingling. She is taking Tylenol as needed for pain relief. She also has been icing which has been helping. She has finished with the aspirin 81 mg for DVT prophylaxis    Interval History 12/11/23  Pt presents 3 1/2 months s/p right hemiarthroplasty, DOS 8/23/23. Pt states overall she is doing well. She does complain of mild anterior hip and groin pain especially when going from a seated to a standing position. She takes Tylenol for pain relief with adequate relief of symptoms. She also complains of leg weakness especially with straight leg raise that has been slightly improving since her last visit. She does not attend physical therapy and just continues with the at home exercises. She states she also does stretching exercises which help alleviate the pain as well. Patient is ambulating without an assistive device. Walking with a normal gait. Denies any numbness, tingling or paresthesias. Review of Systems  Pertinent items are noted in HPI. All other systems were reviewed and are negative. Physical Exam  There were no vitals taken for this visit. Cons: Appears well. No apparent distress. Psych: Alert. Oriented x3. Mood and affect normal.  Eyes: PERRLA, EOMI  Resp: Normal effort. No audible wheezing or stridor. CV: Palpable pulse. No discernable arrhythmia. Lymph:  No palpable cervical, axillary, or inguinal lymphadenopathy. Skin: Warm. No palpable masses. No visible lesions. Neuro: Normal muscle tone. Normal and symmetric DTR's. The right hip was exposed inspected. The patient's previous surgical incision is healing well without any surrounding erythema or induration. No dehiscence no purulence.   Mild tenderness over the posterior aspect of the hip over the trochanteric bursa however this is not reproducing her perceived groin pain. . Patient able to straight leg raise. Strength 4/5 as compared to the contralateral side. No remaining tenderness over the anterior aspect of the hip over the psoas tendon. The patient has no discomfort with range of motion of the right hip. No pain with flexion internal rotation of the hip. No pain with flexion external rotation. Patient able to due to isolated active hip flexion without discomfort. The patient's sensation is intact to light touch in the superficial peroneal, deep peroneal, sural, saphenous, plantar nerve distributions. Tibialis anterior, extensor hallux longus, gastrocnemius, extensor mechanism intact. Limb is well-perfused    Studies Reviewed  X-rays of the pelvis and right hip demonstrate well-maintained right hip hemiarthroplasty with no surrounding lucency or loosening. No acute fractures or dislocations    Procedures      Medical, Surgical, Family, and Social History  The patient's medical history, family history, and social history, were reviewed and updated as appropriate.     Past Medical History:   Diagnosis Date    Anesthesia complication     "shivers"    Arthritis     Asthma     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 see colors, with over-use goes darker- from untreated amblyopia    Breathing difficulty     with extubation pt developed laryngeal spasm    Cataract     both eyes- right eye needs surgery    Colon polyps 2016    Diverticulitis     had episode in April 2018    Edema     lower legs    Generalized osteoarthritis     GERD (gastroesophageal reflux disease)     Hearing problem     unspecified laterality-hearing aid right ear    High cholesterol     History of transfusion     autologus    Incontinence of urine     Polyarthritis     last assessed: 6/26/2015    Rheumatoid arthritis ([address])     Sjogren's syndrome ([address])     last assessed: 7/18/2016    SS-A antibody positive     last assessed: 8/20/2015    Urinary tract infection     UTI (urinary tract infection)            Family History   Problem Relation Age of Onset    Coronary artery disease Mother     COPD Mother     Hypertension Mother     Hyperlipidemia Mother         high cholesterol    Atrial fibrillation Mother     Coronary artery disease Father     Arthritis Father     COPD Father         Black lung    Hypertension Father     Hyperlipidemia Father         high cholesterol    Lung cancer Brother     Coronary artery disease Brother     Hypertension Brother     Hyperlipidemia Brother         high cholesterol    Atrial fibrillation Brother         fib/flutter    Other Sister         pericarditis    Arthritis Sister     Diabetes Maternal Grandmother         mellitus    Breast cancer Maternal Grandmother 79    Breast cancer Paternal Grandmother 79    Arthritis Family     Asthma Family     Cancer Brother         lung    Heart disease Brother         CAD-stents-smoker    No Known Problems Maternal Aunt     No Known Problems Maternal Aunt     No Known Problems Maternal Aunt     No Known Problems Maternal Aunt     No Known Problems Maternal Aunt     Breast cancer Other 36       Social History     Occupational History    Occupation: retired   Tobacco Use    Smoking status: Never     Passive exposure: Never    Smokeless tobacco: Never   Vaping Use    Vaping Use: Never used   Substance and Sexual Activity    Alcohol use: Not Currently    Drug use: No    Sexual activity: Not Currently       Allergies   Allergen Reactions    Celecoxib Other (See Comments)     Other reaction(s): Palpitations  Category:  Adverse Reaction;          Current Outpatient Medications:     acetaminophen (TYLENOL) 500 mg tablet, Take 500 mg by mouth 3 (three) times a day as needed  , Disp: , Rfl:     acetaminophen-codeine (TYLENOL/CODEINE #3) 300-30 MG per tablet, Take 1 tablet by mouth every 4 (four) hours as needed for moderate pain, Disp: 20 tablet, Rfl: 0    albuterol (PROVENTIL HFA,VENTOLIN HFA) 90 mcg/act inhaler, Inhale 2 puffs every 6 (six) hours as needed for wheezing or shortness of breath, Disp: 8.5 g, Rfl: 3    aspirin (ECOTRIN LOW STRENGTH) 81 mg EC tablet, Take 1 tablet (81 mg total) by mouth 2 (two) times a day, Disp: 84 tablet, Rfl: 0    Cranberry 450 MG CAPS, daily, Disp: , Rfl:     cyanocobalamin (VITAMIN B-12) 1000 MCG tablet, Take 1,000 mcg by mouth daily, Disp: , Rfl:     cyclobenzaprine (FLEXERIL) 10 mg tablet, Take 1 tablet (10 mg total) by mouth 2 (two) times a day as needed for muscle spasms, Disp: 20 tablet, Rfl: 0    Diclofenac Sodium (VOLTAREN) 1 %, Apply 2 g topically 4 (four) times a day, Disp: , Rfl:     docusate sodium (COLACE) 100 mg capsule, Take 100 mg by mouth 2 (two) times a day, Disp: , Rfl:     estradiol (ESTRACE VAGINAL) 0.1 mg/g vaginal cream, Insert 1 g into the vagina 3 (three) times a week, Disp: 42.5 g, Rfl: 6    fluticasone (Flovent HFA) 220 mcg/act inhaler, Inhale 2 puffs 2 (two) times a day Rinse mouth after use, Disp: 36 g, Rfl: 3    furosemide (LASIX) 40 mg tablet, Take 1 tablet (40 mg total) by mouth every morning, Disp: 90 tablet, Rfl: 1    Magnesium Oxide -Mg Supplement 400 MG CAPS, Take 1 capsule by mouth every morning  , Disp: , Rfl:     methenamine hippurate (HIPREX) 1 g tablet, Take 1 tablet (1 g total) by mouth 2 (two) times a day with meals, Disp: 60 tablet, Rfl: 5    ondansetron (ZOFRAN-ODT) 4 mg disintegrating tablet, Take 1 tablet (4 mg total) by mouth every 8 (eight) hours as needed for nausea or vomiting, Disp: 20 tablet, Rfl: 0    potassium chloride (Klor-Con) 10 mEq tablet, TAKE 1 TABLET (10 MEQ TOTAL) BY MOUTH IN THE MORNING, Disp: 90 tablet, Rfl: 1    pravastatin (PRAVACHOL) 10 mg tablet, TAKE 1 TABLET BY MOUTH EVERY DAY, Disp: 90 tablet, Rfl: 3    senna (SENOKOT) 8.6 MG tablet, Take 1 tablet by mouth as needed for constipation, Disp: , Rfl:       Scribe Attestation      I,:   am acting as a scribe while in the presence of the attending physician.:       I,:   personally performed the services described in this documentation    as scribed in my presence.:

## 2023-12-13 DIAGNOSIS — R60.9 EDEMA, UNSPECIFIED TYPE: ICD-10-CM

## 2023-12-13 RX ORDER — FUROSEMIDE 40 MG/1
40 TABLET ORAL EVERY MORNING
Qty: 90 TABLET | Refills: 0 | Status: SHIPPED | OUTPATIENT
Start: 2023-12-13

## 2024-01-04 ENCOUNTER — NURSE TRIAGE (OUTPATIENT)
Age: 79
End: 2024-01-04

## 2024-01-04 ENCOUNTER — APPOINTMENT (OUTPATIENT)
Dept: LAB | Facility: CLINIC | Age: 79
End: 2024-01-04
Payer: MEDICARE

## 2024-01-04 DIAGNOSIS — N39.0 RECURRENT UTI: Primary | ICD-10-CM

## 2024-01-04 RX ORDER — NITROFURANTOIN 25; 75 MG/1; MG/1
100 CAPSULE ORAL 2 TIMES DAILY
Qty: 14 CAPSULE | Refills: 0 | Status: SHIPPED | OUTPATIENT
Start: 2024-01-04 | End: 2024-01-11

## 2024-01-04 NOTE — TELEPHONE ENCOUNTER
Spoke with Diane James and informed macrobid was sent to pharmacy of choice. Pt states started having symptoms during [place] and decided to take Macrobid that was previously prescribed and had at home but did not call office for testing due to holidays. Advised not to take antibiotics without providing a sample due to the possibility of abx being resistant to that specific organism. Should contact office to have testing and to make sure rx sent is susceptible to that specific organism should she have an infection. She was also advised should we need to change current abx based on results she will receive a call from us. Pt verbalized understanding.

## 2024-01-10 DIAGNOSIS — R60.9 EDEMA, UNSPECIFIED TYPE: ICD-10-CM

## 2024-01-10 RX ORDER — POTASSIUM CHLORIDE 750 MG/1
10 TABLET, FILM COATED, EXTENDED RELEASE ORAL DAILY
Qty: 90 TABLET | Refills: 1 | Status: SHIPPED | OUTPATIENT
Start: 2024-01-10

## 2024-02-19 DIAGNOSIS — R60.9 EDEMA, UNSPECIFIED TYPE: ICD-10-CM

## 2024-02-19 RX ORDER — FUROSEMIDE 40 MG/1
40 TABLET ORAL EVERY MORNING
Qty: 90 TABLET | Refills: 1 | Status: SHIPPED | OUTPATIENT
Start: 2024-02-19

## 2024-02-21 PROBLEM — N39.0 RECURRENT UTI: Status: RESOLVED | Noted: 2023-01-27 | Resolved: 2024-02-21

## 2024-03-04 ENCOUNTER — APPOINTMENT (OUTPATIENT)
Dept: LAB | Facility: CLINIC | Age: 79
End: 2024-03-04
Payer: MEDICARE

## 2024-03-04 DIAGNOSIS — R73.01 IMPAIRED FASTING GLUCOSE: ICD-10-CM

## 2024-03-04 DIAGNOSIS — R79.89 ABNORMAL CBC: ICD-10-CM

## 2024-03-04 DIAGNOSIS — E78.2 MIXED HYPERLIPIDEMIA: ICD-10-CM

## 2024-03-04 LAB
ALBUMIN SERPL BCP-MCNC: 4.2 G/DL (ref 3.5–5)
ALP SERPL-CCNC: 75 U/L (ref 34–104)
ALT SERPL W P-5'-P-CCNC: 11 U/L (ref 7–52)
ANION GAP SERPL CALCULATED.3IONS-SCNC: 6 MMOL/L
AST SERPL W P-5'-P-CCNC: 14 U/L (ref 13–39)
BILIRUB SERPL-MCNC: 0.95 MG/DL (ref 0.2–1)
BUN SERPL-MCNC: 23 MG/DL (ref 5–25)
CALCIUM SERPL-MCNC: 9.4 MG/DL (ref 8.4–10.2)
CHLORIDE SERPL-SCNC: 105 MMOL/L (ref 96–108)
CHOLEST SERPL-MCNC: 236 MG/DL
CO2 SERPL-SCNC: 30 MMOL/L (ref 21–32)
CREAT SERPL-MCNC: 0.79 MG/DL (ref 0.6–1.3)
ERYTHROCYTE [DISTWIDTH] IN BLOOD BY AUTOMATED COUNT: 14 % (ref 11.6–15.1)
EST. AVERAGE GLUCOSE BLD GHB EST-MCNC: 105 MG/DL
GFR SERPL CREATININE-BSD FRML MDRD: 71 ML/MIN/1.73SQ M
GLUCOSE P FAST SERPL-MCNC: 90 MG/DL (ref 65–99)
HBA1C MFR BLD: 5.3 %
HCT VFR BLD AUTO: 43.6 % (ref 34.8–46.1)
HDLC SERPL-MCNC: 48 MG/DL
HGB BLD-MCNC: 14.2 G/DL (ref 11.5–15.4)
LDLC SERPL CALC-MCNC: 150 MG/DL (ref 0–100)
MCH RBC QN AUTO: 28.1 PG (ref 26.8–34.3)
MCHC RBC AUTO-ENTMCNC: 32.6 G/DL (ref 31.4–37.4)
MCV RBC AUTO: 86 FL (ref 82–98)
PLATELET # BLD AUTO: 262 THOUSANDS/UL (ref 149–390)
PMV BLD AUTO: 10.5 FL (ref 8.9–12.7)
POTASSIUM SERPL-SCNC: 4.2 MMOL/L (ref 3.5–5.3)
PROT SERPL-MCNC: 7 G/DL (ref 6.4–8.4)
RBC # BLD AUTO: 5.06 MILLION/UL (ref 3.81–5.12)
SODIUM SERPL-SCNC: 141 MMOL/L (ref 135–147)
TRIGL SERPL-MCNC: 190 MG/DL
WBC # BLD AUTO: 5.11 THOUSAND/UL (ref 4.31–10.16)

## 2024-03-04 PROCEDURE — 80061 LIPID PANEL: CPT

## 2024-03-04 PROCEDURE — 83036 HEMOGLOBIN GLYCOSYLATED A1C: CPT

## 2024-03-04 PROCEDURE — 36415 COLL VENOUS BLD VENIPUNCTURE: CPT

## 2024-03-04 PROCEDURE — 80053 COMPREHEN METABOLIC PANEL: CPT

## 2024-03-04 PROCEDURE — 85027 COMPLETE CBC AUTOMATED: CPT

## 2024-03-08 ENCOUNTER — OFFICE VISIT (OUTPATIENT)
Dept: CARDIOLOGY CLINIC | Facility: CLINIC | Age: 79
End: 2024-03-08
Payer: MEDICARE

## 2024-03-08 VITALS
BODY MASS INDEX: 30.29 KG/M2 | OXYGEN SATURATION: 96 % | SYSTOLIC BLOOD PRESSURE: 122 MMHG | HEIGHT: 66 IN | HEART RATE: 83 BPM | WEIGHT: 188.5 LBS | DIASTOLIC BLOOD PRESSURE: 82 MMHG

## 2024-03-08 DIAGNOSIS — E78.2 MIXED HYPERLIPIDEMIA: Primary | ICD-10-CM

## 2024-03-08 DIAGNOSIS — R00.2 PALPITATIONS: ICD-10-CM

## 2024-03-08 PROCEDURE — 93000 ELECTROCARDIOGRAM COMPLETE: CPT | Performed by: INTERNAL MEDICINE

## 2024-03-08 PROCEDURE — 99214 OFFICE O/P EST MOD 30 MIN: CPT | Performed by: INTERNAL MEDICINE

## 2024-03-08 RX ORDER — PRAVASTATIN SODIUM 10 MG
10 TABLET ORAL DAILY
Qty: 90 TABLET | Refills: 3 | Status: SHIPPED | OUTPATIENT
Start: 2024-03-08

## 2024-03-08 NOTE — PROGRESS NOTES
Cardiology   Lito Delaney DO, [facility]  Mario Marin MD, [facility]  Alex Ewing MD, [facility]  Vivian Murphy MD, [facility]  -------------------------------------------------------------------  [facility]  [address], [address], [address]  [place], [place], [address]  [phone]    Cardiology Follow Up  Diane James  1945  [id number]          Assessment/Plan:  1. Palpitations  - resolved.  Not currently on any therapy.  Previous monitor showed rare episodes of SVT which did not correspond to her palpitations.      3. Mixed hyperlipidemia  - patient could not tolerate Crestor 5 mg daily.  She was tolerating pravastatin 10 mg daily but stopped because she was concerned about worsening renal function.  - She will restart pravastatin and have repeat CMP and lipid panel in 3 months.         - She should call if any change in nature of palpitations, syncope or near syncope develop.    - discussed diet and exercise.    - Follow up in 1 year.      Interval History:     Diane James is 78 y.o. female here for follow up of palpitations and dyslipidemia.    Since her last visit, she has been feeling well without any chest pain, shortness of breath or lower extremity edema.  She had stopped using pravastatin because she was concerned about worsening renal function.  Recent lipid panel showed cholesterol levels have significantly increased. Her LDL cholesterol is now 150 mg/dL with a total cholesterol of 236 and triglycerides of 190.    In the past, she could not tolerate Rosuvastatin 5 mg daily.     Previously, she was having palpitations frequently which she described as a skipping and racing heartbeat that would occur at various times throughout the day.  A 2 week monitor was done which showed rare episodes of SVT which did not correspond to her palpitations.  Echocardiogram previously showed structurally normal heart with normal ejection fraction.  She uses furosemide daily  "because of lower extremity edema.  If she does not use Lasix, edema returns.    In July 2019 she had undergone echocardiogram and pulmonary function testing to her evaluation of dyspnea and cough.  Cardiac workup was normal and pulmonary function testing was also normal.  She was given Tagamet and Allegra which improved her cough but she stopped the medications.         Past Medical History:   Diagnosis Date    Anesthesia complication     \"shivers\"    Arthritis     Asthma     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 see colors, with over-use goes darker- from untreated amblyopia    Breathing difficulty     with extubation pt developed laryngeal spasm    Cataract     both eyes- right eye needs surgery    Colon polyps 2016    Diverticulitis     had episode in April 2018    Edema     lower legs    Generalized osteoarthritis     GERD (gastroesophageal reflux disease)     Hearing problem     unspecified laterality-hearing aid right ear    High cholesterol     History of transfusion     autologus    Incontinence of urine     Polyarthritis     last assessed: 6/26/2015    Rheumatoid arthritis (HCC)     Sjogren's syndrome (HCC)     last assessed: 7/18/2016    SS-A antibody positive     last assessed: 8/20/2015    Urinary tract infection     UTI (urinary tract infection)      Social History     Socioeconomic History    Marital status: /Civil Union     Spouse name: Not on file    Number of children: Not on file    Years of education: Not on file    Highest education level: Not on file   Occupational History    Occupation: retired   Tobacco Use    Smoking status: Never     Passive exposure: Never    Smokeless tobacco: Never   Vaping Use    Vaping status: Never Used   Substance and Sexual Activity    Alcohol use: Not Currently    Drug use: No    Sexual activity: Not Currently   Other Topics Concern    Not on file   Social History Narrative    Lack of adequate sleep-4-6 hrs/night    Lack of exercise     Social Determinants of Health "     Financial Resource Strain: Low Risk  (4/26/2023)    Overall Financial Resource Strain (CARDIA)     Difficulty of Paying Living Expenses: Not very hard   Food Insecurity: No Food Insecurity (8/23/2023)    Hunger Vital Sign     Worried About Running Out of Food in the Last Year: Never true     Ran Out of Food in the Last Year: Never true   Transportation Needs: No Transportation Needs (8/23/2023)    PRAPARE - Transportation     Lack of Transportation (Medical): No     Lack of Transportation (Non-Medical): No   Physical Activity: Not on file   Stress: Not on file   Social Connections: Not on file   Intimate Partner Violence: Not on file   Housing Stability: High Risk (8/23/2023)    Housing Stability Vital Sign     Unable to Pay for Housing in the Last Year: Yes     Number of Places Lived in the Last Year: 1     Unstable Housing in the Last Year: No      Family History   Problem Relation Age of Onset    Coronary artery disease Mother     COPD Mother     Hypertension Mother     Hyperlipidemia Mother         high cholesterol    Atrial fibrillation Mother     Coronary artery disease Father     Arthritis Father     COPD Father         Black lung    Hypertension Father     Hyperlipidemia Father         high cholesterol    Lung cancer Brother     Coronary artery disease Brother     Hypertension Brother     Hyperlipidemia Brother         high cholesterol    Atrial fibrillation Brother         fib/flutter    Other Sister         pericarditis    Arthritis Sister     Diabetes Maternal Grandmother         mellitus    Breast cancer Maternal Grandmother 70    Breast cancer Paternal Grandmother 70    Arthritis Family     Asthma Family     Cancer Brother         lung    Heart disease Brother         CAD-stents-smoker    No Known Problems Maternal Aunt     No Known Problems Maternal Aunt     No Known Problems Maternal Aunt     No Known Problems Maternal Aunt     No Known Problems Maternal Aunt     Breast cancer Other 40     Past  Surgical History:   Procedure Laterality Date    APPENDECTOMY      AUGMENTATION BREAST Bilateral     breast surgery enlargement procedure elective bilateral    BILATERAL OOPHORECTOMY Bilateral 2003    BLADDER SURGERY      bladder lift,cystocele    CATARACT EXTRACTION      Right 9/2018; Left 10/2018    COLONOSCOPY  04/18/2011    COLPORRHAPHY  2014    anterior, repair of cystocele    HYSTERECTOMY  1673    with cystocele     JOINT REPLACEMENT Left 2016    knee    KNEE ARTHROPLASTY Left     KNEE ARTHROSCOPY  2009    KNEE SURGERY Left 2009    torn meniscus    OOPHORECTOMY      WA COLONOSCOPY FLX DX W/COLLJ SPEC WHEN PFRMD N/A 5/31/2018    Procedure: COLONOSCOPY;  Surgeon: Maria Victoria Pineda MD;  Location: [facility];  Service: Gastroenterology    WA HEMIARTHROPLASTY HIP PARTIAL Right 8/23/2023    Procedure: HEMIARTHROPLASTY HIP (BIPOLAR);  Surgeon: Lester Gray DO;  Location: [facility];  Service: Orthopedics    REDUCTION MAMMAPLASTY  2000    SACRAL NERVE STIMULATOR PLACEMENT N/A 1/31/2022    Procedure: IMPLANTATION NEUROSTIMULATOR ELECTRODE ARRAY SACRAL NERVE; INSERTION NEUROSTIMULATOR; FLUORO; ELECTONIC ANALYSIS;  Surgeon: Jamal Sepulveda MD;  Location: [facility];  Service: UroGynecology           TONSILLECTOMY AND ADENOIDECTOMY      URETHRA SURGERY         Current Outpatient Medications:     acetaminophen (TYLENOL) 500 mg tablet, Take 500 mg by mouth 3 (three) times a day as needed  , Disp: , Rfl:     albuterol (PROVENTIL HFA,VENTOLIN HFA) 90 mcg/act inhaler, Inhale 2 puffs every 6 (six) hours as needed for wheezing or shortness of breath, Disp: 8.5 g, Rfl: 3    Cranberry 450 MG CAPS, daily, Disp: , Rfl:     cyanocobalamin (VITAMIN B-12) 1000 MCG tablet, Take 1,000 mcg by mouth daily, Disp: , Rfl:     Diclofenac Sodium (VOLTAREN) 1 %, Apply 2 g topically 4 (four) times a day, Disp: , Rfl:     docusate sodium (COLACE) 100 mg capsule, Take 100 mg by mouth 2 (two) times a day, Disp: , Rfl:     estradiol (ESTRACE  "VAGINAL) 0.1 mg/g vaginal cream, Insert 1 g into the vagina 3 (three) times a week, Disp: 42.5 g, Rfl: 6    fluticasone (Flovent HFA) 220 mcg/act inhaler, Inhale 2 puffs 2 (two) times a day Rinse mouth after use, Disp: 36 g, Rfl: 3    furosemide (LASIX) 40 mg tablet, TAKE 1 TABLET BY MOUTH EVERY DAY IN THE MORNING, Disp: 90 tablet, Rfl: 1    Magnesium Oxide -Mg Supplement 400 MG CAPS, Take 1 capsule by mouth every morning  , Disp: , Rfl:     methenamine hippurate (HIPREX) 1 g tablet, Take 1 tablet (1 g total) by mouth 2 (two) times a day with meals, Disp: 60 tablet, Rfl: 5    potassium chloride (Klor-Con) 10 mEq tablet, TAKE 1 TABLET (10 MEQ TOTAL) BY MOUTH IN THE MORNING, Disp: 90 tablet, Rfl: 1    pravastatin (PRAVACHOL) 10 mg tablet, TAKE 1 TABLET BY MOUTH EVERY DAY, Disp: 90 tablet, Rfl: 3    senna (SENOKOT) 8.6 MG tablet, Take 1 tablet by mouth as needed for constipation, Disp: , Rfl:         Review of Systems:  Review of Systems   Respiratory:  Negative for shortness of breath.    Cardiovascular:  Negative for chest pain, palpitations and leg swelling.   Musculoskeletal:  Positive for arthralgias.   All other systems reviewed and are negative.        Physical Exam:  Vitals:  Vitals:    03/08/24 1006   BP: 122/82   BP Location: Left arm   Patient Position: Sitting   Cuff Size: Standard   Pulse: 83   SpO2: 96%   Weight: 85.5 kg (188 lb 8 oz)   Height: 5' 6\" (1.676 m)     Physical Exam   Constitutional: She appears healthy. No distress.   Eyes: Pupils are equal, round, and reactive to light. Conjunctivae are normal.   Neck: No JVD present.   Cardiovascular: Normal rate, regular rhythm and normal heart sounds. Exam reveals no gallop and no friction rub.   No murmur heard.  Pulmonary/Chest: Effort normal and breath sounds normal. She has no wheezes. She has no rales.   Musculoskeletal:         General: No tenderness, deformity or edema.      Cervical back: Normal range of motion and neck supple.   Neurological: She " is alert and oriented to person, place, and time.   Skin: Skin is warm and dry.        Cardiographics:  EKG: Personally reviewed normal sinus rhythm with rate 80 beats per minute.  Normal intervals and normal axis  Last known EF:  55 percent    This note was completed in part utilizing M-Modal Fluency Direct Software.  Grammatical errors, random word insertions, spelling mistakes, and incomplete sentences can be an occasional consequence of this system secondary to software limitations, ambient noise, and hardware issues.  If you have any questions or concerns about the content, text, or information contained within the body of this dictation, please contact the provider for clarification.

## 2024-03-19 ENCOUNTER — TELEPHONE (OUTPATIENT)
Dept: ADMINISTRATIVE | Facility: OTHER | Age: 79
End: 2024-03-19

## 2024-03-19 NOTE — TELEPHONE ENCOUNTER
03/19/24 3:05 PM    Patient contacted (spoke with patient) to bring Advance Directive, POLST, or Living Will document to next scheduled pcp visit.    Thank you.  Dahiana Martines [facility]

## 2024-03-20 ENCOUNTER — OFFICE VISIT (OUTPATIENT)
Dept: FAMILY MEDICINE CLINIC | Facility: CLINIC | Age: 79
End: 2024-03-20
Payer: MEDICARE

## 2024-03-20 VITALS
WEIGHT: 188.6 LBS | TEMPERATURE: 98.1 F | HEART RATE: 78 BPM | RESPIRATION RATE: 16 BRPM | SYSTOLIC BLOOD PRESSURE: 126 MMHG | DIASTOLIC BLOOD PRESSURE: 70 MMHG | HEIGHT: 66 IN | BODY MASS INDEX: 30.31 KG/M2

## 2024-03-20 DIAGNOSIS — M35.00 SJOGREN'S SYNDROME, WITH UNSPECIFIED ORGAN INVOLVEMENT (HCC): ICD-10-CM

## 2024-03-20 DIAGNOSIS — M05.20 RHEUMATOID ARTERITIS (HCC): ICD-10-CM

## 2024-03-20 DIAGNOSIS — R73.01 IMPAIRED FASTING GLUCOSE: ICD-10-CM

## 2024-03-20 DIAGNOSIS — E78.2 MIXED HYPERLIPIDEMIA: Primary | ICD-10-CM

## 2024-03-20 PROCEDURE — 99214 OFFICE O/P EST MOD 30 MIN: CPT | Performed by: FAMILY MEDICINE

## 2024-03-20 PROCEDURE — G2211 COMPLEX E/M VISIT ADD ON: HCPCS | Performed by: FAMILY MEDICINE

## 2024-03-20 NOTE — PROGRESS NOTES
Name: Diane James      : 1945      MRN: [id number]  Encounter Provider: Ally Reddy DO  Encounter Date: 3/20/2024   Encounter department: [facility]    Assessment & Plan     1. Mixed hyperlipidemia  Assessment & Plan:  Not controlled, but she had been off of her medication  She is going to follow with Dr. Delaney      2. Rheumatoid arteritis (HCC)  Assessment & Plan:  Stable  Declines seeing a new rheumatologist at this time      3. Sjogren's syndrome, with unspecified organ involvement (HCC)  Assessment & Plan:  Stable       4. Impaired fasting glucose  Assessment & Plan:  Sugar is stable  Will continue to monitor      Return in about 6 months (around 2024) for Annual Wellness Visit.       Subjective      She is struggling to take care of her  who has been very ill.   She has chronic pain in her right hip and needs to have surgery.     Her previous rheumatologist has moved out of the area.  She does not feel ready to see a new one at this time.  She really needs to focus on her 's health.  He had been on hospice and now has actually graduated off of it      Review of Systems    Current Outpatient Medications on File Prior to Visit   Medication Sig    acetaminophen (TYLENOL) 500 mg tablet Take 500 mg by mouth 3 (three) times a day as needed      albuterol (PROVENTIL HFA,VENTOLIN HFA) 90 mcg/act inhaler Inhale 2 puffs every 6 (six) hours as needed for wheezing or shortness of breath    Cranberry 450 MG CAPS daily    cyanocobalamin (VITAMIN B-12) 1000 MCG tablet Take 1,000 mcg by mouth daily    Diclofenac Sodium (VOLTAREN) 1 % Apply 2 g topically 4 (four) times a day    docusate sodium (COLACE) 100 mg capsule Take 100 mg by mouth 2 (two) times a day    estradiol (ESTRACE VAGINAL) 0.1 mg/g vaginal cream Insert 1 g into the vagina 3 (three) times a week    fluticasone (Flovent HFA) 220 mcg/act inhaler Inhale 2 puffs 2 (two) times a day Rinse mouth after use    furosemide (LASIX)  "40 mg tablet TAKE 1 TABLET BY MOUTH EVERY DAY IN THE MORNING    Magnesium Oxide -Mg Supplement 400 MG CAPS Take 1 capsule by mouth every morning      methenamine hippurate (HIPREX) 1 g tablet Take 1 tablet (1 g total) by mouth 2 (two) times a day with meals    potassium chloride (Klor-Con) 10 mEq tablet TAKE 1 TABLET (10 MEQ TOTAL) BY MOUTH IN THE MORNING    pravastatin (PRAVACHOL) 10 mg tablet Take 1 tablet (10 mg total) by mouth daily    senna (SENOKOT) 8.6 MG tablet Take 1 tablet by mouth as needed for constipation       Objective     /70   Pulse 78   Temp 98.1 °F (36.7 °C)   Resp 16   Ht 5' 6\" (1.676 m)   Wt 85.5 kg (188 lb 9.6 oz)   BMI 30.44 kg/m²     Physical Exam  Vitals and nursing note reviewed.   Constitutional:       Appearance: She is well-developed.   HENT:      Head: Normocephalic and atraumatic.      Right Ear: Tympanic membrane and external ear normal.      Left Ear: Tympanic membrane and external ear normal.   Cardiovascular:      Rate and Rhythm: Normal rate and regular rhythm.      Heart sounds: Normal heart sounds. No murmur heard.     No friction rub.   Pulmonary:      Effort: Pulmonary effort is normal. No respiratory distress.      Breath sounds: Normal breath sounds. No wheezing or rales.   Musculoskeletal:      Right lower leg: No edema.      Left lower leg: No edema.       Ally Reddy, DO    "

## 2024-03-25 ENCOUNTER — OFFICE VISIT (OUTPATIENT)
Dept: OBGYN CLINIC | Facility: CLINIC | Age: 79
End: 2024-03-25
Payer: MEDICARE

## 2024-03-25 ENCOUNTER — APPOINTMENT (OUTPATIENT)
Dept: RADIOLOGY | Facility: AMBULARY SURGERY CENTER | Age: 79
End: 2024-03-25
Attending: STUDENT IN AN ORGANIZED HEALTH CARE EDUCATION/TRAINING PROGRAM
Payer: MEDICARE

## 2024-03-25 VITALS
BODY MASS INDEX: 30.31 KG/M2 | HEART RATE: 73 BPM | HEIGHT: 66 IN | WEIGHT: 188.6 LBS | SYSTOLIC BLOOD PRESSURE: 149 MMHG | DIASTOLIC BLOOD PRESSURE: 86 MMHG

## 2024-03-25 DIAGNOSIS — Z51.89 ENCOUNTER FOR OTHER SPECIFIED AFTERCARE: ICD-10-CM

## 2024-03-25 DIAGNOSIS — T84.84XD PAIN WITH HIP HEMIARTHROPLASTY, SUBSEQUENT ENCOUNTER: ICD-10-CM

## 2024-03-25 DIAGNOSIS — Z98.890 STATUS POST SURGERY: Primary | ICD-10-CM

## 2024-03-25 DIAGNOSIS — Z98.890 STATUS POST SURGERY: ICD-10-CM

## 2024-03-25 DIAGNOSIS — Z96.649 PAIN WITH HIP HEMIARTHROPLASTY, SUBSEQUENT ENCOUNTER: ICD-10-CM

## 2024-03-25 PROCEDURE — 73502 X-RAY EXAM HIP UNI 2-3 VIEWS: CPT

## 2024-03-25 PROCEDURE — 99214 OFFICE O/P EST MOD 30 MIN: CPT | Performed by: STUDENT IN AN ORGANIZED HEALTH CARE EDUCATION/TRAINING PROGRAM

## 2024-03-25 RX ORDER — CHLORHEXIDINE GLUCONATE ORAL RINSE 1.2 MG/ML
15 SOLUTION DENTAL ONCE
OUTPATIENT
Start: 2024-03-25 | End: 2024-03-25

## 2024-03-25 RX ORDER — CHLORHEXIDINE GLUCONATE 4 G/100ML
SOLUTION TOPICAL DAILY PRN
OUTPATIENT
Start: 2024-03-25

## 2024-03-25 NOTE — PROGRESS NOTES
Orthopaedic Surgery - Office Note  Diane James (78 y.o. female)   : 1945   MRN: [id number]  Encounter Date: 3/25/2024    Chief Complaint   Patient presents with    Right Hip - Post-op       Past Surgical History:   Procedure Laterality Date    APPENDECTOMY      AUGMENTATION BREAST Bilateral     breast surgery enlargement procedure elective bilateral    BILATERAL OOPHORECTOMY Bilateral     BLADDER SURGERY      bladder lift,cystocele    CATARACT EXTRACTION      Right 2018; Left 10/2018    COLONOSCOPY  2011    COLPORRHAPHY      anterior, repair of cystocele    HYSTERECTOMY  1673    with cystocele     JOINT REPLACEMENT Left 2016    knee    KNEE ARTHROPLASTY Left     KNEE ARTHROSCOPY  2009    KNEE SURGERY Left 2009    torn meniscus    OOPHORECTOMY      MS COLONOSCOPY FLX DX W/COLLJ SPEC WHEN PFRMD N/A 2018    Procedure: COLONOSCOPY;  Surgeon: Maria Victoria Pineda MD;  Location: [facility];  Service: Gastroenterology    MS HEMIARTHROPLASTY HIP PARTIAL Right 2023    Procedure: HEMIARTHROPLASTY HIP (BIPOLAR);  Surgeon: Lester Gray DO;  Location: [facility];  Service: Orthopedics    REDUCTION MAMMAPLASTY      SACRAL NERVE STIMULATOR PLACEMENT N/A 2022    Procedure: IMPLANTATION NEUROSTIMULATOR ELECTRODE ARRAY SACRAL NERVE; INSERTION NEUROSTIMULATOR; FLUORO; ELECTONIC ANALYSIS;  Surgeon: Jamal Sepulveda MD;  Location: [facility];  Service: UroGynecology           TONSILLECTOMY AND ADENOIDECTOMY      URETHRA SURGERY       Assessment / Plan  1. S/p right hemiarthroplasty, DOS 23 for nondisplaced femoral neck fracture   2.  Persistent groin pain    X-ray right hip was reviewed in the office today demonstrated well maintained alignment of the patient's right hip hemiarthroplasty with no signs of loosening or failure.  No acute fracture or dislocation noted.   Discussion was had with patient in regards to total hip arthroplasty for patient's recurrent groin pain.  The patient  has continued to have persistent anteriorly based groin pain with positional movements as well as getting from seated to standing position.  She localizes this directly over the joint.  We have had multiple discussions about continuing physical therapy which has not been successful and she feels like she has plateaued at this level of groin pain.  She has no groin pain when she is ambulating it is when she is getting up from a seated to standing position or trying to bend over she has significant pain she cannot rest with her legs in the seated position with them close together and has to lean out her leg in order to relieve some of the groin pressure.  We discussed that this is likely due to a mismatch between the hemiarthroplasty and her hip socket given the differences in anatomy.  We discussed possible interventions including conversion to total hip arthroplasty.  The patient is active and would be a good candidate for total hip arthroplasty given her function despite her age.  Risks, benefits, alternatives were discussed, risks including but not limited to deep vein thrombosis, pulmonary embolism, leg length discrepancies, jet-implant dislocation, persistent groin pain, damage to neurovascular structures both near and distant, infection both deep and superficial, symptomatic hardware, jet implant fracture, hardware failure, metal sensitivity, chronic pain, postoperative stiffness, avascular necrosis, extremity weakness, decreased range of motion, need for subsequent repeat procedures, as well as the risks associated with general endotracheal anesthesia which include but not limited to death.  Patient agreed informed consent was obtained.    Continue weightbearing as tolerated.    Continue with home exercise program and stretches   Recommended patient to continue to take Vit D and calcium for bone health  Recommend continued stretching and muscle strengthening exercises  Patient continue pain control with  Tylenol and icing  as needed for pain control  Patient scheduled for conversion of hemiarthroplasty to total hip arthroplasty  Patient will follow-up 2 weeks after surgery    History of Present Illness  Diane James is a 78 y.o. female who presents today 3 weeks s/p right hemiarthroplasty, DOS 8/23/23. She was in a inpatient rehab center after surgery and was discharged to go home on 8/30/2023.  She states she is doing well.  She has been working with home physical therapy.  She has been weightbearing as tolerated right lower extremity with the use of a cane or walker inside the home.  Today she is present in a wheelchair.  She is on aspirin 81 mg twice daily for DVT prophylaxis.  She is taking Tylenol 3 which was a old prescription she found at home for pain relief.  This she denies any chills or fevers..     Interval History 10/16/23   Patient presents today in the office weeks  8 weeks s/p right hemiarthroplasty, DOS 8/23/23.  Patient states she has finished with physical therapy and complains of having soreness over the anterior aspect of the right hip into the groin and thigh.   She does have pain occasionally over the mid anterior thigh.  She states stretching exercises to help with the pain.  She does have trouble with doing straight leg exercises due to weakness. She is also complaining of right sided low back pain.   Denies any numbness or tingling.  She is taking Tylenol as needed for pain relief.  She also has been icing which has been helping.  She has finished with the aspirin 81 mg for DVT prophylaxis    Interval History 12/11/23  Pt presents 3 1/2 months s/p right hemiarthroplasty, DOS 8/23/23. Pt states overall she is doing well. She does complain of mild anterior hip and groin pain especially when going from a seated to a standing position. She takes Tylenol for pain relief with adequate relief of symptoms.  She also complains of leg weakness especially with straight leg raise that has been slightly  improving since her last visit. She does not attend physical therapy and just continues with the at home exercises.She states she also does stretching exercises which help alleviate the pain as well.  Patient is ambulating without an assistive device.  Walking with a normal gait.  Denies any numbness, tingling or paresthesias.     Interval history 3/25/24  Pt presents 7 months s/p right hemiarthroplasty, DOS 8/23/23. Pt states overall she is doing well. She does complain of mild anterior hip and groin pain especially when going from a seated to a standing position. She states she has tried Tylenol and Voltaren with moderate relief of symptoms. She states her right leg feels weaker compared to her left. She states she has discontinued her at home workouts due to pain.  She has persistent anterior groin pain specifically going from seated to standing position.  She has pain bringing her legs together in the groin.  She has to rest her leg in a position of comfort which is externally rotated to try to relieve the anterior pressure on the groin.  She feels as if she has plateaued and has not made any improvements over the last several months despite physical therapy, home exercise program, anti-inflammatory medications.  Denies any numbness or paresthesias.       Review of Systems  Pertinent items are noted in HPI.  All other systems were reviewed and are negative.    Physical Exam  There were no vitals taken for this visit.  Cons: Appears well.  No apparent distress.  Psych: Alert. Oriented x3.  Mood and affect normal.  Eyes: PERRLA, EOMI  Resp: Normal effort.  No audible wheezing or stridor.  CV: Palpable pulse.  No discernable arrhythmia.    Lymph:  No palpable cervical, axillary, or inguinal lymphadenopathy.  Skin: Warm.  No palpable masses.  No visible lesions.  Neuro: Normal muscle tone.  Normal and symmetric DTR's.     The right hip was exposed inspected.  The patient's previous surgical incision is healing well  "without any surrounding erythema or induration.  No dehiscence no purulence.   Patient able to straight leg raise. Strength 4/5 as compared to the contralateral side.  Patient has some groin discomfort elicited with flexion and internal rotation to 90 degrees and internal rotation to approximately 20 degrees.  She has pain when getting from a seated to standing position in the office.  She is able to ambulate without an assistive device and does not have pain while ambulating but is these changes per position that are causing her the discomfort.  Pt able to achieve 120 degrees of flexion. No pain with flexion external rotation. The patient's sensation is intact to light touch in the superficial peroneal, deep peroneal, sural, saphenous, plantar nerve distributions.  Tibialis anterior, extensor hallux longus, gastrocnemius, extensor mechanism intact.  Limb is well-perfused    Studies Reviewed  X-rays of the pelvis and right hip demonstrate well-maintained right hip hemiarthroplasty with no surrounding lucency or loosening.  No acute fractures or dislocations.    Procedures      Medical, Surgical, Family, and Social History  The patient's medical history, family history, and social history, were reviewed and updated as appropriate.    Past Medical History:   Diagnosis Date    Anesthesia complication     \"shivers\"    Arthritis     Asthma     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 see colors, with over-use goes darker- from untreated amblyopia    Breathing difficulty     with extubation pt developed laryngeal spasm    Cataract     both eyes- right eye needs surgery    Colon polyps 2016    Diverticulitis     had episode in April 2018    Edema     lower legs    Generalized osteoarthritis     GERD (gastroesophageal reflux disease)     Hearing problem     unspecified laterality-hearing aid right ear    High cholesterol     History of transfusion     autologus    Incontinence of urine     Polyarthritis     last assessed: 6/26/2015    " Rheumatoid arthritis (HCC)     Sjogren's syndrome (HCC)     last assessed: 7/18/2016    SS-A antibody positive     last assessed: 8/20/2015    Urinary tract infection     UTI (urinary tract infection)            Family History   Problem Relation Age of Onset    Coronary artery disease Mother     COPD Mother     Hypertension Mother     Hyperlipidemia Mother         high cholesterol    Atrial fibrillation Mother     Coronary artery disease Father     Arthritis Father     COPD Father         Black lung    Hypertension Father     Hyperlipidemia Father         high cholesterol    Lung cancer Brother     Coronary artery disease Brother     Hypertension Brother     Hyperlipidemia Brother         high cholesterol    Atrial fibrillation Brother         fib/flutter    Other Sister         pericarditis    Arthritis Sister     Diabetes Maternal Grandmother         mellitus    Breast cancer Maternal Grandmother 70    Breast cancer Paternal Grandmother 70    Arthritis Family     Asthma Family     Cancer Brother         lung    Heart disease Brother         CAD-stents-smoker    No Known Problems Maternal Aunt     No Known Problems Maternal Aunt     No Known Problems Maternal Aunt     No Known Problems Maternal Aunt     No Known Problems Maternal Aunt     Breast cancer Other 40       Social History     Occupational History    Occupation: retired   Tobacco Use    Smoking status: Never     Passive exposure: Never    Smokeless tobacco: Never   Vaping Use    Vaping status: Never Used   Substance and Sexual Activity    Alcohol use: Not Currently    Drug use: No    Sexual activity: Not Currently       Allergies   Allergen Reactions    Celecoxib Other (See Comments)     Other reaction(s): Palpitations  Category: Adverse Reaction;          Current Outpatient Medications:     acetaminophen (TYLENOL) 500 mg tablet, Take 500 mg by mouth 3 (three) times a day as needed  , Disp: , Rfl:     albuterol (PROVENTIL HFA,VENTOLIN HFA) 90 mcg/act  inhaler, Inhale 2 puffs every 6 (six) hours as needed for wheezing or shortness of breath, Disp: 8.5 g, Rfl: 3    Cranberry 450 MG CAPS, daily, Disp: , Rfl:     cyanocobalamin (VITAMIN B-12) 1000 MCG tablet, Take 1,000 mcg by mouth daily, Disp: , Rfl:     Diclofenac Sodium (VOLTAREN) 1 %, Apply 2 g topically 4 (four) times a day, Disp: , Rfl:     docusate sodium (COLACE) 100 mg capsule, Take 100 mg by mouth 2 (two) times a day, Disp: , Rfl:     estradiol (ESTRACE VAGINAL) 0.1 mg/g vaginal cream, Insert 1 g into the vagina 3 (three) times a week, Disp: 42.5 g, Rfl: 6    fluticasone (Flovent HFA) 220 mcg/act inhaler, Inhale 2 puffs 2 (two) times a day Rinse mouth after use, Disp: 36 g, Rfl: 3    furosemide (LASIX) 40 mg tablet, TAKE 1 TABLET BY MOUTH EVERY DAY IN THE MORNING, Disp: 90 tablet, Rfl: 1    Magnesium Oxide -Mg Supplement 400 MG CAPS, Take 1 capsule by mouth every morning  , Disp: , Rfl:     methenamine hippurate (HIPREX) 1 g tablet, Take 1 tablet (1 g total) by mouth 2 (two) times a day with meals, Disp: 60 tablet, Rfl: 5    potassium chloride (Klor-Con) 10 mEq tablet, TAKE 1 TABLET (10 MEQ TOTAL) BY MOUTH IN THE MORNING, Disp: 90 tablet, Rfl: 1    pravastatin (PRAVACHOL) 10 mg tablet, Take 1 tablet (10 mg total) by mouth daily, Disp: 90 tablet, Rfl: 3    senna (SENOKOT) 8.6 MG tablet, Take 1 tablet by mouth as needed for constipation, Disp: , Rfl:       Scribe Attestation      I,:   am acting as a scribe while in the presence of the attending physician.:       I,:   personally performed the services described in this documentation    as scribed in my presence.:

## 2024-03-28 ENCOUNTER — TELEPHONE (OUTPATIENT)
Dept: OBGYN CLINIC | Facility: CLINIC | Age: 79
End: 2024-03-28

## 2024-03-28 NOTE — TELEPHONE ENCOUNTER
Patient called to cancel her surgery with Dr. Gray. Her  is going through some health issues and she needs to be there for him. She will call back to reschedule when she is ready.

## 2024-04-24 ENCOUNTER — OFFICE VISIT (OUTPATIENT)
Dept: FAMILY MEDICINE CLINIC | Facility: CLINIC | Age: 79
End: 2024-04-24
Payer: MEDICARE

## 2024-04-24 VITALS
TEMPERATURE: 96.5 F | DIASTOLIC BLOOD PRESSURE: 70 MMHG | WEIGHT: 188.2 LBS | BODY MASS INDEX: 30.25 KG/M2 | RESPIRATION RATE: 18 BRPM | SYSTOLIC BLOOD PRESSURE: 128 MMHG | HEART RATE: 72 BPM | HEIGHT: 66 IN

## 2024-04-24 DIAGNOSIS — E78.2 MIXED HYPERLIPIDEMIA: ICD-10-CM

## 2024-04-24 DIAGNOSIS — R60.9 EDEMA, UNSPECIFIED TYPE: ICD-10-CM

## 2024-04-24 DIAGNOSIS — J45.31 MILD PERSISTENT ASTHMA WITH ACUTE EXACERBATION: Primary | ICD-10-CM

## 2024-04-24 PROCEDURE — G2211 COMPLEX E/M VISIT ADD ON: HCPCS | Performed by: NURSE PRACTITIONER

## 2024-04-24 PROCEDURE — 99214 OFFICE O/P EST MOD 30 MIN: CPT | Performed by: NURSE PRACTITIONER

## 2024-04-24 RX ORDER — AZITHROMYCIN 250 MG/1
TABLET, FILM COATED ORAL
Qty: 6 TABLET | Refills: 0 | Status: SHIPPED | OUTPATIENT
Start: 2024-04-24 | End: 2024-04-29

## 2024-04-24 RX ORDER — METHYLPREDNISOLONE 4 MG/1
TABLET ORAL
Qty: 21 TABLET | Refills: 0 | Status: SHIPPED | OUTPATIENT
Start: 2024-04-24 | End: 2024-05-01

## 2024-04-24 NOTE — PROGRESS NOTES
"Assessment/Plan:    1. Mild persistent asthma with acute exacerbation  Assessment & Plan:  Will start nebulizer at home    Orders:  -     methylPREDNISolone 4 MG tablet therapy pack; Use as directed on package  -     azithromycin (ZITHROMAX) 250 mg tablet; Take 500mg on day 1, 250mg on days 2-5    2. Mixed hyperlipidemia  Assessment & Plan:  Complaint with statin and tolerating it well      3. Edema, unspecified type  Assessment & Plan:  On lasix and KCL            Patient Instructions:  Take medication with food.  It is important that you take the entire course of antibiotics prescribed.  May also take a probiotic of your choice to maintain healthy GI omari.    Can take some probiotic and yogurt with the medication.  Take prednisone with food in morning and do not take any NSAID's while taking prednisone.  Supportive care discussed and advised.  Advised to RTO for any worsening and no improvement.   Follow up for no improvement and worsening of conditions.  Patient advised and educated when to see immediate medical care.    Return if symptoms worsen or fail to improve.      Future Appointments   Date Time Provider Department Center   10/21/2024 11:15 AM Yanick Valdovinos PA-C [facility]           Subjective:      Patient ID: Diane James is a 78 y.o. female.    Chief Complaint   Patient presents with   • Cough     Sxs started months ago   HK CMA   • chest congestion      Started 2 weeks ago          Vitals:  /70   Pulse 72   Temp (!) 96.5 °F (35.8 °C)   Resp 18   Ht 5' 6\" (1.676 m)   Wt 85.4 kg (188 lb 3.2 oz)   BMI 30.38 kg/m²     Patient stated that started with cough about 2 weeks ago and with congestion and having sob at times with chest tightness. Denies chest pain, sob, headache and dizziness  Complaint with chronic medications and tolerating it well    Cough  Associated symptoms include shortness of breath (at times). Pertinent negatives include no chills, ear pain, fever, " headaches, postnasal drip, rhinorrhea, sore throat or wheezing.           The following portions of the patient's history were reviewed and updated as appropriate: allergies, current medications, past family history, past medical history, past social history, past surgical history and problem list.      Review of Systems   Constitutional:  Negative for chills, diaphoresis, fatigue, fever and unexpected weight change.   HENT:  Positive for congestion. Negative for dental problem, drooling, ear discharge, ear pain, facial swelling, hearing loss, mouth sores, nosebleeds, postnasal drip, rhinorrhea, sinus pressure, sinus pain, sneezing, sore throat, tinnitus, trouble swallowing and voice change.    Respiratory:  Positive for cough, chest tightness and shortness of breath (at times). Negative for wheezing.    Cardiovascular: Negative.    Gastrointestinal:  Negative for abdominal pain, constipation, diarrhea, nausea and vomiting.   Skin: Negative.    Neurological:  Negative for dizziness, light-headedness and headaches.         Objective:    Social History     Tobacco Use   Smoking Status Never   • Passive exposure: Never   Smokeless Tobacco Never       Allergies:   Allergies   Allergen Reactions   • Celecoxib Other (See Comments)     Other reaction(s): Palpitations  Category: Adverse Reaction;          Current Outpatient Medications   Medication Sig Dispense Refill   • acetaminophen (TYLENOL) 500 mg tablet Take 500 mg by mouth 3 (three) times a day as needed       • albuterol (PROVENTIL HFA,VENTOLIN HFA) 90 mcg/act inhaler Inhale 2 puffs every 6 (six) hours as needed for wheezing or shortness of breath 8.5 g 3   • azithromycin (ZITHROMAX) 250 mg tablet Take 500mg on day 1, 250mg on days 2-5 6 tablet 0   • Cranberry 450 MG CAPS daily     • cyanocobalamin (VITAMIN B-12) 1000 MCG tablet Take 1,000 mcg by mouth daily     • Diclofenac Sodium (VOLTAREN) 1 % Apply 2 g topically 4 (four) times a day     • docusate sodium  (COLACE) 100 mg capsule Take 100 mg by mouth 2 (two) times a day     • estradiol (ESTRACE VAGINAL) 0.1 mg/g vaginal cream Insert 1 g into the vagina 3 (three) times a week 42.5 g 6   • fluticasone (Flovent HFA) 220 mcg/act inhaler Inhale 2 puffs 2 (two) times a day Rinse mouth after use 36 g 3   • furosemide (LASIX) 40 mg tablet TAKE 1 TABLET BY MOUTH EVERY DAY IN THE MORNING 90 tablet 1   • Magnesium Oxide -Mg Supplement 400 MG CAPS Take 1 capsule by mouth every morning       • methenamine hippurate (HIPREX) 1 g tablet Take 1 tablet (1 g total) by mouth 2 (two) times a day with meals 60 tablet 5   • methylPREDNISolone 4 MG tablet therapy pack Use as directed on package 21 tablet 0   • potassium chloride (Klor-Con) 10 mEq tablet TAKE 1 TABLET (10 MEQ TOTAL) BY MOUTH IN THE MORNING 90 tablet 1   • pravastatin (PRAVACHOL) 10 mg tablet Take 1 tablet (10 mg total) by mouth daily 90 tablet 3   • senna (SENOKOT) 8.6 MG tablet Take 1 tablet by mouth as needed for constipation       No current facility-administered medications for this visit.          Physical Exam  Vitals reviewed.   Constitutional:       Appearance: Normal appearance. She is well-developed.   HENT:      Head: Normocephalic.      Right Ear: Tympanic membrane, ear canal and external ear normal.      Left Ear: Tympanic membrane, ear canal and external ear normal.      Nose: Nose normal.      Right Sinus: No maxillary sinus tenderness or frontal sinus tenderness.      Left Sinus: No maxillary sinus tenderness or frontal sinus tenderness.      Mouth/Throat:      Mouth: No oral lesions.      Pharynx: No oropharyngeal exudate or posterior oropharyngeal erythema.   Cardiovascular:      Rate and Rhythm: Normal rate and regular rhythm.      Heart sounds: Normal heart sounds.   Pulmonary:      Effort: Pulmonary effort is normal.      Breath sounds: Wheezing (diffuse) present.   Musculoskeletal:         General: Normal range of motion.      Cervical back: Neck  supple.   Lymphadenopathy:      Cervical:      Right cervical: No superficial or posterior cervical adenopathy.     Left cervical: No superficial or posterior cervical adenopathy.   Skin:     General: Skin is warm and dry.   Neurological:      Mental Status: She is alert and oriented to person, place, and time.   Psychiatric:         Behavior: Behavior normal.         Thought Content: Thought content normal.         Judgment: Judgment normal.                     MATA Martínez

## 2024-04-24 NOTE — PATIENT INSTRUCTIONS
Azithromycin (By mouth)   Azithromycin (ey-wbqh-nob-MYE-sin)  Treats infections. This medicine is a macrolide antibiotic.   Brand Name(s): Zithromax, Zithromax Tri-Hayder, Zithromax Z-Hayder, Zmax   There may be other brand names for this medicine.  When This Medicine Should Not Be Used:   This medicine is not right for everyone. Do not use it if you had an allergic reaction to azithromycin, erythromycin, or similar medicines, or if you have a history of liver problems caused by azithromycin.  How to Use This Medicine:   Capsule, Liquid, Packet, Powder, Tablet  Your doctor will tell you how much medicine to use. Do not use more than directed.  Take all of the medicine in your prescription to clear up your infection, even if you feel better after the first few doses.  Multiple dose (Zithromax® oral liquid or tablets):   You may take this medicine with or without food.  Shake the bottle well before you measure the medicine. Measure the oral liquid medicine with a marked measuring spoon, oral syringe, or medicine cup.  Single dose (Zmax® extended-release oral liquid or powder):   Liquid:   Take this medicine on an empty stomach at least 1 hour before you eat, or 2 hours after you eat.  Call your doctor right away if you vomit within 1 hour after you take the medicine.  You must take the liquid within 12 hours after the pharmacist gives it to you.  Shake the bottle well before you measure the medicine. Measure your dose with a marked measuring spoon, cup, or syringe.  Powder:   Open 1 packet and pour all of the medicine into a glass with about 2 ounces (¼ cup) of water. Mix well and drink the medicine right away. Pour another 2 ounces of water into the same glass, and drink the remaining medicine.  Read and follow the patient instructions that come with this medicine. Talk to your doctor or pharmacist if you have any questions.  Missed dose: If you are taking multiple doses, take the dose as soon as you remember. If it is  almost time for your next dose, wait until then to take a regular dose. Do not use extra medicine to make up for a missed dose.  Store the medicine in a closed container at room temperature, away from heat, moisture, and direct light.  Extended-release oral liquid: Do not refrigerate or freeze.  Oral liquid for 1 dose only: Store at room temperature. Do not store in the refrigerator or allow the medicine to freeze.  Oral liquid for multiple doses: Store at room temperature or in the refrigerator. Use it within 10 days of filling the prescription.  Drugs and Foods to Avoid:   Ask your doctor or pharmacist before using any other medicine, including over-the-counter medicines, vitamins, and herbal products.  Some medicines can affect how azithromycin works. Tell your doctor if you are also using any of the following:  Colchicine, cyclosporine, digoxin, nelfinavir, phenytoin  Blood thinner (including warfarin)  Ergot medicine  Medicine for a heart rhythm problem (including amiodarone, dofetilide, procainamide, quinidine, sotalol)  Zithromax® for multiple doses: Do not take an antacid that contains magnesium or aluminum at the same time you take Zithromax®. An antacid will affect how the medicine works. Antacids will not affect Zmax® for single dose.  Warnings While Using This Medicine:   Tell your doctor if you are pregnant or breastfeeding, or if you have kidney disease, liver disease, heart disease, heart rhythm problems, heart failure, or myasthenia gravis. Tell your doctor if anyone in your family has heart rhythm problems.  This medicine may cause the following problems:   Serious skin reactions, including Mathews-David syndrome, acute generalized exanthematous pustulosis, toxic epidermal necrolysis, and drug reaction with eosinophilia and systemic symptoms (DRESS)  Liver problems  Infantile hypertrophic pyloric stenosis  Heart rhythm problems, including QT prolongation  Increased risk of serious heart or blood  vessel problems  This medicine can cause diarrhea. Call your doctor if the diarrhea becomes severe, does not stop, or is bloody. Do not take any medicine to stop diarrhea until you have talked to your doctor. Diarrhea can occur 2 months or more after you stop taking this medicine. It may occur 2 months or more after you stop using this medicine.  Call your doctor if your symptoms do not improve or if they get worse.  Your doctor will do lab tests at regular visits to check on the effects of this medicine. Keep all appointments.  Keep all medicine out of the reach of children. Never share your medicine with anyone.  Possible Side Effects While Using This Medicine:   Call your doctor right away if you notice any of these side effects:  Allergic reaction: Itching or hives, swelling in your face or hands, swelling or tingling in your mouth or throat, chest tightness, trouble breathing  Blistering, peeling, red skin rash  Dark urine, pale stools, nausea, vomiting, loss of appetite, stomach pain, yellow skin or eyes  Fainting, dizziness, lightheadedness  Fast, pounding, or uneven heartbeat, blurred vision, chest pain, trouble breathing, tiredness or weakness  Feeling irritable or vomits after feeding (in babies)  Severe diarrhea that may contain blood, stomach cramps, fever  If you notice these less serious side effects, talk with your doctor:   Mild diarrhea, nausea, vomiting, stomach pain  If you notice other side effects that you think are caused by this medicine, tell your doctor.   Call your doctor for medical advice about side effects. You may report side effects to FDA at 4-458-FDA-5121  © Copyright [employer] 2023 Information is for End User's use only and may not be sold, redistributed or otherwise used for commercial purposes.  The above information is an  only. It is not intended as medical advice for individual conditions or treatments. Talk to your doctor, nurse or pharmacist before following any  medical regimen to see if it is safe and effective for you.  Methylprednisolone (By mouth)   Methylprednisolone (meth-il-pred-NIS-oh-lone)  Treats inflammation, severe allergies, flare-ups of ongoing illnesses, and many other medical problems. May also be used to decrease some symptoms of cancer. This medicine is a corticosteroid.   Brand Name(s): Medrol, Medrol Dosepak, Methylpred-DP   There may be other brand names for this medicine.  When This Medicine Should Not Be Used:   This medicine is not right for everyone. Do not use it if you had an allergic reaction to methylprednisolone, or if you have a fungus infection.  How to Use This Medicine:   Tablet  Take your medicine as directed. Your dose may need to be changed several times to find what works best for you.  If you are using this medicine for an ongoing illness, your dose may need to be changed occasionally. Some people take this medicine only every other day, which helps to decrease side effects.  Take your medicine in the morning, unless your doctor tells you otherwise.  It is best to take this medicine with food or milk.  Missed dose: Take a dose as soon as you remember. If it is almost time for your next dose, wait until then and take a regular dose. Do not take extra medicine to make up for a missed dose.  Store the medicine in a closed container at room temperature, away from heat, moisture, and direct light.  Drugs and Foods to Avoid:   Ask your doctor or pharmacist before using any other medicine, including over-the-counter medicines, vitamins, and herbal products.  Some medicines can affect how methylprednisolone works. Tell your doctor if you are using any of the following:  Cyclosporine, ketoconazole, phenobarbital, phenytoin, rifampin, troleandomycin  Aspirin, especially in high doses  Blood thinner (including warfarin)  Diabetes medicine  This medicine may interfere with vaccines. Ask your doctor before you get a flu shot or any other  vaccines.  Warnings While Using This Medicine:   Tell your doctor if you are pregnant or breastfeeding, or if you have kidney disease, liver disease (including cirrhosis), adrenal gland problems, heart failure, high blood pressure, diabetes, osteoporosis, blood clotting problems, thyroid problems, mental health problems (including depression), myasthenia gravis, or stomach or bowel problems (including ulcer or diverticulitis). Tell your doctor if you have an infection (including herpes eye infection, tuberculosis, or threadworm). Also tell your doctor if you have a recent exposure to chickenpox or measles.  This medicine may cause the following problems:  Increased risk of infection  Changes in mood or behavior  High blood pressure  Adrenal gland problems  Eye problems or changes in vision (including cataracts or glaucoma)  Bone problems (including osteoporosis)  Slow growth in children  Increased risk for cancer (including Kaposi's sarcoma)  If you use this medicine for a long time, tell your doctor about any extra stress or anxiety in your life, including other health concerns and emotional stress. Your dose might need to be changed for a short time while you have extra stress.  Do not stop using this medicine suddenly. Your doctor will need to slowly decrease your dose before you stop it completely.  Tell any doctor or dentist who treats you that you are using this medicine. This medicine may affect certain medical test results.  Your doctor will do lab tests at regular visits to check on the effects of this medicine. Keep all appointments.  Keep all medicine out of the reach of children. Never share your medicine with anyone.  Possible Side Effects While Using This Medicine:   Call your doctor right away if you notice any of these side effects:  Allergic reaction: Itching or hives, swelling in your face or hands, swelling or tingling in your mouth or throat, chest tightness, trouble breathing  Bone pain,  decrease in height  Dark freckles, skin color changes, coldness, weakness, tiredness, weight loss  Depression, unusual thoughts, feelings, or behaviors, trouble sleeping  Fever, chills, cough, sore throat, body aches  Severe stomach pain, nausea, vomiting, or red or black stools  Skin changes or growths  Swelling in your hands, ankles, or feet, rapid weight gain  Trouble seeing, blurred vision or other changes in vision, eye pain, headache  Unusual bleeding or bruising  If you notice these less serious side effects, talk with your doctor:   Increased appetite  Round, puffy face  Weight gain around your neck, upper back, breast, face, or waist  If you notice other side effects that you think are caused by this medicine, tell your doctor.   Call your doctor for medical advice about side effects. You may report side effects to FDA at 7-214-FDA-4169  © Copyright [employer] 2023 Information is for End User's use only and may not be sold, redistributed or otherwise used for commercial purposes.  The above information is an  only. It is not intended as medical advice for individual conditions or treatments. Talk to your doctor, nurse or pharmacist before following any medical regimen to see if it is safe and effective for you.

## 2024-05-01 ENCOUNTER — APPOINTMENT (OUTPATIENT)
Dept: RADIOLOGY | Facility: CLINIC | Age: 79
End: 2024-05-01
Payer: MEDICARE

## 2024-05-01 ENCOUNTER — TELEPHONE (OUTPATIENT)
Age: 79
End: 2024-05-01

## 2024-05-01 ENCOUNTER — OFFICE VISIT (OUTPATIENT)
Dept: FAMILY MEDICINE CLINIC | Facility: CLINIC | Age: 79
End: 2024-05-01
Payer: MEDICARE

## 2024-05-01 VITALS
WEIGHT: 183.4 LBS | HEART RATE: 74 BPM | DIASTOLIC BLOOD PRESSURE: 72 MMHG | BODY MASS INDEX: 29.47 KG/M2 | RESPIRATION RATE: 14 BRPM | HEIGHT: 66 IN | SYSTOLIC BLOOD PRESSURE: 134 MMHG | TEMPERATURE: 98.6 F

## 2024-05-01 DIAGNOSIS — R07.89 CHEST TIGHTNESS: Primary | ICD-10-CM

## 2024-05-01 DIAGNOSIS — R07.89 CHEST TIGHTNESS: ICD-10-CM

## 2024-05-01 DIAGNOSIS — R06.02 SOB (SHORTNESS OF BREATH): ICD-10-CM

## 2024-05-01 DIAGNOSIS — J45.31 MILD PERSISTENT ASTHMA WITH ACUTE EXACERBATION: ICD-10-CM

## 2024-05-01 PROCEDURE — 94640 AIRWAY INHALATION TREATMENT: CPT | Performed by: NURSE PRACTITIONER

## 2024-05-01 PROCEDURE — 99214 OFFICE O/P EST MOD 30 MIN: CPT | Performed by: NURSE PRACTITIONER

## 2024-05-01 PROCEDURE — 71046 X-RAY EXAM CHEST 2 VIEWS: CPT

## 2024-05-01 RX ORDER — FLUTICASONE PROPIONATE AND SALMETEROL 250; 50 UG/1; UG/1
1 POWDER RESPIRATORY (INHALATION) 2 TIMES DAILY
Qty: 60 BLISTER | Refills: 1 | Status: SHIPPED | OUTPATIENT
Start: 2024-05-01 | End: 2024-06-30

## 2024-05-01 RX ORDER — IPRATROPIUM BROMIDE AND ALBUTEROL SULFATE 2.5; .5 MG/3ML; MG/3ML
3 SOLUTION RESPIRATORY (INHALATION) ONCE
Status: COMPLETED | OUTPATIENT
Start: 2024-05-01 | End: 2024-05-01

## 2024-05-01 RX ORDER — BENZONATATE 200 MG/1
200 CAPSULE ORAL 3 TIMES DAILY PRN
Qty: 30 CAPSULE | Refills: 0 | Status: SHIPPED | OUTPATIENT
Start: 2024-05-01

## 2024-05-01 RX ORDER — IPRATROPIUM BROMIDE AND ALBUTEROL SULFATE 2.5; .5 MG/3ML; MG/3ML
3 SOLUTION RESPIRATORY (INHALATION) EVERY 8 HOURS PRN
Qty: 100 ML | Refills: 0 | Status: SHIPPED | OUTPATIENT
Start: 2024-05-01 | End: 2024-05-31

## 2024-05-01 RX ORDER — CODEINE PHOSPHATE/GUAIFENESIN 10-100MG/5
5 LIQUID (ML) ORAL 3 TIMES DAILY PRN
Qty: 75 ML | Refills: 0 | Status: SHIPPED | OUTPATIENT
Start: 2024-05-01 | End: 2024-05-06

## 2024-05-01 RX ADMIN — IPRATROPIUM BROMIDE AND ALBUTEROL SULFATE 3 ML: 2.5; .5 SOLUTION RESPIRATORY (INHALATION) at 11:20

## 2024-05-01 NOTE — TELEPHONE ENCOUNTER
ipratropium-albuterol (DUO-NEB) 0.5-2.5 mg  This is not covered by insurance patient stated that its only 22 dollars so she will .   Laura Almazan

## 2024-05-01 NOTE — PROGRESS NOTES
Assessment/Plan:  Duoneb administered in office and patient felt a lot better and will send for home usage and switched fluticasone to advair inhaler and will do STAT chest xray.   1. Chest tightness  -     XR chest pa & lateral; Future; Expected date: 05/01/2024  -     Fluticasone-Salmeterol (Advair Diskus) 250-50 mcg/dose inhaler; Inhale 1 puff 2 (two) times a day Rinse mouth after use.  -     benzonatate (TESSALON) 200 MG capsule; Take 1 capsule (200 mg total) by mouth 3 (three) times a day as needed for cough  -     ipratropium-albuterol (DUO-NEB) 0.5-2.5 mg/3 mL inhalation solution 3 mL  -     guaifenesin-codeine (GUAIFENESIN AC) 100-10 MG/5ML liquid; Take 5 mL by mouth 3 (three) times a day as needed for cough for up to 5 days  -     ipratropium-albuterol (DUO-NEB) 0.5-2.5 mg/3 mL nebulizer solution; Take 3 mL by nebulization every 8 (eight) hours as needed for wheezing or shortness of breath  -     Mini neb    2. SOB (shortness of breath)  -     XR chest pa & lateral; Future; Expected date: 05/01/2024  -     Fluticasone-Salmeterol (Advair Diskus) 250-50 mcg/dose inhaler; Inhale 1 puff 2 (two) times a day Rinse mouth after use.  -     benzonatate (TESSALON) 200 MG capsule; Take 1 capsule (200 mg total) by mouth 3 (three) times a day as needed for cough  -     ipratropium-albuterol (DUO-NEB) 0.5-2.5 mg/3 mL inhalation solution 3 mL  -     guaifenesin-codeine (GUAIFENESIN AC) 100-10 MG/5ML liquid; Take 5 mL by mouth 3 (three) times a day as needed for cough for up to 5 days  -     ipratropium-albuterol (DUO-NEB) 0.5-2.5 mg/3 mL nebulizer solution; Take 3 mL by nebulization every 8 (eight) hours as needed for wheezing or shortness of breath  -     Mini neb    3. Mild persistent asthma with acute exacerbation  -     XR chest pa & lateral; Future; Expected date: 05/01/2024  -     Fluticasone-Salmeterol (Advair Diskus) 250-50 mcg/dose inhaler; Inhale 1 puff 2 (two) times a day Rinse mouth after use.  -      "benzonatate (TESSALON) 200 MG capsule; Take 1 capsule (200 mg total) by mouth 3 (three) times a day as needed for cough  -     ipratropium-albuterol (DUO-NEB) 0.5-2.5 mg/3 mL inhalation solution 3 mL  -     guaifenesin-codeine (GUAIFENESIN AC) 100-10 MG/5ML liquid; Take 5 mL by mouth 3 (three) times a day as needed for cough for up to 5 days  -     ipratropium-albuterol (DUO-NEB) 0.5-2.5 mg/3 mL nebulizer solution; Take 3 mL by nebulization every 8 (eight) hours as needed for wheezing or shortness of breath  -     Mini neb            Patient Instructions:  Do not drive and operate any machinery after taking cough medication.  Supportive care discussed and advised.  Advised to RTO for any worsening and no improvement.   Follow up for no improvement and worsening of conditions.  Patient advised and educated when to see immediate medical care.    Return if symptoms worsen or fail to improve.      Future Appointments   Date Time Provider Department Center   10/21/2024 11:15 AM Yanick Valdovinos PA-C [facility]           Subjective:      Patient ID: Diane James is a 78 y.o. female.    Chief Complaint   Patient presents with   • Cough     X 7 months  Estefania Bowman CMA    • Nasal Congestion     X 3 weeks.         Vitals:  /72   Pulse 74   Temp 98.6 °F (37 °C)   Resp 14   Ht 5' 6\" (1.676 m)   Wt 83.2 kg (183 lb 6.4 oz)   BMI 29.60 kg/m²     Patient was seen last week for asthma exacerbation and stated that finished steroid pack and z-pack but not much benefit and still having cough with chest tightness and sob. Denies fever and chest pain. Using albuterol nebulizer..        Cough  Associated symptoms include shortness of breath. Pertinent negatives include no chills, ear pain, fever, headaches, postnasal drip, rhinorrhea, sore throat or wheezing.     The following portions of the patient's history were reviewed and updated as appropriate: allergies, current medications, past family history, " past medical history, past social history, past surgical history and problem list.      Review of Systems   Constitutional:  Negative for chills, diaphoresis, fatigue, fever and unexpected weight change.   HENT:  Negative for congestion, dental problem, drooling, ear discharge, ear pain, facial swelling, hearing loss, mouth sores, nosebleeds, postnasal drip, rhinorrhea, sinus pressure, sinus pain, sneezing, sore throat, tinnitus, trouble swallowing and voice change.    Respiratory:  Positive for cough, chest tightness and shortness of breath. Negative for wheezing.    Cardiovascular: Negative.    Gastrointestinal:  Negative for abdominal pain, constipation, diarrhea, nausea and vomiting.   Musculoskeletal: Negative.    Skin: Negative.    Neurological:  Negative for dizziness, light-headedness and headaches.         Objective:    Social History     Tobacco Use   Smoking Status Never   • Passive exposure: Never   Smokeless Tobacco Never       Allergies:   Allergies   Allergen Reactions   • Celecoxib Other (See Comments)     Other reaction(s): Palpitations  Category: Adverse Reaction;          Current Outpatient Medications   Medication Sig Dispense Refill   • acetaminophen (TYLENOL) 500 mg tablet Take 500 mg by mouth 3 (three) times a day as needed       • albuterol (PROVENTIL HFA,VENTOLIN HFA) 90 mcg/act inhaler Inhale 2 puffs every 6 (six) hours as needed for wheezing or shortness of breath 8.5 g 3   • benzonatate (TESSALON) 200 MG capsule Take 1 capsule (200 mg total) by mouth 3 (three) times a day as needed for cough 30 capsule 0   • Cranberry 450 MG CAPS daily     • cyanocobalamin (VITAMIN B-12) 1000 MCG tablet Take 1,000 mcg by mouth daily     • Diclofenac Sodium (VOLTAREN) 1 % Apply 2 g topically 4 (four) times a day     • docusate sodium (COLACE) 100 mg capsule Take 100 mg by mouth 2 (two) times a day     • estradiol (ESTRACE VAGINAL) 0.1 mg/g vaginal cream Insert 1 g into the vagina 3 (three) times a week  42.5 g 6   • Fluticasone-Salmeterol (Advair Diskus) 250-50 mcg/dose inhaler Inhale 1 puff 2 (two) times a day Rinse mouth after use. 60 blister 1   • furosemide (LASIX) 40 mg tablet TAKE 1 TABLET BY MOUTH EVERY DAY IN THE MORNING 90 tablet 1   • guaifenesin-codeine (GUAIFENESIN AC) 100-10 MG/5ML liquid Take 5 mL by mouth 3 (three) times a day as needed for cough for up to 5 days 75 mL 0   • ipratropium-albuterol (DUO-NEB) 0.5-2.5 mg/3 mL nebulizer solution Take 3 mL by nebulization every 8 (eight) hours as needed for wheezing or shortness of breath 100 mL 0   • Magnesium Oxide -Mg Supplement 400 MG CAPS Take 1 capsule by mouth every morning       • methenamine hippurate (HIPREX) 1 g tablet Take 1 tablet (1 g total) by mouth 2 (two) times a day with meals 60 tablet 5   • potassium chloride (Klor-Con) 10 mEq tablet TAKE 1 TABLET (10 MEQ TOTAL) BY MOUTH IN THE MORNING 90 tablet 1   • pravastatin (PRAVACHOL) 10 mg tablet Take 1 tablet (10 mg total) by mouth daily 90 tablet 3   • senna (SENOKOT) 8.6 MG tablet Take 1 tablet by mouth as needed for constipation       No current facility-administered medications for this visit.          Physical Exam  Vitals reviewed.   Constitutional:       Appearance: Normal appearance. She is well-developed.   HENT:      Head: Normocephalic.      Right Ear: Tympanic membrane, ear canal and external ear normal.      Left Ear: Tympanic membrane, ear canal and external ear normal.      Nose: Nose normal.      Right Sinus: No maxillary sinus tenderness or frontal sinus tenderness.      Left Sinus: No maxillary sinus tenderness or frontal sinus tenderness.      Mouth/Throat:      Mouth: No oral lesions.      Pharynx: No oropharyngeal exudate or posterior oropharyngeal erythema.   Cardiovascular:      Rate and Rhythm: Normal rate and regular rhythm.      Heart sounds: Normal heart sounds.   Pulmonary:      Effort: Pulmonary effort is normal.      Breath sounds: Wheezing present.  "  Musculoskeletal:         General: Normal range of motion.      Cervical back: Neck supple.   Lymphadenopathy:      Cervical:      Right cervical: No superficial or posterior cervical adenopathy.     Left cervical: No superficial or posterior cervical adenopathy.   Skin:     General: Skin is warm and dry.   Neurological:      Mental Status: She is alert and oriented to person, place, and time.   Psychiatric:         Behavior: Behavior normal.         Thought Content: Thought content normal.         Judgment: Judgment normal.               Mini neb    Performed by: MATA Martínez  Authorized by: MATA Martínez  Universal Protocol:  Consent: Verbal consent obtained.  Risks and benefits: risks, benefits and alternatives were discussed  Consent given by: patient  Time out: Immediately prior to procedure a \"time out\" was called to verify the correct patient, procedure, equipment, support staff and site/side marked as required.  Timeout called at: 5/1/2024 11:20 AM.  Patient understanding: patient states understanding of the procedure being performed  Patient consent: the patient's understanding of the procedure matches consent given  Patient identity confirmed: verbally with patient    Number of treatments:  1  Treatment 1:   Pre-Procedure     Symptoms:  Cough, shortness of breath and wheezing    Lung Sounds:  Wheezing    HR:  74    RR:  14    Medication Administered:  Duoneb - Albuterol 2.5 mg/Atrovent 0.5 mg  Post-Procedure     Lung sounds:  Wheezing improved and SOB improved         MATA Martínez        "

## 2024-05-02 ENCOUNTER — TELEPHONE (OUTPATIENT)
Age: 79
End: 2024-05-02

## 2024-05-02 DIAGNOSIS — J45.31 MILD PERSISTENT ASTHMA WITH ACUTE EXACERBATION: Primary | ICD-10-CM

## 2024-05-02 RX ORDER — DEXTROMETHORPHAN HYDROBROMIDE AND PROMETHAZINE HYDROCHLORIDE 15; 6.25 MG/5ML; MG/5ML
5 SYRUP ORAL 4 TIMES DAILY PRN
Qty: 118 ML | Refills: 0 | Status: SHIPPED | OUTPATIENT
Start: 2024-05-02

## 2024-05-02 NOTE — TELEPHONE ENCOUNTER
Pharmacist called and stated they do not carry the guaifenesin-codeine within a twenty mil radius.Pharmacy does carry Phenergan  DM .Please advise.

## 2024-05-04 NOTE — PROGRESS NOTES
Assessment/Plan     Problem List Items Addressed This Visit     Impaired fasting glucose     Has been stable   Will check labs in 3 months         Relevant Orders    Hemoglobin A1C    Mixed hyperlipidemia - Primary     Stable   Continue pravastatin 10 mg daily         Relevant Orders    Comprehensive metabolic panel    Lipid Panel with Direct LDL reflex    Primary generalized (osteo)arthritis    Relevant Medications    acetaminophen-codeine (TYLENOL #3) 300-30 mg per tablet      Other Visit Diagnoses     Screening for deficiency anemia        Relevant Orders    CBC         Treatment Plan: Continue Tylenol with codeine as needed and will supplement with regular Tylenol and aspirin to avoid use of the Tylenol with codeine  Treatment Goals: Maintain functioning as well she is and to help take care of her     Opiate risks  There are risks associated with opioid medications, including dependence, addiction and tolerance  The patient understands and agrees to use these medications only as prescribed  Potential side effects of the medications include, but are not limited to, constipation, drowsiness, addiction, impaired judgment and risk of fatal overdose if not taken as prescribed  The patient was warned against driving while taking sedation medications  Sharing medications is a felony  At this point in time, the patient is showing no signs of addiction, abuse, diversion or suicidal ideation  Opioid agreement  Pain management agreement was reviewed with patient and signed/updated during visit      PD[place] review  [place] or [place]  reviewed  No red flags were identified; safe to proceed with prescription          Subjective     Opioid Management:   Type of visit: Follow-up    Interval history: She has been trying to take less of the Tylenol with codeine  She is taking aspirin       Screening Tools/Assessments:    PHQ-2/9:  PHQ-2 score: 0    Brief Pain Inventory (BPI):  1) Throughout our lives, most of us have had pain from time to time (such as minor headaches, sprains, and toothaches)  Have you had pain other than these everyday kinds of pain today? Yes  2) Where is your pain located? See picture  3) Rate your pain at its worst in the last 24 hours: 5  4) Rate your pain at its least in the last 24 hours: 0  5) Rate your average level of pain: 3  6) Rate your pain right now: 3  7) What treatments or medications are you receiving for your pain? Tylenol #3  8) In the past 24 hours, how much relief have pain treatments or medication provided?  80%  9) During the past 24 hours, pain has interfered with your:     A) General activity: 3     B) Mood: 2     C) Walking ability: 6     D) Normal work (work outside the home & housework): 6     E) Relations with other people: 0     F) Sleep: 8     G) Enjoyment of life: 7    Drug Screen:  Date of last drug screen: 4/28/2022    Opioid agreement:  Active Opioid agreement on file?: Yes    Opioid agreement signed date: 8/3/2021  Opioid agreement expiration date: 8/3/2022    Naloxone:  Currently prescribed Naloxone (Narcan): No      HPI  Pain Medications             acetaminophen (TYLENOL) 500 mg tablet Take 500 mg by mouth 3 (three) times a day as needed      acetaminophen-codeine (TYLENOL #3) 300-30 mg per tablet Take 1 tablet by mouth every 4 (four) hours as needed for moderate pain         Outpatient Morphine Milligram Equivalents Per Day     7/26/22 and after 27 MME/Day    Order Name Dose Route Frequency Maximum MME/Day     acetaminophen-codeine (TYLENOL #3) 300-30 mg per tablet 1 tablet Oral Every 4 hours PRN 27 MME/Day    Total Potential Morphine Milligram Equivalents Per Day 27 MME/Day    Calculation Information        acetaminophen-codeine (TYLENOL #3) 300-30 mg per tablet    acetaminophen-codeine 300-30 mg Tabs: single dose of 30 mg of opioid in combo * 6 doses per day * morphine equivalence factor of 0 15 = 27 MME/Day                         PDMP Review       Value Time User PDMP Reviewed  Yes 7/26/2022 11:23 AM Odalis Macias DO         Review of Systems  Objective     /66   Pulse 69   Temp (!) 97 °F (36 1 °C)   Resp 18   Ht 5' 6" (1 676 m)   Wt 91 6 kg (202 lb)   SpO2 99%   BMI 32 60 kg/m²     Physical Exam  Vitals and nursing note reviewed  Constitutional:       General: She is not in acute distress  Appearance: She is well-developed  HENT:      Head: Normocephalic and atraumatic  Eyes:      Conjunctiva/sclera: Conjunctivae normal    Cardiovascular:      Rate and Rhythm: Normal rate and regular rhythm  Heart sounds: No murmur heard  Pulmonary:      Effort: Pulmonary effort is normal  No respiratory distress  Breath sounds: Normal breath sounds  Abdominal:      Palpations: Abdomen is soft  Tenderness: There is no abdominal tenderness  Musculoskeletal:      Cervical back: Neck supple  Skin:     General: Skin is warm and dry  Neurological:      Mental Status: She is alert           Odalis Macias DO 04-May-2024 06:31

## 2024-05-08 NOTE — TELEPHONE ENCOUNTER
Medications that are used with DME will not be covered by Medicare Part D plans. Please use [facility] to order nebulizer medications from a DME company. This process will select a DME company that participates with patients insurance and handle all processing and shipping. Retail pharmacies can not process these types of medication unless they submit it under Medicare Part B. If the pt wants to continue to use their local retail pharmacy, please call the pharmacy and have the pharmacist submit nebulizer medication under patient Medicare Part B plan. This is the quickest most efficient way to ensure the pt gets their medications in a timely manner.

## 2024-05-09 NOTE — TELEPHONE ENCOUNTER
Please call Diane and ask her what she'd prefer to do. She needs to ask her local pharmacy if they can submit it under Medicare Part B or would she like me to try ordering it from a durable medical equipment supplier?  Ally Reddy, DO

## 2024-06-07 DIAGNOSIS — R60.9 EDEMA, UNSPECIFIED TYPE: ICD-10-CM

## 2024-06-07 DIAGNOSIS — N39.0 RECURRENT UTI: ICD-10-CM

## 2024-06-07 RX ORDER — METHENAMINE HIPPURATE 1000 MG/1
TABLET ORAL
Qty: 180 TABLET | Refills: 1 | Status: SHIPPED | OUTPATIENT
Start: 2024-06-07

## 2024-06-07 RX ORDER — POTASSIUM CHLORIDE 750 MG/1
10 TABLET, FILM COATED, EXTENDED RELEASE ORAL DAILY
Qty: 90 TABLET | Refills: 1 | Status: SHIPPED | OUTPATIENT
Start: 2024-06-07

## 2024-06-10 ENCOUNTER — HOSPITAL ENCOUNTER (OUTPATIENT)
Dept: RADIOLOGY | Facility: HOSPITAL | Age: 79
Discharge: HOME | End: 2024-06-10
Payer: MEDICARE

## 2024-06-10 VITALS — BODY MASS INDEX: 28.93 KG/M2 | WEIGHT: 180 LBS | HEIGHT: 66 IN

## 2024-06-10 DIAGNOSIS — Z12.31 SCREENING MAMMOGRAM FOR HIGH-RISK PATIENT: ICD-10-CM

## 2024-06-10 PROCEDURE — 77063 BREAST TOMOSYNTHESIS BI: CPT

## 2024-06-10 PROCEDURE — 77067 SCR MAMMO BI INCL CAD: CPT

## 2024-06-17 ENCOUNTER — APPOINTMENT (OUTPATIENT)
Dept: LAB | Facility: CLINIC | Age: 79
End: 2024-06-17
Payer: MEDICARE

## 2024-06-17 DIAGNOSIS — E78.2 MIXED HYPERLIPIDEMIA: ICD-10-CM

## 2024-06-17 LAB
ALBUMIN SERPL BCP-MCNC: 4.3 G/DL (ref 3.5–5)
ALP SERPL-CCNC: 73 U/L (ref 34–104)
ALT SERPL W P-5'-P-CCNC: 17 U/L (ref 7–52)
ANION GAP SERPL CALCULATED.3IONS-SCNC: 6 MMOL/L (ref 4–13)
AST SERPL W P-5'-P-CCNC: 16 U/L (ref 13–39)
BILIRUB SERPL-MCNC: 1.12 MG/DL (ref 0.2–1)
BUN SERPL-MCNC: 21 MG/DL (ref 5–25)
CALCIUM SERPL-MCNC: 9.4 MG/DL (ref 8.4–10.2)
CHLORIDE SERPL-SCNC: 102 MMOL/L (ref 96–108)
CHOLEST SERPL-MCNC: 179 MG/DL
CO2 SERPL-SCNC: 33 MMOL/L (ref 21–32)
CREAT SERPL-MCNC: 0.75 MG/DL (ref 0.6–1.3)
GFR SERPL CREATININE-BSD FRML MDRD: 76 ML/MIN/1.73SQ M
GLUCOSE P FAST SERPL-MCNC: 88 MG/DL (ref 65–99)
HDLC SERPL-MCNC: 45 MG/DL
LDLC SERPL CALC-MCNC: 104 MG/DL (ref 0–100)
NONHDLC SERPL-MCNC: 134 MG/DL
POTASSIUM SERPL-SCNC: 4.2 MMOL/L (ref 3.5–5.3)
PROT SERPL-MCNC: 6.9 G/DL (ref 6.4–8.4)
SODIUM SERPL-SCNC: 141 MMOL/L (ref 135–147)
TRIGL SERPL-MCNC: 150 MG/DL

## 2024-06-17 PROCEDURE — 36415 COLL VENOUS BLD VENIPUNCTURE: CPT

## 2024-06-17 PROCEDURE — 80053 COMPREHEN METABOLIC PANEL: CPT

## 2024-06-17 PROCEDURE — 80061 LIPID PANEL: CPT

## 2024-06-20 ENCOUNTER — TELEPHONE (OUTPATIENT)
Dept: CARDIOLOGY CLINIC | Facility: CLINIC | Age: 79
End: 2024-06-20

## 2024-06-20 NOTE — TELEPHONE ENCOUNTER
----- Message from Lito Delaney DO sent at 6/19/2024  9:52 PM EDT -----  Can you please let the patient know cholesterol levels much better

## 2024-07-03 ENCOUNTER — NURSE TRIAGE (OUTPATIENT)
Age: 79
End: 2024-07-03

## 2024-07-03 ENCOUNTER — APPOINTMENT (OUTPATIENT)
Dept: LAB | Facility: CLINIC | Age: 79
End: 2024-07-03
Payer: MEDICARE

## 2024-07-03 DIAGNOSIS — N39.0 RECURRENT UTI: Primary | ICD-10-CM

## 2024-07-03 DIAGNOSIS — N39.0 RECURRENT UTI: ICD-10-CM

## 2024-07-03 PROCEDURE — 87186 SC STD MICRODIL/AGAR DIL: CPT

## 2024-07-03 PROCEDURE — 87077 CULTURE AEROBIC IDENTIFY: CPT

## 2024-07-03 PROCEDURE — 87086 URINE CULTURE/COLONY COUNT: CPT

## 2024-07-03 RX ORDER — NITROFURANTOIN 25; 75 MG/1; MG/1
100 CAPSULE ORAL 2 TIMES DAILY
Qty: 10 CAPSULE | Refills: 0 | Status: SHIPPED | OUTPATIENT
Start: 2024-07-03 | End: 2024-07-08

## 2024-07-03 NOTE — TELEPHONE ENCOUNTER
"Answer Assessment - Initial Assessment Questions  1. REASON FOR CALL or QUESTION: \"What is your reason for calling today?\" or \"How can I best help you?\" or \"What question do you have that I can help answer?\"      Patient called in to inform us that she went for urine testing today as she has has been having some discomfort while urinating. Patient has a hx of UTI's. She is requesting to be started on macrobid in the meantime due to the holiday and upcoming weekend. Please advise.    Protocols used: Information Only Call - No Triage-ADULT-[place]    "

## 2024-07-05 LAB — BACTERIA UR CULT: ABNORMAL

## 2024-07-15 DIAGNOSIS — J45.31 MILD PERSISTENT ASTHMA WITH ACUTE EXACERBATION: ICD-10-CM

## 2024-07-15 DIAGNOSIS — R06.02 SOB (SHORTNESS OF BREATH): ICD-10-CM

## 2024-07-15 DIAGNOSIS — R07.89 CHEST TIGHTNESS: ICD-10-CM

## 2024-07-16 RX ORDER — FLUTICASONE PROPIONATE AND SALMETEROL 250; 50 UG/1; UG/1
1 POWDER RESPIRATORY (INHALATION) 2 TIMES DAILY
Qty: 60 BLISTER | Refills: 5 | Status: SHIPPED | OUTPATIENT
Start: 2024-07-16 | End: 2025-01-12

## 2024-08-05 ENCOUNTER — TELEPHONE (OUTPATIENT)
Age: 79
End: 2024-08-05

## 2024-08-05 DIAGNOSIS — M17.11 PRIMARY OSTEOARTHRITIS OF RIGHT KNEE: Primary | ICD-10-CM

## 2024-08-05 NOTE — TELEPHONE ENCOUNTER
Caller: patient  Doctor/office: Emma RENEE#: [phone]      Diane called to start auth/delivery for VISCO(Gel) injections.    Body Part: R knee  Pain Level (scale 1-10): 8  Date of last Gel Injection: 8/4/2023    Educated patient on Visco procedure. Auth team will review insurance and process the request appropriately. Patient will be contacted if the have any questions and when ready to schedule.   
Detail Level: Zone
Pre-auth placed.   
Note Text (......Xxx Chief Complaint.): This diagnosis correlates with the

## 2024-08-07 ENCOUNTER — APPOINTMENT (EMERGENCY)
Dept: RADIOLOGY | Facility: HOSPITAL | Age: 79
End: 2024-08-07
Payer: MEDICARE

## 2024-08-07 ENCOUNTER — HOSPITAL ENCOUNTER (EMERGENCY)
Facility: HOSPITAL | Age: 79
Discharge: HOME | End: 2024-08-07
Attending: EMERGENCY MEDICINE
Payer: MEDICARE

## 2024-08-07 VITALS
TEMPERATURE: 97.9 F | SYSTOLIC BLOOD PRESSURE: 137 MMHG | RESPIRATION RATE: 20 BRPM | DIASTOLIC BLOOD PRESSURE: 62 MMHG | HEART RATE: 75 BPM | OXYGEN SATURATION: 96 %

## 2024-08-07 DIAGNOSIS — M25.561 RIGHT KNEE PAIN: Primary | ICD-10-CM

## 2024-08-07 PROCEDURE — 99284 EMERGENCY DEPT VISIT MOD MDM: CPT

## 2024-08-07 PROCEDURE — 99284 EMERGENCY DEPT VISIT MOD MDM: CPT | Performed by: PHYSICIAN ASSISTANT

## 2024-08-07 PROCEDURE — 93971 EXTREMITY STUDY: CPT | Performed by: SURGERY

## 2024-08-07 PROCEDURE — 73564 X-RAY EXAM KNEE 4 OR MORE: CPT

## 2024-08-07 PROCEDURE — 93971 EXTREMITY STUDY: CPT

## 2024-08-07 RX ORDER — PREDNISONE 20 MG/1
60 TABLET ORAL ONCE
Status: COMPLETED | OUTPATIENT
Start: 2024-08-07 | End: 2024-08-07

## 2024-08-07 RX ORDER — PREDNISONE 20 MG/1
40 TABLET ORAL DAILY
Qty: 8 TABLET | Refills: 0 | Status: SHIPPED | OUTPATIENT
Start: 2024-08-07 | End: 2024-08-11

## 2024-08-07 RX ADMIN — PREDNISONE 60 MG: 20 TABLET ORAL at 10:25

## 2024-08-07 NOTE — ED PROVIDER NOTES
History  Chief Complaint   Patient presents with    Knee Pain     Calf pain and progressively painful and could not walk and lifting yesterday. Voltaren, icing and tylenol has been taken by patient. 8/10 on activity     78 y/o female presenting today with right-sided knee pain.  Patient relays that over the past week she has had calf pain and pain along the backside of the knee for unknown reason, states that yesterday she had worsening right sided knee pain that moved more anteriorly and now is bandlike surrounding the knee.  She has had issues with his knee before in the past with partial meniscal tear and has had injections.  Has not noticed any specific swelling to the calf or the knee.  No recent travel.  No prior history of DVT to this extremity however has had a right sided hip replacement.  No falls or injuries however has had issues bending and extending the knee.  States that next week she is scheduled to have a knee injection.  Denies numbness, paresthesias bruising, swelling etc.  Differential includes but is not limited to DVT, effusion, sprain, etc.         Prior to Admission Medications   Prescriptions Last Dose Informant Patient Reported? Taking?   Cranberry 450 MG CAPS  Self Yes No   Sig: daily   Diclofenac Sodium (VOLTAREN) 1 %  Self Yes No   Sig: Apply 2 g topically 4 (four) times a day   Fluticasone-Salmeterol (Advair Diskus) 250-50 mcg/dose inhaler   No No   Sig: Inhale 1 puff 2 (two) times a day Rinse mouth after use.   Magnesium Oxide -Mg Supplement 400 MG CAPS  Self Yes No   Sig: Take 1 capsule by mouth every morning     acetaminophen (TYLENOL) 500 mg tablet  Self Yes No   Sig: Take 500 mg by mouth 3 (three) times a day as needed     albuterol (PROVENTIL HFA,VENTOLIN HFA) 90 mcg/act inhaler  Self No No   Sig: Inhale 2 puffs every 6 (six) hours as needed for wheezing or shortness of breath   benzonatate (TESSALON) 200 MG capsule   No No   Sig: Take 1 capsule (200 mg total) by mouth 3 (three)  "times a day as needed for cough   cyanocobalamin (VITAMIN B-12) 1000 MCG tablet  Self Yes No   Sig: Take 1,000 mcg by mouth daily   docusate sodium (COLACE) 100 mg capsule  Self Yes No   Sig: Take 100 mg by mouth 2 (two) times a day   estradiol (ESTRACE VAGINAL) 0.1 mg/g vaginal cream  Self No No   Sig: Insert 1 g into the vagina 3 (three) times a week   furosemide (LASIX) 40 mg tablet  Self No No   Sig: TAKE 1 TABLET BY MOUTH EVERY DAY IN THE MORNING   methenamine hippurate (HIPREX) 1 g tablet   No No   Sig: TAKE 1 TABLET BY MOUTH 2 TIMES A DAY WITH MEALS.   potassium chloride (Klor-Con) 10 mEq tablet   No No   Sig: Take 1 tablet (10 mEq total) by mouth in the morning   pravastatin (PRAVACHOL) 10 mg tablet  Self No No   Sig: Take 1 tablet (10 mg total) by mouth daily   promethazine-dextromethorphan (PHENERGAN-DM) 6.25-15 mg/5 mL oral syrup   No No   Sig: Take 5 mL by mouth 4 (four) times a day as needed for cough   senna (SENOKOT) 8.6 MG tablet  Self Yes No   Sig: Take 1 tablet by mouth as needed for constipation      Facility-Administered Medications: None       Past Medical History:   Diagnosis Date    Anesthesia complication     \"shivers\"    Arthritis     Asthma     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 see colors, with over-use goes darker- from untreated amblyopia    Breathing difficulty     with extubation pt developed laryngeal spasm    Cataract     both eyes- right eye needs surgery    Colon polyps 2016    Diverticulitis     had episode in April 2018    Edema     lower legs    Generalized osteoarthritis     GERD (gastroesophageal reflux disease)     Hearing problem     unspecified laterality-hearing aid right ear    High cholesterol     History of transfusion     autologus    Incontinence of urine     Polyarthritis     last assessed: 6/26/2015    Rheumatoid arthritis (HCC)     Sjogren's syndrome (HCC)     last assessed: 7/18/2016    SS-A antibody positive     last assessed: 8/20/2015    Urinary tract infection     UTI " (urinary tract infection)        Past Surgical History:   Procedure Laterality Date    APPENDECTOMY      AUGMENTATION BREAST Bilateral     breast surgery enlargement procedure elective bilateral    BILATERAL OOPHORECTOMY Bilateral 2003    BLADDER SURGERY      bladder lift,cystocele    CATARACT EXTRACTION      Right 9/2018; Left 10/2018    COLONOSCOPY  04/18/2011    COLPORRHAPHY  2014    anterior, repair of cystocele    HYSTERECTOMY  1673    with cystocele     JOINT REPLACEMENT Left 2016    knee    KNEE ARTHROPLASTY Left     KNEE ARTHROSCOPY  2009    KNEE SURGERY Left 2009    torn meniscus    OOPHORECTOMY      MO COLONOSCOPY FLX DX W/COLLJ SPEC WHEN PFRMD N/A 5/31/2018    Procedure: COLONOSCOPY;  Surgeon: Maria Victoria Pineda MD;  Location: [facility];  Service: Gastroenterology    MO HEMIARTHROPLASTY HIP PARTIAL Right 8/23/2023    Procedure: HEMIARTHROPLASTY HIP (BIPOLAR);  Surgeon: Lester Gray DO;  Location: [facility];  Service: Orthopedics    REDUCTION MAMMAPLASTY  2000    SACRAL NERVE STIMULATOR PLACEMENT N/A 1/31/2022    Procedure: IMPLANTATION NEUROSTIMULATOR ELECTRODE ARRAY SACRAL NERVE; INSERTION NEUROSTIMULATOR; FLUORO; ELECTONIC ANALYSIS;  Surgeon: Jamal Sepulveda MD;  Location: [facility];  Service: UroGynecology           TONSILLECTOMY AND ADENOIDECTOMY      URETHRA SURGERY         Family History   Problem Relation Age of Onset    Coronary artery disease Mother     COPD Mother     Hypertension Mother     Hyperlipidemia Mother         high cholesterol    Atrial fibrillation Mother     Coronary artery disease Father     Arthritis Father     COPD Father         Black lung    Hypertension Father     Hyperlipidemia Father         high cholesterol    Lung cancer Brother     Coronary artery disease Brother     Hypertension Brother     Hyperlipidemia Brother         high cholesterol    Atrial fibrillation Brother         fib/flutter    Other Sister         pericarditis    Arthritis Sister     Diabetes  Maternal Grandmother         mellitus    Breast cancer Maternal Grandmother 70    Breast cancer Paternal Grandmother 70    Arthritis Family     Asthma Family     Cancer Brother         lung    Heart disease Brother         CAD-stents-smoker    No Known Problems Maternal Aunt     No Known Problems Maternal Aunt     No Known Problems Maternal Aunt     No Known Problems Maternal Aunt     No Known Problems Maternal Aunt     Breast cancer Other 40     I have reviewed and agree with the history as documented.    E-Cigarette/Vaping    E-Cigarette Use Never User      E-Cigarette/Vaping Substances    Nicotine No     THC No     CBD No     Flavoring No     Other No     Unknown No      Social History     Tobacco Use    Smoking status: Never     Passive exposure: Never    Smokeless tobacco: Never   Vaping Use    Vaping status: Never Used   Substance Use Topics    Alcohol use: Not Currently    Drug use: No       Review of Systems   Constitutional: Negative.  Negative for chills, fatigue and fever.   HENT: Negative.  Negative for congestion, postnasal drip, rhinorrhea and sore throat.    Eyes: Negative.    Respiratory: Negative.  Negative for cough, shortness of breath and wheezing.    Cardiovascular: Negative.    Gastrointestinal: Negative.  Negative for abdominal pain, diarrhea, nausea and vomiting.   Endocrine: Negative.    Genitourinary: Negative.    Musculoskeletal:  Positive for arthralgias.   Skin: Negative.    Neurological: Negative.    Hematological: Negative.    Psychiatric/Behavioral: Negative.     All other systems reviewed and are negative.      Physical Exam  Physical Exam  Vitals and nursing note reviewed.   Constitutional:       Appearance: Normal appearance.   HENT:      Head: Normocephalic and atraumatic.      Right Ear: External ear normal.      Left Ear: External ear normal.      Nose: Nose normal.   Eyes:      Conjunctiva/sclera: Conjunctivae normal.   Cardiovascular:      Rate and Rhythm: Normal rate.    Pulmonary:      Effort: Pulmonary effort is normal.   Abdominal:      General: There is no distension.   Musculoskeletal:         General: No deformity. Normal range of motion.      Cervical back: Normal range of motion.        Legs:    Skin:     General: Skin is dry.      Findings: No rash.   Neurological:      General: No focal deficit present.      Mental Status: She is alert and oriented to person, place, and time. Mental status is at baseline.   Psychiatric:         Mood and Affect: Mood normal.         Behavior: Behavior normal.         Thought Content: Thought content normal.         Judgment: Judgment normal.         Vital Signs  ED Triage Vitals [08/07/24 0913]   Temperature Pulse Respirations Blood Pressure SpO2   97.9 °F (36.6 °C) 75 20 137/62 96 %      Temp Source Heart Rate Source Patient Position - Orthostatic VS BP Location FiO2 (%)   Temporal -- Sitting Right arm --      Pain Score       8           Vitals:    08/07/24 0913   BP: 137/62   Pulse: 75   Patient Position - Orthostatic VS: Sitting         Visual Acuity      ED Medications  Medications   predniSONE tablet 60 mg (60 mg Oral Given 8/7/24 1025)       Diagnostic Studies  Results Reviewed       None                   XR knee 4+ views Right injury   ED Interpretation by Jennifer Hurt PA-C (08/07 1007)   No acute osseous abnormality      Final Result by Scot Fortune MD (08/07 1058)      No acute osseous abnormality. Mild degenerative changes. Mild swelling above the patella         Computerized Assisted Algorithm (CAA) may have been used to analyze all applicable images.         Workstation performed: [id number]         VAS lower limb venous duplex study, unilateral/limited    (Results Pending)              Procedures  Procedures         ED Course  ED Course as of 08/07/24 1100   Wed Aug 07, 2024   0944 Vascular study is negative                                  SBIRT 22yo+      Flowsheet Row Most Recent Value   Initial Alcohol  Screen: US AUDIT-C     1. How often do you have a drink containing alcohol? 0 Filed at: 08/07/2024 0922   3b. FEMALE Any Age, or MALE 65+: How often do you have 4 or more drinks on one occassion? 0 Filed at: 08/07/2024 0922   Audit-C Score 0 Filed at: 08/07/2024 0922   KIKI: How many times in the past year have you...    Used an illegal drug or used a prescription medication for non-medical reasons? Never Filed at: 08/07/2024 0922                      Medical Decision Making  Negative vascular study.  Had thorough conversation with patient regarding x-ray.  Patient does not have any pain at rest however with ambulation, today started using a cane.  Patient was placed in a right knee Ace wrap and assessed by me with good neurovascular exam before and after.  Patient has an appointment with orthopedics in 1 week in the meantime will start on prednisone course.  Patient has no laxity, no ballottement, will consider arthritic type origin of pain with underlying past history of meniscal injury.  Patient has tolerated prednisone well before in the past.    Patient is informed to return to the emergency department for worsening of symptoms and was given proper education regarding their diagnosis and symptoms.The patient verbalizes understanding and agrees with above assessment and plan. All questions were answered.          Amount and/or Complexity of Data Reviewed  Radiology: ordered and independent interpretation performed.    Risk  Prescription drug management.                 Disposition  Final diagnoses:   Right knee pain     Time reflects when diagnosis was documented in both MDM as applicable and the Disposition within this note       Time User Action Codes Description Comment    8/7/2024 10:21 AM Jennifer Hurt Add [M25.561] Right knee pain           ED Disposition       ED Disposition   Discharge    Condition   Stable    Date/Time   Wed Aug 7, 2024 1021    Comment   Diane James discharge to home/self  care.                   Follow-up Information       Follow up With Specialties Details Why Contact Info Additional Information    [facility] Emergency Department Emergency Medicine Go to  If symptoms worsen, otherwise please follow up with you orthopedic [address] [facility] [address] [phone] [facility] Emergency Department, [address], [place], [address]            Discharge Medication List as of 8/7/2024 10:22 AM        START taking these medications    Details   predniSONE 20 mg tablet Take 2 tablets (40 mg total) by mouth daily for 4 days, Starting Wed 8/7/2024, Until Sun 8/11/2024, Normal           CONTINUE these medications which have NOT CHANGED    Details   promethazine-dextromethorphan (PHENERGAN-DM) 6.25-15 mg/5 mL oral syrup Take 5 mL by mouth 4 (four) times a day as needed for cough, Starting Thu 5/2/2024, Normal      acetaminophen (TYLENOL) 500 mg tablet Take 500 mg by mouth 3 (three) times a day as needed  , Historical Med      albuterol (PROVENTIL HFA,VENTOLIN HFA) 90 mcg/act inhaler Inhale 2 puffs every 6 (six) hours as needed for wheezing or shortness of breath, Starting Mon 7/31/2023, Normal      benzonatate (TESSALON) 200 MG capsule Take 1 capsule (200 mg total) by mouth 3 (three) times a day as needed for cough, Starting Wed 5/1/2024, Normal      Cranberry 450 MG CAPS daily, Historical Med      cyanocobalamin (VITAMIN B-12) 1000 MCG tablet Take 1,000 mcg by mouth daily, Historical Med      Diclofenac Sodium (VOLTAREN) 1 % Apply 2 g topically 4 (four) times a day, Historical Med      docusate sodium (COLACE) 100 mg capsule Take 100 mg by mouth 2 (two) times a day, Historical Med      estradiol (ESTRACE VAGINAL) 0.1 mg/g vaginal cream Insert 1 g into the vagina 3 (three) times a week, Starting Fri 10/28/2022, Normal      Fluticasone-Salmeterol (Advair Diskus) 250-50 mcg/dose inhaler Inhale 1 puff 2 (two) times a day Rinse  mouth after use., Starting Tue 7/16/2024, Until Sun 1/12/2025, Normal      furosemide (LASIX) 40 mg tablet TAKE 1 TABLET BY MOUTH EVERY DAY IN THE MORNING, Starting Mon 2/19/2024, Normal      Magnesium Oxide -Mg Supplement 400 MG CAPS Take 1 capsule by mouth every morning  , Historical Med      methenamine hippurate (HIPREX) 1 g tablet TAKE 1 TABLET BY MOUTH 2 TIMES A DAY WITH MEALS., Normal      potassium chloride (Klor-Con) 10 mEq tablet Take 1 tablet (10 mEq total) by mouth in the morning, Starting Fri 6/7/2024, Normal      pravastatin (PRAVACHOL) 10 mg tablet Take 1 tablet (10 mg total) by mouth daily, Starting Fri 3/8/2024, Normal      senna (SENOKOT) 8.6 MG tablet Take 1 tablet by mouth as needed for constipation, Historical Med             No discharge procedures on file.    PDMP Review         Value Time User    PDMP Reviewed  Yes 5/1/2024 11:05 AM MATA Martínez            ED Provider  Electronically Signed by             Jennifer Hurt PA-C  08/07/24 7708

## 2024-08-15 ENCOUNTER — PROCEDURE VISIT (OUTPATIENT)
Dept: OBGYN CLINIC | Facility: CLINIC | Age: 79
End: 2024-08-15
Payer: MEDICARE

## 2024-08-15 DIAGNOSIS — M17.11 PRIMARY OSTEOARTHRITIS OF RIGHT KNEE: Primary | ICD-10-CM

## 2024-08-15 DIAGNOSIS — T84.84XD PAIN WITH HIP HEMIARTHROPLASTY, SUBSEQUENT ENCOUNTER: ICD-10-CM

## 2024-08-15 DIAGNOSIS — Z96.649 PAIN WITH HIP HEMIARTHROPLASTY, SUBSEQUENT ENCOUNTER: ICD-10-CM

## 2024-08-15 DIAGNOSIS — Z96.641 HISTORY OF RIGHT HIP HEMIARTHROPLASTY: ICD-10-CM

## 2024-08-15 PROCEDURE — 20610 DRAIN/INJ JOINT/BURSA W/O US: CPT

## 2024-08-15 NOTE — PROGRESS NOTES
Large joint arthrocentesis: R knee  Universal Protocol:  Consent: Verbal consent obtained.  Risks and benefits: risks, benefits and alternatives were discussed  Consent given by: patient  Timeout called at: 8/15/2024 2:20 PM.  Patient understanding: patient states understanding of the procedure being performed  Patient consent: the patient's understanding of the procedure matches consent given  Site marked: the operative site was marked  Radiology Images displayed and confirmed. If images not available, report reviewed: imaging studies available  Patient identity confirmed: verbally with patient  Supporting Documentation  Indications: pain   Procedure Details  Location: knee - R knee  Needle size: 22 G  Ultrasound guidance: no  Approach: anterolateral  Medications administered: 3 mL sodium hyaluronate 60 MG/3ML    Patient tolerance: patient tolerated the procedure well with no immediate complications  Dressing:  Sterile dressing applied      Diane presents for right knee Durolane injection to treat her right primary knes osteoarthritis. Today, the patient reports 8/10 right knee pain. Of note, the patient did present to the ED on 8/7/24 for right knee pain and swelling localized to the posterior aspect. Pain did radiate down the back of the calf and was severe enough that she was having difficulty bearing weight/walking. Knee x-rays and U/S Doppler were performed. X-rays were negative for acute osseous abnormalities and demonstrated known degenerative changes. U/S was negative for DVT. Today, the patient notes persistent pain, though overall improved since presenting to the ED. Of note, the patient does also report right hip pain in the setting of right hip hemiarthroplasty performed by Dr. Gray in August 2023. The patient did follow up with him about this and was scheduled for a revision, however, Diane's  passed away and she was unable to undergo further care for her hip. She states she has been walking  with a limp and is ready to have further care for her right hip.    On exam today, the patient has no significant joint effusion and ROM from 0-120. She does have medial joint line tenderness. The patient tolerated the procedure well without any difficulties or complications. Post-injections were reviewed and we discussed that she can undergo Visco injections every 6 months at a minimum, depending on symptoms. These can be alternated with steroid injections that can occur every 3 months at a minimum if necessary. We will plan to follow up with Diane as needed following this injection. We also placed a referral to Dr. Allison for further discussion of her right hip pain s/p right hip hemiarthroplasty. The patient does prefer to stay in [place] for care.

## 2024-08-20 DIAGNOSIS — R60.9 EDEMA, UNSPECIFIED TYPE: ICD-10-CM

## 2024-08-20 RX ORDER — FUROSEMIDE 40 MG
40 TABLET ORAL EVERY MORNING
Qty: 100 TABLET | Refills: 1 | Status: SHIPPED | OUTPATIENT
Start: 2024-08-20

## 2024-08-23 ENCOUNTER — OFFICE VISIT (OUTPATIENT)
Dept: OBGYN CLINIC | Facility: CLINIC | Age: 79
End: 2024-08-23
Payer: MEDICARE

## 2024-08-23 ENCOUNTER — APPOINTMENT (OUTPATIENT)
Dept: RADIOLOGY | Facility: CLINIC | Age: 79
End: 2024-08-23
Payer: MEDICARE

## 2024-08-23 VITALS
DIASTOLIC BLOOD PRESSURE: 74 MMHG | HEART RATE: 68 BPM | WEIGHT: 175 LBS | BODY MASS INDEX: 28.12 KG/M2 | SYSTOLIC BLOOD PRESSURE: 113 MMHG | HEIGHT: 66 IN

## 2024-08-23 DIAGNOSIS — Z96.641 HISTORY OF RIGHT HIP HEMIARTHROPLASTY: ICD-10-CM

## 2024-08-23 DIAGNOSIS — M70.71 ILIOPSOAS BURSITIS OF RIGHT HIP: Primary | ICD-10-CM

## 2024-08-23 DIAGNOSIS — M25.551 PAIN IN RIGHT HIP: ICD-10-CM

## 2024-08-23 DIAGNOSIS — M70.61 GREATER TROCHANTERIC BURSITIS OF RIGHT HIP: ICD-10-CM

## 2024-08-23 PROCEDURE — 73502 X-RAY EXAM HIP UNI 2-3 VIEWS: CPT

## 2024-08-23 PROCEDURE — 99214 OFFICE O/P EST MOD 30 MIN: CPT | Performed by: ORTHOPAEDIC SURGERY

## 2024-08-23 NOTE — PROGRESS NOTES
Assessment/Plan:  1. Iliopsoas bursitis of right hip        2. Greater trochanteric bursitis of right hip  Ambulatory Referral to Physical Therapy      3. History of right hip hemiarthroplasty  Ambulatory Referral to Orthopedic Surgery    XR hip/pelv 2-3 vws right if performed      4. Pain in right hip  Ambulatory Referral to Physical Therapy        Scribe Attestation      I,:  Deo Martino am acting as a scribe while in the presence of the attending physician.:       I,:  Alex Allison, DO personally performed the services described in this documentation    as scribed in my presence.:           Diane is a pleasant 79-year-old female who presents today for initial evaluation of her right hip pain.  After reviewing her imaging and performing a thorough history and physical exam I explained that she does appear symptomatic of iliopsoas bursitis and greater trochanteric bursitis.  I recommended that she initiate physical therapy for her greater trochanteric bursitis and she will also begin using Voltaren gel for this.  I do believe the positioning of outter head of her bipolar hemiarthroplasty is contributing to her iliopsoas bursitis.  After discussion, we both agreed on a diagnostic and hopefully therapeutic iliopsoas bursa injection.  If this does not provide long-lasting relief for her, she understands my recommendation would be for conversion to total hip arthroplasty.  We will plan to see her back in 8 weeks to discuss her response to therapy for the greater trochanteric bursitis as well as her response to the iliopsoas bursa injection.  All of her questions and concerns were addressed today.    Subjective: Initial evaluation for right hip pain    Patient ID: Diane James is a 79 y.o. female who presents today for initial evaluation of her right hip pain.  She underwent right hip hemiarthroplasty with Dr. Gray on 8/23/2023.  At today's visit, she reports that she has had persistent pain since her  "procedure.  She describes sharp pain about the anterior hip that occurs with sit to stand transitions and during flexion.  Her pain is less pronounced when walking on flat ground.  She also finds her symptoms are relieved when she sits with her leg in an abducted and externally rotated position.  She has discussed conversion to total hip arthroplasty with Dr. Gray, but deferred the procedure due to the death of her .  She denies any recent injury or trauma.    Review of Systems   Constitutional:  Positive for activity change. Negative for chills, fever and unexpected weight change.   HENT:  Negative for hearing loss, nosebleeds and sore throat.    Eyes:  Negative for pain, redness and visual disturbance.   Respiratory:  Negative for cough, shortness of breath and wheezing.    Cardiovascular:  Negative for chest pain, palpitations and leg swelling.   Gastrointestinal:  Negative for abdominal pain, nausea and vomiting.   Endocrine: Negative for polydipsia and polyuria.   Genitourinary:  Negative for dysuria and hematuria.   Musculoskeletal:  Positive for arthralgias and myalgias. Negative for joint swelling.        See HPI   Skin:  Negative for rash and wound.   Neurological:  Negative for dizziness, numbness and headaches.   Psychiatric/Behavioral:  Negative for decreased concentration and suicidal ideas. The patient is not nervous/anxious.          Past Medical History:   Diagnosis Date    Anesthesia complication     \"shivers\"    Arthritis     Asthma     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 see colors, with over-use goes darker- from untreated amblyopia    Breathing difficulty     with extubation pt developed laryngeal spasm    Cataract     both eyes- right eye needs surgery    Colon polyps 2016    Diverticulitis     had episode in April 2018    Edema     lower legs    Generalized osteoarthritis     GERD (gastroesophageal reflux disease)     Hearing problem     unspecified laterality-hearing aid right ear    High " cholesterol     History of transfusion     autologus    Incontinence of urine     Polyarthritis     last assessed: 6/26/2015    Rheumatoid arthritis (HCC)     Sjogren's syndrome (HCC)     last assessed: 7/18/2016    SS-A antibody positive     last assessed: 8/20/2015    Urinary tract infection     UTI (urinary tract infection)        Past Surgical History:   Procedure Laterality Date    APPENDECTOMY      AUGMENTATION BREAST Bilateral     breast surgery enlargement procedure elective bilateral    BILATERAL OOPHORECTOMY Bilateral 2003    BLADDER SURGERY      bladder lift,cystocele    CATARACT EXTRACTION      Right 9/2018; Left 10/2018    COLONOSCOPY  04/18/2011    COLPORRHAPHY  2014    anterior, repair of cystocele    HYSTERECTOMY  1673    with cystocele     JOINT REPLACEMENT Left 2016    knee    KNEE ARTHROPLASTY Left     KNEE ARTHROSCOPY  2009    KNEE SURGERY Left 2009    torn meniscus    OOPHORECTOMY      TN COLONOSCOPY FLX DX W/COLLJ SPEC WHEN PFRMD N/A 5/31/2018    Procedure: COLONOSCOPY;  Surgeon: Maria Victoria Pineda MD;  Location: [facility];  Service: Gastroenterology    TN HEMIARTHROPLASTY HIP PARTIAL Right 8/23/2023    Procedure: HEMIARTHROPLASTY HIP (BIPOLAR);  Surgeon: Lester Gray DO;  Location: [facility];  Service: Orthopedics    REDUCTION MAMMAPLASTY  2000    SACRAL NERVE STIMULATOR PLACEMENT N/A 1/31/2022    Procedure: IMPLANTATION NEUROSTIMULATOR ELECTRODE ARRAY SACRAL NERVE; INSERTION NEUROSTIMULATOR; FLUORO; ELECTONIC ANALYSIS;  Surgeon: Jamal Sepulveda MD;  Location: [facility];  Service: UroGynecology           TONSILLECTOMY AND ADENOIDECTOMY      URETHRA SURGERY         Family History   Problem Relation Age of Onset    Coronary artery disease Mother     COPD Mother     Hypertension Mother     Hyperlipidemia Mother         high cholesterol    Atrial fibrillation Mother     Coronary artery disease Father     Arthritis Father     COPD Father         Black lung    Hypertension Father      Hyperlipidemia Father         high cholesterol    Lung cancer Brother     Coronary artery disease Brother     Hypertension Brother     Hyperlipidemia Brother         high cholesterol    Atrial fibrillation Brother         fib/flutter    Other Sister         pericarditis    Arthritis Sister     Diabetes Maternal Grandmother         mellitus    Breast cancer Maternal Grandmother 70    Breast cancer Paternal Grandmother 70    Arthritis Family     Asthma Family     Cancer Brother         lung    Heart disease Brother         CAD-stents-smoker    No Known Problems Maternal Aunt     No Known Problems Maternal Aunt     No Known Problems Maternal Aunt     No Known Problems Maternal Aunt     No Known Problems Maternal Aunt     Breast cancer Other 40       Social History     Occupational History    Occupation: retired   Tobacco Use    Smoking status: Never     Passive exposure: Never    Smokeless tobacco: Never   Vaping Use    Vaping status: Never Used   Substance and Sexual Activity    Alcohol use: Not Currently    Drug use: No    Sexual activity: Not Currently         Current Outpatient Medications:     acetaminophen (TYLENOL) 500 mg tablet, Take 500 mg by mouth 3 (three) times a day as needed  , Disp: , Rfl:     Cranberry 450 MG CAPS, daily, Disp: , Rfl:     cyanocobalamin (VITAMIN B-12) 1000 MCG tablet, Take 1,000 mcg by mouth daily, Disp: , Rfl:     Diclofenac Sodium (VOLTAREN) 1 %, Apply 2 g topically 4 (four) times a day, Disp: , Rfl:     docusate sodium (COLACE) 100 mg capsule, Take 100 mg by mouth 2 (two) times a day, Disp: , Rfl:     estradiol (ESTRACE VAGINAL) 0.1 mg/g vaginal cream, Insert 1 g into the vagina 3 (three) times a week, Disp: 42.5 g, Rfl: 6    Fluticasone-Salmeterol (Advair Diskus) 250-50 mcg/dose inhaler, Inhale 1 puff 2 (two) times a day Rinse mouth after use., Disp: 60 blister, Rfl: 5    furosemide (LASIX) 40 mg tablet, TAKE 1 TABLET BY MOUTH EVERY DAY IN THE MORNING, Disp: 100 tablet, Rfl: 1     Magnesium Oxide -Mg Supplement 400 MG CAPS, Take 1 capsule by mouth every morning  , Disp: , Rfl:     methenamine hippurate (HIPREX) 1 g tablet, TAKE 1 TABLET BY MOUTH 2 TIMES A DAY WITH MEALS., Disp: 180 tablet, Rfl: 1    potassium chloride (Klor-Con) 10 mEq tablet, Take 1 tablet (10 mEq total) by mouth in the morning, Disp: 90 tablet, Rfl: 1    pravastatin (PRAVACHOL) 10 mg tablet, Take 1 tablet (10 mg total) by mouth daily, Disp: 90 tablet, Rfl: 3    promethazine-dextromethorphan (PHENERGAN-DM) 6.25-15 mg/5 mL oral syrup, Take 5 mL by mouth 4 (four) times a day as needed for cough, Disp: 118 mL, Rfl: 0    senna (SENOKOT) 8.6 MG tablet, Take 1 tablet by mouth as needed for constipation, Disp: , Rfl:     albuterol (PROVENTIL HFA,VENTOLIN HFA) 90 mcg/act inhaler, Inhale 2 puffs every 6 (six) hours as needed for wheezing or shortness of breath, Disp: 8.5 g, Rfl: 3    benzonatate (TESSALON) 200 MG capsule, Take 1 capsule (200 mg total) by mouth 3 (three) times a day as needed for cough, Disp: 30 capsule, Rfl: 0    Allergies   Allergen Reactions    Celecoxib Other (See Comments)     Other reaction(s): Palpitations  Category: Adverse Reaction;        Objective:  Vitals:    08/23/24 1317   BP: 113/74   Pulse: 68       Body mass index is 28.25 kg/m².    Right Hip Exam     Tenderness   The patient is experiencing tenderness in the greater trochanter and lateral.    Range of Motion   Abduction:  normal   Adduction:  normal   Extension:  normal   Flexion:  normal   External rotation:  normal   Internal rotation:  normal     Muscle Strength   Abduction: 5/5   Adduction: 5/5   Flexion: 4/5     Tests   ROSENDO: positive    Other   Erythema: absent  Scars: present  Sensation: normal  Pulse: present    Comments:  Well healed posterolateral scar  Mobilizes with UE assistance with hip flexion   Stinchfield: positive              Physical Exam  Vitals and nursing note reviewed.   Constitutional:       Appearance: Normal appearance.  She is well-developed.   HENT:      Head: Normocephalic and atraumatic.      Right Ear: External ear normal.      Left Ear: External ear normal.   Eyes:      General: No scleral icterus.     Extraocular Movements: Extraocular movements intact.      Conjunctiva/sclera: Conjunctivae normal.   Cardiovascular:      Rate and Rhythm: Normal rate.   Pulmonary:      Effort: Pulmonary effort is normal. No respiratory distress.   Musculoskeletal:      Cervical back: Normal range of motion and neck supple.      Comments: See Ortho exam   Skin:     General: Skin is warm and dry.   Neurological:      General: No focal deficit present.      Mental Status: She is alert and oriented to person, place, and time.   Psychiatric:         Behavior: Behavior normal.         I have personally reviewed pertinent films in PACS.    X-ray of the right hip obtained on 8/23/2024 reviewed demonstrating a hip hemiarthroplasty without evidence of obvious failure. There is no acute fracture, dislocation, lytic or blastic lesion.     This document was created using speech voice recognition software.   Grammatical errors, random word insertions, pronoun errors, and incomplete sentences are an occasional consequence of this system due to software limitations, ambient noise, and hardware issues.   Any formal questions or concerns about content, text, or information contained within the body of this dictation should be directly addressed to the provider for clarification.

## 2024-09-03 ENCOUNTER — EVALUATION (OUTPATIENT)
Dept: PHYSICAL THERAPY | Facility: CLINIC | Age: 79
End: 2024-09-03
Payer: MEDICARE

## 2024-09-03 DIAGNOSIS — M70.61 GREATER TROCHANTERIC BURSITIS OF RIGHT HIP: ICD-10-CM

## 2024-09-03 DIAGNOSIS — M25.551 PAIN IN RIGHT HIP: ICD-10-CM

## 2024-09-03 PROCEDURE — 97161 PT EVAL LOW COMPLEX 20 MIN: CPT

## 2024-09-03 NOTE — PROGRESS NOTES
PT Evaluation   Today's date: 9/3/2024  Patient name: Diane James  : 1945  MRN: [id number]  Referring provider: Alex Allison DO  Dx:   Encounter Diagnosis     ICD-10-CM    1. Greater trochanteric bursitis of right hip  M70.61 Ambulatory Referral to Physical Therapy      2. Pain in right hip  M25.551 Ambulatory Referral to Physical Therapy            Assessment  Assessment details: Patient is a 79 y.o. Female who presents to skilled outpatient PT for referring diagnoses include greater trochanteric bursitis of right hip and pain in right hip. Primary movement impairment diagnosis of mobility deficits mostly from impaired muscle length resulting in pathoanatomical symptoms of pain, decreased range of motion, power and strength, gait deficits, and poor posture. The aforementioned impairments have limited the patient's ability to exercise for general health and well being, perform repeated bending motions, navigate stairs, and lift from ground. No further referral is necessary at this time based upon examination results. Patient education performed during today's session included: hip anatomy, HEP, symptom tracking, and lumbar spine neuroanatomy.      Impairments: Abnormal gait, Abnormal muscle tone, Abnormal or restricted ROM, Activity intolerance, Impaired balance, Impaired physical strength, Lacks appropriate HEP, Poor posture, Poor body mechanics, and Weight-bearing intolerance  Understanding of Dx/Px/POC: Good  Prognosis: Good    Patient verbalized understanding of POC. Please contact me if you have any questions or recommendations. Thank you for the referral and the opportunity to share in Diane James's care.    Plan  Patient would benefit from: PT Eval and Skilled PT  Planned modality interventions: Dry needling, Biofeedback, Cryotherapy, TENS, Thermotherapy: Hydrocollator Packs, and Traction  Planned therapy interventions: Abdominal trunk stabilization, Balance/WB training, Body mechanics training,  "Coordination, Dry Needling, Functional ROM exercises, Gait training, HEP, Joint mobilization, Manual therapy, Hutton taping, Motor coordination training, Neuromuscular re-education, Patient education, Postural training, Strengthening, Stretching, Therapeutic activities, and Therapeutic exercises  Frequency: 2x/wk  Duration in weeks: 6  Plan of Care beginning date: 9/3/24  Plan of Care expiration date: 6 weeks - 10/15/2024  Treatment plan discussed with: Patient       Goals  Short Term Goals (4 weeks):    - Patient will be independent in basic HEP 2-3 weeks  - Patient will demonstrate knee ROM WNL for ease with gait.   - Patient will have 0/10 pain at rest  - Patient will demonstrate >1/3 improvement in MMT grade as applicable    Long Term Goals (8 weeks):  - Patient will be independent in a comprehensive home exercise program  - Patient FOTO score will improve by 10 points.   - Patient will independently ambulate >1000 feet (community ambulation)  - Patient will self-report >75% improvement in function  - Patient will be able to walk one mile on even terrain with dog with no more than 1/10 discomfort  - Patient will be able to lift <10lb box from ground to waist height with no pain      Subjective    History of Present Illness  - Mechanism of injury: patient fell and \"broke her back and hip\", had a partial replacement in 2023, went to inpatient and outpatient rehab, notes that the pain just \"never got better\" in the R hip. Patient followed up with MD who did imaging and per patient the results showed the femoral head sitting incorrectly in the acetabulum. Patient was going to have a surgery to \"fix the ball not being in the right place\" but deferred due to death of her . Imaging reports: Hip hemiarthroplasty without evidence of a hardware complication or failure. Moderate to severe left hip osteoarthritic degenerative changes are present. Degenerative changes pubic symphysis and visualized lower lumbar " "spine. Notes that the pain has been getting progressively worse since then, and characterizes it as throbbing in the R hip. Patient walks 1/2 a mile a day with her dog.     - Functional limitations: stairs,  prolonged sitting or standing, getting in and out of the car, bending forward and lifting,     - Patient goals: patient reluctant to start PT- would like \"the hip to stop hurting\"    - Red flag screening:   Positive for: age >50 years old, history of cancer, weight loss, and rest/night pain skin cancer , has not had much of an appettite has lost 15 lbs  Negative for: fever, chills, night sweats, recent infection, immunosuppression, saddle anesthesia, bladder dysfunction, and LE neurological deficits    Pain  - Current pain ratin/10  - At best pain ratin/10  - At worst pain ratin/10  - Location: R hip, mostly toward the groin  - Alleviating factors: stretch leg out, rest     Social Support  - Steps to enter house: none  - Stairs in house: none   - Bedroom/bathroom set up: 1 floor  - DME available: used a cane off and on  - [place]    Objective     Lumbar Spine:  Flexion:  not limited  Repeated motions- no difference  Extension: moderately limited  Repeated motions- no difference  R SB:minimally limited  L SB: minimally limited    LE MMT  L Hip Flexion: 4+/5  R Hip Flexion: 4+/5   L Hip Abduction: 4/5 R Hip Abduction: 4/5   L Hip Adduction: 5/5 R Hip Adduction: 4+/5   L Knee Extension: 5/5 R Knee Extension: 5/5   L Knee Flexion: 5/5 R Knee Flexion: 5/5   L Ankle DF: 5/5 R Ankle DF: 4+/5   L Ankle PF: 4+/5  R Ankle PF: 4+/5     LE ROM    L hip flexion: 120 degrees R hip flexion: 120 degrees   L hip IR: 35 degrees  R hip IR: 35 degrees    L hip ER: 45 degrees  R hip ER: 45 degrees    L hip abduction: 45 degrees  R hip abduction: 30 *pain degrees        Special Testing L R   ROSENDO negative negative   FADIR negative negative   Hip Scour  negative negative   Thigh thrust negative negative " "  Neurodynamic assessment  No sx's No sx's       Sensation  - Light touch: intact bilaterally in LE's       Insurance:  AMA/CMS Eval/ Re-eval POC expires FOTO Auth #/ Referral # Total     Start date  Expiration date Extension  Visit limitation?  PT only or  PT+OT? Co-Insurance   CMS Eval                                  AUTH #:  Date                 Authed:  Used                Remaining                     Precautions: standard  Past Medical History:   Diagnosis Date    Anesthesia complication     \"shivers\"    Arthritis     Asthma     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 see colors, with over-use goes darker- from untreated amblyopia    Breathing difficulty     with extubation pt developed laryngeal spasm    Cataract     both eyes- right eye needs surgery    Colon polyps 2016    Diverticulitis     had episode in April 2018    Edema     lower legs    Generalized osteoarthritis     GERD (gastroesophageal reflux disease)     Hearing problem     unspecified laterality-hearing aid right ear    High cholesterol     History of transfusion     autologus    Incontinence of urine     Polyarthritis     last assessed: 6/26/2015    Rheumatoid arthritis (HCC)     Sjogren's syndrome (HCC)     last assessed: 7/18/2016    SS-A antibody positive     last assessed: 8/20/2015    Urinary tract infection     UTI (urinary tract infection)          Date 9/3/2024        Visit Number IE    FOTO             Manual                                    Neuro Re-ed         Hip burners          Clamshells         Bridge          Quad set                   TherEx         NuStep warmup NV        Prone press ups 1x10   NV        APRIL ~5'        Squats         Side stepping with loop         HR NV        SLR          LAQ         SAQ         LTR HEP        TrA marching HEP                                                              TherAct         Patient education                                             Gait Training                                  "   Ann         CP              Access Code: [email or web address]  URL: [email or web address]  Date: 09/03/2024  Prepared by: Doretha Bustos    Exercises  - Static Prone on Elbows  - 1 x daily - 7 x weekly - 3 sets - 10 reps  - Prone Press Up  - 1 x daily - 7 x weekly - 3 sets - 10 reps  - Supine March  - 1 x daily - 7 x weekly - 3 sets - 10 reps  - Hooklying Lumbar Rotation  - 1 x daily - 7 x weekly - 3 sets - 10 reps

## 2024-09-05 ENCOUNTER — OFFICE VISIT (OUTPATIENT)
Dept: PHYSICAL THERAPY | Facility: CLINIC | Age: 79
End: 2024-09-05
Payer: MEDICARE

## 2024-09-05 DIAGNOSIS — M25.551 PAIN IN RIGHT HIP: ICD-10-CM

## 2024-09-05 DIAGNOSIS — M70.61 GREATER TROCHANTERIC BURSITIS OF RIGHT HIP: Primary | ICD-10-CM

## 2024-09-05 PROCEDURE — 97110 THERAPEUTIC EXERCISES: CPT

## 2024-09-05 PROCEDURE — 97112 NEUROMUSCULAR REEDUCATION: CPT

## 2024-09-05 NOTE — PROGRESS NOTES
"Daily Note     Today's date: 2024  Patient name: Diane James  : 1945  MRN: [id number]  Referring provider: Alex Allison DO  Dx:   Encounter Diagnosis     ICD-10-CM    1. Greater trochanteric bursitis of right hip  M70.61       2. Pain in right hip  M25.551             Subjective: patient reports soreness in the low back and aching in the hip today. Notes the LTR exercise flared up her low back pain and is worried about this.       Objective: See treatment diary below      Assessment: Tolerated treatment well.  Focused todays session on hip mobility and strengthening with functional activities. Patient required cues for upright posture and had minimal increase in pain throughout session. End of session added in manual HS/piriformis stretching which also helped to alleviate soreness particularly the piriformis stretching. Patient demonstrated fatigue post treatment and would benefit from continued PT to continue with symptom management.       Plan: Continue per plan of care.      Insurance:  AMA/CMS Eval/ Re-eval POC expires FOTO Auth #/ Referral # Total     Start date  Expiration date Extension  Visit limitation?  PT only or  PT+OT? Co-Insurance   CMS Eval                                  AUTH #:  Date                 Authed:  Used                Remaining                     Precautions: standard  Past Medical History:   Diagnosis Date    Anesthesia complication     \"shivers\"    Arthritis     Asthma     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 see colors, with over-use goes darker- from untreated amblyopia    Breathing difficulty     with extubation pt developed laryngeal spasm    Cataract     both eyes- right eye needs surgery    Colon polyps 2016    Diverticulitis     had episode in 2018    Edema     lower legs    Generalized osteoarthritis     GERD (gastroesophageal reflux disease)     Hearing problem     unspecified laterality-hearing aid right ear    High cholesterol     History of transfusion     autologus " "   Incontinence of urine     Polyarthritis     last assessed: 6/26/2015    Rheumatoid arthritis (HCC)     Sjogren's syndrome (HCC)     last assessed: 7/18/2016    SS-A antibody positive     last assessed: 8/20/2015    Urinary tract infection     UTI (urinary tract infection)          Date 9/3/2024 9/5/24       Visit Number IE 2   FOTO             Manual         HS piri glute stretching  ~10' VM                         Neuro Re-ed         Hip burners          Clamshells  NV       Bridge   NV       Quad set            Standing hip abd and ext 2x10 each       TherEx         NuStep warmup NV Nustep lvl2 7'       Prone press ups 1x10   NV JEWEL- hold for NV       APRIL ~5'        Squats         Step ups  6\" step up with knee  2x10          Leg press   2x10 at 55#  1x10 at 65#       Side stepping with loop  NV       HR NV 20x       SLR          LAQ         SAQ         LTR HEP hold       TrA marching HEP 20x                                                             TherAct         Patient education                                             Gait Training                                    Modalities         CP                "

## 2024-09-10 ENCOUNTER — PROCEDURE VISIT (OUTPATIENT)
Dept: PAIN MEDICINE | Facility: CLINIC | Age: 79
End: 2024-09-10
Payer: MEDICARE

## 2024-09-10 VITALS — HEART RATE: 94 BPM | SYSTOLIC BLOOD PRESSURE: 123 MMHG | DIASTOLIC BLOOD PRESSURE: 81 MMHG

## 2024-09-10 DIAGNOSIS — M70.61 GREATER TROCHANTERIC BURSITIS OF RIGHT HIP: Primary | ICD-10-CM

## 2024-09-10 PROBLEM — M70.71 ILIOPSOAS BURSITIS OF RIGHT HIP: Status: ACTIVE | Noted: 2024-09-10

## 2024-09-10 PROCEDURE — 20611 DRAIN/INJ JOINT/BURSA W/US: CPT | Performed by: PHYSICAL MEDICINE & REHABILITATION

## 2024-09-10 RX ORDER — BUPIVACAINE HYDROCHLORIDE 2.5 MG/ML
4 INJECTION, SOLUTION EPIDURAL; INFILTRATION; INTRACAUDAL ONCE
Status: COMPLETED | OUTPATIENT
Start: 2024-09-10 | End: 2024-09-10

## 2024-09-10 RX ORDER — METHYLPREDNISOLONE ACETATE 40 MG/ML
40 INJECTION, SUSPENSION INTRA-ARTICULAR; INTRALESIONAL; INTRAMUSCULAR; SOFT TISSUE ONCE
Status: COMPLETED | OUTPATIENT
Start: 2024-09-10 | End: 2024-09-10

## 2024-09-10 RX ORDER — LIDOCAINE HYDROCHLORIDE 10 MG/ML
3 INJECTION, SOLUTION INFILTRATION; PERINEURAL ONCE
Status: COMPLETED | OUTPATIENT
Start: 2024-09-10 | End: 2024-09-10

## 2024-09-10 RX ADMIN — LIDOCAINE HYDROCHLORIDE 3 ML: 10 INJECTION, SOLUTION INFILTRATION; PERINEURAL at 11:37

## 2024-09-10 RX ADMIN — BUPIVACAINE HYDROCHLORIDE 4 ML: 2.5 INJECTION, SOLUTION EPIDURAL; INFILTRATION; INTRACAUDAL at 11:36

## 2024-09-10 RX ADMIN — METHYLPREDNISOLONE ACETATE 40 MG: 40 INJECTION, SUSPENSION INTRA-ARTICULAR; INTRALESIONAL; INTRAMUSCULAR; SOFT TISSUE at 11:37

## 2024-09-10 NOTE — PROGRESS NOTES
Indication: Hip pain  Preprocedure diagnosis: 1. Iliopsoas bursitis  2. Hip pain  Postprocedure diagnosis: 1. Iliopsoas bursitis  2. Hip pain  Procedure: Ultrasound-guided right iliopsoas bursa injection  After discussing the risks, benefits, and alternatives to the procedure, the patient expressed understanding and wished to proceed. The patient was brought to the procedure suite and placed in the supine position. A procedural pause was conducted to verify: correct patient identity, procedure to be performed and as applicable, correct side and site, correct patient position, and availability of implants, special equipment or special requirements. A simple surgical tray was used. Prior to the procedure, the hip joint was examined with a 12 MHz linear transducer to visualize the hip joint, iliopsoas tendon and determine the optimal needle path. Following this, the groin was prepared with a ChloraPrep scrub, then re-examined using the same transducer, a sterile ultrasound transducer cover, and sterile ultrasound transducer gel. Thereafter, using continuous ultrasound guidance, a 2.5 inch 25-gauge needle was advanced into the iliopsoas bursa. After visualization of the tip in the target area and negative aspiration for blood, a mixture of 40 mg Depo-Medrol in 3 mL of 0.25% bupivacaine was injected into the iliopsoas bursa. Following the injection, the needle was withdrawn. The patient tolerated the procedure well and there were no apparent complications. After an appropriate amount of observation, the patient was dismissed from the clinic in good condition under their own power.

## 2024-09-12 ENCOUNTER — OFFICE VISIT (OUTPATIENT)
Dept: PHYSICAL THERAPY | Facility: CLINIC | Age: 79
End: 2024-09-12
Payer: MEDICARE

## 2024-09-12 DIAGNOSIS — M70.61 GREATER TROCHANTERIC BURSITIS OF RIGHT HIP: Primary | ICD-10-CM

## 2024-09-12 DIAGNOSIS — M25.551 PAIN IN RIGHT HIP: ICD-10-CM

## 2024-09-12 PROCEDURE — 97110 THERAPEUTIC EXERCISES: CPT

## 2024-09-12 PROCEDURE — 97112 NEUROMUSCULAR REEDUCATION: CPT

## 2024-09-12 NOTE — PROGRESS NOTES
"Daily Note     Today's date: 2024  Patient name: Diane James  : 1945  MRN: [id number]  Referring provider: Alex Allison DO  Dx:   Encounter Diagnosis     ICD-10-CM    1. Greater trochanteric bursitis of right hip  M70.61       2. Pain in right hip  M25.551             Subjective: Patient notes that she had the shot on Tuesday in R hip, \"and it feels a little better\".      Objective: See treatment diary below      Assessment: Tolerated treatment well. Initiated continued hip strengthening exercises today to patients tolerance- specifically targeted more glute strengthening today. Patient does fatigue with repetition and has some difficulty with breathing (hx of asthma). Continued stretch/STM to the iliopsoas on the R as it continues to be implicated in patients pain. Patient exhibited good technique with therapeutic exercises and would benefit from continued PT to continue with symptom relief.       Plan: Continue per plan of care.      Insurance:  AMA/CMS Eval/ Re-eval POC expires FOTO Auth #/ Referral # Total     Start date  Expiration date Extension  Visit limitation?  PT only or  PT+OT? Co-Insurance   CMS Eval                                  AUTH #:  Date                 Authed:  Used                Remaining                     Precautions: standard  Past Medical History:   Diagnosis Date    Anesthesia complication     \"shivers\"    Arthritis     Asthma     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 see colors, with over-use goes darker- from untreated amblyopia    Breathing difficulty     with extubation pt developed laryngeal spasm    Cataract     both eyes- right eye needs surgery    Colon polyps     Diverticulitis     had episode in 2018    Edema     lower legs    Generalized osteoarthritis     GERD (gastroesophageal reflux disease)     Hearing problem     unspecified laterality-hearing aid right ear    High cholesterol     History of transfusion     autologus    Incontinence of urine     Polyarthritis " "    last assessed: 6/26/2015    Rheumatoid arthritis (HCC)     Sjogren's syndrome (HCC)     last assessed: 7/18/2016    SS-A antibody positive     last assessed: 8/20/2015    Urinary tract infection     UTI (urinary tract infection)        Date 9/3/2024 9/5/24 9/12/24      Visit Number IE 2 3  FOTO             Manual         HS piri glute stretching  ~10' VM          R hip mobs- inf and lat glide 4x20 each          Manual iliopsoas (modified bhavna) and STM ~10'      Neuro Re-ed         Hip burners          Clamshells  NV RTB 2x10       Bridge   NV 2x10       Quad set            Standing hip abd and ext 2x10 each Standing hip abd and ext 2x10 each      TherEx         NuStep warmup NV Nustep lvl2 7' def      Prone press ups 1x10   NV JEWEL- hold for NV def      DKTC with peanut   20x       APRIL ~5'        Squats   TRX squats 2x10       Step ups  6\" step up with knee  2x10          Leg press   2x10 at 55#  1x10 at 65# Leg press   3x10 at 65#      Side stepping with loop  NV       HR NV 20x       SLR    Unable/pain& fatigue      LAQ   LAQ alt with add sq x20       SAQ         LTR HEP hold       TrA marching HEP 20x 20x          Bosu up and overs x20                                                    TherAct         Patient education                                             Gait Training                                    Modalities         CP                  "

## 2024-09-13 NOTE — PROGRESS NOTES
09/13/24 1243   Discharge Planning   Home or Post Acute Services In home services  ([facility])   Type of Home Care Services Home nursing visits;Home OT;Home PT   Expected Discharge Disposition Home Health   Does the patient need discharge transport arranged? No       Patient will discharge to home today. Final HCO sent to [facility] to resume care. SOC confirmed for 24-72 hours after discharge. Family will provide transportation home.   ADT alert received. Covering RN CM reached out to patient. Pt taking Augmentin as directed. Took flexeril initially, however discomfort has subsided and not currently taking medication. Pt reports she did not experience much nausea. As a result has not taken any zofran. No additional questions or concerns. Note routed to Christiano Grossman RN, CM.

## 2024-09-17 ENCOUNTER — APPOINTMENT (OUTPATIENT)
Dept: PHYSICAL THERAPY | Facility: CLINIC | Age: 79
End: 2024-09-17
Payer: MEDICARE

## 2024-09-19 ENCOUNTER — APPOINTMENT (OUTPATIENT)
Dept: PHYSICAL THERAPY | Facility: CLINIC | Age: 79
End: 2024-09-19
Payer: MEDICARE

## 2024-09-24 ENCOUNTER — TELEPHONE (OUTPATIENT)
Dept: PAIN MEDICINE | Facility: CLINIC | Age: 79
End: 2024-09-24

## 2024-09-24 ENCOUNTER — APPOINTMENT (OUTPATIENT)
Dept: PHYSICAL THERAPY | Facility: CLINIC | Age: 79
End: 2024-09-24
Payer: MEDICARE

## 2024-09-25 ENCOUNTER — OFFICE VISIT (OUTPATIENT)
Dept: FAMILY MEDICINE CLINIC | Facility: CLINIC | Age: 79
End: 2024-09-25
Payer: MEDICARE

## 2024-09-25 VITALS
SYSTOLIC BLOOD PRESSURE: 110 MMHG | OXYGEN SATURATION: 98 % | DIASTOLIC BLOOD PRESSURE: 70 MMHG | HEIGHT: 66 IN | RESPIRATION RATE: 18 BRPM | HEART RATE: 73 BPM | WEIGHT: 171 LBS | BODY MASS INDEX: 27.48 KG/M2 | TEMPERATURE: 97.2 F

## 2024-09-25 DIAGNOSIS — N39.0 RECURRENT UTI: ICD-10-CM

## 2024-09-25 DIAGNOSIS — J45.31 MILD PERSISTENT ASTHMA WITH ACUTE EXACERBATION: Primary | ICD-10-CM

## 2024-09-25 DIAGNOSIS — E78.2 MIXED HYPERLIPIDEMIA: ICD-10-CM

## 2024-09-25 PROCEDURE — G2211 COMPLEX E/M VISIT ADD ON: HCPCS | Performed by: NURSE PRACTITIONER

## 2024-09-25 PROCEDURE — 99214 OFFICE O/P EST MOD 30 MIN: CPT | Performed by: NURSE PRACTITIONER

## 2024-09-25 RX ORDER — LEVOCETIRIZINE DIHYDROCHLORIDE 5 MG/1
5 TABLET, FILM COATED ORAL
Qty: 90 TABLET | Refills: 0 | Status: SHIPPED | OUTPATIENT
Start: 2024-09-25 | End: 2024-12-24

## 2024-09-25 RX ORDER — PREDNISONE 10 MG/1
TABLET ORAL
Qty: 20 TABLET | Refills: 0 | Status: SHIPPED | OUTPATIENT
Start: 2024-09-25 | End: 2024-10-05

## 2024-09-25 RX ORDER — IPRATROPIUM BROMIDE AND ALBUTEROL SULFATE 2.5; .5 MG/3ML; MG/3ML
3 SOLUTION RESPIRATORY (INHALATION) 4 TIMES DAILY
Qty: 100 ML | Refills: 0 | Status: SHIPPED | OUTPATIENT
Start: 2024-09-25

## 2024-09-25 NOTE — PATIENT INSTRUCTIONS
Adult Patient Counseling Points     Levocetirizine   What is this drug used for?   It is used to ease allergy signs.  It is used to treat hives.  All drugs may cause side effects. However, many people have no side effects or only have minor side effects. Call your doctor or get medical help if any of these side effects or any other side effects bother you or do not go away:   Loss of strength and energy  Fatigue  Stuffy nose  Sore throat  Nosebleed  Dry mouth  Cough  Vomiting  Constipation  Diarrhea  WARNING/CAUTION: Even though it may be rare, some people may have very bad and sometimes deadly side effects when taking a drug. Tell your doctor or get medical help right away if you have any of the following signs or symptoms that may be related to a very bad side effect:   Ear pain  Difficult urination  Signs of an allergic reaction, like rash; hives; itching; red, swollen, blistered, or peeling skin with or without fever; wheezing; tightness in the chest or throat; trouble breathing, swallowing, or talking; unusual hoarseness; or swelling of the mouth, face, lips, tongue, or throat.  Note: This is not a comprehensive list of all side effects. Talk to your doctor if you have questions.  Consumer Information Use and Disclaimer: This information should not be used to decide whether or not to take this medicine or any other medicine. Only the healthcare provider has the knowledge and training to decide which medicines are right for a specific patient. This information does not endorse any medicine as safe, effective, or approved for treating any patient or health condition. This is only a limited summary of general information about the medicine’s uses from the patient education leaflet and is not intended to be comprehensive. This limited summary does NOT include all information available about the possible uses, directions, warnings, precautions, interactions, adverse effects, or risks that may apply to this medicine.  This information is not intended to provide medical advice, diagnosis or treatment and does not replace information you receive from the healthcare provider. For a more detailed summary of information about the risks and benefits of using this medicine, please speak with your healthcare provider and review the entire patient education leaflet.  Copyright   © 2024 [employer] and its affiliates and/or licensors. All rights reserved.  Last Reviewed Date   2022-10-06  Adult Patient Counseling Points     Prednisone   What is this drug used for?   It is used for many health problems like allergy signs, asthma, adrenal gland problems, blood problems, skin rashes, or swelling problems. This is not a list of all health problems that this drug may be used for. Talk with the doctor.  All drugs may cause side effects. However, many people have no side effects or only have minor side effects. Call your doctor or get medical help if any of these side effects or any other side effects bother you or do not go away:   Upset stomach or throwing up  Trouble sleeping  Restlessness  Sweating a lot  Dizziness or headache  WARNING/CAUTION: Even though it may be rare, some people may have very bad and sometimes deadly side effects when taking a drug. Tell your doctor or get medical help right away if you have any of the following signs or symptoms that may be related to a very bad side effect:   Infection like fever, chills, flu-like signs, very bad sore throat, ear or sinus pain, cough, more sputum or change in color of sputum, pain with passing urine, mouth sores, or a wound that will not heal  Low potassium levels like muscle pain or weakness, muscle cramps, or a heartbeat that does not feel normal  Pancreas problem (pancreatitis) like very bad stomach pain, very bad back pain, or very bad upset stomach or throwing up  High or low blood pressure like very bad headache or dizziness, passing out, or change in eyesight  Weak adrenal  gland like a very bad upset stomach or throwing up, very bad dizziness or passing out, muscle weakness, feeling very tired, mood changes, not hungry, or weight loss  Cushing's disease like weight gain in the upper back or belly, moon face, very bad headache, or slow healing  High blood sugar like confusion, feeling sleepy, more thirst, more hungry, passing urine more often, flushing, fast breathing, or breath that smells like fruit  Shortness of breath, a big weight gain, or swelling in the arms or legs  Skin changes (pimples, stretch marks, slow healing, hair growth)  Fast, slow, or abnormal heartbeat  Chest pain or pressure  Swelling, warmth, numbness, change of color, or pain in a leg or arm  Period (menstrual) changes  Bone or joint pain  Feeling very tired or weak  Change in eyesight  Feeling confused, not able to focus, or change in behavior  Mood changes  Memory problems or loss  Hallucinations (seeing or hearing things that are not there)  Seizures  A burning, numbness, or tingling feeling that is not normal  Very bad belly pain  Any unexplained bruising or bleeding  Black, tarry, or bloody stools  Throwing up blood or throw up that looks like coffee grounds  Signs of an allergic reaction, like rash; hives; itching; red, swollen, blistered, or peeling skin with or without fever; wheezing; tightness in the chest or throat; trouble breathing, swallowing, or talking; unusual hoarseness; or swelling of the mouth, face, lips, tongue, or throat.  Note: This is not a comprehensive list of all side effects. Talk to your doctor if you have questions.  Consumer Information Use and Disclaimer: This information should not be used to decide whether or not to take this medicine or any other medicine. Only the healthcare provider has the knowledge and training to decide which medicines are right for a specific patient. This information does not endorse any medicine as safe, effective, or approved for treating any patient  or health condition. This is only a limited summary of general information about the medicine's uses from the patient education leaflet and is not intended to be comprehensive. This limited summary does NOT include all information available about the possible uses, directions, warnings, precautions, interactions, adverse effects, or risks that may apply to this medicine. This information is not intended to provide medical advice, diagnosis or treatment and does not replace information you receive from the healthcare provider. For a more detailed summary of information about the risks and benefits of using this medicine, please speak with your healthcare provider and review the entire patient education leaflet.  Copyright   © 2024 [employer] and its affiliates and/or licensors. All rights reserved.  Last Reviewed Date   2021-08-03

## 2024-09-25 NOTE — PROGRESS NOTES
"Assessment/Plan:    1. Mild persistent asthma with acute exacerbation  -     ipratropium-albuterol (DUO-NEB) 0.5-2.5 mg/3 mL nebulizer solution; Take 3 mL by nebulization 4 (four) times a day  -     predniSONE 10 mg tablet; 4 tabs x 2 days, 3 tabs x 2 days, 2 tabs x 2 days, 1 tab x 2 days  -     levocetirizine (XYZAL) 5 MG tablet; Take 1 tablet (5 mg total) by mouth daily at bedtime  -     Ambulatory Referral to Pulmonology; Future  2. Recurrent UTI  Assessment & Plan:  Managed by urologist  3. Mixed hyperlipidemia  Assessment & Plan:  On statin            Patient Instructions:  Take prednisone with food in morning and do not take any NSAID's while taking prednisone.  Supportive care discussed and advised.  Advised to RTO for any worsening and no improvement.   Follow up for no improvement and worsening of conditions.  Patient advised and educated when to see immediate medical care.    Return if symptoms worsen or fail to improve.      Future Appointments   Date Time Provider Department Center   9/26/2024 10:15 AM Doretha Bustos PT [place] [facility]   10/18/2024 10:00 AM Alex Allison DO [facility]   10/21/2024 11:15 AM Yanick Valdovinos PA-C [facility]   1/6/2025  3:00 PM Ally Reddy DO [facility]           Subjective:      Patient ID: Diane James is a 79 y.o. female.    Chief Complaint   Patient presents with    Cough     Started a few months ago  seems to be worse lately    [id number]         Vitals:  /70   Pulse 73   Temp (!) 97.2 °F (36.2 °C)   Resp 18   Ht 5' 6\" (1.676 m)   Wt 77.6 kg (171 lb)   SpO2 98%   BMI 27.60 kg/m²     Patient was seen in April for asthma issues and was started on Advair and did feel better but now from a month again having cough with chest tightness and sob at times. Denies any chest pain. Needs refill on duoneb. Not sure if having flare up due to weather changes.  Complaint with medications for chronic " illnesses      Cough  Associated symptoms include shortness of breath. Pertinent negatives include no chills, ear pain, fever, headaches, postnasal drip, rhinorrhea, sore throat or wheezing.               PHQ-2/9 Depression Screening    Little interest or pleasure in doing things: 0 - not at all  Feeling down, depressed, or hopeless: 0 - not at all  PHQ-2 Score: 0  PHQ-2 Interpretation: Negative depression screen             The following portions of the patient's history were reviewed and updated as appropriate: allergies, current medications, past family history, past medical history, past social history, past surgical history and problem list.      Review of Systems   Constitutional:  Negative for chills, diaphoresis, fatigue, fever and unexpected weight change.   HENT:  Negative for congestion, dental problem, drooling, ear discharge, ear pain, facial swelling, hearing loss, mouth sores, nosebleeds, postnasal drip, rhinorrhea, sinus pressure, sinus pain, sneezing, sore throat, tinnitus, trouble swallowing and voice change.    Respiratory:  Positive for cough, chest tightness and shortness of breath. Negative for wheezing.    Cardiovascular: Negative.    Gastrointestinal:  Negative for abdominal pain, constipation, diarrhea, nausea and vomiting.   Skin: Negative.    Neurological:  Negative for dizziness, light-headedness and headaches.         Objective:    Social History     Tobacco Use   Smoking Status Never    Passive exposure: Never   Smokeless Tobacco Never       Allergies:   Allergies   Allergen Reactions    Celecoxib Other (See Comments)     Other reaction(s): Palpitations  Category: Adverse Reaction;          Current Outpatient Medications   Medication Sig Dispense Refill    acetaminophen (TYLENOL) 500 mg tablet Take 500 mg by mouth 3 (three) times a day as needed        Cranberry 450 MG CAPS daily      cyanocobalamin (VITAMIN B-12) 1000 MCG tablet Take 1,000 mcg by mouth daily      Diclofenac Sodium  (VOLTAREN) 1 % Apply 2 g topically 4 (four) times a day      docusate sodium (COLACE) 100 mg capsule Take 100 mg by mouth 2 (two) times a day      estradiol (ESTRACE VAGINAL) 0.1 mg/g vaginal cream Insert 1 g into the vagina 3 (three) times a week 42.5 g 6    Fluticasone-Salmeterol (Advair Diskus) 250-50 mcg/dose inhaler Inhale 1 puff 2 (two) times a day Rinse mouth after use. 60 blister 5    furosemide (LASIX) 40 mg tablet TAKE 1 TABLET BY MOUTH EVERY DAY IN THE MORNING 100 tablet 1    ipratropium-albuterol (DUO-NEB) 0.5-2.5 mg/3 mL nebulizer solution Take 3 mL by nebulization 4 (four) times a day 100 mL 0    levocetirizine (XYZAL) 5 MG tablet Take 1 tablet (5 mg total) by mouth daily at bedtime 90 tablet 0    Magnesium Oxide -Mg Supplement 400 MG CAPS Take 1 capsule by mouth every morning        methenamine hippurate (HIPREX) 1 g tablet TAKE 1 TABLET BY MOUTH 2 TIMES A DAY WITH MEALS. 180 tablet 1    potassium chloride (Klor-Con) 10 mEq tablet Take 1 tablet (10 mEq total) by mouth in the morning 90 tablet 1    pravastatin (PRAVACHOL) 10 mg tablet Take 1 tablet (10 mg total) by mouth daily 90 tablet 3    predniSONE 10 mg tablet 4 tabs x 2 days, 3 tabs x 2 days, 2 tabs x 2 days, 1 tab x 2 days 20 tablet 0    senna (SENOKOT) 8.6 MG tablet Take 1 tablet by mouth as needed for constipation      albuterol (PROVENTIL HFA,VENTOLIN HFA) 90 mcg/act inhaler Inhale 2 puffs every 6 (six) hours as needed for wheezing or shortness of breath 8.5 g 3    benzonatate (TESSALON) 200 MG capsule Take 1 capsule (200 mg total) by mouth 3 (three) times a day as needed for cough 30 capsule 0     No current facility-administered medications for this visit.          Physical Exam  Constitutional:       Appearance: Normal appearance.   HENT:      Head: Normocephalic and atraumatic.      Nose: Nose normal.   Eyes:      Conjunctiva/sclera: Conjunctivae normal.   Cardiovascular:      Rate and Rhythm: Normal rate and regular rhythm.      Pulses:  Normal pulses.      Heart sounds: Normal heart sounds.   Pulmonary:      Effort: Pulmonary effort is normal.      Breath sounds: Wheezing present.   Skin:     General: Skin is warm and dry.      Findings: No rash.   Neurological:      Mental Status: She is alert and oriented to person, place, and time.   Psychiatric:         Mood and Affect: Mood normal.         Behavior: Behavior normal.         Thought Content: Thought content normal.         Judgment: Judgment normal.                     MATA Martínez

## 2024-09-26 ENCOUNTER — OFFICE VISIT (OUTPATIENT)
Dept: PHYSICAL THERAPY | Facility: CLINIC | Age: 79
End: 2024-09-26
Payer: MEDICARE

## 2024-09-26 DIAGNOSIS — M70.61 GREATER TROCHANTERIC BURSITIS OF RIGHT HIP: Primary | ICD-10-CM

## 2024-09-26 DIAGNOSIS — M25.551 PAIN IN RIGHT HIP: ICD-10-CM

## 2024-09-26 PROCEDURE — 97530 THERAPEUTIC ACTIVITIES: CPT

## 2024-09-26 NOTE — PROGRESS NOTES
"Daily Note -Discharge    Today's date: 2024  Patient name: Diane James  : 1945  MRN: [id number]  Referring provider: Alex Allison DO  Dx:   Encounter Diagnosis     ICD-10-CM    1. Greater trochanteric bursitis of right hip  M70.61       2. Pain in right hip  M25.551           Subjective: patient reports feeling \"mostly good\" since last visit- came back from vacation and notes the home had any flights of stairs. Notes her bilateral knees are very sore. The hip is \"so much better\" but she still feels like there is 10% that is \"still there\". She would like to be discharged today.       Objective: See treatment diary below      Assessment:     Goals  Short Term Goals (4 weeks):    - Patient will be independent in basic HEP 2-3 weeks   MET  - Patient will demonstrate knee ROM WNL for ease with gait.   MET  - Patient will have 0/10 pain at rest      MET  - Patient will demonstrate >1/3 improvement in MMT grade as applicable MET    Long Term Goals (8 weeks):  - Patient will be independent in a comprehensive home exercise program  MET  - Patient FOTO score will improve by 10 points.      MET  - Patient will independently ambulate >1000 feet (community ambulation)  MET  - Patient will self-report >75% improvement in function    MET  - Patient will be able to walk one mile on even terrain with dog with no more than 1/10 discomfort MET  - Patient will be able to lift <10lb box from ground to waist height with no pain    MET      LE MMT    Discharge 24   L Hip Flexion: 4+/5  R Hip Flexion: 4+/5 5/5   L Hip Abduction: 4/5 R Hip Abduction: 4/5 5/5   L Hip Adduction: 5/5 R Hip Adduction: 4+/5 5/5   L Knee Extension: 5/5 R Knee Extension: 5/5 5/5   L Knee Flexion: 5/5 R Knee Flexion: 5/5 5/5   L Ankle DF: 5/5 R Ankle DF: 4+/5 5/5   L Ankle PF: 4+/5  R Ankle PF: 4+/5 5/5     LE ROM      Discharge    L hip flexion: 120 degrees R hip flexion: 120 degrees 120 bilat no pain   L hip IR: 35 degrees  R hip IR: 35 " "degrees  35 bilat no pain   L hip ER: 45 degrees  R hip ER: 45 degrees  50 bilat  no pain   L hip abduction: 45 degrees  R hip abduction: 30 *pain degrees  50 bilat no pain       Tolerated treatment well. She is doing incredibly well post injection with very minimal pain in the R hip.       Plan: Continue per plan of care.      Insurance:  AMA/CMS Eval/ Re-eval POC expires FOTO Auth #/ Referral # Total     Start date  Expiration date Extension  Visit limitation?  PT only or  PT+OT? Co-Insurance   CMS Eval                                  AUTH #:  Date                 Authed:  Used                Remaining                     Precautions: standard  Past Medical History:   Diagnosis Date    Anesthesia complication     \"shivers\"    Arthritis     Asthma     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 see colors, with over-use goes darker- from untreated amblyopia    Breathing difficulty     with extubation pt developed laryngeal spasm    Cataract     both eyes- right eye needs surgery    Colon polyps 2016    Diverticulitis     had episode in April 2018    Edema     lower legs    Generalized osteoarthritis     GERD (gastroesophageal reflux disease)     Hearing problem     unspecified laterality-hearing aid right ear    High cholesterol     History of transfusion     autologus    Incontinence of urine     Polyarthritis     last assessed: 6/26/2015    Rheumatoid arthritis (HCC)     Sjogren's syndrome (HCC)     last assessed: 7/18/2016    SS-A antibody positive     last assessed: 8/20/2015    Urinary tract infection     UTI (urinary tract infection)        Date 9/3/2024 9/5/24 9/12/24 9/26/24     Visit Number IE 2 3 4 FOTO             Manual         HS piri glute stretching  ~10' VM  Re-eval for d/c         R hip mobs- inf and lat glide 4x20 each          Manual iliopsoas (modified bhavna) and STM ~10'      Neuro Re-ed         Hip burners          Clamshells  NV RTB 2x10  GTB 2x10      Bridge   NV 2x10       Quad set            Standing hip abd " "and ext 2x10 each Standing hip abd and ext 2x10 each      TherEx         NuStep warmup NV Nustep lvl2 7' def      Prone press ups 1x10   NV JEWEL- hold for NV def      DKTC with peanut   20x       APRIL ~5'        Squats   TRX squats 2x10       Step ups  6\" step up with knee  2x10          Leg press   2x10 at 55#  1x10 at 65# Leg press   3x10 at 65#      Side stepping with loop  NV       HR NV 20x       SLR    Unable/pain& fatigue      LAQ   LAQ alt with add sq x20       SAQ         LTR HEP hold       TrA marching HEP 20x 20x          Bosu up and overs x20                                                    TherAct         Patient education                                             Gait Training                                    Modalities         CP                    "

## 2024-10-01 ENCOUNTER — OFFICE VISIT (OUTPATIENT)
Dept: PULMONOLOGY | Facility: MEDICAL CENTER | Age: 79
End: 2024-10-01
Payer: MEDICARE

## 2024-10-01 VITALS
OXYGEN SATURATION: 97 % | TEMPERATURE: 98.2 F | RESPIRATION RATE: 12 BRPM | HEIGHT: 66 IN | SYSTOLIC BLOOD PRESSURE: 128 MMHG | WEIGHT: 167 LBS | HEART RATE: 68 BPM | DIASTOLIC BLOOD PRESSURE: 70 MMHG | BODY MASS INDEX: 26.84 KG/M2

## 2024-10-01 DIAGNOSIS — J45.31 MILD PERSISTENT ASTHMA WITH ACUTE EXACERBATION: Primary | ICD-10-CM

## 2024-10-01 PROCEDURE — 99214 OFFICE O/P EST MOD 30 MIN: CPT | Performed by: STUDENT IN AN ORGANIZED HEALTH CARE EDUCATION/TRAINING PROGRAM

## 2024-10-01 PROCEDURE — G2211 COMPLEX E/M VISIT ADD ON: HCPCS | Performed by: STUDENT IN AN ORGANIZED HEALTH CARE EDUCATION/TRAINING PROGRAM

## 2024-10-01 NOTE — PROGRESS NOTES
Pulmonary Outpatient Progress Note   Diane James 79 y.o. female MRN: [id number]  10/1/2024      Assessment:    Mild intermittent asthma now with persistent symptoms, previously was on ICS now has escalated to Advair 250 ICS/LABA with DuoNebs.  Now has a nagging cough not going away over the past 2 months.  Asthma seems relatively controlled.  Regarding her cough she denies any nasal symptoms doubt postnasal drip, chest x-ray is clear but she does have a history of RA and Sjogren's which could have ILD involvement.  She has occasional acid reflux which would be the next thing to treat.  Empiric trial of Pepcid or Tums to see if this alleviates her cough  History of Sjogren's disease  History of rheumatoid arthritis  History of right hip fracture status post arthroplasty    Plan:    Try Pepcid or Tums to see if this alleviates the cough  Continue Advair 1 puff twice daily  Continue rescue inhaler as needed  Continue DuoNebs as needed currently using twice a day  Follow-up in 4 to 5 months  Message me on [facility] regarding Pepcid  I have spent a total time of 32 minutes in caring for this patient on the day of the visit/encounter including Diagnostic results, Prognosis, Risks and benefits of tx options, Instructions for management, Patient and family education, Importance of tx compliance, Risk factor reductions, Impressions, Counseling / Coordination of care, Documenting in the medical record, Reviewing / ordering tests, medicine, procedures  , and Obtaining or reviewing history  .    History of Present Illness   HPI:    79-year-old female here for evaluation.  Past medical history of mild intermittent asthma, Sjogren's disease, rheumatoid arthritis along with a right displaced femoral neck fracture with a hemiarthroplasty in August 2023.    She has a history of asthma that was diagnosed many years ago currently on ICS LABA Advair, as needed albuterol, DuoNeb.  Triggers include perfume and Weathers.  Recently seen by  "primary care a week ago for mild asthma exacerbation placed on prednisone Anurag Gilmorezal and referred to pulmonary.  In April she was started on nebulizers along with azithromycin and steroids for asthma exacerbation.    Currently complaining of chronic cough going on for the past 2 months, her asthma symptoms appear relatively better with ICS/LABA.  No significant peripheral eosinophilia.  Says occasional seasonal triggers, no significant nocturnal cough or wheezing.  Has a new dog that she got in the past 2 months but generally has dogs in the past and she is not allergic to them.  No other pets at home.  Able to do most of her own activities but does occasionally get short of breath.    PFT is normal in May 2022  Echocardiogram May 2022 normal  Chest x-ray personally reviewed May 2024 normal  Labs reviewed peripheral eosinophilia 0.06    Review of Systems   Constitutional: Negative.    HENT:  Positive for congestion. Negative for nosebleeds, postnasal drip and rhinorrhea.    Eyes: Negative.    Respiratory:  Positive for cough and shortness of breath.    Cardiovascular: Negative.    Gastrointestinal: Negative.    Endocrine: Negative.    Genitourinary: Negative.    Musculoskeletal: Negative.    Skin: Negative.    Allergic/Immunologic: Positive for environmental allergies.   Neurological: Negative.    Hematological: Negative.    Psychiatric/Behavioral: Negative.          Historical Information   Past Medical History:   Diagnosis Date    Anesthesia complication     \"shivers\"    Arthritis     Asthma     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 see colors, with over-use goes darker- from untreated amblyopia    Breathing difficulty     with extubation pt developed laryngeal spasm    Cataract     both eyes- right eye needs surgery    Colon polyps 2016    Diverticulitis     had episode in April 2018    Edema     lower legs    Generalized osteoarthritis     GERD (gastroesophageal reflux disease)     Hearing problem     unspecified " laterality-hearing aid right ear    High cholesterol     History of transfusion     autologus    Incontinence of urine     Polyarthritis     last assessed: 6/26/2015    Rheumatoid arthritis (HCC)     Sjogren's syndrome (HCC)     last assessed: 7/18/2016    SS-A antibody positive     last assessed: 8/20/2015    Urinary tract infection     UTI (urinary tract infection)      Past Surgical History:   Procedure Laterality Date    APPENDECTOMY      AUGMENTATION BREAST Bilateral     breast surgery enlargement procedure elective bilateral    BILATERAL OOPHORECTOMY Bilateral 2003    BLADDER SURGERY      bladder lift,cystocele    CATARACT EXTRACTION      Right 9/2018; Left 10/2018    COLONOSCOPY  04/18/2011    COLPORRHAPHY  2014    anterior, repair of cystocele    HYSTERECTOMY  1673    with cystocele     JOINT REPLACEMENT Left 2016    knee    KNEE ARTHROPLASTY Left     KNEE ARTHROSCOPY  2009    KNEE SURGERY Left 2009    torn meniscus    OOPHORECTOMY      MA COLONOSCOPY FLX DX W/COLLJ SPEC WHEN PFRMD N/A 5/31/2018    Procedure: COLONOSCOPY;  Surgeon: Maria Victoria Pineda MD;  Location: [facility];  Service: Gastroenterology    MA HEMIARTHROPLASTY HIP PARTIAL Right 8/23/2023    Procedure: HEMIARTHROPLASTY HIP (BIPOLAR);  Surgeon: Lester Gray DO;  Location: [facility];  Service: Orthopedics    REDUCTION MAMMAPLASTY  2000    SACRAL NERVE STIMULATOR PLACEMENT N/A 1/31/2022    Procedure: IMPLANTATION NEUROSTIMULATOR ELECTRODE ARRAY SACRAL NERVE; INSERTION NEUROSTIMULATOR; FLUORO; ELECTONIC ANALYSIS;  Surgeon: Jamal Sepulveda MD;  Location: [facility];  Service: UroGynecology           TONSILLECTOMY AND ADENOIDECTOMY      URETHRA SURGERY       Family History   Problem Relation Age of Onset    Coronary artery disease Mother     COPD Mother     Hypertension Mother     Hyperlipidemia Mother         high cholesterol    Atrial fibrillation Mother     Coronary artery disease Father     Arthritis Father     COPD Father         Black  lung    Hypertension Father     Hyperlipidemia Father         high cholesterol    Lung cancer Brother     Coronary artery disease Brother     Hypertension Brother     Hyperlipidemia Brother         high cholesterol    Atrial fibrillation Brother         fib/flutter    Other Sister         pericarditis    Arthritis Sister     Diabetes Maternal Grandmother         mellitus    Breast cancer Maternal Grandmother 70    Breast cancer Paternal Grandmother 70    Arthritis Family     Asthma Family     Cancer Brother         lung    Heart disease Brother         CAD-stents-smoker    No Known Problems Maternal Aunt     No Known Problems Maternal Aunt     No Known Problems Maternal Aunt     No Known Problems Maternal Aunt     No Known Problems Maternal Aunt     Breast cancer Other 40     Social History     Tobacco Use   Smoking Status Never    Passive exposure: Never   Smokeless Tobacco Never       Occupational History: NA    Meds/Allergies     Current Outpatient Medications:     acetaminophen (TYLENOL) 500 mg tablet, Take 500 mg by mouth 3 (three) times a day as needed  , Disp: , Rfl:     Cranberry 450 MG CAPS, daily, Disp: , Rfl:     cyanocobalamin (VITAMIN B-12) 1000 MCG tablet, Take 1,000 mcg by mouth daily, Disp: , Rfl:     Diclofenac Sodium (VOLTAREN) 1 %, Apply 2 g topically 4 (four) times a day, Disp: , Rfl:     docusate sodium (COLACE) 100 mg capsule, Take 100 mg by mouth 2 (two) times a day, Disp: , Rfl:     estradiol (ESTRACE VAGINAL) 0.1 mg/g vaginal cream, Insert 1 g into the vagina 3 (three) times a week, Disp: 42.5 g, Rfl: 6    Fluticasone-Salmeterol (Advair Diskus) 250-50 mcg/dose inhaler, Inhale 1 puff 2 (two) times a day Rinse mouth after use., Disp: 60 blister, Rfl: 5    furosemide (LASIX) 40 mg tablet, TAKE 1 TABLET BY MOUTH EVERY DAY IN THE MORNING, Disp: 100 tablet, Rfl: 1    ipratropium-albuterol (DUO-NEB) 0.5-2.5 mg/3 mL nebulizer solution, Take 3 mL by nebulization 4 (four) times a day, Disp: 100 mL,  "Rfl: 0    levocetirizine (XYZAL) 5 MG tablet, Take 1 tablet (5 mg total) by mouth daily at bedtime, Disp: 90 tablet, Rfl: 0    Magnesium Oxide -Mg Supplement 400 MG CAPS, Take 1 capsule by mouth every morning  , Disp: , Rfl:     methenamine hippurate (HIPREX) 1 g tablet, TAKE 1 TABLET BY MOUTH 2 TIMES A DAY WITH MEALS., Disp: 180 tablet, Rfl: 1    potassium chloride (Klor-Con) 10 mEq tablet, Take 1 tablet (10 mEq total) by mouth in the morning, Disp: 90 tablet, Rfl: 1    pravastatin (PRAVACHOL) 10 mg tablet, Take 1 tablet (10 mg total) by mouth daily, Disp: 90 tablet, Rfl: 3    senna (SENOKOT) 8.6 MG tablet, Take 1 tablet by mouth as needed for constipation, Disp: , Rfl:     albuterol (PROVENTIL HFA,VENTOLIN HFA) 90 mcg/act inhaler, Inhale 2 puffs every 6 (six) hours as needed for wheezing or shortness of breath, Disp: 8.5 g, Rfl: 3    benzonatate (TESSALON) 200 MG capsule, Take 1 capsule (200 mg total) by mouth 3 (three) times a day as needed for cough, Disp: 30 capsule, Rfl: 0    predniSONE 10 mg tablet, 4 tabs x 2 days, 3 tabs x 2 days, 2 tabs x 2 days, 1 tab x 2 days (Patient not taking: Reported on 10/1/2024), Disp: 20 tablet, Rfl: 0  Allergies   Allergen Reactions    Celecoxib Other (See Comments)     Other reaction(s): Palpitations  Category: Adverse Reaction;        Vitals: Blood pressure 128/70, pulse 68, temperature 98.2 °F (36.8 °C), temperature source Temporal, resp. rate 12, height 5' 6\" (1.676 m), weight 75.8 kg (167 lb), SpO2 97%., Body mass index is 26.95 kg/m². Oxygen Therapy  SpO2: 97 %    Physical Exam:    GEN: alert and oriented x 3, pleasant and cooperative   HEENT:  Normocephalic, atraumatic, clear nares and ears   NECK: No JVD   HEART: Rate, normal S1 and S2  LUNGS: Clear to auscultation bilaterally; no wheezes, rales, or rhonchi; respiration nonlabored   ABDOMEN:  Normoactive bowel sounds, soft, no tenderness, no distention  EXTREMITIES: no edema  NEURO: no gross focal findings  SKIN:  Dry, " "intact, warm to touch    Labs: I have personally reviewed pertinent lab results.  P eos 0.06  No results found for: \"IGE\"    Imaging and other studies: Personally reviewed the following image studies in PACS and associated radiology reports: chest xray. My interpretation of the radiology images/reports is: CXR normal .    Pulmonary function testing:  Personally interpreted by me  PFT normal    Other Studies: No pertinent imaging studies reviewed.  PCP notes     Chris Zamora MD  Pulmonary and Critical Care Medicine     Answers submitted by the patient for this visit:  Pulmonology Questionnaire (Submitted on 9/27/2024)  Chief Complaint: Primary symptoms  Do you have chest tightness?: Yes  Do you experience frequent throat clearing?: Yes  Do you have a hoarse voice?: Yes  Chronicity: chronic  When did you first notice your symptoms?: more than 1 month ago  How often do your symptoms occur?: daily  Since you first noticed this problem, how has it changed?: gradually worsening  Do you have shortness of breath that occurs with effort or exertion?: Yes  Do you have fatigue?: Yes  Have you experienced weight loss?: Yes  Which of the following makes your symptoms better?: steroid inhaler    "

## 2024-10-01 NOTE — PATIENT INSTRUCTIONS
It was a pleasure seeing you today!    Use Advair 1 puff twice a day  Use rescue inhaler or nebulizer as needed  Try Tums or Pepcid  Send a message on MyChart in a week or 2  Follow up in 4-5 months     Chris Zamora MD  Pulmonary and Critical Care Medicine

## 2024-10-18 ENCOUNTER — OFFICE VISIT (OUTPATIENT)
Dept: OBGYN CLINIC | Facility: CLINIC | Age: 79
End: 2024-10-18
Payer: MEDICARE

## 2024-10-18 VITALS
HEART RATE: 71 BPM | SYSTOLIC BLOOD PRESSURE: 116 MMHG | DIASTOLIC BLOOD PRESSURE: 75 MMHG | WEIGHT: 167 LBS | BODY MASS INDEX: 26.84 KG/M2 | HEIGHT: 66 IN

## 2024-10-18 DIAGNOSIS — Z96.641 HISTORY OF RIGHT HIP HEMIARTHROPLASTY: ICD-10-CM

## 2024-10-18 DIAGNOSIS — M70.71 ILIOPSOAS BURSITIS OF RIGHT HIP: ICD-10-CM

## 2024-10-18 DIAGNOSIS — M25.551 PAIN IN RIGHT HIP: ICD-10-CM

## 2024-10-18 DIAGNOSIS — M70.61 GREATER TROCHANTERIC BURSITIS OF RIGHT HIP: Primary | ICD-10-CM

## 2024-10-18 PROCEDURE — 99213 OFFICE O/P EST LOW 20 MIN: CPT | Performed by: ORTHOPAEDIC SURGERY

## 2024-10-18 NOTE — PROGRESS NOTES
Assessment/Plan:  1. Greater trochanteric bursitis of right hip        2. Iliopsoas bursitis of right hip        3. History of right hip hemiarthroplasty        4. Pain in right hip          Scribe Attestation      I,:  Deo Martino am acting as a scribe while in the presence of the attending physician.:       I,:  Alex Allison, DO personally performed the services described in this documentation    as scribed in my presence.:           Diane is a pleasant 79-year-old female who presents today for follow-up evaluation for her right hip.  I am pleased with the improvement she has experienced with physical therapy and iliopsoas bursa injection.  We discussed her residual pain today which appears consistent with greater trochanteric bursitis.  I recommended that she continue with her home exercise program and use of Voltaren gel.  She may increase the frequency of her use of Voltaren to 3-4 times daily.  She will continue with activity to her tolerance.  We will plan to see her back on an as-needed basis.    Subjective: Follow-up evaluation for right hip    Patient ID: Diane James is a 79 y.o. female who presents today for follow-up evaluation for her right hip.  At her last visit, she was referred to physical therapy and was also referred for an iliopsoas bursa injection with Dr. Guevara.  At today's visit, she reports significant improvement with physical therapy and the injection.  Her symptoms have been relieved by 90%.  She does complain of residual pain about her lateral hip that bothers her mostly at night.  She has been using Voltaren gel for this with some benefit.  She denies any new injury or trauma.    Review of Systems   Constitutional:  Positive for activity change. Negative for chills, fever and unexpected weight change.   HENT:  Negative for hearing loss, nosebleeds and sore throat.    Eyes:  Negative for pain, redness and visual disturbance.   Respiratory:  Negative for cough, shortness of breath  "and wheezing.    Cardiovascular:  Negative for chest pain, palpitations and leg swelling.   Gastrointestinal:  Negative for abdominal pain, nausea and vomiting.   Endocrine: Negative for polydipsia and polyuria.   Genitourinary:  Negative for dysuria and hematuria.   Musculoskeletal:  Negative for arthralgias, joint swelling and myalgias.        See HPI   Skin:  Negative for rash and wound.   Neurological:  Negative for dizziness, numbness and headaches.   Psychiatric/Behavioral:  Negative for decreased concentration and suicidal ideas. The patient is not nervous/anxious.          Past Medical History:   Diagnosis Date    Anesthesia complication     \"shivers\"    Arthritis     Asthma     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 see colors, with over-use goes darker- from untreated amblyopia    Breathing difficulty     with extubation pt developed laryngeal spasm    Cataract     both eyes- right eye needs surgery    Colon polyps 2016    Diverticulitis     had episode in April 2018    Edema     lower legs    Generalized osteoarthritis     GERD (gastroesophageal reflux disease)     Hearing problem     unspecified laterality-hearing aid right ear    High cholesterol     History of transfusion     autologus    Incontinence of urine     Polyarthritis     last assessed: 6/26/2015    Rheumatoid arthritis (HCC)     Sjogren's syndrome (HCC)     last assessed: 7/18/2016    SS-A antibody positive     last assessed: 8/20/2015    Urinary tract infection     UTI (urinary tract infection)        Past Surgical History:   Procedure Laterality Date    APPENDECTOMY      AUGMENTATION BREAST Bilateral     breast surgery enlargement procedure elective bilateral    BILATERAL OOPHORECTOMY Bilateral 2003    BLADDER SURGERY      bladder lift,cystocele    CATARACT EXTRACTION      Right 9/2018; Left 10/2018    COLONOSCOPY  04/18/2011    COLPORRHAPHY  2014    anterior, repair of cystocele    HYSTERECTOMY  1673    with cystocele     JOINT REPLACEMENT Left 2016    " knee    KNEE ARTHROPLASTY Left     KNEE ARTHROSCOPY  2009    KNEE SURGERY Left 2009    torn meniscus    OOPHORECTOMY      LA COLONOSCOPY FLX DX W/COLLJ SPEC WHEN PFRMD N/A 5/31/2018    Procedure: COLONOSCOPY;  Surgeon: Maria Victoria Pineda MD;  Location: [facility];  Service: Gastroenterology    LA HEMIARTHROPLASTY HIP PARTIAL Right 8/23/2023    Procedure: HEMIARTHROPLASTY HIP (BIPOLAR);  Surgeon: Lester Gray DO;  Location: [facility];  Service: Orthopedics    REDUCTION MAMMAPLASTY  2000    SACRAL NERVE STIMULATOR PLACEMENT N/A 1/31/2022    Procedure: IMPLANTATION NEUROSTIMULATOR ELECTRODE ARRAY SACRAL NERVE; INSERTION NEUROSTIMULATOR; FLUORO; ELECTONIC ANALYSIS;  Surgeon: Jamal Sepulveda MD;  Location: [facility];  Service: UroGynecology           TONSILLECTOMY AND ADENOIDECTOMY      URETHRA SURGERY         Family History   Problem Relation Age of Onset    Coronary artery disease Mother     COPD Mother     Hypertension Mother     Hyperlipidemia Mother         high cholesterol    Atrial fibrillation Mother     Coronary artery disease Father     Arthritis Father     COPD Father         Black lung    Hypertension Father     Hyperlipidemia Father         high cholesterol    Lung cancer Brother     Coronary artery disease Brother     Hypertension Brother     Hyperlipidemia Brother         high cholesterol    Atrial fibrillation Brother         fib/flutter    Other Sister         pericarditis    Arthritis Sister     Diabetes Maternal Grandmother         mellitus    Breast cancer Maternal Grandmother 70    Breast cancer Paternal Grandmother 70    Arthritis Family     Asthma Family     Cancer Brother         lung    Heart disease Brother         CAD-stents-smoker    No Known Problems Maternal Aunt     No Known Problems Maternal Aunt     No Known Problems Maternal Aunt     No Known Problems Maternal Aunt     No Known Problems Maternal Aunt     Breast cancer Other 40       Social History     Occupational History     Occupation: retired   Tobacco Use    Smoking status: Never     Passive exposure: Never    Smokeless tobacco: Never   Vaping Use    Vaping status: Never Used   Substance and Sexual Activity    Alcohol use: Not Currently    Drug use: No    Sexual activity: Not Currently         Current Outpatient Medications:     acetaminophen (TYLENOL) 500 mg tablet, Take 500 mg by mouth 3 (three) times a day as needed  , Disp: , Rfl:     Cranberry 450 MG CAPS, daily, Disp: , Rfl:     cyanocobalamin (VITAMIN B-12) 1000 MCG tablet, Take 1,000 mcg by mouth daily, Disp: , Rfl:     Diclofenac Sodium (VOLTAREN) 1 %, Apply 2 g topically 4 (four) times a day, Disp: , Rfl:     docusate sodium (COLACE) 100 mg capsule, Take 100 mg by mouth 2 (two) times a day, Disp: , Rfl:     estradiol (ESTRACE VAGINAL) 0.1 mg/g vaginal cream, Insert 1 g into the vagina 3 (three) times a week, Disp: 42.5 g, Rfl: 6    Fluticasone-Salmeterol (Advair Diskus) 250-50 mcg/dose inhaler, Inhale 1 puff 2 (two) times a day Rinse mouth after use., Disp: 60 blister, Rfl: 5    furosemide (LASIX) 40 mg tablet, TAKE 1 TABLET BY MOUTH EVERY DAY IN THE MORNING, Disp: 100 tablet, Rfl: 1    ipratropium-albuterol (DUO-NEB) 0.5-2.5 mg/3 mL nebulizer solution, Take 3 mL by nebulization 4 (four) times a day, Disp: 100 mL, Rfl: 0    levocetirizine (XYZAL) 5 MG tablet, Take 1 tablet (5 mg total) by mouth daily at bedtime, Disp: 90 tablet, Rfl: 0    Magnesium Oxide -Mg Supplement 400 MG CAPS, Take 1 capsule by mouth every morning  , Disp: , Rfl:     methenamine hippurate (HIPREX) 1 g tablet, TAKE 1 TABLET BY MOUTH 2 TIMES A DAY WITH MEALS., Disp: 180 tablet, Rfl: 1    potassium chloride (Klor-Con) 10 mEq tablet, Take 1 tablet (10 mEq total) by mouth in the morning, Disp: 90 tablet, Rfl: 1    pravastatin (PRAVACHOL) 10 mg tablet, Take 1 tablet (10 mg total) by mouth daily, Disp: 90 tablet, Rfl: 3    senna (SENOKOT) 8.6 MG tablet, Take 1 tablet by mouth as needed for constipation, Disp:  , Rfl:     albuterol (PROVENTIL HFA,VENTOLIN HFA) 90 mcg/act inhaler, Inhale 2 puffs every 6 (six) hours as needed for wheezing or shortness of breath, Disp: 8.5 g, Rfl: 3    benzonatate (TESSALON) 200 MG capsule, Take 1 capsule (200 mg total) by mouth 3 (three) times a day as needed for cough, Disp: 30 capsule, Rfl: 0    Allergies   Allergen Reactions    Celecoxib Other (See Comments)     Other reaction(s): Palpitations  Category: Adverse Reaction;        Objective:  Vitals:    10/18/24 1002   BP: 116/75   Pulse: 71       Body mass index is 26.95 kg/m².    Right Hip Exam     Tenderness   The patient is experiencing tenderness in the greater trochanter and lateral.    Range of Motion   Abduction:  normal   Adduction:  normal   Extension:  normal   Flexion:  normal   External rotation:  normal   Internal rotation:  normal     Muscle Strength   Abduction: 5/5   Adduction: 5/5   Flexion: 5/5     Tests   ROSENDO: negative    Other   Erythema: absent  Scars: present  Sensation: normal  Pulse: present    Comments:  Well healed posterolateral scar  Mobilizes with UE assistance with hip flexion   Stinchfield: Negative              Physical Exam  Vitals and nursing note reviewed.   Constitutional:       Appearance: Normal appearance. She is well-developed.   HENT:      Head: Normocephalic and atraumatic.      Right Ear: External ear normal.      Left Ear: External ear normal.   Eyes:      General: No scleral icterus.     Extraocular Movements: Extraocular movements intact.      Conjunctiva/sclera: Conjunctivae normal.   Cardiovascular:      Rate and Rhythm: Normal rate.   Pulmonary:      Effort: Pulmonary effort is normal. No respiratory distress.   Musculoskeletal:      Cervical back: Normal range of motion and neck supple.      Comments: See Ortho exam   Skin:     General: Skin is warm and dry.   Neurological:      General: No focal deficit present.      Mental Status: She is alert and oriented to person, place, and time.    Psychiatric:         Behavior: Behavior normal.           This document was created using speech voice recognition software.   Grammatical errors, random word insertions, pronoun errors, and incomplete sentences are an occasional consequence of this system due to software limitations, ambient noise, and hardware issues.   Any formal questions or concerns about content, text, or information contained within the body of this dictation should be directly addressed to the provider for clarification.

## 2024-10-25 PROBLEM — N39.0 RECURRENT UTI: Status: RESOLVED | Noted: 2024-09-25 | Resolved: 2024-10-25

## 2024-11-05 ENCOUNTER — OFFICE VISIT (OUTPATIENT)
Dept: UROLOGY | Facility: CLINIC | Age: 79
End: 2024-11-05

## 2024-11-05 VITALS
WEIGHT: 165 LBS | BODY MASS INDEX: 26.52 KG/M2 | SYSTOLIC BLOOD PRESSURE: 130 MMHG | OXYGEN SATURATION: 97 % | HEART RATE: 101 BPM | HEIGHT: 66 IN | DIASTOLIC BLOOD PRESSURE: 80 MMHG

## 2024-11-05 DIAGNOSIS — R33.9 INCOMPLETE BLADDER EMPTYING: ICD-10-CM

## 2024-11-05 DIAGNOSIS — N95.2 VAGINAL ATROPHY: ICD-10-CM

## 2024-11-05 DIAGNOSIS — N39.0 RECURRENT UTI: ICD-10-CM

## 2024-11-05 DIAGNOSIS — N32.81 OAB (OVERACTIVE BLADDER): Primary | ICD-10-CM

## 2024-11-05 RX ORDER — METHENAMINE HIPPURATE 1000 MG/1
1 TABLET ORAL 2 TIMES DAILY WITH MEALS
Qty: 180 TABLET | Refills: 1 | Status: SHIPPED | OUTPATIENT
Start: 2024-11-05

## 2024-11-05 RX ORDER — ESTRADIOL 0.1 MG/G
1 CREAM VAGINAL 3 TIMES WEEKLY
Qty: 42.5 G | Refills: 6 | Status: SHIPPED | OUTPATIENT
Start: 2024-11-06

## 2024-11-05 RX ORDER — NITROFURANTOIN 25; 75 MG/1; MG/1
100 CAPSULE ORAL 2 TIMES DAILY
Qty: 10 CAPSULE | Refills: 2 | Status: SHIPPED | OUTPATIENT
Start: 2024-11-05

## 2024-11-05 NOTE — PROGRESS NOTES
"Diane James is a(n) 79 y.o. female. , :  1945    Subjective     Assessment:  Diagnoses of Vaginal atrophy and Recurrent UTI were pertinent to this visit.  UTI  Recurrent E coli UTI on Premarin, hiprex + vit C, and nitrofurantion PRN    Vaginal atrophy  Vaginal atrophy on Premarin     Incomplete bladder emptying  Incomplete bladder emptying no longer performing CIC for years   Currently with María Young           Plan:  F/u 6 months to check on UA/PVR   Continue the hipprex 1gm BID and vit C daily  Continue the premarin cream TIW  Increase water consumption to help with UTI and any possible constipation   Use nitrofurantion PRN if has UTI   Continue f/u with Dr. Young for her Interstim      Radiology  23 CT ap W CTS  KIDNEYS/URETERS:  No hydronephrosis or urinary tract calculus.  One or more sharply circumscribed subcentimeter renal hypodensities are present, too small to accurately characterize, and statistically most likely benign findings. According to recent   literature (Radiology 2019) no further workup of these findings is recommended  REPRODUCTIVE ORGANS:  Surgical changes of prior hysterectomy.     URINARY BLADDER:  Unremarkable.     History  Recurrent E coli  UTI  Premarin, hiprex + vit C, and nitrofurantion PRN  Atrophic vaginitis   Incomplete bladder emptying.  Prior CIC and Interstim with Dr. Sepulveda   OAB tx with Interstim     Past  surgical history   Anterior colporrhaphy  Interstim  Dr. Sepulveda with urogynecology -underwent recent interstim placement in early .         2024 OV Nicholas Parsonser  80 y/o female with rheumatoid arthritis, GERD, diverticulosis, rosacea, Sjogren's, asthma, cataract, incomplete bladder emptying with prior CIC and Interstim  Dr. Sepulveda, recurrent UTI and vagina atrophy hx tx with Premarin, hiprex + vit C, and nitrofurantion PRN    Review of Systems    No results found for: \"PSA\"  No results found for: \"TESTOSTERONE\"  No components found " "for: \"CR\"  No results found for: \"HBA1C\"    Objective     /80 (BP Location: Left arm, Patient Position: Sitting, Cuff Size: Standard)   Pulse 101   Ht 5' 6\" (1.676 m)   Wt 74.8 kg (165 lb)   SpO2 97%   BMI 26.63 kg/m²     Physical Exam  Cardiovascular:      Rate and Rhythm: Normal rate.   Pulmonary:      Effort: Pulmonary effort is normal.   Abdominal:      General: Abdomen is flat.   Genitourinary:     Comments: No urethral caruncle noted  No vaginal bleeding or excoriation noted  No vaginal discharge  Whitish vaginal atrophy noted in labia and vaginal introitus   Skin:     General: Skin is warm.   Neurological:      Mental Status: She is alert.   Psychiatric:         Mood and Affect: Mood normal.           [facility]  "

## 2024-11-26 ENCOUNTER — OFFICE VISIT (OUTPATIENT)
Dept: AUDIOLOGY | Facility: CLINIC | Age: 79
End: 2024-11-26
Payer: MEDICARE

## 2024-11-26 ENCOUNTER — OFFICE VISIT (OUTPATIENT)
Dept: AUDIOLOGY | Facility: CLINIC | Age: 79
End: 2024-11-26
Payer: COMMERCIAL

## 2024-11-26 DIAGNOSIS — H90.A31 MIXED CONDUCTIVE AND SENSORINEURAL HEARING LOSS OF RIGHT EAR WITH RESTRICTED HEARING OF LEFT EAR: Primary | ICD-10-CM

## 2024-11-26 PROCEDURE — 92557 COMPREHENSIVE HEARING TEST: CPT | Performed by: AUDIOLOGIST

## 2024-11-27 NOTE — PROGRESS NOTES
"Diagnostic Hearing Evaluation    Name:  Diane James  :  1945  Age:  79 y.o.   MRN:  [id number]  Date of Evaluation: 2024     HISTORY:     Reason for visit:  Asymmetric hearing loss, last tested in      Diane James is being seen today at the request of Dr. Reddy for a follow up  evaluation of hearing. The patient reports possible decrease of hearing. The patient  denies otalgia, otorrhea, noise exposure, and notes a positive history of dizziness and tinnitus on occasion \"with illness\".        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   The right ear has decreased by 15 dBHL at 4000 and 6000 Hz and 10 dBHL at 8000 Hz.   Word recognition scores are stable.      RECOMMENDATIONS:  Follow up with PCP or ENT  Annual hearing evaluations for monitoring purposes     PATIENT EDUCATION:   The results of today's results and recommendations were reviewed with the patient and her hearing thresholds were explained at length. Treatment options, including amplification and communication strategies, were discussed as appropriate. The patient voiced understanding of her test results. Questions were addressed and the patient was encouraged to contact our department should concerns arise.    Stanislav Reyes, CCC-A  Clinical Audiologist  [id number]  [facility]" [facility] AUDIOLOGY  [address] [address]

## 2024-11-27 NOTE — PROGRESS NOTES
Hearing Aid Visit:    Name:  Diane James  :  1945  Age:  79 y.o.  MRN:  [id number]  Date of Evaluation: 2024    HISTORY:    Diane James was seen today (2024) for a(n) out-of-warranty hearing aid check of her monaural hearing aid, RIGHT in conjunction with a hearing evaluation.  Today, Diane reports wearing the device more often.   Her daughter is reporting that she is not hearing with it.       Most recent Audiogram: 24    DEVICE INFORMATION:     Diane James is fit with [employer] Z series i110 JONATAN hearing aid for the right ear.   Right serial number [id number]. Left serial number N/A.   Warranty date: OOW (Loss/Damage and repair).      ACTION/ADJUSTMENTS:  Added a 60 gain (# 2 )  to reprogram with better gain range.    Changed to newer 7 mm occluded dome / provided 6 mm also to see which felt better   Reprogrammed to today's audiogram / patient could hear more and clearer     RECOMMENDATIONS:   RTO 2025 for HA service   Patient advised to call if any fine tuning is needed with new settings     Stanislav Reyes, CCC-A  Clinical Audiologist  [id number]  [facility] PROFESSIONAL [facility] AUDIOLOGY  [address] [address]

## 2024-12-11 NOTE — PERIOPERATIVE NURSING NOTE
Patient's FSGs are uncontrolled due to hyperglycemia on current medication regimen.  Last A1c reviewed-   Lab Results   Component Value Date    HGBA1C 6.6 (H) 09/20/2024     Most recent fingerstick glucose reviewed-   Recent Labs   Lab 12/10/24  1711 12/10/24  2030 12/11/24  0751 12/11/24  1112   POCTGLUCOSE 155* 200* 85 194*       Current correctional scale  Low  Maintain anti-hyperglycemic dose as follows-   Antihyperglycemics (From admission, onward)    Start     Stop Route Frequency Ordered    12/09/24 2100  insulin glargine U-100 (Lantus) pen 15 Units         -- SubQ Nightly 12/08/24 2349    12/09/24 0054  insulin aspart U-100 pen 0-5 Units         -- SubQ Before meals & nightly PRN 12/08/24 2355        Hold Oral hypoglycemics while patient is in the hospital.   Post-Op processes and procedures were explained and all related patient and family questions were answered  Discharge instructions were given and all related patient and family questions were answered   Po fluids given, patient tolerated fluids well

## 2024-12-23 ENCOUNTER — RA CDI HCC (OUTPATIENT)
Dept: OTHER | Facility: HOSPITAL | Age: 79
End: 2024-12-23

## 2024-12-31 DIAGNOSIS — R60.9 EDEMA, UNSPECIFIED TYPE: ICD-10-CM

## 2024-12-31 RX ORDER — POTASSIUM CHLORIDE 750 MG/1
10 TABLET, EXTENDED RELEASE ORAL DAILY
Qty: 90 TABLET | Refills: 1 | Status: SHIPPED | OUTPATIENT
Start: 2024-12-31

## 2024-12-31 RX ORDER — FUROSEMIDE 40 MG/1
40 TABLET ORAL EVERY MORNING
Qty: 90 TABLET | Refills: 1 | Status: SHIPPED | OUTPATIENT
Start: 2024-12-31

## 2025-01-08 NOTE — TELEPHONE ENCOUNTER
I spoke with pt regarding her ED visit yesterday 4/15/18  She states she is feeling terrible  She asked what pain medication she can take and I advised her to discuss at her ED follow up appt today with Chipper Lennox  She didn't want to take Tylenol with Codeine that she has because she states she hasn't had a BM in four days  ED visit documented in ED log 
Refill approved per protocol and e prescribe to pharmacy.  
I acted within this role throughout the entirety of the procedure performed by the primary surgeon

## 2025-01-17 ENCOUNTER — TELEPHONE (OUTPATIENT)
Dept: PULMONOLOGY | Facility: MEDICAL CENTER | Age: 80
End: 2025-01-17

## 2025-01-17 NOTE — TELEPHONE ENCOUNTER
RAGHAVENDRA for patient to call office back to schedule follow up appointment . Patient is due for 3m follow up appointment with Dr. Zamora for February.

## 2025-02-03 ENCOUNTER — OFFICE VISIT (OUTPATIENT)
Dept: FAMILY MEDICINE CLINIC | Facility: CLINIC | Age: 80
End: 2025-02-03
Payer: MEDICARE

## 2025-02-03 VITALS
SYSTOLIC BLOOD PRESSURE: 126 MMHG | BODY MASS INDEX: 26.2 KG/M2 | RESPIRATION RATE: 18 BRPM | WEIGHT: 163 LBS | HEIGHT: 66 IN | TEMPERATURE: 98 F | HEART RATE: 78 BPM | DIASTOLIC BLOOD PRESSURE: 84 MMHG

## 2025-02-03 DIAGNOSIS — E78.2 MIXED HYPERLIPIDEMIA: ICD-10-CM

## 2025-02-03 DIAGNOSIS — R73.01 IMPAIRED FASTING GLUCOSE: ICD-10-CM

## 2025-02-03 DIAGNOSIS — J45.30 MILD PERSISTENT ASTHMA WITHOUT COMPLICATION: ICD-10-CM

## 2025-02-03 DIAGNOSIS — Z78.0 POSTMENOPAUSAL: ICD-10-CM

## 2025-02-03 DIAGNOSIS — Z13.0 SCREENING FOR DEFICIENCY ANEMIA: ICD-10-CM

## 2025-02-03 DIAGNOSIS — Z00.00 MEDICARE ANNUAL WELLNESS VISIT, SUBSEQUENT: Primary | ICD-10-CM

## 2025-02-03 DIAGNOSIS — M05.20 RHEUMATOID ARTERITIS (HCC): ICD-10-CM

## 2025-02-03 PROBLEM — R39.9 UTI SYMPTOMS: Status: RESOLVED | Noted: 2021-01-30 | Resolved: 2025-02-03

## 2025-02-03 PROCEDURE — 99214 OFFICE O/P EST MOD 30 MIN: CPT | Performed by: FAMILY MEDICINE

## 2025-02-03 PROCEDURE — G0439 PPPS, SUBSEQ VISIT: HCPCS | Performed by: FAMILY MEDICINE

## 2025-02-03 PROCEDURE — G2211 COMPLEX E/M VISIT ADD ON: HCPCS | Performed by: FAMILY MEDICINE

## 2025-02-03 RX ORDER — FLUTICASONE PROPIONATE 220 UG/1
2 AEROSOL, METERED RESPIRATORY (INHALATION) 2 TIMES DAILY
COMMUNITY

## 2025-02-03 NOTE — PROGRESS NOTES
Name: Diane James      : 1945      MRN: [id number]  Encounter Provider: Ally Reddy DO  Encounter Date: 2/3/2025   Encounter department: [facility]    Assessment & Plan  Medicare annual wellness visit, subsequent  RSV vaccine recommended        Rheumatoid arteritis (HCC)  Stable   Orders:  •  CBC; Future    Mild persistent asthma without complication  Stable   Continue flovent 220 mcg twice daily        Impaired fasting glucose  Stable   Has lost weight  Orders:  •  Hemoglobin A1C; Future    Mixed hyperlipidemia  Has lost weight and eating better  Will stop pravastatin and recheck labs in 6 months   She doesn't think at her age she is getting much benefit from the medication   Orders:  •  Comprehensive metabolic panel; Future  •  Lipid Panel with Direct LDL reflex; Future    Screening for deficiency anemia    Orders:  •  CBC; Future    Postmenopausal    Orders:  •  DXA bone density spine hip and pelvis; Future       Return in about 6 months (around 8/3/2025) for Next scheduled follow up.    Preventive health issues were discussed with patient, and age appropriate screening tests were ordered as noted in patient's After Visit Summary. Personalized health advice and appropriate referrals for health education or preventive services given if needed, as noted in patient's After Visit Summary.    History of Present Illness     She has been eating better and exercising. She has lost weight since her  has passed.        Patient Care Team:  Ally Reddy DO as PCP - General  DO Ally Hurst DO Navtej Brar, DO Loveleen K Sidhu, MD as Endoscopist    Review of Systems  Medical History Reviewed by provider this encounter:  Tobacco  Allergies  Meds  Problems  Med Hx  Surg Hx  Fam Hx       Annual Wellness Visit Questionnaire   Diane is here for her Subsequent Wellness visit.     Health Risk Assessment:   Patient rates overall health as very good. Patient feels that their  physical health rating is same. Patient is satisfied with their life. Eyesight was rated as same. Hearing was rated as same. Patient feels that their emotional and mental health rating is same. Patients states they are never, rarely angry. Patient states they are sometimes unusually tired/fatigued. Pain experienced in the last 7 days has been some. Patient's pain rating has been 2/10. Patient states that she has experienced weight loss or gain in last 6 months.     Fall Risk Screening:   In the past year, patient has experienced: no history of falling in past year      Urinary Incontinence Screening:   Patient has not leaked urine accidently in the last six months.     Home Safety:  Patient does not have trouble with stairs inside or outside of their home. Patient has working smoke alarms and has working carbon monoxide detector. Home safety hazards include: none.     Nutrition:   Current diet is Low Cholesterol, Low Saturated Fat, Low Carb and No Added Salt.     Medications:   Patient is currently taking over-the-counter supplements. OTC medications include: see medication list. Patient is able to manage medications.     Activities of Daily Living (ADLs)/Instrumental Activities of Daily Living (IADLs):   Walk and transfer into and out of bed and chair?: Yes  Dress and groom yourself?: Yes    Bathe or shower yourself?: Yes    Feed yourself? Yes  Do your laundry/housekeeping?: Yes  Manage your money, pay your bills and track your expenses?: Yes  Make your own meals?: Yes    Do your own shopping?: Yes    Previous Hospitalizations:   Any hospitalizations or ED visits within the last 12 months?: No      Advance Care Planning:   Living will: Yes    Durable POA for healthcare: Yes    Advanced directive: Yes    Advanced directive counseling given: Yes    End of Life Decisions reviewed with patient: Yes    Provider agrees with end of life decisions: Yes      Cognitive Screening:   Provider or family/friend/caregiver  concerned regarding cognition?: No    PREVENTIVE SCREENINGS      Cardiovascular Screening:    General: Screening Not Indicated and History Lipid Disorder      Diabetes Screening:     General: Screening Current      Colorectal Cancer Screening:     General: Screening Not Indicated      Breast Cancer Screening:     General: Screening Current      Cervical Cancer Screening:    General: Screening Not Indicated      Osteoporosis Screening:    General: Risks and Benefits Discussed    Due for: DXA Axial      Abdominal Aortic Aneurysm (AAA) Screening:        General: Screening Not Indicated      Lung Cancer Screening:     General: Screening Not Indicated      Hepatitis C Screening:    General: Screening Current    Screening, Brief Intervention, and Referral to Treatment (SBIRT)    Screening  Typical number of drinks in a day: 0  Typical number of drinks in a week: 0  Interpretation: Low risk drinking behavior.    AUDIT-C Screenin) How often did you have a drink containing alcohol in the past year? never  2) How many drinks did you have on a typical day when you were drinking in the past year? 0  3) How often did you have 6 or more drinks on one occasion in the past year? never    AUDIT-C Score: 0  Interpretation: Score 0-2 (female): Negative screen for alcohol misuse    Single Item Drug Screening:  How often have you used an illegal drug (including marijuana) or a prescription medication for non-medical reasons in the past year? never    Single Item Drug Screen Score: 0  Interpretation: Negative screen for possible drug use disorder    Brief Intervention  Alcohol & drug use screenings were reviewed. No concerns regarding substance use disorder identified.     Social Drivers of Health     Financial Resource Strain: Low Risk  (2023)    Overall Financial Resource Strain (CARDIA)    • Difficulty of Paying Living Expenses: Not very hard   Food Insecurity: No Food Insecurity (2/3/2025)    Hunger Vital Sign    • Worried  "About Running Out of Food in the Last Year: Never true    • Ran Out of Food in the Last Year: Never true   Transportation Needs: No Transportation Needs (2/3/2025)    PRAPARE - Transportation    • Lack of Transportation (Medical): No    • Lack of Transportation (Non-Medical): No   Housing Stability: Unknown (2/3/2025)    Housing Stability Vital Sign    • Unable to Pay for Housing in the Last Year: No    • Homeless in the Last Year: No   Utilities: Not At Risk (2/3/2025)    [facility] Utilities    • Threatened with loss of utilities: No     No results found.    Objective   /84   Pulse 78   Temp 98 °F (36.7 °C)   Resp 18   Ht 5' 6\" (1.676 m)   Wt 73.9 kg (163 lb)   BMI 26.31 kg/m²     Physical Exam  Vitals and nursing note reviewed.   Constitutional:       Appearance: She is well-developed.   HENT:      Head: Normocephalic and atraumatic.      Right Ear: External ear normal.      Left Ear: External ear normal.      Nose: Nose normal.   Cardiovascular:      Rate and Rhythm: Normal rate and regular rhythm.      Heart sounds: Normal heart sounds. No murmur heard.     No friction rub.   Pulmonary:      Effort: No respiratory distress.      Breath sounds: Normal breath sounds. No wheezing or rales.          "

## 2025-02-03 NOTE — ASSESSMENT & PLAN NOTE
Has lost weight and eating better  Will stop pravastatin and recheck labs in 6 months   She doesn't think at her age she is getting much benefit from the medication   Orders:  •  Comprehensive metabolic panel; Future  •  Lipid Panel with Direct LDL reflex; Future

## 2025-02-03 NOTE — PATIENT INSTRUCTIONS
Medicare Preventive Visit Patient Instructions  Thank you for completing your Welcome to Medicare Visit or Medicare Annual Wellness Visit today. Your next wellness visit will be due in one year (2/4/2026).  The screening/preventive services that you may require over the next 5-10 years are detailed below. Some tests may not apply to you based off risk factors and/or age. Screening tests ordered at today's visit but not completed yet may show as past due. Also, please note that scanned in results may not display below.  Preventive Screenings:  Service Recommendations Previous Testing/Comments   Colorectal Cancer Screening  * Colonoscopy    * Fecal Occult Blood Test (FOBT)/Fecal Immunochemical Test (FIT)  * Fecal DNA/Cologuard Test  * Flexible Sigmoidoscopy Age: 45-75 years old   Colonoscopy: every 10 years (may be performed more frequently if at higher risk)  OR  FOBT/FIT: every 1 year  OR  Cologuard: every 3 years  OR  Sigmoidoscopy: every 5 years  Screening may be recommended earlier than age 45 if at higher risk for colorectal cancer. Also, an individualized decision between you and your healthcare provider will decide whether screening between the ages of 76-85 would be appropriate. Colonoscopy: 07/29/2021  FOBT/FIT: Not on file  Cologuard: Not on file  Sigmoidoscopy: Not on file          Breast Cancer Screening Age: 40+ years old  Frequency: every 1-2 years  Not required if history of left and right mastectomy Mammogram: 06/10/2024    Screening Current   Cervical Cancer Screening Between the ages of 21-29, pap smear recommended once every 3 years.   Between the ages of 30-65, can perform pap smear with HPV co-testing every 5 years.   Recommendations may differ for women with a history of total hysterectomy, cervical cancer, or abnormal pap smears in past. Pap Smear: Not on file    Screening Not Indicated   Hepatitis C Screening Once for adults born between 1945 and 1965  More frequently in patients at high risk  for Hepatitis C Hep C Antibody: 11/27/2018    Screening Current   Diabetes Screening 1-2 times per year if you're at risk for diabetes or have pre-diabetes Fasting glucose: 88 mg/dL (6/17/2024)  A1C: 5.3 % (3/4/2024)  Screening Current   Cholesterol Screening Once every 5 years if you don't have a lipid disorder. May order more often based on risk factors. Lipid panel: 06/17/2024    Screening Not Indicated  History Lipid Disorder     Other Preventive Screenings Covered by Medicare:  Abdominal Aortic Aneurysm (AAA) Screening: covered once if your at risk. You're considered to be at risk if you have a family history of AAA.  Lung Cancer Screening: covers low dose CT scan once per year if you meet all of the following conditions: (1) Age 55-77; (2) No signs or symptoms of lung cancer; (3) Current smoker or have quit smoking within the last 15 years; (4) You have a tobacco smoking history of at least 20 pack years (packs per day multiplied by number of years you smoked); (5) You get a written order from a healthcare provider.  Glaucoma Screening: covered annually if you're considered high risk: (1) You have diabetes OR (2) Family history of glaucoma OR (3)  aged 50 and older OR (4)  American aged 65 and older  Osteoporosis Screening: covered every 2 years if you meet one of the following conditions: (1) You're estrogen deficient and at risk for osteoporosis based off medical history and other findings; (2) Have a vertebral abnormality; (3) On glucocorticoid therapy for more than 3 months; (4) Have primary hyperparathyroidism; (5) On osteoporosis medications and need to assess response to drug therapy.   Last bone density test (DXA Scan): 03/19/2009.  HIV Screening: covered annually if you're between the age of 15-65. Also covered annually if you are younger than 15 and older than 65 with risk factors for HIV infection. For pregnant patients, it is covered up to 3 times per  pregnancy.    Immunizations:  Immunization Recommendations   Influenza Vaccine Annual influenza vaccination during flu season is recommended for all persons aged >= 6 months who do not have contraindications   Pneumococcal Vaccine   * Pneumococcal conjugate vaccine = PCV13 (Prevnar 13), PCV15 (Vaxneuvance), PCV20 (Prevnar 20)  * Pneumococcal polysaccharide vaccine = PPSV23 (Pneumovax) Adults 19-65 yo with certain risk factors or if 65+ yo  If never received any pneumonia vaccine: recommend Prevnar 20 (PCV20)  Give PCV20 if previously received 1 dose of PCV13 or PPSV23   Hepatitis B Vaccine 3 dose series if at intermediate or high risk (ex: diabetes, end stage renal disease, liver disease)   Respiratory syncytial virus (RSV) Vaccine - COVERED BY MEDICARE PART D  * RSVPreF3 (Arexvy) CDC recommends that adults 60 years of age and older may receive a single dose of RSV vaccine using shared clinical decision-making (SCDM)   Tetanus (Td) Vaccine - COST NOT COVERED BY MEDICARE PART B Following completion of primary series, a booster dose should be given every 10 years to maintain immunity against tetanus. Td may also be given as tetanus wound prophylaxis.   Tdap Vaccine - COST NOT COVERED BY MEDICARE PART B Recommended at least once for all adults. For pregnant patients, recommended with each pregnancy.   Shingles Vaccine (Shingrix) - COST NOT COVERED BY MEDICARE PART B  2 shot series recommended in those 19 years and older who have or will have weakened immune systems or those 50 years and older     Health Maintenance Due:      Topic Date Due   • DXA SCAN  03/19/2011   • Breast Cancer Screening: Mammogram  06/10/2025   • Hepatitis C Screening  Completed   • Colorectal Cancer Screening  Discontinued     Immunizations Due:      Topic Date Due   • Influenza Vaccine (1) 09/01/2024   • COVID-19 Vaccine (4 - 2024-25 season) 09/01/2024     Advance Directives   What are advance directives?  Advance directives are legal documents  that state your wishes and plans for medical care. These plans are made ahead of time in case you lose your ability to make decisions for yourself. Advance directives can apply to any medical decision, such as the treatments you want, and if you want to donate organs.   What are the types of advance directives?  There are many types of advance directives, and each state has rules about how to use them. You may choose a combination of any of the following:  Living will:  This is a written record of the treatment you want. You can also choose which treatments you do not want, which to limit, and which to stop at a certain time. This includes surgery, medicine, IV fluid, and tube feedings.   Durable power of  for healthcare (DPAHC):  This is a written record that states who you want to make healthcare choices for you when you are unable to make them for yourself. This person, called a proxy, is usually a family member or a friend. You may choose more than 1 proxy.  Do not resuscitate (DNR) order:  A DNR order is used in case your heart stops beating or you stop breathing. It is a request not to have certain forms of treatment, such as CPR. A DNR order may be included in other types of advance directives.  Medical directive:  This covers the care that you want if you are in a coma, near death, or unable to make decisions for yourself. You can list the treatments you want for each condition. Treatment may include pain medicine, surgery, blood transfusions, dialysis, IV or tube feedings, and a ventilator (breathing machine).  Values history:  This document has questions about your views, beliefs, and how you feel and think about life. This information can help others choose the care that you would choose.  Why are advance directives important?  An advance directive helps you control your care. Although spoken wishes may be used, it is better to have your wishes written down. Spoken wishes can be misunderstood, or  not followed. Treatments may be given even if you do not want them. An advance directive may make it easier for your family to make difficult choices about your care.   Weight Management   Why it is important to manage your weight:  Being overweight increases your risk of health conditions such as heart disease, high blood pressure, type 2 diabetes, and certain types of cancer. It can also increase your risk for osteoarthritis, sleep apnea, and other respiratory problems. Aim for a slow, steady weight loss. Even a small amount of weight loss can lower your risk of health problems.  How to lose weight safely:  A safe and healthy way to lose weight is to eat fewer calories and get regular exercise. You can lose up about 1 pound a week by decreasing the number of calories you eat by 500 calories each day.   Healthy meal plan for weight management:  A healthy meal plan includes a variety of foods, contains fewer calories, and helps you stay healthy. A healthy meal plan includes the following:  Eat whole-grain foods more often.  A healthy meal plan should contain fiber. Fiber is the part of grains, fruits, and vegetables that is not broken down by your body. Whole-grain foods are healthy and provide extra fiber in your diet. Some examples of whole-grain foods are whole-wheat breads and pastas, oatmeal, brown rice, and bulgur.  Eat a variety of vegetables every day.  Include dark, leafy greens such as spinach, kale, shruthi greens, and mustard greens. Eat yellow and orange vegetables such as carrots, sweet potatoes, and winter squash.   Eat a variety of fruits every day.  Choose fresh or canned fruit (canned in its own juice or light syrup) instead of juice. Fruit juice has very little or no fiber.  Eat low-fat dairy foods.  Drink fat-free (skim) milk or 1% milk. Eat fat-free yogurt and low-fat cottage cheese. Try low-fat cheeses such as mozzarella and other reduced-fat cheeses.  Choose meat and other protein foods that  are low in fat.  Choose beans or other legumes such as split peas or lentils. Choose fish, skinless poultry (chicken or turkey), or lean cuts of red meat (beef or pork). Before you cook meat or poultry, cut off any visible fat.   Use less fat and oil.  Try baking foods instead of frying them. Add less fat, such as margarine, sour cream, regular salad dressing and mayonnaise to foods. Eat fewer high-fat foods. Some examples of high-fat foods include french fries, doughnuts, ice cream, and cakes.  Eat fewer sweets.  Limit foods and drinks that are high in sugar. This includes candy, cookies, regular soda, and sweetened drinks.  Exercise:  Exercise at least 30 minutes per day on most days of the week. Some examples of exercise include walking, biking, dancing, and swimming. You can also fit in more physical activity by taking the stairs instead of the elevator or parking farther away from stores. Ask your healthcare provider about the best exercise plan for you.      © Copyright [employer] 2018 Information is for End User's use only and may not be sold, redistributed or otherwise used for commercial purposes. All illustrations and images included in CareNotes® are the copyrighted property of [employer] or [employer]

## 2025-02-19 DIAGNOSIS — R60.9 EDEMA, UNSPECIFIED TYPE: ICD-10-CM

## 2025-02-20 RX ORDER — POTASSIUM CHLORIDE 750 MG/1
10 TABLET, EXTENDED RELEASE ORAL DAILY
Qty: 90 TABLET | Refills: 0 | OUTPATIENT
Start: 2025-02-20

## 2025-03-05 ENCOUNTER — OFFICE VISIT (OUTPATIENT)
Dept: PULMONOLOGY | Facility: MEDICAL CENTER | Age: 80
End: 2025-03-05
Payer: MEDICARE

## 2025-03-05 VITALS
WEIGHT: 164 LBS | BODY MASS INDEX: 26.36 KG/M2 | HEIGHT: 66 IN | TEMPERATURE: 97.8 F | RESPIRATION RATE: 12 BRPM | DIASTOLIC BLOOD PRESSURE: 82 MMHG | OXYGEN SATURATION: 98 % | SYSTOLIC BLOOD PRESSURE: 132 MMHG | HEART RATE: 106 BPM

## 2025-03-05 DIAGNOSIS — J45.31 MILD PERSISTENT ASTHMA WITH ACUTE EXACERBATION: Primary | ICD-10-CM

## 2025-03-05 PROCEDURE — 99214 OFFICE O/P EST MOD 30 MIN: CPT | Performed by: STUDENT IN AN ORGANIZED HEALTH CARE EDUCATION/TRAINING PROGRAM

## 2025-03-05 NOTE — PROGRESS NOTES
Pulmonary Outpatient Progress Note   Diane James 79 y.o. female MRN: [id number]  3/5/2025      Assessment:    Mild intermittent asthma relatively stable.  We placed her on Advair discus and she did not like the powder.  So she went back to Flovent MDI and she likes this more.  Since she stable on not can to change anything.  If she worsens then we could put her on Symbicort.    Palpitations, she purchased an apple watch and noticed her heart rate up to 140s at times.  This lasted for several hours.  When this happens she gets panicky and feels palpitations and has radiating neck discomfort.  She saw cardiology a year ago.  I advised her she should reach out to them.  She was hesitant but hopefully she reaches out.  I counseled her on the possibility of A-fib with strokes  History of Sjogren's disease  History of rheumatoid arthritis  History of right hip fracture status post arthroplasty    Plan:    Continue dietary modifications  Okay to use Flovent ICS  If she worsens then she could be switched to Symbicort or Advair HFA instead of Diskus  Follow up as needed  I have spent a total time of 31 minutes in caring for this patient on the day of the visit/encounter including Diagnostic results, Prognosis, Risks and benefits of tx options, Instructions for management, Patient and family education, Importance of tx compliance, Risk factor reductions, Impressions, Counseling / Coordination of care, Documenting in the medical record, Reviewing / ordering tests, medicine, procedures  , and Obtaining or reviewing history  .  And discussed with cardiologist    History of Present Illness   HPI:    79-year-old female here for evaluation.  Past medical history of mild intermittent asthma, Sjogren's disease, rheumatoid arthritis along with a right displaced femoral neck fracture with a hemiarthroplasty in August 2023.    She has a history of asthma previously I placed her on Advair discus and told her to continue butyryl along  "with DuoNebs.  She did not like Advair discus.  The powder made her feel weird.  So she went back to Flovent ICS MDI.  She is doing well on this medication.  I do not see a reason to go back to ICS/LABA for now    She does not dietary modifications and feel like her acid is more controlled.  She can continue empiric Pepcid/PPI if needed.    She does complain of palpitations along with radiating neck discomfort and noticed her heart rate to be 140s.  This has happened several times and having more more recently.  This is associated with a mild cough.  I do not see an indication to adjust her inhalers based on this.  I do think she should reach out to her cardiologist to discuss further.    PFT is normal in May 2022  Echocardiogram May 2022 normal  Chest x-ray personally reviewed May 2024 normal  Labs reviewed peripheral eosinophilia 0.06    Review of Systems   Constitutional: Negative.    HENT:  Positive for congestion. Negative for nosebleeds, postnasal drip and rhinorrhea.    Eyes: Negative.    Respiratory:  Positive for cough and chest tightness. Negative for shortness of breath and wheezing.    Cardiovascular: Negative.    Gastrointestinal: Negative.    Endocrine: Negative.    Genitourinary: Negative.    Musculoskeletal: Negative.    Skin: Negative.    Allergic/Immunologic: Positive for environmental allergies.   Neurological: Negative.    Hematological: Negative.    Psychiatric/Behavioral: Negative.          Historical Information   Past Medical History:   Diagnosis Date   • Anesthesia complication     \"shivers\"   • Arthritis    • Asthma    • 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 see colors, with over-use goes darker- from untreated amblyopia   • Breathing difficulty     with extubation pt developed laryngeal spasm   • Cataract     both eyes- right eye needs surgery   • Colon polyps 2016   • Diverticulitis     had episode in April 2018   • Edema     lower legs   • Generalized osteoarthritis    • GERD (gastroesophageal reflux " disease)    • Hearing problem     unspecified laterality-hearing aid right ear   • High cholesterol    • History of transfusion     autologus   • Incontinence of urine    • Polyarthritis     last assessed: 6/26/2015   • Rheumatoid arthritis (HCC)    • Sjogren's syndrome (HCC)     last assessed: 7/18/2016   • SS-A antibody positive     last assessed: 8/20/2015   • Urinary tract infection    • UTI (urinary tract infection)      Past Surgical History:   Procedure Laterality Date   • APPENDECTOMY     • AUGMENTATION BREAST Bilateral     breast surgery enlargement procedure elective bilateral   • BILATERAL OOPHORECTOMY Bilateral 2003   • BLADDER SURGERY      bladder lift,cystocele   • CATARACT EXTRACTION      Right 9/2018; Left 10/2018   • COLONOSCOPY  04/18/2011   • COLPORRHAPHY  2014    anterior, repair of cystocele   • HYSTERECTOMY  1673    with cystocele    • JOINT REPLACEMENT Left 2016    knee   • KNEE ARTHROPLASTY Left    • KNEE ARTHROSCOPY  2009   • KNEE SURGERY Left 2009    torn meniscus   • OOPHORECTOMY     • AL COLONOSCOPY FLX DX W/COLLJ SPEC WHEN PFRMD N/A 5/31/2018    Procedure: COLONOSCOPY;  Surgeon: Maria Victoria Pineda MD;  Location: [facility];  Service: Gastroenterology   • AL HEMIARTHROPLASTY HIP PARTIAL Right 8/23/2023    Procedure: HEMIARTHROPLASTY HIP (BIPOLAR);  Surgeon: Lester Gray DO;  Location[facility];  Service: Orthopedics   • REDUCTION MAMMAPLASTY  2000   • SACRAL NERVE STIMULATOR PLACEMENT N/A 1/31/2022    Procedure: IMPLANTATION NEUROSTIMULATOR ELECTRODE ARRAY SACRAL NERVE; INSERTION NEUROSTIMULATOR; FLUORO; ELECTONIC ANALYSIS;  Surgeon: Jamal Sepulveda MD;  Location: [facility];  Service: UroGynecology          • TONSILLECTOMY AND ADENOIDECTOMY     • URETHRA SURGERY       Family History   Problem Relation Age of Onset   • Coronary artery disease Mother    • COPD Mother    • Hypertension Mother    • Hyperlipidemia Mother         high cholesterol   • Atrial fibrillation Mother    •  Coronary artery disease Father    • Arthritis Father    • COPD Father         Black lung   • Hypertension Father    • Hyperlipidemia Father         high cholesterol   • Lung cancer Brother    • Coronary artery disease Brother    • Hypertension Brother    • Hyperlipidemia Brother         high cholesterol   • Atrial fibrillation Brother         fib/flutter   • Other Sister         pericarditis   • Arthritis Sister    • Diabetes Maternal Grandmother         mellitus   • Breast cancer Maternal Grandmother 70   • Breast cancer Paternal Grandmother 70   • Arthritis Family    • Asthma Family    • Cancer Brother         lung   • Heart disease Brother         CAD-stents-smoker   • No Known Problems Maternal Aunt    • No Known Problems Maternal Aunt    • No Known Problems Maternal Aunt    • No Known Problems Maternal Aunt    • No Known Problems Maternal Aunt    • Breast cancer Other 40     Social History     Tobacco Use   Smoking Status Never   • Passive exposure: Never   Smokeless Tobacco Never       Occupational History: retired ER nurse     Meds/Allergies     Current Outpatient Medications:   •  acetaminophen (TYLENOL) 500 mg tablet, Take 500 mg by mouth 3 (three) times a day as needed  , Disp: , Rfl:   •  Cranberry 450 MG CAPS, daily, Disp: , Rfl:   •  cyanocobalamin (VITAMIN B-12) 1000 MCG tablet, Take 1,000 mcg by mouth daily, Disp: , Rfl:   •  Diclofenac Sodium (VOLTAREN) 1 %, Apply 2 g topically 4 (four) times a day, Disp: , Rfl:   •  docusate sodium (COLACE) 100 mg capsule, Take 100 mg by mouth 2 (two) times a day, Disp: , Rfl:   •  estradiol (ESTRACE VAGINAL) 0.1 mg/g vaginal cream, Insert 1 g into the vagina 3 (three) times a week, Disp: 42.5 g, Rfl: 6  •  fluticasone (FLOVENT HFA) 220 mcg/act inhaler, Inhale 2 puffs 2 (two) times a day Rinse mouth after use., Disp: , Rfl:   •  furosemide (LASIX) 40 mg tablet, TAKE 1 TABLET BY MOUTH EVERY DAY IN THE MORNING, Disp: 90 tablet, Rfl: 1  •  Magnesium Oxide -Mg  "Supplement 400 MG CAPS, Take 1 capsule by mouth every morning  , Disp: , Rfl:   •  methenamine hippurate (HIPREX) 1 g tablet, Take 1 tablet (1 g total) by mouth 2 (two) times a day with meals, Disp: 180 tablet, Rfl: 1  •  nitrofurantoin (MACROBID) 100 mg capsule, Take 1 capsule (100 mg total) by mouth 2 (two) times a day, Disp: 10 capsule, Rfl: 2  •  potassium chloride (Klor-Con) 10 mEq tablet, TAKE 1 TABLET (10 MEQ TOTAL) BY MOUTH IN THE MORNING, Disp: 90 tablet, Rfl: 1  •  senna (SENOKOT) 8.6 MG tablet, Take 1 tablet by mouth as needed for constipation, Disp: , Rfl:   Allergies   Allergen Reactions   • Celecoxib Other (See Comments)     Other reaction(s): Palpitations  Category: Adverse Reaction;        Vitals: Blood pressure 132/82, pulse (!) 106, temperature 97.8 °F (36.6 °C), temperature source Temporal, resp. rate 12, height 5' 6\" (1.676 m), weight 74.4 kg (164 lb), SpO2 98%., Body mass index is 26.47 kg/m². Oxygen Therapy  SpO2: 98 %    Physical Exam:    GEN: alert and oriented x 3, pleasant and cooperative   HEENT:  Normocephalic, atraumatic  NECK: No JVD   HEART: Rate, normal S1 and S2, no irr rhythm  LUNGS: Clear to auscultation bilaterally; no wheezes, rales, or rhonchi; respiration nonlabored   ABDOMEN:  Normoactive bowel sounds, soft, no tenderness, no distention  EXTREMITIES: no edema  NEURO: no gross focal findings  SKIN:  Dry, intact, warm to touch    Labs: I have personally reviewed pertinent lab results.  No results found for: \"IGE\"    Imaging and other studies: Personally reviewed the following image studies in PACS and associated radiology reports: chest xray. My interpretation of the radiology images/reports is: CXR normal .    Pulmonary function testing:  Personally interpreted by me  PFT normal    Other Studies: No pertinent imaging studies reviewed.  Cardiology notes    Chris Zamora MD  Pulmonary and Critical Care Medicine     Answers submitted by the patient for this visit:  Pulmonology " Questionnaire (Submitted on 9/27/2024)  Chief Complaint: Primary symptoms  Do you have chest tightness?: Yes  Do you experience frequent throat clearing?: Yes  Do you have a hoarse voice?: Yes  Chronicity: chronic  When did you first notice your symptoms?: more than 1 month ago  How often do your symptoms occur?: daily  Since you first noticed this problem, how has it changed?: gradually worsening  Do you have shortness of breath that occurs with effort or exertion?: Yes  Do you have fatigue?: Yes  Have you experienced weight loss?: Yes  Which of the following makes your symptoms better?: steroid inhaler    Answers submitted by the patient for this visit:  Pulmonology Questionnaire (Submitted on 3/3/2025)  Chief Complaint: Primary symptoms  Chronicity: recurrent  When did you first notice your symptoms?: more than 1 month ago  How often do your symptoms occur?: intermittently  Since you first noticed this problem, how has it changed?: waxing and waning  Do you have shortness of breath that occurs with effort or exertion?: Yes  Have you experienced weight loss?: Yes  Which of the following makes your symptoms worse?: climbing stairs, strenuous activity  Which of the following makes your symptoms better?: rest, steroid inhaler

## 2025-03-05 NOTE — Clinical Note
Gregg Jansen, you saw her a year ago for palpitations.  She now purchased a Apple watch.  It looks like her heart rate goes up to 140 at times and she feels palpitations and radiating neck discomfort.  I told her to reach out to your office to schedule an appointment.  Just in case she does not, would you guys be willing to reach out to her as well?  Thanks Chris

## 2025-03-05 NOTE — PATIENT INSTRUCTIONS
It was a pleasure seeing you today!    Reach out to cardiology  Continue acid suppression  Continue Flovent  Follow-up as needed    Chris Zamora MD  Pulmonary and Critical Care Medicine

## 2025-03-10 ENCOUNTER — NURSE TRIAGE (OUTPATIENT)
Age: 80
End: 2025-03-10

## 2025-03-10 NOTE — TELEPHONE ENCOUNTER
"FOLLOW UP: First available urgent visit scheduled.     REASON FOR CONVERSATION: Rapid Heart Rate  Pt called to report that she has been experiencing rapid heart rate on occasion to 120-140 that can now last for \"hours\" over the past several months. The patient has a history of SVT that last seconds only. She is symptomatic as well with SOB and chest tightness. The patient reports this is now happening daily.     SYMPTOMS: -140 with chest tightness and SOB, denies dizziness and lightheadedness. Current HR 90. PT does not detect AFIB on her apple watch. She does note take her BP at home.     OTHER: Encouraged patient to take the first available with Brannon in the [facility] office. Patient wanted to wait for her visit with Dr. Delaney 4/3.    DISPOSITION: Urgent Visit         Answer Assessment - Initial Assessment Questions  1. DESCRIPTION: \"Please describe your heart rate or heartbeat that you are having\" (e.g., fast/slow, regular/irregular, skipped or extra beats, \"palpitations\")      Rapid heart rate to 120-140 lasting several hours   2. ONSET: \"When did it start?\" (e.g., minutes, hours, days)       Several months   3. DURATION: \"How long does it last\" (e.g., seconds, minutes, hours)      Hours  on and off   4. PATTERN \"Does it come and go, or has it been constant since it started?\"  \"Does it get worse with exertion?\"   \"Are you feeling it now?\"      Denies, but does get worse if she does not stop what she is trying to do.   5. TAP: \"Using your hand, can you tap out what you are feeling on a chair or table in front of you, so that I can hear?\" Note: Not all patients can do this.        Unsure - but apple watch does not detect afib   6. HEART RATE: \"Can you tell me your heart rate?\" \"How many beats in 15 seconds?\"  Note: Not all patients can do this.        90  7. RECURRENT SYMPTOM: \"Have you ever had this before?\" If Yes, ask: \"When was the last time?\" and \"What happened that time?\"       Recurrent but not like this " "  8. CAUSE: \"What do you think is causing the palpitations?\"      SVT   9. CARDIAC HISTORY: \"Do you have any history of heart disease?\" (e.g., heart attack, angina, bypass surgery, angioplasty, arrhythmia)       Palpitations, HLD   10. OTHER SYMPTOMS: \"Do you have any other symptoms?\" (e.g., dizziness, chest pain, sweating, difficulty breathing)        Chest tightness, SOB, denies dizziness but lightheaded    Protocols used: Heart Rate and Heartbeat Questions-Adult-[place]    "

## 2025-03-11 ENCOUNTER — CLINICAL SUPPORT (OUTPATIENT)
Dept: CARDIOLOGY CLINIC | Facility: CLINIC | Age: 80
End: 2025-03-11
Payer: MEDICARE

## 2025-03-11 VITALS
WEIGHT: 166 LBS | BODY MASS INDEX: 26.68 KG/M2 | HEIGHT: 66 IN | OXYGEN SATURATION: 97 % | SYSTOLIC BLOOD PRESSURE: 122 MMHG | HEART RATE: 84 BPM | DIASTOLIC BLOOD PRESSURE: 72 MMHG

## 2025-03-11 DIAGNOSIS — R00.2 PALPITATIONS: Primary | ICD-10-CM

## 2025-03-11 PROCEDURE — 99211 OFF/OP EST MAY X REQ PHY/QHP: CPT

## 2025-03-11 PROCEDURE — 93000 ELECTROCARDIOGRAM COMPLETE: CPT

## 2025-03-11 RX ORDER — METOPROLOL SUCCINATE 25 MG/1
25 TABLET, EXTENDED RELEASE ORAL DAILY
Qty: 30 TABLET | Refills: 0 | Status: SHIPPED | OUTPATIENT
Start: 2025-03-11 | End: 2025-04-10

## 2025-03-11 NOTE — PROGRESS NOTES
Pt came in for EKG check as she has been feeling her heart rate increase. When it does happen she also has SOB and chest tightness. Per Dr Murphy pt to start on metoprolol 25mg daily. Pt agreeable. She has an appointment with Dr العلي on 4/3

## 2025-03-13 ENCOUNTER — OFFICE VISIT (OUTPATIENT)
Dept: AUDIOLOGY | Facility: CLINIC | Age: 80
End: 2025-03-13

## 2025-03-13 DIAGNOSIS — H90.A31 MIXED CONDUCTIVE AND SENSORINEURAL HEARING LOSS OF RIGHT EAR WITH RESTRICTED HEARING OF LEFT EAR: Primary | ICD-10-CM

## 2025-03-13 NOTE — PROGRESS NOTES
Hearing Aid Evaluation  Name:  Diane James  :  1945  Age:  79 y.o.  MRN:  [id number]  Date of Evaluation: 25     HISTORY:    Diane James was seen today for a hearing aid evaluation following a hearing aid check.   Her 10 year old right JONATAN hearing aid is no longer providing her proper amplification for speech.   Her most recent audiometric testing was performed on 2024.    RESULTS REVIEW:    The audiometric findings were reviewed with the patient . All of the patient's questions regarding her hearing status were addressed, and the importance of realistic expectations of hearing loss and amplification were discussed.      DEVICE REVIEW & RECOMMENDATION:     Strengths and limitations of amplification, including various hearing aid styles, technology, options, and accessories were discussed at length with patient. Hearing aids are assistive devices and are not designed to restore normal hearing. The patient was counseled on the importance of self-advocacy, motivation, as well as effective communication strategies that can be used to optimize hearing aid success. Based on the degree of her hearing loss, preferences, and lifestyle needs, the following hearing recommendations were made:    The first hearing aid recommendation is Oticon Intent 1 mini R.  The second hearing aid recommendation is Oticon Intent 2 mini R.    [facility]'s office policies regarding our hearing aid program were reviewed, including the 45 day trial period, non-refundable return fees, as well as the  warranties and service plan. Hearing aid cost, and payment, as well as insurance benefit (if applicable) were discussed with the patient.     A loaner / demo pair of devices (and ) were provided to Mrs. James to trial in her environments.   She was very happy, with the outcomes in office, therefore, we will order the following:        DEVICE SELECTION:    At this time, patient wishes to proceed with the purchase  of the below listed hearing aid(s).     The patient selected the Oticon Intent 2 hearing aids.    Level: Advanced   Color: Chroma Beige     size: Right # 2 85 / Left # 2 85   Dome size: 8 mm DB    Accessories:  [employer]      Devices ordered as specified above (order # [id number] 3/13/25).    Stanislav Reyes, CCC-A  Clinical Audiologist  [phone]  [facility] AUDIOLOG[facility]  [address] [facility] [address]

## 2025-03-17 NOTE — PROGRESS NOTES
Progress Note    Name:  Diane James  :  1945  Age:  79 y.o.  MRN:  [id number]  Date of Evaluation: 25     Hearing aids arrived,  \Oticon Intent 2 miniRITE R  #[id number]  #[id number]  SmartCharger# [id number]  Warranty 28  Scheduled 3/26    Stanislav Meek  Clinical Audiologist

## 2025-03-31 ENCOUNTER — TELEPHONE (OUTPATIENT)
Dept: FAMILY MEDICINE CLINIC | Facility: CLINIC | Age: 80
End: 2025-03-31

## 2025-03-31 ENCOUNTER — OFFICE VISIT (OUTPATIENT)
Dept: AUDIOLOGY | Facility: CLINIC | Age: 80
End: 2025-03-31
Payer: COMMERCIAL

## 2025-03-31 DIAGNOSIS — H90.A31 MIXED CONDUCTIVE AND SENSORINEURAL HEARING LOSS OF RIGHT EAR WITH RESTRICTED HEARING OF LEFT EAR: Primary | ICD-10-CM

## 2025-03-31 PROCEDURE — V5261 HEARING AID, DIGIT, BIN, BTE: HCPCS | Performed by: AUDIOLOGIST

## 2025-03-31 PROCEDURE — V5160 DISPENSING FEE BINAURAL: HCPCS | Performed by: AUDIOLOGIST

## 2025-03-31 NOTE — TELEPHONE ENCOUNTER
Form completed  In clerical in basket  Thank you,  Ally Reddy   
Form copied and put in mail to go out tomorrow. NFA  
Patient dropped off handicap placard form. Placed in your folder. Please mail when complete.   
no

## 2025-04-02 DIAGNOSIS — R00.2 PALPITATIONS: ICD-10-CM

## 2025-04-03 ENCOUNTER — OFFICE VISIT (OUTPATIENT)
Dept: CARDIOLOGY CLINIC | Facility: CLINIC | Age: 80
End: 2025-04-03
Payer: MEDICARE

## 2025-04-03 VITALS
WEIGHT: 161 LBS | OXYGEN SATURATION: 98 % | BODY MASS INDEX: 25.88 KG/M2 | DIASTOLIC BLOOD PRESSURE: 70 MMHG | HEART RATE: 78 BPM | SYSTOLIC BLOOD PRESSURE: 110 MMHG | HEIGHT: 66 IN

## 2025-04-03 DIAGNOSIS — R00.2 PALPITATIONS: Primary | ICD-10-CM

## 2025-04-03 DIAGNOSIS — R60.0 BILATERAL LEG EDEMA: ICD-10-CM

## 2025-04-03 DIAGNOSIS — E78.2 MIXED HYPERLIPIDEMIA: ICD-10-CM

## 2025-04-03 DIAGNOSIS — R53.82 CHRONIC FATIGUE: ICD-10-CM

## 2025-04-03 PROCEDURE — 99214 OFFICE O/P EST MOD 30 MIN: CPT | Performed by: INTERNAL MEDICINE

## 2025-04-03 RX ORDER — METOPROLOL SUCCINATE 25 MG/1
25 TABLET, EXTENDED RELEASE ORAL DAILY
Qty: 90 TABLET | Refills: 1 | Status: SHIPPED | OUTPATIENT
Start: 2025-04-03 | End: 2025-05-03

## 2025-04-03 NOTE — PROGRESS NOTES
Cardiology   Lito Delaney DO, [facility]  Mario Marin MD, [facility]  Alex Ewing MD, [facility]  Vivian Murphy MD, [facility]  -------------------------------------------------------------------  [facility]  [address], [address], [address]  [place], [place], [address]  [phone]    Cardiology Follow Up  Diane James  1945  [id number]          Assessment/Plan:  1. Palpitations  -She has had previous episodes of SVT on monitoring but did not correspond to symptoms.  The palpitations had resolved in the past but have now returned and she has episodes lasting for up to 2 hours.  -She was prescribed metoprolol last week but discontinued due to fatigue.  May resume metoprolol 12.5 mg daily.  -1 week extended monitor will be obtained  -2D echocardiogram ordered  -Blood work ordered including TSH as she is also feeling fatigued.    2.  Lower extremity edema  -Continue furosemide    3. Mixed hyperlipidemia  - patient could not tolerate Crestor 5 mg daily.  She was tolerating pravastatin 10 mg daily but stopped after repeat blood work showed improved levels with diet modification as well.  -Lipid panel ordered    Interval History:     Diane James is 79 y.o. female here for follow up of palpitations and dyslipidemia.   She has noticed increased episodes of palpitations recently.  She has been getting them nearly every day now with longest lasting episode lasting for 2 hours.  She describes it as a racing heartbeat associated with chest tightness and shortness of breath.  There are no inciting events.  Improved with rest.  She has not gone to the emergency room.  No syncope or near syncope.  No symptoms at other times.  Does not have pain or shortness of breath with exertion.  She previously was having palpitations and to monitor showed rare episodes of SVT which did not correspond to her palpitations.    She had previously stopped pravastatin and lipid levels have increased.   "Restarted and most recent LDL was 104 mg/dL.    Pravastatin was discontinued afterwards.    In the past, she could not tolerate Rosuvastatin 5 mg daily.     Previously, she was having palpitations frequently which she described as a skipping and racing heartbeat that would occur at various times throughout the day.  A 2 week monitor was done which showed rare episodes of SVT which did not correspond to her palpitations.  Echocardiogram previously showed structurally normal heart with normal ejection fraction.  She uses furosemide daily because of lower extremity edema.  If she does not use Lasix, edema returns.    In July 2019 she had undergone echocardiogram and pulmonary function testing to her evaluation of dyspnea and cough.  Cardiac workup was normal and pulmonary function testing was also normal.  She was given Tagamet and Allegra which improved her cough but she stopped the medications.         Past Medical History:   Diagnosis Date    Anesthesia complication     \"shivers\"    Arthritis     Asthma     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 see colors, with over-use goes darker- from untreated amblyopia    Breathing difficulty     with extubation pt developed laryngeal spasm    Cataract     both eyes- right eye needs surgery    Colon polyps 2016    Diverticulitis     had episode in April 2018    Edema     lower legs    Generalized osteoarthritis     GERD (gastroesophageal reflux disease)     Hearing problem     unspecified laterality-hearing aid right ear    High cholesterol     History of transfusion     autologus    Incontinence of urine     Polyarthritis     last assessed: 6/26/2015    Rheumatoid arthritis (HCC)     Sjogren's syndrome (HCC)     last assessed: 7/18/2016    SS-A antibody positive     last assessed: 8/20/2015    Urinary tract infection     UTI (urinary tract infection)      Social History     Socioeconomic History    Marital status: /Civil Union     Spouse name: Not on file    Number of children: Not on " file    Years of education: Not on file    Highest education level: Not on file   Occupational History    Occupation: retired   Tobacco Use    Smoking status: Never     Passive exposure: Never    Smokeless tobacco: Never   Vaping Use    Vaping status: Never Used   Substance and Sexual Activity    Alcohol use: Not Currently    Drug use: No    Sexual activity: Not Currently   Other Topics Concern    Not on file   Social History Narrative    Lack of adequate sleep-4-6 hrs/night    Lack of exercise     Social Drivers of Health     Financial Resource Strain: Low Risk  (4/26/2023)    Overall Financial Resource Strain (CARDIA)     Difficulty of Paying Living Expenses: Not very hard   Food Insecurity: No Food Insecurity (2/3/2025)    Hunger Vital Sign     Worried About Running Out of Food in the Last Year: Never true     Ran Out of Food in the Last Year: Never true   Transportation Needs: No Transportation Needs (2/3/2025)    PRAPARE - Transportation     Lack of Transportation (Medical): No     Lack of Transportation (Non-Medical): No   Physical Activity: Not on file   Stress: Not on file   Social Connections: Not on file   Intimate Partner Violence: Not on file   Housing Stability: Unknown (2/3/2025)    Housing Stability Vital Sign     Unable to Pay for Housing in the Last Year: No     Number of Times Moved in the Last Year: Not on file     Homeless in the Last Year: No      Family History   Problem Relation Age of Onset    Coronary artery disease Mother     COPD Mother     Hypertension Mother     Hyperlipidemia Mother         high cholesterol    Atrial fibrillation Mother     Coronary artery disease Father     Arthritis Father     COPD Father         Black lung    Hypertension Father     Hyperlipidemia Father         high cholesterol    Lung cancer Brother     Coronary artery disease Brother     Hypertension Brother     Hyperlipidemia Brother         high cholesterol    Atrial fibrillation Brother         fib/flutter     Other Sister         pericarditis    Arthritis Sister     Diabetes Maternal Grandmother         mellitus    Breast cancer Maternal Grandmother 70    Breast cancer Paternal Grandmother 70    Arthritis Family     Asthma Family     Cancer Brother         lung    Heart disease Brother         CAD-stents-smoker    No Known Problems Maternal Aunt     No Known Problems Maternal Aunt     No Known Problems Maternal Aunt     No Known Problems Maternal Aunt     No Known Problems Maternal Aunt     Breast cancer Other 40     Past Surgical History:   Procedure Laterality Date    APPENDECTOMY      AUGMENTATION BREAST Bilateral     breast surgery enlargement procedure elective bilateral    BILATERAL OOPHORECTOMY Bilateral 2003    BLADDER SURGERY      bladder lift,cystocele    CATARACT EXTRACTION      Right 9/2018; Left 10/2018    COLONOSCOPY  04/18/2011    COLPORRHAPHY  2014    anterior, repair of cystocele    HYSTERECTOMY  1673    with cystocele     JOINT REPLACEMENT Left 2016    knee    KNEE ARTHROPLASTY Left     KNEE ARTHROSCOPY  2009    KNEE SURGERY Left 2009    torn meniscus    OOPHORECTOMY      [place] COLONOSCOPY FLX DX W/COLLJ SPEC WHEN PFRMD N/A 5/31/2018    Procedure: COLONOSCOPY;  Surgeon: Maria Victoria Pineda MD;  Location: [facility] GI LAB;  Service: Gastroenterology    NH HEMIARTHROPLASTY HIP PARTIAL Right 8/23/2023    Procedure: HEMIARTHROPLASTY HIP (BIPOLAR);  Surgeon: Lester Gray DO;  [facility]: [facility];  Service: Orthopedics    REDUCTION MAMMAPLASTY  2000    SACRAL NERVE STIMULATOR PLACEMENT N/A 1/31/2022    Procedure: IMPLANTATION NEUROSTIMULATOR ELECTRODE ARRAY SACRAL NERVE; INSERTION NEUROSTIMULATOR; FLUORO; ELECTONIC ANALYSIS;  Surgeon: Jamal Sepulveda MD;  Location: [facility];  Service: UroGynecology           TONSILLECTOMY AND ADENOIDECTOMY      URETHRA SURGERY         Current Outpatient Medications:     acetaminophen (TYLENOL) 500 mg tablet, Take 500 mg by mouth 3 (three) times a day as needed  , Disp: , Rfl:  "    Cranberry 450 MG CAPS, daily, Disp: , Rfl:     cyanocobalamin (VITAMIN B-12) 1000 MCG tablet, Take 1,000 mcg by mouth daily, Disp: , Rfl:     Diclofenac Sodium (VOLTAREN) 1 %, Apply 2 g topically 4 (four) times a day, Disp: , Rfl:     docusate sodium (COLACE) 100 mg capsule, Take 100 mg by mouth 2 (two) times a day, Disp: , Rfl:     estradiol (ESTRACE VAGINAL) 0.1 mg/g vaginal cream, Insert 1 g into the vagina 3 (three) times a week, Disp: 42.5 g, Rfl: 6    fluticasone (FLOVENT HFA) 220 mcg/act inhaler, Inhale 2 puffs 2 (two) times a day Rinse mouth after use., Disp: , Rfl:     furosemide (LASIX) 40 mg tablet, TAKE 1 TABLET BY MOUTH EVERY DAY IN THE MORNING, Disp: 90 tablet, Rfl: 1    Magnesium Oxide -Mg Supplement 400 MG CAPS, Take 1 capsule by mouth every morning  , Disp: , Rfl:     methenamine hippurate (HIPREX) 1 g tablet, Take 1 tablet (1 g total) by mouth 2 (two) times a day with meals, Disp: 180 tablet, Rfl: 1    metoprolol succinate (TOPROL-XL) 25 mg 24 hr tablet, Take 1 tablet (25 mg total) by mouth daily, Disp: 30 tablet, Rfl: 0    nitrofurantoin (MACROBID) 100 mg capsule, Take 1 capsule (100 mg total) by mouth 2 (two) times a day, Disp: 10 capsule, Rfl: 2    potassium chloride (Klor-Con) 10 mEq tablet, TAKE 1 TABLET (10 MEQ TOTAL) BY MOUTH IN THE MORNING, Disp: 90 tablet, Rfl: 1    senna (SENOKOT) 8.6 MG tablet, Take 1 tablet by mouth as needed for constipation, Disp: , Rfl:         Review of Systems:  Review of Systems   Respiratory:  Negative for shortness of breath.    Cardiovascular:  Negative for chest pain, palpitations and leg swelling.   Musculoskeletal:  Positive for arthralgias.   All other systems reviewed and are negative.        Physical Exam:  Vitals:  Vitals:    04/03/25 0833   BP: 110/70   BP Location: Left arm   Patient Position: Sitting   Cuff Size: Standard   Pulse: 78   SpO2: 98%   Weight: 73 kg (161 lb)   Height: 5' 6\" (1.676 m)     Physical Exam   Constitutional: She appears " healthy. No distress.   Eyes: Pupils are equal, round, and reactive to light. Conjunctivae are normal.   Neck: No JVD present.   Cardiovascular: Normal rate, regular rhythm and normal heart sounds. Exam reveals no gallop and no friction rub.   No murmur heard.  Pulmonary/Chest: Effort normal and breath sounds normal. She has no wheezes. She has no rales.   Musculoskeletal:         General: No tenderness, deformity or edema.      Cervical back: Normal range of motion and neck supple.   Neurological: She is alert and oriented to person, place, and time.   Skin: Skin is warm and dry.        Cardiographics:  EKG: Personally reviewed normal sinus rhythm with rate 80 beats per minute.  Normal intervals and normal axis  Last known EF:  55 percent    This note was completed in part utilizing M-Modal Fluency Direct Software.  Grammatical errors, random word insertions, spelling mistakes, and incomplete sentences can be an occasional consequence of this system secondary to software limitations, ambient noise, and hardware issues.  If you have any questions or concerns about the content, text, or information contained within the body of this dictation, please contact the provider for clarification.

## 2025-04-03 NOTE — PROGRESS NOTES
Hearing Aid Fitting    Name:  Diane James  :  1945  Age:  79 y.o.  MRN:  [id number]  Date of Evaluation: 3/31/2025    HISTORY:    Diane James was seen today for a binaural hearing aid fitting of her Oticon Intent  in the canal (JONATAN)  RC hearing aid(s). Diane was unaccompanied to today's visit. Hearing aid purchase is being paid by private Pay.    DEVICE INFORMATION:     Left Device Right Device   Hearing Aid Make: Oticon  Oticon    Hearing Aid Model: Intent 2 mini R  Intent 2 mini R    Serial Number: [id number]   Repair Warranty Date: 2028   Loss/Damage Warranty Status: Active  Active        Length/Output # 2 85 w/lock # 2 85 w/lock   Wax System: Pro Wax miniFIT Pro Wax miniFIT   Dome Size/Style: 8 mm DB  8 mm DB    Battery: Lithium-ion Rechargeable Lithium-ion Rechargeable       Serial Number:  [id number]    Warranty Date:  2028     Accessories: N/A       DEVICE SETTINGS:    Hearing aid(s) were programmed using VAC + fitting formula and were adjusted based on the patient's perceived comfort level and demo settings. Hearing aid(s) were set to experience level 2 (fine tuned) R aid and step 3 L aid  per patient's subjective listening preference. The patient noted good sound quality, and was happy with the overall sound quality and fit of the hearing aid(s).    DEVICE ORIENTATION:    The patient was counseled on device and  components and function. Proper insertion and removal of the aid(s) was demonstrated. The patient practiced insertion and removal of the devices in the office, they demonstrated excellent ability to manipulate the hearing aids. The patient  was given the devices user manuals that reviews aid usage and operation.    The hearing aid warranty, including unlimited repair and a one time loss and damage per hearing aid, through the , as well as [facility] hearing aid service plan, including unlimited office visits, and supplies were  outlined thoroughly. The patient agreed to the terms of sale listed on the purchase agreement containing device specifications, warranties, pricing information, as well as [facility]'s 45-day trial period timeline. After this period has elapsed, hearing aids cannot be returned.    The aids were initially set to the Iphone, however, they would not disconnect from the [employer] reza and we had to unpair them.  Afterward, they worked appropriately, but will have to follow up to see if they worked well after she returned home.       DEVICE EXPECTATIONS & USAGE:     Hearing aids are assistive devices and are not designed to restore normal hearing sensitivity. The importance of realistic expectations, especially in the presence of background noise, was emphasized. The need for daily, consistent usage (8-12 hours per day) for proper device adjustment, as well as the importance of self-advocacy and practicing effective communication strategies was outlined.     Effective communication strategies include:  1.) Maintaining face-to-face communication, allowing for speechreading of facial expressions, lips, and gestures.  2.) Reducing background noise and distance between communication partners.  3.) Having communication partners reduce their rate of speech when appropriate.  4.) Beginning conversation by getting communication partner's attention.  5.) Asking for rephrasing of missed aspects of conversation rather than asking for repetition.    RECOMMENDATIONS:  The patient demonstrated understanding of all the topics discussed. The patientis to follow-up in 2-3 weeks for a hearing aid check within the trial period as scheduled.     RTO scheduled for 4/24/2025    Stanislav Reyes, CCC-A  Clinical Audiologist   [facility] AUDIOLOGY  [address] [place] [phone]

## 2025-04-07 ENCOUNTER — APPOINTMENT (OUTPATIENT)
Dept: LAB | Facility: CLINIC | Age: 80
End: 2025-04-07
Payer: MEDICARE

## 2025-04-07 DIAGNOSIS — R53.82 CHRONIC FATIGUE: ICD-10-CM

## 2025-04-07 DIAGNOSIS — R00.2 PALPITATIONS: ICD-10-CM

## 2025-04-07 DIAGNOSIS — E78.2 MIXED HYPERLIPIDEMIA: ICD-10-CM

## 2025-04-07 LAB
ALBUMIN SERPL BCG-MCNC: 4.3 G/DL (ref 3.5–5)
ALP SERPL-CCNC: 67 U/L (ref 34–104)
ALT SERPL W P-5'-P-CCNC: 14 U/L (ref 7–52)
ANION GAP SERPL CALCULATED.3IONS-SCNC: 6 MMOL/L (ref 4–13)
AST SERPL W P-5'-P-CCNC: 15 U/L (ref 13–39)
BASOPHILS # BLD AUTO: 0.03 THOUSANDS/ÂΜL (ref 0–0.1)
BASOPHILS NFR BLD AUTO: 1 % (ref 0–1)
BILIRUB SERPL-MCNC: 1.26 MG/DL (ref 0.2–1)
BUN SERPL-MCNC: 25 MG/DL (ref 5–25)
CALCIUM SERPL-MCNC: 9.3 MG/DL (ref 8.4–10.2)
CHLORIDE SERPL-SCNC: 104 MMOL/L (ref 96–108)
CHOLEST SERPL-MCNC: 191 MG/DL (ref ?–200)
CO2 SERPL-SCNC: 31 MMOL/L (ref 21–32)
CREAT SERPL-MCNC: 0.76 MG/DL (ref 0.6–1.3)
EOSINOPHIL # BLD AUTO: 0.09 THOUSAND/ÂΜL (ref 0–0.61)
EOSINOPHIL NFR BLD AUTO: 2 % (ref 0–6)
ERYTHROCYTE [DISTWIDTH] IN BLOOD BY AUTOMATED COUNT: 13 % (ref 11.6–15.1)
GFR SERPL CREATININE-BSD FRML MDRD: 74 ML/MIN/1.73SQ M
GLUCOSE P FAST SERPL-MCNC: 95 MG/DL (ref 65–99)
HCT VFR BLD AUTO: 42.6 % (ref 34.8–46.1)
HDLC SERPL-MCNC: 48 MG/DL
HGB BLD-MCNC: 14.4 G/DL (ref 11.5–15.4)
IMM GRANULOCYTES # BLD AUTO: 0.01 THOUSAND/UL (ref 0–0.2)
IMM GRANULOCYTES NFR BLD AUTO: 0 % (ref 0–2)
LDLC SERPL CALC-MCNC: 116 MG/DL (ref 0–100)
LYMPHOCYTES # BLD AUTO: 1.76 THOUSANDS/ÂΜL (ref 0.6–4.47)
LYMPHOCYTES NFR BLD AUTO: 35 % (ref 14–44)
MCH RBC QN AUTO: 30.4 PG (ref 26.8–34.3)
MCHC RBC AUTO-ENTMCNC: 33.8 G/DL (ref 31.4–37.4)
MCV RBC AUTO: 90 FL (ref 82–98)
MONOCYTES # BLD AUTO: 0.48 THOUSAND/ÂΜL (ref 0.17–1.22)
MONOCYTES NFR BLD AUTO: 10 % (ref 4–12)
NEUTROPHILS # BLD AUTO: 2.62 THOUSANDS/ÂΜL (ref 1.85–7.62)
NEUTS SEG NFR BLD AUTO: 52 % (ref 43–75)
NONHDLC SERPL-MCNC: 143 MG/DL
NRBC BLD AUTO-RTO: 0 /100 WBCS
PLATELET # BLD AUTO: 216 THOUSANDS/UL (ref 149–390)
PMV BLD AUTO: 10.5 FL (ref 8.9–12.7)
POTASSIUM SERPL-SCNC: 4.2 MMOL/L (ref 3.5–5.3)
PROT SERPL-MCNC: 6.7 G/DL (ref 6.4–8.4)
RBC # BLD AUTO: 4.74 MILLION/UL (ref 3.81–5.12)
SODIUM SERPL-SCNC: 141 MMOL/L (ref 135–147)
TRIGL SERPL-MCNC: 135 MG/DL (ref ?–150)
TSH SERPL DL<=0.05 MIU/L-ACNC: 2.37 UIU/ML (ref 0.45–4.5)
WBC # BLD AUTO: 4.99 THOUSAND/UL (ref 4.31–10.16)

## 2025-04-07 PROCEDURE — 80053 COMPREHEN METABOLIC PANEL: CPT

## 2025-04-07 PROCEDURE — 85025 COMPLETE CBC W/AUTO DIFF WBC: CPT

## 2025-04-07 PROCEDURE — 84443 ASSAY THYROID STIM HORMONE: CPT

## 2025-04-07 PROCEDURE — 80061 LIPID PANEL: CPT

## 2025-04-07 PROCEDURE — 36415 COLL VENOUS BLD VENIPUNCTURE: CPT

## 2025-04-10 ENCOUNTER — APPOINTMENT (OUTPATIENT)
Dept: RADIOLOGY | Facility: CLINIC | Age: 80
End: 2025-04-10
Payer: MEDICARE

## 2025-04-10 ENCOUNTER — OFFICE VISIT (OUTPATIENT)
Dept: OBGYN CLINIC | Facility: CLINIC | Age: 80
End: 2025-04-10
Payer: MEDICARE

## 2025-04-10 VITALS — WEIGHT: 160 LBS | BODY MASS INDEX: 25.71 KG/M2 | HEIGHT: 66 IN

## 2025-04-10 DIAGNOSIS — M25.552 LEFT HIP PAIN: ICD-10-CM

## 2025-04-10 DIAGNOSIS — M16.12 PRIMARY OSTEOARTHRITIS OF LEFT HIP: Primary | ICD-10-CM

## 2025-04-10 PROCEDURE — 99214 OFFICE O/P EST MOD 30 MIN: CPT | Performed by: ORTHOPAEDIC SURGERY

## 2025-04-10 PROCEDURE — 73502 X-RAY EXAM HIP UNI 2-3 VIEWS: CPT

## 2025-04-10 NOTE — ASSESSMENT & PLAN NOTE
Symptoms consistent with underlying severe OA  Discussed conservative treatment options  PT referral given  Offered left hip CSI with S&P  Scheduled for procedure today  Return PRN  Orders:    Ambulatory Referral to Physical Therapy; Future

## 2025-04-16 ENCOUNTER — APPOINTMENT (OUTPATIENT)
Dept: RADIOLOGY | Facility: HOSPITAL | Age: 80
End: 2025-04-16
Payer: MEDICARE

## 2025-04-16 ENCOUNTER — HOSPITAL ENCOUNTER (OUTPATIENT)
Facility: AMBULARY SURGERY CENTER | Age: 80
Setting detail: OUTPATIENT SURGERY
Discharge: HOME | End: 2025-04-16
Attending: STUDENT IN AN ORGANIZED HEALTH CARE EDUCATION/TRAINING PROGRAM | Admitting: STUDENT IN AN ORGANIZED HEALTH CARE EDUCATION/TRAINING PROGRAM
Payer: MEDICARE

## 2025-04-16 VITALS
OXYGEN SATURATION: 96 % | TEMPERATURE: 96.9 F | SYSTOLIC BLOOD PRESSURE: 126 MMHG | HEART RATE: 69 BPM | DIASTOLIC BLOOD PRESSURE: 83 MMHG | RESPIRATION RATE: 18 BRPM

## 2025-04-16 PROCEDURE — 77002 NEEDLE LOCALIZATION BY XRAY: CPT | Performed by: STUDENT IN AN ORGANIZED HEALTH CARE EDUCATION/TRAINING PROGRAM

## 2025-04-16 PROCEDURE — 77002 NEEDLE LOCALIZATION BY XRAY: CPT

## 2025-04-16 PROCEDURE — 20610 DRAIN/INJ JOINT/BURSA W/O US: CPT | Performed by: STUDENT IN AN ORGANIZED HEALTH CARE EDUCATION/TRAINING PROGRAM

## 2025-04-16 RX ORDER — BUPIVACAINE HYDROCHLORIDE 5 MG/ML
INJECTION, SOLUTION EPIDURAL; INTRACAUDAL; PERINEURAL AS NEEDED
Status: DISCONTINUED | OUTPATIENT
Start: 2025-04-16 | End: 2025-04-16 | Stop reason: HOSPADM

## 2025-04-16 RX ORDER — LIDOCAINE HYDROCHLORIDE 10 MG/ML
INJECTION, SOLUTION EPIDURAL; INFILTRATION; INTRACAUDAL; PERINEURAL AS NEEDED
Status: DISCONTINUED | OUTPATIENT
Start: 2025-04-16 | End: 2025-04-16 | Stop reason: HOSPADM

## 2025-04-16 RX ORDER — METHYLPREDNISOLONE ACETATE 40 MG/ML
INJECTION, SUSPENSION INTRA-ARTICULAR; INTRALESIONAL; INTRAMUSCULAR; SOFT TISSUE AS NEEDED
Status: DISCONTINUED | OUTPATIENT
Start: 2025-04-16 | End: 2025-04-16 | Stop reason: HOSPADM

## 2025-04-16 NOTE — OP NOTE
OPERATIVE REPORT  PATIENT NAME: Diane James    :  1945  MRN: [id number]  Pt Location: [facility] MINOR/PAIN ROOM 01    SURGERY DATE: 2025    Surgeons and Role:     * Sam Stafford DO - Primary    Preop Diagnosis:  Primary osteoarthritis of left hip [M16.12]    Post-Op Diagnosis Codes:     * Primary osteoarthritis of left hip [M16.12]    Procedure(s):  Left - LEFT INTRA-ARTICULAR HIP INJECTION    Specimen(s):  * No specimens in log *    Estimated Blood Loss:   Minimal    Drains:  * No LDAs found *    Anesthesia Type:   Local    Operative Indications:  Primary osteoarthritis of left hip [M16.12]      PROCEDURE  Written informed consent was obtained after discussing the risks and benefits of intraarticular hip injection.  The patient was placed supine on the fluoroscopy table and the area overlying the left hip was prepped with chlorhexadine and draped in a sterile fashion.   The joint was identifyied by flouroscopy and the soft tissue was anesthetized with 1% lidocaine.  A 22g 3.5 inch spinal needle  was inserted under fluoroscopic guidance, by a slightly oblique approach, until contacting periostium at the medial/inferior femoral head.  Contrast was injected under live flouroscopy revealing dye spread throughout the joint capsule and hip socket.  There was no intravascular uptake.  After negative aspiration, the joint was injected with 40 mg of Depomedrol and 3 ml 0.5% bupivacaine.  The needle was then flushed and removed.  There were no paresthesias and aspiration was negative at all times. The patient tolerated the procedure well and there were no apparent complications.  Written and verbal discharge instructions were reviewed with the patient prior to discharge.       Sam Stafford DO     SIGNATURE: Sam Stafford DO  DATE: 2025  TIME: 11:20 AM

## 2025-04-16 NOTE — DISCHARGE INSTRUCTIONS

## 2025-04-16 NOTE — H&P
"History of Present Illness: The patient is a 79 y.o. female who presents with complaints of left hip pain    Past Medical History:   Diagnosis Date    Anesthesia complication     \"shivers\"    Arthritis     Asthma     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 see colors, with over-use goes darker- from untreated amblyopia    Breathing difficulty     with extubation pt developed laryngeal spasm    Cataract     both eyes- right eye needs surgery    Colon polyps 2016    Diverticulitis     had episode in April 2018    Edema     lower legs    Generalized osteoarthritis     GERD (gastroesophageal reflux disease)     Hearing problem     unspecified laterality-hearing aid right ear    High cholesterol     History of transfusion     autologus    Incontinence of urine     Polyarthritis     last assessed: 6/26/2015    Rheumatoid arthritis (HCC)     Sjogren's syndrome (HCC)     last assessed: 7/18/2016    SS-A antibody positive     last assessed: 8/20/2015    Urinary tract infection     UTI (urinary tract infection)        Past Surgical History:   Procedure Laterality Date    APPENDECTOMY      AUGMENTATION BREAST Bilateral     breast surgery enlargement procedure elective bilateral    BILATERAL OOPHORECTOMY Bilateral 2003    BLADDER SURGERY      bladder lift,cystocele    CATARACT EXTRACTION      Right 9/2018; Left 10/2018    COLONOSCOPY  04/18/2011    COLPORRHAPHY  2014    anterior, repair of cystocele    HYSTERECTOMY  1673    with cystocele     JOINT REPLACEMENT Left 2016    knee    KNEE ARTHROPLASTY Left     KNEE ARTHROSCOPY  2009    KNEE SURGERY Left 2009    torn meniscus    OOPHORECTOMY      [place] COLONOSCOPY FLX DX W/COLLJ SPEC WHEN PFRMD N/A 5/31/2018    Procedure: COLONOSCOPY;  Surgeon: Maria Victoria Pineda MD;  Location: [facility] GI LAB;  Service: Gastroenterology    TN HEMIARTHROPLASTY HIP PARTIAL Right 8/23/2023    Procedure: HEMIARTHROPLASTY HIP (BIPOLAR);  Surgeon: Lester Gray DO;  [facility];  Service: Orthopedics    REDUCTION " MAMMAPLASTY  2000    SACRAL NERVE STIMULATOR PLACEMENT N/A 1/31/2022    Procedure: IMPLANTATION NEUROSTIMULATOR ELECTRODE ARRAY SACRAL NERVE; INSERTION NEUROSTIMULATOR; FLUORO; ELECTONIC ANALYSIS;  Surgeon: Jamal Sepulveda MD;  Location: [facility];  Service: UroGynecology           TONSILLECTOMY AND ADENOIDECTOMY      URETHRA SURGERY         No current facility-administered medications for this encounter.    Allergies   Allergen Reactions    Celecoxib Other (See Comments)     Other reaction(s): Palpitations  Category: Adverse Reaction;        Physical Exam: There were no vitals filed for this visit.  General: Awake, Alert, Oriented x 3, Mood and affect appropriate  Respiratory: Respirations even and unlabored  Cardiovascular: Peripheral pulses intact; no edema  Musculoskeletal Exam: tenderness to left hip, positive FABERs    ASA Score: 2         Assessment: primary osteoarthritis of left hip    Plan:  proceed with left hip injection

## 2025-04-18 ENCOUNTER — RESULTS FOLLOW-UP (OUTPATIENT)
Dept: CARDIOLOGY CLINIC | Facility: CLINIC | Age: 80
End: 2025-04-18

## 2025-04-18 LAB
CV ZIO BASELINE AVG BPM: 73 BPM
CV ZIO BASELINE BPM HIGH: 164 BPM
CV ZIO BASELINE BPM LOW: 55 BPM
CV ZIO DEVICE ANALYSIS TIME: NORMAL
CV ZIO ECT SVE COUNT: 179 EPISODES
CV ZIO ECT SVE CPLT COUNT: 18 EPISODES
CV ZIO ECT SVE CPLT FREQ: NORMAL
CV ZIO ECT SVE FREQ: NORMAL
CV ZIO ECT SVE TPLT COUNT: 6 EPISODES
CV ZIO ECT SVE TPLT FREQ: NORMAL
CV ZIO ECT VE COUNT: 0 EPISODES
CV ZIO ECT VE CPLT COUNT: 0 EPISODES
CV ZIO ECT VE CPLT FREQ: 0
CV ZIO ECT VE FREQ: 0
CV ZIO ECT VE TPLT COUNT: 0 EPISODES
CV ZIO ECT VE TPLT FREQ: 0
CV ZIO ECTOPIC SVE COUPLET RAW PERCENT: 0 %
CV ZIO ECTOPIC SVE ISOLATED PERCENT: 0.02 %
CV ZIO ECTOPIC SVE TRIPLET RAW PERCENT: 0 %
CV ZIO ECTOPIC VE COUPLET RAW PERCENT: 0 %
CV ZIO ECTOPIC VE ISOLATED PERCENT: 0 %
CV ZIO ECTOPIC VE TRIPLET RAW PERCENT: 0 %
CV ZIO ENROLLMENT END: NORMAL
CV ZIO ENROLLMENT START: NORMAL
CV ZIO PATIENT EVENTS DIARIES: 7
CV ZIO PATIENT EVENTS TRIGGERS: 7
CV ZIO PAUSE COUNT: 0
CV ZIO PRESCRIPTION STATUS: NORMAL
CV ZIO SVT AVG BPM: 125 BPM
CV ZIO SVT BPM HIGH: 164 BPM
CV ZIO SVT BPM LOW: 99 BPM
CV ZIO SVT COUNT: 12
CV ZIO SVT F EPI AVG BPM: 152 BPM
CV ZIO SVT F EPI BEATS: 4 BEATS
CV ZIO SVT F EPI BPM HIGH: 164 BPM
CV ZIO SVT F EPI BPM LOW: 143 BPM
CV ZIO SVT F EPI DUR: 1.8 SEC
CV ZIO SVT F EPI END: NORMAL
CV ZIO SVT F EPI START: NORMAL
CV ZIO SVT L EPI AVG BPM: 122 BPM
CV ZIO SVT L EPI BEATS: 40 BEATS
CV ZIO SVT L EPI BPM HIGH: 130 BPM
CV ZIO SVT L EPI BPM LOW: 104 BPM
CV ZIO SVT L EPI DUR: 19.5 SEC
CV ZIO SVT L EPI END: NORMAL
CV ZIO SVT L EPI START: NORMAL
CV ZIO TOTAL  ENROLLMENT PERIOD: NORMAL
CV ZIO VT COUNT: 0

## 2025-04-18 NOTE — TELEPHONE ENCOUNTER
----- Message from Lito Delaney DO sent at 4/18/2025  9:32 AM EDT -----  Can you please let the patient know her monitor showed some SVT episodes but longest lasted 20 seconds.    Is she feeling better?    
I spoke with patient, made aware of results.     She reports that since being seen she has had improvement in symptoms.    
no

## 2025-04-22 ENCOUNTER — HOSPITAL ENCOUNTER (OUTPATIENT)
Dept: NON INVASIVE DIAGNOSTICS | Facility: HOSPITAL | Age: 80
Discharge: HOME | End: 2025-04-22
Attending: INTERNAL MEDICINE
Payer: MEDICARE

## 2025-04-22 VITALS
WEIGHT: 160 LBS | BODY MASS INDEX: 25.71 KG/M2 | HEIGHT: 66 IN | HEART RATE: 69 BPM | DIASTOLIC BLOOD PRESSURE: 83 MMHG | SYSTOLIC BLOOD PRESSURE: 126 MMHG

## 2025-04-22 DIAGNOSIS — R53.82 CHRONIC FATIGUE: ICD-10-CM

## 2025-04-22 DIAGNOSIS — R00.2 PALPITATIONS: ICD-10-CM

## 2025-04-22 PROCEDURE — 93306 TTE W/DOPPLER COMPLETE: CPT

## 2025-04-22 PROCEDURE — 93306 TTE W/DOPPLER COMPLETE: CPT | Performed by: INTERNAL MEDICINE

## 2025-04-23 LAB
AORTIC ROOT: 3.2 CM
AV PEAK GRADIENT: 8 MMHG
BSA FOR ECHO PROCEDURE: 1.82 M2
DOP CALC LVOT AREA: 2.83 CM2
DOP CALC LVOT DIAMETER: 1.9 CM
E WAVE DECELERATION TIME: 262 MS
E/A RATIO: 0.65
FRACTIONAL SHORTENING: 39 (ref 28–44)
INTERVENTRICULAR SEPTUM IN DIASTOLE (PARASTERNAL SHORT AXIS VIEW): 0.9 CM
INTERVENTRICULAR SEPTUM: 0.9 CM (ref 0.6–1.1)
LAAS-AP4: 18.1 CM2
LEFT ATRIUM SIZE: 3.4 CM
LEFT INTERNAL DIMENSION IN SYSTOLE: 2.7 CM (ref 2.1–4)
LEFT VENTRICULAR INTERNAL DIMENSION IN DIASTOLE: 4.4 CM (ref 3.5–6)
LEFT VENTRICULAR POSTERIOR WALL IN END DIASTOLE: 0.8 CM
LEFT VENTRICULAR STROKE VOLUME: 58 ML
LV EF US.2D.A4C+ESTIMATED: 62 %
LVSV (TEICH): 58 ML
MV E'TISSUE VEL-SEP: 8 CM/S
MV PEAK A VEL: 1.12 M/S
MV PEAK E VEL: 73 CM/S
MV STENOSIS PRESSURE HALF TIME: 76 MS
MV VALVE AREA P 1/2 METHOD: 2.89
RIGHT ATRIUM AREA SYSTOLE A4C: 11.7 CM2
RIGHT VENTRICLE ID DIMENSION: 2.2 CM
SL CV PED ECHO LEFT VENTRICLE DIASTOLIC VOLUME (MOD BIPLANE) 2D: 86 ML
SL CV PED ECHO LEFT VENTRICLE SYSTOLIC VOLUME (MOD BIPLANE) 2D: 28 ML
TR MAX PG: 4 MMHG
TR PEAK VELOCITY: 1 M/S
TRICUSPID ANNULAR PLANE SYSTOLIC EXCURSION: 1.8 CM
TRICUSPID VALVE PEAK REGURGITATION VELOCITY: 0.96 M/S

## 2025-04-30 ENCOUNTER — TELEPHONE (OUTPATIENT)
Dept: PAIN MEDICINE | Facility: CLINIC | Age: 80
End: 2025-04-30

## 2025-05-05 NOTE — PROGRESS NOTES
Name: Diane James      : 1945      MRN: [id number]  Encounter Provider: Nicholas Tran PA-C  Encounter Date: 2025   Encounter department: [facility]  :  Assessment & Plan  Recurrent UTI  Recurrent E coli UTI on Premarin, hiprex + vit C, and nitrofurantion PRN   Last UTI around Dec 2024 and none since.   UA today 24  PH 5.0 and clear   -If having breakthrough infections can add D-mannose   -continue the  Premarin, hiprex + vit C, and nitrofurantion PRN    Vaginal atrophy  Vaginal atrophy on Premarin TIW   -f/u 6 months vaginal exam   -continue the premarin cream TIW   Orders:    estradiol (ESTRACE VAGINAL) 0.1 mg/g vaginal cream; Insert 1 g into the vagina 3 (three) times a week    Incomplete bladder emptying  Incomplete bladder emptying no longer performing CIC for years   Interstim placement ~  with Dr. Young and follow yearly. (Wants to f/u at closer office)   PVR-- 10/24/22 ,  23  ,  23 , 25 ,   -F/u for Interstim interrogation         History of Present Illness   Diane James is a 79 y.o. female with recurrent urinary tract infections on Hip-Edenilson 1 g twice daily and vitamin C daily, premarin cream 3 times per week, and nitrofurantoin as needed if has urinary tract infection.  She previously underwent InterStim device and follows with Dr. Young yearly but wants to have her interstim checked closer since lives in [place].      Radiology  23 CT ap W CTS  KIDNEYS/URETERS:  No hydronephrosis or urinary tract calculus.  One or more sharply circumscribed subcentimeter renal hypodensities are present, too small to accurately characterize, and statistically most likely benign findings. According to recent   literature (Radiology 2019) no further workup of these findings is recommended  REPRODUCTIVE ORGANS:  Surgical changes of prior hysterectomy.     URINARY BLADDER:  Unremarkable.      History  Recurrent E coli   "UTI  Premarin, hiprex + vit C, and nitrofurantion PRN  Atrophic vaginitis   Incomplete bladder emptying.  Prior CIC and Interstim with Dr. Sepulveda   OAB tx with Interstim      Past  surgical history   Anterior colporrhaphy  Interstim  Dr. Sepulveda with urogynecology -underwent recent interstim placement in early 2022.            11/5/2024 OV Nicholas Tran  78 y/o female with rheumatoid arthritis, GERD, diverticulosis, rosacea, Sjogren's, asthma, cataract, incomplete bladder emptying with prior CIC and Interstim 2022 Dr. Sepulveda, recurrent UTI and vagina atrophy hx tx with Premarin, hiprex + vit C, and nitrofurantion PRN    Assessment:  UTI  Recurrent E coli UTI on Premarin, hiprex + vit C, and nitrofurantion PRN     Vaginal atrophy  Vaginal atrophy on Premarin      Incomplete bladder emptying  Incomplete bladder emptying no longer performing CIC for years   Currently with Interstim Dr. Young        Plan:  F/u 6 months to check on UA/PVR   Continue the hipprex 1gm BID and vit C daily  Continue the premarin cream TIW  Increase water consumption to help with UTI and any possible constipation   Use nitrofurantion PRN if has UTI   Continue f/u with Dr. Young for her Interstim      Objective   /80 (BP Location: Left arm, Patient Position: Sitting, Cuff Size: Adult)   Pulse 82   Ht 5' 6\" (1.676 m)   SpO2 98%   BMI 25.82 kg/m²     Physical Exam  Pulmonary:      Effort: Pulmonary effort is normal.   Musculoskeletal:      Comments: No CVA tenderness b/l    Skin:     General: Skin is warm.   Neurological:      Mental Status: She is alert.   Psychiatric:         Mood and Affect: Mood normal.           Results   No results found for: \"PSA\"  Lab Results   Component Value Date    GLUCOSE 101 12/11/2015    CALCIUM 9.3 04/07/2025     12/11/2015    K 4.2 04/07/2025    CO2 31 04/07/2025     04/07/2025    BUN 25 04/07/2025    CREATININE 0.76 04/07/2025     Lab Results   Component Value Date    WBC 4.99 04/07/2025 "    HGB 14.4 04/07/2025    HCT 42.6 04/07/2025    MCV 90 04/07/2025     04/07/2025       Office Urine Dip  Recent Results (from the past hour)   POCT Measure PVR    Collection Time: 05/07/25 10:40 AM   Result Value Ref Range    POST-VOID RESIDUAL VOLUME, ML  mL   POCT urine dip    Collection Time: 05/07/25 10:44 AM   Result Value Ref Range    LEUKOCYTE ESTERASE,UA -     NITRITE,UA -     SL AMB POCT UROBILINOGEN 0.2     POCT URINE PROTEIN -      PH,UA 5.0     BLOOD,UA -     SPECIFIC GRAVITY,UA 1.005     KETONES,UA -     BILIRUBIN,UA -     GLUCOSE, UA -      COLOR,UA yellow     CLARITY,UA clear

## 2025-05-07 ENCOUNTER — OFFICE VISIT (OUTPATIENT)
Dept: UROLOGY | Facility: CLINIC | Age: 80
End: 2025-05-07
Payer: MEDICARE

## 2025-05-07 VITALS
DIASTOLIC BLOOD PRESSURE: 80 MMHG | SYSTOLIC BLOOD PRESSURE: 122 MMHG | OXYGEN SATURATION: 98 % | HEART RATE: 82 BPM | BODY MASS INDEX: 25.82 KG/M2 | HEIGHT: 66 IN

## 2025-05-07 DIAGNOSIS — N39.0 RECURRENT UTI: Primary | ICD-10-CM

## 2025-05-07 DIAGNOSIS — R33.9 INCOMPLETE BLADDER EMPTYING: ICD-10-CM

## 2025-05-07 DIAGNOSIS — N95.2 VAGINAL ATROPHY: ICD-10-CM

## 2025-05-07 DIAGNOSIS — N32.81 OAB (OVERACTIVE BLADDER): ICD-10-CM

## 2025-05-07 LAB
POST-VOID RESIDUAL VOLUME, ML POC: 172 ML
SL AMB  POCT GLUCOSE, UA: NORMAL
SL AMB LEUKOCYTE ESTERASE,UA: NORMAL
SL AMB POCT BILIRUBIN,UA: NORMAL
SL AMB POCT BLOOD,UA: NORMAL
SL AMB POCT CLARITY,UA: CLEAR
SL AMB POCT COLOR,UA: YELLOW
SL AMB POCT KETONES,UA: NORMAL
SL AMB POCT NITRITE,UA: NORMAL
SL AMB POCT PH,UA: 5
SL AMB POCT SPECIFIC GRAVITY,UA: 1
SL AMB POCT URINE PROTEIN: NORMAL
SL AMB POCT UROBILINOGEN: 0.2

## 2025-05-07 PROCEDURE — 81002 URINALYSIS NONAUTO W/O SCOPE: CPT | Performed by: PHYSICIAN ASSISTANT

## 2025-05-07 PROCEDURE — 99214 OFFICE O/P EST MOD 30 MIN: CPT | Performed by: PHYSICIAN ASSISTANT

## 2025-05-07 PROCEDURE — 51798 US URINE CAPACITY MEASURE: CPT | Performed by: PHYSICIAN ASSISTANT

## 2025-05-07 RX ORDER — NITROFURANTOIN 25; 75 MG/1; MG/1
100 CAPSULE ORAL 2 TIMES DAILY
Qty: 10 CAPSULE | Refills: 2 | Status: SHIPPED | OUTPATIENT
Start: 2025-05-07

## 2025-05-07 RX ORDER — ESTRADIOL 0.1 MG/G
1 CREAM VAGINAL 3 TIMES WEEKLY
Qty: 42.5 G | Refills: 6 | Status: SHIPPED | OUTPATIENT
Start: 2025-05-07

## 2025-05-07 RX ORDER — METHENAMINE HIPPURATE 1000 MG/1
1 TABLET ORAL 2 TIMES DAILY WITH MEALS
Qty: 180 TABLET | Refills: 1 | Status: SHIPPED | OUTPATIENT
Start: 2025-05-07

## 2025-05-14 ENCOUNTER — TELEPHONE (OUTPATIENT)
Dept: AUDIOLOGY | Facility: CLINIC | Age: 80
End: 2025-05-14

## 2025-05-14 NOTE — TELEPHONE ENCOUNTER
Left VM to patient regarding 5/28 appointment.  Asked that she call the office back regarding R aid replacement

## 2025-05-15 ENCOUNTER — OFFICE VISIT (OUTPATIENT)
Dept: OBGYN CLINIC | Facility: CLINIC | Age: 80
End: 2025-05-15
Payer: MEDICARE

## 2025-05-15 ENCOUNTER — TELEPHONE (OUTPATIENT)
Age: 80
End: 2025-05-15

## 2025-05-15 VITALS — BODY MASS INDEX: 26.71 KG/M2 | WEIGHT: 166.2 LBS | HEIGHT: 66 IN

## 2025-05-15 DIAGNOSIS — M05.20 RHEUMATOID ARTERITIS (HCC): ICD-10-CM

## 2025-05-15 DIAGNOSIS — Z01.818 PREOP EXAMINATION: ICD-10-CM

## 2025-05-15 DIAGNOSIS — M35.00 SJOGREN'S SYNDROME, WITH UNSPECIFIED ORGAN INVOLVEMENT (HCC): ICD-10-CM

## 2025-05-15 DIAGNOSIS — M25.552 LEFT HIP PAIN: ICD-10-CM

## 2025-05-15 DIAGNOSIS — M16.12 PRIMARY OSTEOARTHRITIS OF LEFT HIP: Primary | ICD-10-CM

## 2025-05-15 DIAGNOSIS — K57.33 DIVERTICULITIS OF LARGE INTESTINE WITHOUT PERFORATION OR ABSCESS WITH BLEEDING: ICD-10-CM

## 2025-05-15 PROCEDURE — 99215 OFFICE O/P EST HI 40 MIN: CPT | Performed by: ORTHOPAEDIC SURGERY

## 2025-05-15 RX ORDER — ZINC SULFATE 50(220)MG
220 CAPSULE ORAL DAILY
Qty: 30 CAPSULE | Refills: 0 | Status: SHIPPED | OUTPATIENT
Start: 2025-05-15 | End: 2025-06-14

## 2025-05-15 RX ORDER — FERROUS SULFATE 324(65)MG
324 TABLET, DELAYED RELEASE (ENTERIC COATED) ORAL
Qty: 30 TABLET | Refills: 0 | Status: SHIPPED | OUTPATIENT
Start: 2025-05-15 | End: 2025-06-14

## 2025-05-15 RX ORDER — ASCORBIC ACID 500 MG
500 TABLET ORAL 2 TIMES DAILY
Qty: 60 TABLET | Refills: 0 | Status: SHIPPED | OUTPATIENT
Start: 2025-05-15 | End: 2025-06-14

## 2025-05-15 RX ORDER — MUPIROCIN 20 MG/G
OINTMENT TOPICAL 2 TIMES DAILY
Qty: 15 G | Refills: 0 | Status: SHIPPED | OUTPATIENT
Start: 2025-05-15

## 2025-05-15 RX ORDER — FOLIC ACID 1 MG/1
1 TABLET ORAL DAILY
Qty: 30 TABLET | Refills: 0 | Status: SHIPPED | OUTPATIENT
Start: 2025-05-15 | End: 2025-06-14

## 2025-05-15 NOTE — ASSESSMENT & PLAN NOTE
Pain refractory to all conservative treatment  Discussed DA TOMMY  Consented for surgery today.  To be scheduled after 7/16/2025.  PCP and cardiac clearance  Same-day discharge  Outpatient physical therapy  Return at time of surgery  Orders:    Case request operating room: ARTHROPLASTY HIP TOTAL ANTERIOR,NAVIGATED - LEFT - SAME DAY; Standing    Comprehensive metabolic panel; Future    Hemoglobin A1C W/EAG Estimation; Future    CBC and differential; Future    MRSA culture; Future    If Symptomatic, order: UA w Reflex to Microscopic w Reflex to Culture    Protime-INR; Future    APTT; Future    Ambulatory referral to Cardiology; Future    Ambulatory referral to Physical Therapy; Future    Ambulatory referral to Family Practice; Future    EKG 12 lead; Future    ascorbic acid (VITAMIN C) 500 MG tablet; Take 1 tablet (500 mg total) by mouth 2 (two) times a day    ferrous sulfate 324 (65 Fe) mg; Take 1 tablet (324 mg total) by mouth daily before breakfast    folic acid (FOLVITE) 1 mg tablet; Take 1 tablet (1 mg total) by mouth daily    mupirocin (BACTROBAN) 2 % ointment; Apply topically 2 (two) times a day    zinc sulfate (ZINCATE) 220 mg capsule; Take 1 capsule (220 mg total) by mouth daily    Cholecalciferol (VITAMIN D3) 1,000 units tablet; Take 1 tablet (1,000 Units total) by mouth daily

## 2025-05-15 NOTE — TELEPHONE ENCOUNTER
Caller: Isabell from [facility]   Established Doctor: Dr. Delaney   Call Back Number: [phone]    Does this patient require an office visit for Cardiac Clearance for upcomming surgery or can clearance be given without an office visit?       Date of Last Office Visit: 4/3/25    Date Of Surgery: 7/28/25  Surgical Procedure: Left hip replacement   Name of Facility: [facility]   Name of Surgeon: Dr. Allison   Phone Number for Surgical Facility (If the caller does not have this info, please put N/A): [phone]  Fax Number for Surgical Facility (If the caller does not have this info, please put N/A): [phone]    Does the patient have any new or worsening chest pain or shortness of breath since the last office visit? No  Does the patient have any new hospitalizations or cardiac procedures since the last office visit? No   Does the patient have any specific cardiac concerns regarding the surgery/procedure that they want to discuss with physician before surgery? No   Has the patient had an ECG performed anywhere in the last 30 days? No       Thank You

## 2025-05-15 NOTE — PROGRESS NOTES
Name: Diane James      : 1945      MRN: [id number]  Encounter Provider: Alex Allison DO  Encounter Date: 5/15/2025   Encounter department: [facility]  :  Assessment & Plan  Primary osteoarthritis of left hip  Pain refractory to all conservative treatment  Discussed DA TOMMY  Consented for surgery today.  To be scheduled after 2025.  PCP and cardiac clearance  Same-day discharge  Outpatient physical therapy  Return at time of surgery  Orders:    Case request operating room: ARTHROPLASTY HIP TOTAL ANTERIOR,NAVIGATED - LEFT - SAME DAY; Standing    Comprehensive metabolic panel; Future    Hemoglobin A1C W/EAG Estimation; Future    CBC and differential; Future    MRSA culture; Future    If Symptomatic, order: UA w Reflex to Microscopic w Reflex to Culture    Protime-INR; Future    APTT; Future    Ambulatory referral to Cardiology; Future    Ambulatory referral to Physical Therapy; Future    Ambulatory referral to Family Practice; Future    EKG 12 lead; Future    ascorbic acid (VITAMIN C) 500 MG tablet; Take 1 tablet (500 mg total) by mouth 2 (two) times a day    ferrous sulfate 324 (65 Fe) mg; Take 1 tablet (324 mg total) by mouth daily before breakfast    folic acid (FOLVITE) 1 mg tablet; Take 1 tablet (1 mg total) by mouth daily    mupirocin (BACTROBAN) 2 % ointment; Apply topically 2 (two) times a day    zinc sulfate (ZINCATE) 220 mg capsule; Take 1 capsule (220 mg total) by mouth daily    Cholecalciferol (VITAMIN D3) 1,000 units tablet; Take 1 tablet (1,000 Units total) by mouth daily    Left hip pain    Orders:    Case request operating room: ARTHROPLASTY HIP TOTAL ANTERIOR,NAVIGATED - LEFT - SAME DAY; Standing    Comprehensive metabolic panel; Future    Hemoglobin A1C W/EAG Estimation; Future    CBC and differential; Future    MRSA culture; Future    If Symptomatic, order: UA w Reflex to Microscopic w Reflex to Culture    Protime-INR; Future    APTT; Future     Ambulatory referral to Cardiology; Future    Ambulatory referral to Physical Therapy; Future    Ambulatory referral to Family Practice; Future    EKG 12 lead; Future    Rheumatoid arteritis (HCC)         Diverticulitis of large intestine without perforation or abscess with bleeding         Sjogren's syndrome, with unspecified organ involvement (HCC)         Preop examination    Orders:    Ambulatory referral to Cardiology; Future    Ambulatory referral to Physical Therapy; Future    Ambulatory referral to Family Practice; Future         Diane James is a pleasant 79 y.o. female who returns today for follow-up evaluation for her left hip.  Unfortunately, she received only temporary relief after her recent intra-articular injection.  Based on the progression of her underlying disease and her persistent pain despite activity modification, maintaining an appropriate weight, over-the-counter medications, and intra-articular injection therapy, I explained that she is a good candidate for direct anterior left total hip arthroplasty. The pre jet and postoperative expectations were discussed here in the office today. The risks and benefits of undergoing direct anterior left total hip arthroplasty were discussed at length and consents were signed and placed in the chart.  Please see risk stratification and discussion below.  She will meet with my surgery scheduler today to pick a date for her procedure and make preoperative arrangements.  She will be eligible after 7/16/2025 due to her recent intra-articular injection.  All of her questions and concerns were addressed today.  We will plan to see her back at time of surgery.    History of MRSA: no  History of DVT/PE: no  History of Diabetes:no  History of Malignancy: no  History of GI bleed/Peptic ulcer: no  Are you a smoker: no  Are you on medication for autoimmune disease (rheumatoid arthritis, psoriatic arthritis, lupus, etc.): no  Are you using OTC supplements:  no  Clearances: PCP and Cardiology  Implants: Hip  Discharge: Same-Day  PT: Outpatient  Postop Follow-up: 2 weeks    The patient was counseled in detail regarding the diagnosis, the treatment options available, the prognosis of each treatment option, the potential risks and complications.  These are, but are not limited to; deep vein thrombosis, pulmonary embolism, neurologic and vascular injury, infection, instability, leg length discrepancy, dislocation, hematoma, reflex sympathetic dystrophy, loss of range of motion, ankylosis of the knee, fracture, screw or prosthetic perforation, chronic pain, acute pain, chronic leg pain and edema, loosening, death, heart attack, and stroke.  The patient's questions were answered in detail.  The patient demonstrates understanding of these risks and wishes to proceed with surgery.    Return for At time of surgery.    I have personally reviewed pertinent imaging in PACS.  X-ray of the left hip obtained on 4/10/2025 reviewed demonstrating severe degenerative change with medial narrowing.  There is sclerosis and osteophytosis.  There is no acute fracture, dislocation, lytic or blastic lesion.     History: Diane James is a 79 y.o. female who returns today for follow-up evaluation for her left hip.  At her last visit, she was referred for intra-articular injection which occurred with our spine and pain team on 4/16/2025.  At today's visit, she reports that this provided near complete resolution of her pain for 1 to 2 weeks.  She is very pleased with this short time being pain-free.  After 2 weeks, her activity related pain quickly returned and has been severe.  She complains of severe aching in the left hip and groin with all weightbearing activity.  She is interested in discussing surgery today.  She currently lives alone, but has family close by to help her with her recovery.  She denies any new injury or trauma.    Estimated body mass index is 26.83 kg/m² as calculated from the  "following:    Height as of this encounter: 5' 6\" (1.676 m).    Weight as of this encounter: 75.4 kg (166 lb 3.2 oz).    Lab Results   Component Value Date    HGBA1C 5.3 03/04/2024       Social History     Occupational History    Occupation: retired   Tobacco Use    Smoking status: Never     Passive exposure: Never    Smokeless tobacco: Never   Vaping Use    Vaping status: Never Used   Substance and Sexual Activity    Alcohol use: Not Currently    Drug use: No    Sexual activity: Not Currently       Objective:  Left Hip Exam     Tenderness   The patient is experiencing tenderness in the anterior.    Range of Motion   Abduction:  50   Adduction:  30   Flexion:  120   External rotation:  50   Internal rotation: 20     Muscle Strength   Flexion: 5/5     Tests   ROSENDO: positive    Other   Erythema: absent  Scars: absent  Sensation: normal  Pulse: present    Comments:  Stinchfield: positive  FADIR: positive  No overhanging pannus            There were no vitals filed for this visit.    Subjective:  Past Medical History:   Diagnosis Date    Anesthesia complication     \"shivers\"    Arthritis     Asthma     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 see colors, with over-use goes darker- from untreated amblyopia    Breathing difficulty     with extubation pt developed laryngeal spasm    Cataract     both eyes- right eye needs surgery    Colon polyps 2016    Diverticulitis     had episode in April 2018    Edema     lower legs    Generalized osteoarthritis     GERD (gastroesophageal reflux disease)     Hearing problem     unspecified laterality-hearing aid right ear    High cholesterol     History of transfusion     autologus    Incontinence of urine     Polyarthritis     last assessed: 6/26/2015    Rheumatoid arthritis (HCC)     Sjogren's syndrome (HCC)     last assessed: 7/18/2016    SS-A antibody positive     last assessed: 8/20/2015    Urinary tract infection     UTI (urinary tract infection)        Past Surgical History:   Procedure " Laterality Date    APPENDECTOMY      AUGMENTATION BREAST Bilateral     breast surgery enlargement procedure elective bilateral    BILATERAL OOPHORECTOMY Bilateral 2003    BLADDER SURGERY      bladder lift,cystocele    CATARACT EXTRACTION      Right 9/2018; Left 10/2018    COLONOSCOPY  04/18/2011    COLPORRHAPHY  2014    anterior, repair of cystocele    HYSTERECTOMY  1673    with cystocele     JOINT REPLACEMENT Left 2016    knee    KNEE ARTHROPLASTY Left     KNEE ARTHROSCOPY  2009    KNEE SURGERY Left 2009    torn meniscus    OOPHORECTOMY      OR ARTHROCENTESIS ASPIR&/INJ MAJOR JT/BURSA W/O US Left 4/16/2025    Procedure: LEFT INTRA-ARTICULAR HIP INJECTION;  Surgeon: Sam Stafford DO;  Location: [facility] MAIN OR;  Service: Pain Management     OR COLONOSCOPY FLX DX W/COLLJ SPEC WHEN PFRMD N/A 5/31/2018    Procedure: COLONOSCOPY;  Surgeon: Maria Victoria Pineda MD;  Location: [facility] GI LAB;  Service: Gastroenterology    OR HEMIARTHROPLASTY HIP PARTIAL Right 8/23/2023    Procedure: HEMIARTHROPLASTY HIP (BIPOLAR);  Surgeon: Lester Gray DO;  Location: [facility];  Service: Orthopedics    REDUCTION MAMMAPLASTY  2000    SACRAL NERVE STIMULATOR PLACEMENT N/A 1/31/2022    Procedure: IMPLANTATION NEUROSTIMULATOR ELECTRODE ARRAY SACRAL NERVE; INSERTION NEUROSTIMULATOR; FLUORO; ELECTONIC ANALYSIS;  Surgeon: Jamal Sepulveda MD;  Location: [facility];  Service: UroGynecology           TONSILLECTOMY AND ADENOIDECTOMY      URETHRA SURGERY         Family History   Problem Relation Age of Onset    Coronary artery disease Mother     COPD Mother     Hypertension Mother     Hyperlipidemia Mother         high cholesterol    Atrial fibrillation Mother     Coronary artery disease Father     Arthritis Father     COPD Father         Black lung    Hypertension Father     Hyperlipidemia Father         high cholesterol    Lung cancer Brother     Coronary artery disease Brother     Hypertension Brother     Hyperlipidemia Brother         high  cholesterol    Atrial fibrillation Brother         fib/flutter    Other Sister         pericarditis    Arthritis Sister     Diabetes Maternal Grandmother         mellitus    Breast cancer Maternal Grandmother 70    Breast cancer Paternal Grandmother 70    Arthritis Family     Asthma Family     Cancer Brother         lung    Heart disease Brother         CAD-stents-smoker    No Known Problems Maternal Aunt     No Known Problems Maternal Aunt     No Known Problems Maternal Aunt     No Known Problems Maternal Aunt     No Known Problems Maternal Aunt     Breast cancer Other 40       Current Medications[1]    Allergies[2]    Review of Systems   Constitutional:  Positive for activity change. Negative for chills, fever and unexpected weight change.   HENT:  Negative for hearing loss, nosebleeds and sore throat.    Eyes:  Negative for pain, redness and visual disturbance.   Respiratory:  Negative for cough, shortness of breath and wheezing.    Cardiovascular:  Negative for chest pain, palpitations and leg swelling.   Gastrointestinal:  Negative for abdominal pain, nausea and vomiting.   Endocrine: Negative for polydipsia and polyuria.   Genitourinary:  Negative for dysuria and hematuria.   Musculoskeletal:  See HPI  Skin:  Negative for rash and wound.   Neurological:  Negative for dizziness, numbness and headaches.   Psychiatric/Behavioral:  Negative for decreased concentration and suicidal ideas. The patient is not nervous/anxious.      Physical Exam  Vitals and nursing note reviewed.   Constitutional:       Appearance: Normal appearance. She is well-developed.   HENT:      Head: Normocephalic and atraumatic.      Right Ear: External ear normal.      Left Ear: External ear normal.   Eyes:      General: No scleral icterus.     Extraocular Movements: Extraocular movements intact.      Conjunctiva/sclera: Conjunctivae normal.   Cardiovascular:      Rate and Rhythm: Normal rate.   Pulmonary:      Effort: Pulmonary effort is  normal. No respiratory distress.   Musculoskeletal:      Cervical back: Normal range of motion and neck supple.      Comments: See Ortho exam   Skin:     General: Skin is warm and dry.   Neurological:      General: No focal deficit present.      Mental Status: She is alert and oriented to person, place, and time.   Psychiatric:         Behavior: Behavior normal.     Scribe Attestation      I,:  Deo Dickbecky am acting as a scribe while in the presence of the attending physician.:       I,:  Alex Allison DO personally performed the services described in this documentation    as scribed in my presence.:           This document was created using speech voice recognition software.   Grammatical errors, random word insertions, pronoun errors, and incomplete sentences are an occasional consequence of this system due to software limitations, ambient noise, and hardware issues.   Any formal questions or concerns about content, text, or information contained within the body of this dictation should be directly addressed to the provider for clarification.         [1]   Current Outpatient Medications:     acetaminophen (TYLENOL) 500 mg tablet, Take 500 mg by mouth as needed in the morning and 500 mg as needed at noon and 500 mg as needed in the evening., Disp: , Rfl:     ascorbic acid (VITAMIN C) 500 MG tablet, Take 1 tablet (500 mg total) by mouth 2 (two) times a day, Disp: 60 tablet, Rfl: 0    Cholecalciferol (VITAMIN D3) 1,000 units tablet, Take 1 tablet (1,000 Units total) by mouth daily, Disp: 30 tablet, Rfl: 0    cyanocobalamin (VITAMIN B-12) 1000 MCG tablet, Take 1,000 mcg by mouth in the morning., Disp: , Rfl:     Diclofenac Sodium (VOLTAREN) 1 %, Apply 2 g topically in the morning and 2 g at noon and 2 g in the evening and 2 g before bedtime., Disp: , Rfl:     docusate sodium (COLACE) 100 mg capsule, Take 100 mg by mouth in the morning and 100 mg in the evening., Disp: , Rfl:     estradiol (ESTRACE VAGINAL) 0.1  mg/g vaginal cream, Insert 1 g into the vagina 3 (three) times a week, Disp: 42.5 g, Rfl: 6    ferrous sulfate 324 (65 Fe) mg, Take 1 tablet (324 mg total) by mouth daily before breakfast, Disp: 30 tablet, Rfl: 0    fluticasone (FLOVENT HFA) 220 mcg/act inhaler, Inhale 2 puffs in the morning and 2 puffs in the evening. Rinse mouth after use., Disp: , Rfl:     folic acid (FOLVITE) 1 mg tablet, Take 1 tablet (1 mg total) by mouth daily, Disp: 30 tablet, Rfl: 0    furosemide (LASIX) 40 mg tablet, TAKE 1 TABLET BY MOUTH EVERY DAY IN THE MORNING, Disp: 90 tablet, Rfl: 1    Magnesium Oxide -Mg Supplement 400 MG CAPS, Take 1 capsule by mouth every morning, Disp: , Rfl:     methenamine hippurate (HIPREX) 1 g tablet, Take 1 tablet (1 g total) by mouth 2 (two) times a day with meals, Disp: 180 tablet, Rfl: 1    mupirocin (BACTROBAN) 2 % ointment, Apply topically 2 (two) times a day, Disp: 15 g, Rfl: 0    nitrofurantoin (MACROBID) 100 mg capsule, Take 1 capsule (100 mg total) by mouth 2 (two) times a day, Disp: 10 capsule, Rfl: 2    potassium chloride (Klor-Con) 10 mEq tablet, TAKE 1 TABLET (10 MEQ TOTAL) BY MOUTH IN THE MORNING, Disp: 90 tablet, Rfl: 1    senna (SENOKOT) 8.6 MG tablet, Take 1 tablet by mouth as needed for constipation, Disp: , Rfl:     zinc sulfate (ZINCATE) 220 mg capsule, Take 1 capsule (220 mg total) by mouth daily, Disp: 30 capsule, Rfl: 0    Cranberry 450 MG CAPS, daily, Disp: , Rfl:     metoprolol succinate (TOPROL-XL) 25 mg 24 hr tablet, TAKE 1 TABLET (25 MG TOTAL) BY MOUTH DAILY. (Patient not taking: Reported on 5/7/2025), Disp: 90 tablet, Rfl: 1  [2]   Allergies  Allergen Reactions    Celecoxib Other (See Comments)     Other reaction(s): Palpitations  Category: Adverse Reaction;

## 2025-05-28 ENCOUNTER — OFFICE VISIT (OUTPATIENT)
Dept: AUDIOLOGY | Facility: CLINIC | Age: 80
End: 2025-05-28

## 2025-05-28 DIAGNOSIS — H90.A31 MIXED CONDUCTIVE AND SENSORINEURAL HEARING LOSS OF RIGHT EAR WITH RESTRICTED HEARING OF LEFT EAR: Primary | ICD-10-CM

## 2025-05-29 NOTE — PROGRESS NOTES
"Hearing Aid Visit:    Name:  Diane James  :  1945  Age:  79 y.o.  MRN:  [id number]  Date of Evaluation: 25     HISTORY:    Diane James was seen today (2025) for a(n) in-warranty (under trial) hearing aid check of her bilateral hearing aids. Today, Diane stated that she was still having difficulties \"understanding speech\" with the Right device.   She was scheduled on 2025 for follow up, but was unable to make the appointment.      Most recent Audiogram: 2024    DEVICE INFORMATION:     Left Device Right Device   Hearing Aid Make: OtQwbcg  OtQwbcg    Hearing Aid Model: Intent 2 mini R  Intent 2 mini R    Serial Number: [id number]   Repair Warranty Date: 2028   Loss/Damage Warranty Status: Active  Active        Length/Output # 2 85 w/lock # 2 85 w/lock   Wax System: Pro Wax miniFIT Pro Wax miniFIT   Dome Size/Style: 8 mm DB  8 mm DB    Battery: Lithium-ion Rechargeable Lithium-ion Rechargeable       Serial Number: [id number]   Warranty Date: 2028    Accessories: N/A     ACTION/ADJUSTMENTS:  Reprogrammed a new Intent 2 to the right ear.   The patient reported immediately that she could \"understand\" my speech much better.   The left aid was reprogrammed to prescription and the \"wooshing\" sound she was hearing was gone.    RECOMMENDATIONS:   Returning the original Right device for credit, with the \"new\"   RTO in about 1 month or if doing well, 4 months     Stanislav Reyes, CCC-A  Clinical Audiologist  [id number]  [facility] AUDIOLOGY  [address] [id number]  "

## 2025-06-07 DIAGNOSIS — M16.12 PRIMARY OSTEOARTHRITIS OF LEFT HIP: ICD-10-CM

## 2025-06-09 RX ORDER — ASCORBIC ACID 500 MG
500 TABLET ORAL 2 TIMES DAILY
Qty: 180 TABLET | Refills: 1 | Status: SHIPPED | OUTPATIENT
Start: 2025-06-09

## 2025-06-09 RX ORDER — CHOLECALCIFEROL (VITAMIN D3) 25 MCG
1000 TABLET ORAL DAILY
Qty: 90 TABLET | Refills: 1 | Status: SHIPPED | OUTPATIENT
Start: 2025-06-09

## 2025-06-09 RX ORDER — FOLIC ACID 1 MG/1
1000 TABLET ORAL DAILY
Qty: 90 TABLET | Refills: 1 | Status: SHIPPED | OUTPATIENT
Start: 2025-06-09

## 2025-06-09 RX ORDER — FERROUS SULFATE 324(65)MG
324 TABLET, DELAYED RELEASE (ENTERIC COATED) ORAL
Qty: 90 TABLET | Refills: 1 | Status: SHIPPED | OUTPATIENT
Start: 2025-06-09 | End: 2025-07-09

## 2025-06-17 ENCOUNTER — OFFICE VISIT (OUTPATIENT)
Dept: AUDIOLOGY | Facility: CLINIC | Age: 80
End: 2025-06-17

## 2025-06-17 DIAGNOSIS — H90.A31 MIXED CONDUCTIVE AND SENSORINEURAL HEARING LOSS OF RIGHT EAR WITH RESTRICTED HEARING OF LEFT EAR: Primary | ICD-10-CM

## 2025-06-17 NOTE — PROGRESS NOTES
"Hearing Aid Visit:    Name:  Diane James  :  1945  Age:  79 y.o.  MRN:  [id number]  Date of Evaluation: 25     HISTORY:    Diane James was seen today (2025) for a(n) in-warranty hearing aid check of her bilateral hearing aids. Today, Diane reports that she is acclimating to the devices.  Sometimes, she will have intermittent issues with \"clarity\" of speech in the right ear (due to possible ETD and previous medical issues with the ear)      Most recent Audiogram: 2024    DEVICE INFORMATION:     Left Device Right Device   Hearing Aid Make: Oticon  Oticon    Hearing Aid Model: Intent 2 mini R  Intent 2 mini R    Serial Number: [id number]   Repair Warranty Date: 2028   Loss/Damage Warranty Status: Active  Active        Length/Output # 2 85 w/lock # 2 85 w/lock   Wax System: Pro Wax miniFIT Pro Wax miniFIT   Dome Size/Style: 8 mm DB  8 mm DB    Battery: Lithium-ion Rechargeable Lithium-ion Rechargeable       Serial Number: [id number]   Warranty Date: 2028    Accessories: N/A       ACTION/ADJUSTMENTS:  Fine tuned the RIGHT aid in the mid frequencies (10 in all) and she reported that she could tell the difference in the gain   Canals are clear   She is not utilizing them all day when home / discussed use for the TV     RECOMMENDATIONS:   RTO 2025 for 1st 5 month FU to fit / she will call with any issues prior     Stanislav Reyes, CCC-A  Clinical Audiologist  [id number]  [facility] PROFESSIONAL [facility] AUDIOLOGY  [address] [address]  "

## 2025-07-28 ENCOUNTER — HOSPITAL ENCOUNTER (OUTPATIENT)
Dept: RADIOLOGY | Facility: HOSPITAL | Age: 80
Discharge: HOME | End: 2025-07-28
Attending: FAMILY MEDICINE
Payer: MEDICARE

## 2025-07-28 DIAGNOSIS — Z12.31 ENCOUNTER FOR SCREENING MAMMOGRAM FOR MALIGNANT NEOPLASM OF BREAST: ICD-10-CM

## 2025-07-28 PROCEDURE — 77063 BREAST TOMOSYNTHESIS BI: CPT

## 2025-07-28 PROCEDURE — 77067 SCR MAMMO BI INCL CAD: CPT

## 2025-08-13 ENCOUNTER — OFFICE VISIT (OUTPATIENT)
Dept: CARDIOLOGY CLINIC | Facility: CLINIC | Age: 80
End: 2025-08-13
Payer: MEDICARE

## 2025-08-14 ENCOUNTER — TELEPHONE (OUTPATIENT)
Dept: OBGYN CLINIC | Facility: HOSPITAL | Age: 80
End: 2025-08-14

## 2025-08-18 ENCOUNTER — APPOINTMENT (OUTPATIENT)
Dept: LAB | Facility: CLINIC | Age: 80
End: 2025-08-18
Payer: MEDICARE

## 2025-08-18 DIAGNOSIS — M25.552 LEFT HIP PAIN: ICD-10-CM

## 2025-08-18 DIAGNOSIS — M16.12 PRIMARY OSTEOARTHRITIS OF LEFT HIP: ICD-10-CM

## 2025-08-18 LAB
ALBUMIN SERPL BCG-MCNC: 4.1 G/DL (ref 3.5–5)
ALP SERPL-CCNC: 60 U/L (ref 34–104)
ALT SERPL W P-5'-P-CCNC: 19 U/L (ref 7–52)
ANION GAP SERPL CALCULATED.3IONS-SCNC: 4 MMOL/L (ref 4–13)
APTT PPP: 30 SECONDS (ref 23–34)
AST SERPL W P-5'-P-CCNC: 17 U/L (ref 13–39)
BASOPHILS # BLD AUTO: 0.03 THOUSANDS/ÂΜL (ref 0–0.1)
BASOPHILS NFR BLD AUTO: 1 % (ref 0–1)
BILIRUB SERPL-MCNC: 1.13 MG/DL (ref 0.2–1)
BUN SERPL-MCNC: 29 MG/DL (ref 5–25)
CALCIUM SERPL-MCNC: 9.1 MG/DL (ref 8.4–10.2)
CHLORIDE SERPL-SCNC: 105 MMOL/L (ref 96–108)
CO2 SERPL-SCNC: 32 MMOL/L (ref 21–32)
CREAT SERPL-MCNC: 0.71 MG/DL (ref 0.6–1.3)
EOSINOPHIL # BLD AUTO: 0.1 THOUSAND/ÂΜL (ref 0–0.61)
EOSINOPHIL NFR BLD AUTO: 2 % (ref 0–6)
ERYTHROCYTE [DISTWIDTH] IN BLOOD BY AUTOMATED COUNT: 13.4 % (ref 11.6–15.1)
GFR SERPL CREATININE-BSD FRML MDRD: 80 ML/MIN/1.73SQ M
GLUCOSE P FAST SERPL-MCNC: 84 MG/DL (ref 65–99)
HCT VFR BLD AUTO: 40.4 % (ref 34.8–46.1)
HGB BLD-MCNC: 13.5 G/DL (ref 11.5–15.4)
IMM GRANULOCYTES # BLD AUTO: 0.01 THOUSAND/UL (ref 0–0.2)
IMM GRANULOCYTES NFR BLD AUTO: 0 % (ref 0–2)
INR PPP: 1.04 (ref 0.85–1.19)
LYMPHOCYTES # BLD AUTO: 1.67 THOUSANDS/ÂΜL (ref 0.6–4.47)
LYMPHOCYTES NFR BLD AUTO: 34 % (ref 14–44)
MCH RBC QN AUTO: 30.4 PG (ref 26.8–34.3)
MCHC RBC AUTO-ENTMCNC: 33.4 G/DL (ref 31.4–37.4)
MCV RBC AUTO: 91 FL (ref 82–98)
MONOCYTES # BLD AUTO: 0.48 THOUSAND/ÂΜL (ref 0.17–1.22)
MONOCYTES NFR BLD AUTO: 10 % (ref 4–12)
NEUTROPHILS # BLD AUTO: 2.62 THOUSANDS/ÂΜL (ref 1.85–7.62)
NEUTS SEG NFR BLD AUTO: 53 % (ref 43–75)
NRBC BLD AUTO-RTO: 0 /100 WBCS
PLATELET # BLD AUTO: 196 THOUSANDS/UL (ref 149–390)
PMV BLD AUTO: 10.6 FL (ref 8.9–12.7)
POTASSIUM SERPL-SCNC: 4.1 MMOL/L (ref 3.5–5.3)
PROT SERPL-MCNC: 6.6 G/DL (ref 6.4–8.4)
PROTHROMBIN TIME: 13.9 SECONDS (ref 12.3–15)
RBC # BLD AUTO: 4.44 MILLION/UL (ref 3.81–5.12)
SODIUM SERPL-SCNC: 141 MMOL/L (ref 135–147)
WBC # BLD AUTO: 4.91 THOUSAND/UL (ref 4.31–10.16)

## 2025-08-18 PROCEDURE — 36415 COLL VENOUS BLD VENIPUNCTURE: CPT

## 2025-08-18 PROCEDURE — 80053 COMPREHEN METABOLIC PANEL: CPT

## 2025-08-18 PROCEDURE — 85025 COMPLETE CBC W/AUTO DIFF WBC: CPT

## 2025-08-18 PROCEDURE — 85610 PROTHROMBIN TIME: CPT

## 2025-08-18 PROCEDURE — 85730 THROMBOPLASTIN TIME PARTIAL: CPT

## 2025-08-18 PROCEDURE — 87081 CULTURE SCREEN ONLY: CPT

## 2025-08-19 LAB
EST. AVERAGE GLUCOSE BLD GHB EST-MCNC: 105 MG/DL
HBA1C MFR BLD: 5.3 %
MRSA NOSE QL CULT: NORMAL

## (undated) DEVICE — REMOTE CONTROL: Brand: AXONICS

## (undated) DEVICE — BETHLEHEM UNIVERSAL MINOR GEN: Brand: CARDINAL HEALTH

## (undated) DEVICE — 2108 SERIES SAGITTAL BLADE (18.6 X 0.64 X 61.1MM)

## (undated) DEVICE — PENCIL ELECTROSURG E-Z CLEAN -0035H

## (undated) DEVICE — FLEXIBLE ADHESIVE BANDAGE,X-LARGE: Brand: CURITY

## (undated) DEVICE — DRAPE EQUIPMENT RF WAND

## (undated) DEVICE — GLOVE INDICATOR PI UNDERGLOVE SZ 8 BLUE

## (undated) DEVICE — DRESSING MEPILEX AG BORDER POST-OP 4 X 8 IN

## (undated) DEVICE — SOLIDIFIER FLUID WASTE CONTROL 1500ML

## (undated) DEVICE — DISPOSABLE OR TOWEL: Brand: CARDINAL HEALTH

## (undated) DEVICE — BETHLEHEM TOTAL HIP, KIT: Brand: CARDINAL HEALTH

## (undated) DEVICE — TUBING BUBBLE CLEAR 5MM X 100 FT NS

## (undated) DEVICE — SYRINGE 10ML LL

## (undated) DEVICE — TOWEL SURG XR DETECT GREEN STRL RFD

## (undated) DEVICE — DRAPE C-ARM X-RAY

## (undated) DEVICE — IMPERVIOUS STOCKINETTE: Brand: DEROYAL

## (undated) DEVICE — GLOVE SRG BIOGEL 8

## (undated) DEVICE — NEEDLE 22 G X 1 1/2 SAFETY

## (undated) DEVICE — LEAD IMPLANT KIT: Brand: AXONICS

## (undated) DEVICE — CAPIT HIP BIPOLAR HEAD POR PRIMARY

## (undated) DEVICE — INTENDED FOR TISSUE SEPARATION, AND OTHER PROCEDURES THAT REQUIRE A SHARP SURGICAL BLADE TO PUNCTURE OR CUT.: Brand: BARD-PARKER SAFETY BLADES SIZE 11, STERILE

## (undated) DEVICE — 1200CC GUARDIAN II: Brand: GUARDIAN

## (undated) DEVICE — DRAPE SHEET THREE QUARTER

## (undated) DEVICE — BRUSH ENDO CLEANING DBL-HEADER

## (undated) DEVICE — ADHESIVE SKIN HIGH VISCOSITY EXOFIN 1ML

## (undated) DEVICE — CAPIT HIP STEM ACTIS EMPHASYS

## (undated) DEVICE — ALCOHOL ISOPROPYL BLUE

## (undated) DEVICE — AIRLIFE™  ADULT CUSHION NASAL CANNULA WITH 7 FOOT (2.1 M) CRUSH-RESISTANT OXYGEN TUBING, AND U/CONNECT-IT ADAPTER: Brand: AIRLIFE™

## (undated) DEVICE — 3M™ IOBAN™ 2 ANTIMICROBIAL INCISE DRAPE 6650EZ: Brand: IOBAN™ 2

## (undated) DEVICE — LUBRICANT SURGILUBE TUBE 4 OZ  FLIP TOP

## (undated) DEVICE — DRESSING MEPILEX BORDER 4 X 10 IN

## (undated) DEVICE — MEDI-VAC YANKAUER SUCTION HANDLE W/BULBOUS AND CONTROL VENT: Brand: CARDINAL HEALTH

## (undated) DEVICE — GAUZE SPONGES,16 PLY: Brand: CURITY

## (undated) DEVICE — SMARTSET HV HIGH VISCOSITY BONE CEMENT 40G
Type: IMPLANTABLE DEVICE | Site: HIP | Status: NON-FUNCTIONAL
Brand: SMARTSET

## (undated) DEVICE — BOWL AND CEMENT CARTRIDGE WITH BREAKAWAY FEMORAL NOZZLE: Brand: ACM

## (undated) DEVICE — NEEDLE SPINAL 22G X 3.5 IN PLST HUB

## (undated) DEVICE — SUT PDS II 0 CT-1 36 IN Z346H

## (undated) DEVICE — ELECTRODE BLADE MOD E-Z CLEAN  2.75IN 7CM -0012AM

## (undated) DEVICE — MEDI-VAC YANKAUER SUCTION HANDLE W/STRAIGHT TIP & CONTROL VENT: Brand: CARDINAL HEALTH

## (undated) DEVICE — MEDI-VAC YANKAUER SUCTION HANDLE: Brand: CARDINAL HEALTH

## (undated) DEVICE — HOOD WITH PEEL AWAY FACE SHIELD: Brand: T7PLUS

## (undated) DEVICE — "MH-438 A/W VLVE F/140 EVIS-140": Brand: AIR/WATER VALVE

## (undated) DEVICE — HOOD: Brand: T7PLUS

## (undated) DEVICE — HEWSON SUTURE RETRIEVER: Brand: HEWSON SUTURE RETRIEVER

## (undated) DEVICE — SCD SEQUENTIAL COMPRESSION COMFORT SLEEVE MEDIUM KNEE LENGTH: Brand: KENDALL SCD

## (undated) DEVICE — SUT MONOCRYL 3-0 PS-2 27 IN Y427H

## (undated) DEVICE — "MH-443 SUCTION VALVE F/EVIS140 EVIS160": Brand: SUCTION VALVE

## (undated) DEVICE — Device

## (undated) DEVICE — GLOVE SRG LF STRL BGL SKNSNS 8 PF

## (undated) DEVICE — CHARGING SYSTEM: Brand: AXONICS

## (undated) DEVICE — BONE PREPARATION KIT
Type: IMPLANTABLE DEVICE | Site: HIP | Status: NON-FUNCTIONAL
Brand: BIOPREP

## (undated) DEVICE — DISPOSABLE BIOPSY VALVE MAJ-1555: Brand: SINGLE USE BIOPSY VALVE (STERILE)

## (undated) DEVICE — MEDI-VAC YANK SUCT HNDL W/TPRD BULBOUS TIP: Brand: CARDINAL HEALTH

## (undated) DEVICE — HANDPIECE SET WITH RETRACTABLE COAXIAL FAN SPRAY TIP AND SUCTION TUBE: Brand: INTERPULSE

## (undated) DEVICE — GLOVE EXAM NON-STRL NTRL PLUS LRG PF

## (undated) DEVICE — NEEDLE 25G X 1 1/2

## (undated) DEVICE — PLASTIC ADHESIVE BANDAGE: Brand: CURITY

## (undated) DEVICE — SUT SILK 2-0 SH 30 IN K833H

## (undated) DEVICE — TUBING SUCTION 5MM X 12 FT

## (undated) DEVICE — DISPOSABLE EQUIPMENT COVER: Brand: SMALL TOWEL DRAPE

## (undated) DEVICE — ACE WRAP 6 IN UNSTERILE

## (undated) DEVICE — IV SET EXT SM BORE CARESITE 8IN

## (undated) DEVICE — 2000CC GUARDIAN II: Brand: GUARDIAN

## (undated) DEVICE — SUT MONOCRYL 2-0 SH 27 IN Y417H

## (undated) DEVICE — TRAY PAIN SUPPORT

## (undated) DEVICE — TUBING AUX CHANNEL

## (undated) DEVICE — PROVE COVER: Brand: UNBRANDED

## (undated) DEVICE — PREP PAD BNS: Brand: CONVERTORS

## (undated) DEVICE — HEAVY DUTY TABLE COVER: Brand: CONVERTORS

## (undated) DEVICE — DRESSING MEPILEX AG BORDER 4 X 8 IN

## (undated) DEVICE — CHEST/BREAST DRAPE: Brand: CONVERTORS

## (undated) DEVICE — SUT ETHIBOND 5 V-37 30 IN MB66G

## (undated) DEVICE — CHLORAPREP HI-LITE 10.5ML ORANGE

## (undated) DEVICE — "MAJ-901 WATER CONTAINER SET CV-160/140": Brand: WATER CONTAINER

## (undated) DEVICE — INTENDED FOR TISSUE SEPARATION, AND OTHER PROCEDURES THAT REQUIRE A SHARP SURGICAL BLADE TO PUNCTURE OR CUT.: Brand: BARD-PARKER SAFETY BLADES SIZE 15, STERILE

## (undated) DEVICE — GLOVE PI ULTRA TOUCH SZ.8.0